# Patient Record
Sex: MALE | Race: WHITE | Employment: OTHER | URBAN - NONMETROPOLITAN AREA
[De-identification: names, ages, dates, MRNs, and addresses within clinical notes are randomized per-mention and may not be internally consistent; named-entity substitution may affect disease eponyms.]

---

## 2019-11-25 ENCOUNTER — HOSPITAL ENCOUNTER (INPATIENT)
Age: 64
LOS: 6 days | Discharge: HOME | DRG: 246 | End: 2019-12-01
Attending: EMERGENCY MEDICINE | Admitting: FAMILY MEDICINE
Payer: MEDICARE

## 2019-11-25 ENCOUNTER — APPOINTMENT (OUTPATIENT)
Dept: GENERAL RADIOLOGY | Age: 64
DRG: 246 | End: 2019-11-25
Payer: MEDICARE

## 2019-11-25 PROBLEM — I50.9 ACUTE DECOMPENSATED HEART FAILURE (HCC): Status: ACTIVE | Noted: 2019-11-25

## 2019-11-25 LAB
ALBUMIN SERPL-MCNC: 3.7 G/DL (ref 3.5–5.1)
ALP BLD-CCNC: 79 U/L (ref 38–126)
ALT SERPL-CCNC: 29 U/L (ref 11–66)
ANION GAP SERPL CALCULATED.3IONS-SCNC: 13 MEQ/L (ref 8–16)
APTT: 25.1 SECONDS (ref 22–38)
AST SERPL-CCNC: 30 U/L (ref 5–40)
BASOPHILS # BLD: 0.7 %
BASOPHILS ABSOLUTE: 0.1 THOU/MM3 (ref 0–0.1)
BILIRUB SERPL-MCNC: 0.5 MG/DL (ref 0.3–1.2)
BILIRUBIN DIRECT: < 0.2 MG/DL (ref 0–0.3)
BUN BLDV-MCNC: 16 MG/DL (ref 7–22)
CALCIUM SERPL-MCNC: 9.1 MG/DL (ref 8.5–10.5)
CHLORIDE BLD-SCNC: 95 MEQ/L (ref 98–111)
CO2: 26 MEQ/L (ref 23–33)
CREAT SERPL-MCNC: 0.7 MG/DL (ref 0.4–1.2)
EKG ATRIAL RATE: 95 BPM
EKG P AXIS: 58 DEGREES
EKG P-R INTERVAL: 134 MS
EKG Q-T INTERVAL: 384 MS
EKG QRS DURATION: 106 MS
EKG QTC CALCULATION (BAZETT): 482 MS
EKG R AXIS: 19 DEGREES
EKG T AXIS: 92 DEGREES
EKG VENTRICULAR RATE: 95 BPM
EOSINOPHIL # BLD: 0.9 %
EOSINOPHILS ABSOLUTE: 0.1 THOU/MM3 (ref 0–0.4)
ERYTHROCYTE [DISTWIDTH] IN BLOOD BY AUTOMATED COUNT: 12.3 % (ref 11.5–14.5)
ERYTHROCYTE [DISTWIDTH] IN BLOOD BY AUTOMATED COUNT: 46.4 FL (ref 35–45)
GFR SERPL CREATININE-BSD FRML MDRD: > 90 ML/MIN/1.73M2
GLUCOSE BLD-MCNC: 101 MG/DL (ref 70–108)
HCT VFR BLD CALC: 40.7 % (ref 42–52)
HEMOGLOBIN: 14.1 GM/DL (ref 14–18)
IMMATURE GRANS (ABS): 0.03 THOU/MM3 (ref 0–0.07)
IMMATURE GRANULOCYTES: 0.4 %
INR BLD: 1.1 (ref 0.85–1.13)
LYMPHOCYTES # BLD: 14.9 %
LYMPHOCYTES ABSOLUTE: 1.1 THOU/MM3 (ref 1–4.8)
MCH RBC QN AUTO: 35.6 PG (ref 26–33)
MCHC RBC AUTO-ENTMCNC: 34.6 GM/DL (ref 32.2–35.5)
MCV RBC AUTO: 102.8 FL (ref 80–94)
MONOCYTES # BLD: 9.2 %
MONOCYTES ABSOLUTE: 0.7 THOU/MM3 (ref 0.4–1.3)
NUCLEATED RED BLOOD CELLS: 0 /100 WBC
OSMOLALITY CALCULATION: 269.6 MOSMOL/KG (ref 275–300)
PLATELET # BLD: 262 THOU/MM3 (ref 130–400)
PMV BLD AUTO: 10.3 FL (ref 9.4–12.4)
POTASSIUM SERPL-SCNC: 4.5 MEQ/L (ref 3.5–5.2)
PRO-BNP: ABNORMAL PG/ML (ref 0–900)
PROCALCITONIN: 0.05 NG/ML (ref 0.01–0.09)
RBC # BLD: 3.96 MILL/MM3 (ref 4.7–6.1)
SEG NEUTROPHILS: 73.9 %
SEGMENTED NEUTROPHILS ABSOLUTE COUNT: 5.6 THOU/MM3 (ref 1.8–7.7)
SODIUM BLD-SCNC: 134 MEQ/L (ref 135–145)
TOTAL PROTEIN: 6.2 G/DL (ref 6.1–8)
TROPONIN T: < 0.01 NG/ML
WBC # BLD: 7.6 THOU/MM3 (ref 4.8–10.8)

## 2019-11-25 PROCEDURE — 85610 PROTHROMBIN TIME: CPT

## 2019-11-25 PROCEDURE — 85025 COMPLETE CBC W/AUTO DIFF WBC: CPT

## 2019-11-25 PROCEDURE — 80076 HEPATIC FUNCTION PANEL: CPT

## 2019-11-25 PROCEDURE — 99223 1ST HOSP IP/OBS HIGH 75: CPT | Performed by: FAMILY MEDICINE

## 2019-11-25 PROCEDURE — 84443 ASSAY THYROID STIM HORMONE: CPT

## 2019-11-25 PROCEDURE — 2500000003 HC RX 250 WO HCPCS: Performed by: EMERGENCY MEDICINE

## 2019-11-25 PROCEDURE — 84484 ASSAY OF TROPONIN QUANT: CPT

## 2019-11-25 PROCEDURE — 84145 PROCALCITONIN (PCT): CPT

## 2019-11-25 PROCEDURE — 85730 THROMBOPLASTIN TIME PARTIAL: CPT

## 2019-11-25 PROCEDURE — 93005 ELECTROCARDIOGRAM TRACING: CPT | Performed by: EMERGENCY MEDICINE

## 2019-11-25 PROCEDURE — 36415 COLL VENOUS BLD VENIPUNCTURE: CPT

## 2019-11-25 PROCEDURE — 71045 X-RAY EXAM CHEST 1 VIEW: CPT

## 2019-11-25 PROCEDURE — 99284 EMERGENCY DEPT VISIT MOD MDM: CPT

## 2019-11-25 PROCEDURE — 96374 THER/PROPH/DIAG INJ IV PUSH: CPT

## 2019-11-25 PROCEDURE — 1200000003 HC TELEMETRY R&B

## 2019-11-25 PROCEDURE — 80048 BASIC METABOLIC PNL TOTAL CA: CPT

## 2019-11-25 PROCEDURE — 83880 ASSAY OF NATRIURETIC PEPTIDE: CPT

## 2019-11-25 RX ORDER — ONDANSETRON 2 MG/ML
4 INJECTION INTRAMUSCULAR; INTRAVENOUS EVERY 6 HOURS PRN
Status: DISCONTINUED | OUTPATIENT
Start: 2019-11-25 | End: 2019-12-02 | Stop reason: HOSPADM

## 2019-11-25 RX ORDER — BUMETANIDE 0.25 MG/ML
1 INJECTION, SOLUTION INTRAMUSCULAR; INTRAVENOUS ONCE
Status: COMPLETED | OUTPATIENT
Start: 2019-11-25 | End: 2019-11-25

## 2019-11-25 RX ORDER — BUMETANIDE 0.25 MG/ML
1 INJECTION, SOLUTION INTRAMUSCULAR; INTRAVENOUS 2 TIMES DAILY
Status: DISCONTINUED | OUTPATIENT
Start: 2019-11-26 | End: 2019-11-26

## 2019-11-25 RX ORDER — ACETAMINOPHEN 500 MG
500 TABLET ORAL DAILY
Status: ON HOLD | COMMUNITY
End: 2019-12-01 | Stop reason: SDUPTHER

## 2019-11-25 RX ORDER — IBUPROFEN 200 MG
200 TABLET ORAL DAILY
Status: ON HOLD | COMMUNITY
End: 2019-11-28 | Stop reason: HOSPADM

## 2019-11-25 RX ORDER — SODIUM CHLORIDE 0.9 % (FLUSH) 0.9 %
10 SYRINGE (ML) INJECTION EVERY 12 HOURS SCHEDULED
Status: DISCONTINUED | OUTPATIENT
Start: 2019-11-26 | End: 2019-12-02 | Stop reason: HOSPADM

## 2019-11-25 RX ORDER — SODIUM CHLORIDE 0.9 % (FLUSH) 0.9 %
10 SYRINGE (ML) INJECTION PRN
Status: DISCONTINUED | OUTPATIENT
Start: 2019-11-25 | End: 2019-11-27 | Stop reason: SDUPTHER

## 2019-11-25 RX ADMIN — BUMETANIDE 1 MG: 0.25 INJECTION INTRAMUSCULAR; INTRAVENOUS at 21:41

## 2019-11-25 ASSESSMENT — PAIN DESCRIPTION - ONSET
ONSET: ON-GOING
ONSET: ON-GOING

## 2019-11-25 ASSESSMENT — PAIN SCALES - GENERAL
PAINLEVEL_OUTOF10: 0
PAINLEVEL_OUTOF10: 6
PAINLEVEL_OUTOF10: 6

## 2019-11-25 ASSESSMENT — ENCOUNTER SYMPTOMS
TROUBLE SWALLOWING: 0
ABDOMINAL DISTENTION: 0
PHOTOPHOBIA: 0
DIARRHEA: 0
CONSTIPATION: 0
BLOOD IN STOOL: 0
RHINORRHEA: 0
BACK PAIN: 0
EYE PAIN: 0
COUGH: 0
SINUS PRESSURE: 0
EYE ITCHING: 0
VOICE CHANGE: 0
EYE REDNESS: 0
CHOKING: 0
NAUSEA: 0
EYE DISCHARGE: 0
SORE THROAT: 0
WHEEZING: 0
ABDOMINAL PAIN: 0
CHEST TIGHTNESS: 0
SHORTNESS OF BREATH: 0
VOMITING: 0

## 2019-11-25 ASSESSMENT — PAIN DESCRIPTION - LOCATION
LOCATION: ANKLE;FOOT
LOCATION: ANKLE;FOOT

## 2019-11-25 ASSESSMENT — PAIN DESCRIPTION - PROGRESSION
CLINICAL_PROGRESSION: NOT CHANGED
CLINICAL_PROGRESSION: NOT CHANGED

## 2019-11-25 ASSESSMENT — PAIN DESCRIPTION - PAIN TYPE
TYPE: ACUTE PAIN
TYPE: ACUTE PAIN

## 2019-11-25 ASSESSMENT — PAIN DESCRIPTION - FREQUENCY
FREQUENCY: CONTINUOUS
FREQUENCY: CONTINUOUS

## 2019-11-25 ASSESSMENT — PAIN DESCRIPTION - ORIENTATION
ORIENTATION: LEFT;RIGHT
ORIENTATION: RIGHT;LEFT

## 2019-11-25 ASSESSMENT — PAIN DESCRIPTION - DESCRIPTORS
DESCRIPTORS: SHOOTING
DESCRIPTORS: SHOOTING

## 2019-11-25 NOTE — LETTER
Beneficiary Notification Letter     This [place] Provider is Participating in an Innovative Payment and [address] [place] [address] [place] from Henri Maldonado:   Cheryl is participating in a Medicare initiative called the [facility] for [address] [facility]. You are receiving this letter because your health care provider has identified you as a patient who is receiving care through this initiative. Health care providers participating in the [facility], including Cheryl, will work with Medicare to improve care for patients. Your Medicare rights have not been changed. You still have all the same Medicare rights and protections, including the right to choose which hospital, doctor, or other health care provider you see. However, because Cheryl chose to participate in the [address] [place] [address], all Medicare beneficiaries who meet the eligibility criteria of this initiative will receive care under the initiative. If you do not wish to receive care under the [employer] [facility] [address] [facility], you must choose a health care provider that does not participate in this initiative for your care. Regardless of which health care provider you see, Medicare will continue to cover all of your medically necessary services. Bundled Payments for Care Improvement Advanced aims to help improve your care     The [employer] [facility] [facility] is an innovative Medicare initiative that encourages your doctors, hospitals, and other health care providers to work more closely together so you get better care during and following certain hospital stays.  In this initiative, doctors and hospitals may work closely with certain health care providers and 1-800- MEDICARE (4-572.273.7572). TTY users should call 6-781.252.4523. · To find a different doctor, visit Medicare's Physician Compare website, HDTapes.co.nz, or call 1-800-MEDICARE (466 6330). TTY users should call 0-419.375.1535. · To find a different skilled nursing facility, visit [employer] website, https://www.[employer]/, or call 1-800-MEDICARE (1- 353.541.2590). TTY users should call 6-592.586.6848. · To find a different long term care hospital, visit [facility] [address] Compare website, bluebird bi[employer]Hookflash[employer]ant, or call 1-800- MEDICARE (321 8547). TTY users should call 8-488.671.6236. · To find a different inpatient rehabilitation facility, visit [facility] Compare website, www.medicare.gov/ inpatientrehabilitation facilitycompare, or call 1-800-MEDICARE (6-437.236.8834). TTY users should call 2- 365.299.8390. · To find a different home health agency, visit [address] website, www.medicare.gov/homehealthcompare, or call 1-800-MEDICARE (8-180- 430-3064). TTY users should call 8-205.241.4299.

## 2019-11-26 ENCOUNTER — APPOINTMENT (OUTPATIENT)
Dept: GENERAL RADIOLOGY | Age: 64
DRG: 246 | End: 2019-11-26
Payer: MEDICARE

## 2019-11-26 PROBLEM — I50.21 ACUTE SYSTOLIC (CONGESTIVE) HEART FAILURE (HCC): Status: ACTIVE | Noted: 2019-11-25

## 2019-11-26 PROBLEM — F10.10 ETOH ABUSE: Status: ACTIVE | Noted: 2019-11-26

## 2019-11-26 PROBLEM — I47.29 NONSUSTAINED VENTRICULAR TACHYCARDIA: Status: ACTIVE | Noted: 2019-11-26

## 2019-11-26 PROBLEM — I48.0 PAROXYSMAL ATRIAL FIBRILLATION (HCC): Status: ACTIVE | Noted: 2019-11-26

## 2019-11-26 PROBLEM — Z72.0 TOBACCO ABUSE: Status: ACTIVE | Noted: 2019-11-26

## 2019-11-26 PROBLEM — I10 ESSENTIAL HYPERTENSION: Status: ACTIVE | Noted: 2019-11-26

## 2019-11-26 LAB
ANION GAP SERPL CALCULATED.3IONS-SCNC: 11 MEQ/L (ref 8–16)
BASOPHILS # BLD: 0.6 %
BASOPHILS ABSOLUTE: 0.1 THOU/MM3 (ref 0–0.1)
BUN BLDV-MCNC: 16 MG/DL (ref 7–22)
CALCIUM SERPL-MCNC: 9 MG/DL (ref 8.5–10.5)
CHLORIDE BLD-SCNC: 93 MEQ/L (ref 98–111)
CHOLESTEROL, TOTAL: 135 MG/DL (ref 100–199)
CO2: 26 MEQ/L (ref 23–33)
CREAT SERPL-MCNC: 0.7 MG/DL (ref 0.4–1.2)
EOSINOPHIL # BLD: 1.3 %
EOSINOPHILS ABSOLUTE: 0.1 THOU/MM3 (ref 0–0.4)
ERYTHROCYTE [DISTWIDTH] IN BLOOD BY AUTOMATED COUNT: 12.1 % (ref 11.5–14.5)
ERYTHROCYTE [DISTWIDTH] IN BLOOD BY AUTOMATED COUNT: 45.9 FL (ref 35–45)
GFR SERPL CREATININE-BSD FRML MDRD: > 90 ML/MIN/1.73M2
GLUCOSE BLD-MCNC: 115 MG/DL (ref 70–108)
HCT VFR BLD CALC: 41.6 % (ref 42–52)
HDLC SERPL-MCNC: 26 MG/DL
HEMOGLOBIN: 14.6 GM/DL (ref 14–18)
IMMATURE GRANS (ABS): 0.03 THOU/MM3 (ref 0–0.07)
IMMATURE GRANULOCYTES: 0.3 %
LDL CHOLESTEROL CALCULATED: 89 MG/DL
LV EF: 20 %
LVEF MODALITY: NORMAL
LYMPHOCYTES # BLD: 15.9 %
LYMPHOCYTES ABSOLUTE: 1.5 THOU/MM3 (ref 1–4.8)
MAGNESIUM: 1.8 MG/DL (ref 1.6–2.4)
MAGNESIUM: 2 MG/DL (ref 1.6–2.4)
MCH RBC QN AUTO: 35.9 PG (ref 26–33)
MCHC RBC AUTO-ENTMCNC: 35.1 GM/DL (ref 32.2–35.5)
MCV RBC AUTO: 102.2 FL (ref 80–94)
MONOCYTES # BLD: 9.1 %
MONOCYTES ABSOLUTE: 0.9 THOU/MM3 (ref 0.4–1.3)
NUCLEATED RED BLOOD CELLS: 0 /100 WBC
PLATELET # BLD: 268 THOU/MM3 (ref 130–400)
PMV BLD AUTO: 10.5 FL (ref 9.4–12.4)
POTASSIUM SERPL-SCNC: 4.1 MEQ/L (ref 3.5–5.2)
POTASSIUM SERPL-SCNC: 4.3 MEQ/L (ref 3.5–5.2)
RBC # BLD: 4.07 MILL/MM3 (ref 4.7–6.1)
SEG NEUTROPHILS: 72.8 %
SEGMENTED NEUTROPHILS ABSOLUTE COUNT: 6.8 THOU/MM3 (ref 1.8–7.7)
SODIUM BLD-SCNC: 130 MEQ/L (ref 135–145)
TRIGL SERPL-MCNC: 101 MG/DL (ref 0–199)
TSH SERPL DL<=0.05 MIU/L-ACNC: 1.36 UIU/ML (ref 0.4–4.2)
WBC # BLD: 9.4 THOU/MM3 (ref 4.8–10.8)

## 2019-11-26 PROCEDURE — 80048 BASIC METABOLIC PNL TOTAL CA: CPT

## 2019-11-26 PROCEDURE — 83735 ASSAY OF MAGNESIUM: CPT

## 2019-11-26 PROCEDURE — 2580000003 HC RX 258: Performed by: INTERNAL MEDICINE

## 2019-11-26 PROCEDURE — 36415 COLL VENOUS BLD VENIPUNCTURE: CPT

## 2019-11-26 PROCEDURE — 94760 N-INVAS EAR/PLS OXIMETRY 1: CPT

## 2019-11-26 PROCEDURE — 2500000003 HC RX 250 WO HCPCS: Performed by: FAMILY MEDICINE

## 2019-11-26 PROCEDURE — 2500000003 HC RX 250 WO HCPCS: Performed by: INTERNAL MEDICINE

## 2019-11-26 PROCEDURE — 93306 TTE W/DOPPLER COMPLETE: CPT

## 2019-11-26 PROCEDURE — 84132 ASSAY OF SERUM POTASSIUM: CPT

## 2019-11-26 PROCEDURE — 6360000002 HC RX W HCPCS: Performed by: INTERNAL MEDICINE

## 2019-11-26 PROCEDURE — 6370000000 HC RX 637 (ALT 250 FOR IP): Performed by: INTERNAL MEDICINE

## 2019-11-26 PROCEDURE — 6370000000 HC RX 637 (ALT 250 FOR IP): Performed by: FAMILY MEDICINE

## 2019-11-26 PROCEDURE — 85025 COMPLETE CBC W/AUTO DIFF WBC: CPT

## 2019-11-26 PROCEDURE — 51798 US URINE CAPACITY MEASURE: CPT

## 2019-11-26 PROCEDURE — 99223 1ST HOSP IP/OBS HIGH 75: CPT | Performed by: INTERNAL MEDICINE

## 2019-11-26 PROCEDURE — 6360000002 HC RX W HCPCS: Performed by: FAMILY MEDICINE

## 2019-11-26 PROCEDURE — 2580000003 HC RX 258: Performed by: FAMILY MEDICINE

## 2019-11-26 PROCEDURE — 99233 SBSQ HOSP IP/OBS HIGH 50: CPT | Performed by: INTERNAL MEDICINE

## 2019-11-26 PROCEDURE — 71045 X-RAY EXAM CHEST 1 VIEW: CPT

## 2019-11-26 PROCEDURE — 1200000003 HC TELEMETRY R&B

## 2019-11-26 PROCEDURE — 80061 LIPID PANEL: CPT

## 2019-11-26 RX ORDER — SODIUM CHLORIDE 0.9 % (FLUSH) 0.9 %
10 SYRINGE (ML) INJECTION EVERY 12 HOURS SCHEDULED
Status: DISCONTINUED | OUTPATIENT
Start: 2019-11-26 | End: 2019-12-02 | Stop reason: HOSPADM

## 2019-11-26 RX ORDER — PHENOBARBITAL 32.4 MG/1
32.4 TABLET ORAL 2 TIMES DAILY
Status: DISCONTINUED | OUTPATIENT
Start: 2019-11-27 | End: 2019-11-28

## 2019-11-26 RX ORDER — NICOTINE 21 MG/24HR
1 PATCH, TRANSDERMAL 24 HOURS TRANSDERMAL DAILY
Status: DISCONTINUED | OUTPATIENT
Start: 2019-11-26 | End: 2019-12-02 | Stop reason: HOSPADM

## 2019-11-26 RX ORDER — SPIRONOLACTONE 25 MG/1
25 TABLET ORAL DAILY
Status: DISCONTINUED | OUTPATIENT
Start: 2019-11-26 | End: 2019-12-02 | Stop reason: HOSPADM

## 2019-11-26 RX ORDER — BUMETANIDE 1 MG/1
1 TABLET ORAL 2 TIMES DAILY
Status: DISCONTINUED | OUTPATIENT
Start: 2019-11-26 | End: 2019-11-26

## 2019-11-26 RX ORDER — FOLIC ACID 1 MG/1
1 TABLET ORAL DAILY
Status: DISCONTINUED | OUTPATIENT
Start: 2019-11-26 | End: 2019-12-02 | Stop reason: HOSPADM

## 2019-11-26 RX ORDER — BUMETANIDE 1 MG/1
0.5 TABLET ORAL 2 TIMES DAILY
Status: DISCONTINUED | OUTPATIENT
Start: 2019-11-27 | End: 2019-12-02 | Stop reason: HOSPADM

## 2019-11-26 RX ORDER — LISINOPRIL 2.5 MG/1
2.5 TABLET ORAL 2 TIMES DAILY
Status: DISCONTINUED | OUTPATIENT
Start: 2019-11-26 | End: 2019-11-26

## 2019-11-26 RX ORDER — THIAMINE MONONITRATE (VIT B1) 100 MG
100 TABLET ORAL DAILY
Status: DISCONTINUED | OUTPATIENT
Start: 2019-11-26 | End: 2019-12-02 | Stop reason: HOSPADM

## 2019-11-26 RX ORDER — LISINOPRIL 5 MG/1
5 TABLET ORAL ONCE
Status: COMPLETED | OUTPATIENT
Start: 2019-11-26 | End: 2019-11-26

## 2019-11-26 RX ORDER — SODIUM CHLORIDE 0.9 % (FLUSH) 0.9 %
10 SYRINGE (ML) INJECTION PRN
Status: DISCONTINUED | OUTPATIENT
Start: 2019-11-26 | End: 2019-12-02 | Stop reason: HOSPADM

## 2019-11-26 RX ORDER — PHENOBARBITAL 32.4 MG/1
64.8 TABLET ORAL 2 TIMES DAILY
Status: DISPENSED | OUTPATIENT
Start: 2019-11-26 | End: 2019-11-27

## 2019-11-26 RX ORDER — CARVEDILOL 6.25 MG/1
6.25 TABLET ORAL 2 TIMES DAILY WITH MEALS
Status: DISCONTINUED | OUTPATIENT
Start: 2019-11-26 | End: 2019-12-02 | Stop reason: HOSPADM

## 2019-11-26 RX ORDER — BUMETANIDE 0.25 MG/ML
1 INJECTION, SOLUTION INTRAMUSCULAR; INTRAVENOUS ONCE
Status: COMPLETED | OUTPATIENT
Start: 2019-11-26 | End: 2019-11-26

## 2019-11-26 RX ADMIN — PHENOBARBITAL SODIUM 227.5 MG: 65 INJECTION INTRAMUSCULAR; INTRAVENOUS at 21:58

## 2019-11-26 RX ADMIN — PHENOBARBITAL SODIUM 227.5 MG: 65 INJECTION INTRAMUSCULAR; INTRAVENOUS at 11:21

## 2019-11-26 RX ADMIN — MAGNESIUM SULFATE HEPTAHYDRATE 1 G: 500 INJECTION, SOLUTION INTRAMUSCULAR; INTRAVENOUS at 13:11

## 2019-11-26 RX ADMIN — BUMETANIDE 1 MG: 0.25 INJECTION INTRAMUSCULAR; INTRAVENOUS at 17:45

## 2019-11-26 RX ADMIN — SODIUM CHLORIDE, PRESERVATIVE FREE 10 ML: 5 INJECTION INTRAVENOUS at 08:53

## 2019-11-26 RX ADMIN — ENOXAPARIN SODIUM 60 MG: 60 INJECTION SUBCUTANEOUS at 17:45

## 2019-11-26 RX ADMIN — LISINOPRIL 5 MG: 5 TABLET ORAL at 13:11

## 2019-11-26 RX ADMIN — PHENOBARBITAL 64.8 MG: 32.4 TABLET ORAL at 23:52

## 2019-11-26 RX ADMIN — Medication 100 MG: at 08:52

## 2019-11-26 RX ADMIN — BUMETANIDE 1 MG: 0.25 INJECTION INTRAMUSCULAR; INTRAVENOUS at 08:52

## 2019-11-26 RX ADMIN — CARVEDILOL 6.25 MG: 6.25 TABLET, FILM COATED ORAL at 14:23

## 2019-11-26 RX ADMIN — SODIUM CHLORIDE, PRESERVATIVE FREE 10 ML: 5 INJECTION INTRAVENOUS at 22:00

## 2019-11-26 RX ADMIN — ENOXAPARIN SODIUM 40 MG: 40 INJECTION SUBCUTANEOUS at 08:52

## 2019-11-26 RX ADMIN — SPIRONOLACTONE 25 MG: 25 TABLET ORAL at 14:23

## 2019-11-26 RX ADMIN — PHENOBARBITAL SODIUM 227.5 MG: 65 INJECTION INTRAMUSCULAR; INTRAVENOUS at 16:10

## 2019-11-26 RX ADMIN — LISINOPRIL 2.5 MG: 2.5 TABLET ORAL at 10:08

## 2019-11-26 RX ADMIN — FOLIC ACID 1 MG: 1 TABLET ORAL at 08:52

## 2019-11-26 ASSESSMENT — PAIN SCALES - GENERAL
PAINLEVEL_OUTOF10: 0

## 2019-11-26 NOTE — FLOWSHEET NOTE
11/26/19 0843   Vital Signs   Temp 98.3 °F (36.8 °C)   Temp Source Oral   Pulse 98   Heart Rate Source Monitor   Resp 16   BP (!) 162/102  (Manual - Dr. Aura Galicia)   BP Location Left upper arm     Dr. Lorene Tim notified of manual /102, he has been consistently hypertensive since admission from the ED with no BP meds given or anything PRN. See orders placed.

## 2019-11-26 NOTE — ED TRIAGE NOTES
Pt to er. Pt c/o bilateral leg swelling from the calfs down. Pt states hurts to walk on them. Denies any SOB or CP. States been going on for a week.

## 2019-11-26 NOTE — PROGRESS NOTES
Utilize [facility] alcohol withdrawal scale (Based on [place] Modified Alcohol Withdrawal Scale). Tabulate score based on classifications including Tremor, Sweating, Hallucination, Orientation, and Agitation. Tremor: 0  Sweatin  Hallucinations: 0  Orientation: 0  Agitation: 0  Total Score: 0  Action perform as described below     Tremor:  No tremor is 0 points. Tremor on movement is 1 point. Tremor at rest is 2 points. Sweating: No Sweat 0 points. Moist is 1 point. Drenching sweats is 2 points. Hallucinations: No present 0 points. Dissuadable is 1 point. Not dissuadable is 2 points. Orientation: Oriented 0 points. Vague/detached 1 point. Disoriented/no contact 2 points. Agitation: Calm 0 points. Anxious 1 point. Panicky 2 points. Check scale every 2 hours. Discontinue scoring with 4 consecutive scorings of 0. Scale 0: No phenobarbital given. Re-assess every 30 minutes as needed. Scale 1-3: Phenobarbital 130 mg IV over 3 minutes. Re-assess every 30 minutes as needed. May administer every 30 minutes to a maximum dose of phenobarbital 1040 mg in 24 hours! Scale 4-8: Phenobarbital 260 mg IV over 5 minutes. Re-assess every 30 minutes as needed. May administer every 30 minutes to a maximum dose of phenobarbital 1040mg in 24 hours! Scale 9-10: Transfer to ICU (if not already in ICU). Administer 10mg/kg phenobarbital IV over 30 minutes. Maximum dose phenobarbital is 1040mg in 24 hours!

## 2019-11-26 NOTE — ED NOTES
Upon first contact, pt resting comfortably with no concerns voiced at this time. Side rails up x2 and call light in reach.        [facility]  11/25/19 2044

## 2019-11-26 NOTE — PRE SEDATION
[address] [address]  Sedation/Analgesia History & Physical    Pt Name: Keturah Sheridan  Account number: [de-identified]  MRN: [id number]  YOB: 1955  Provider Performing Procedure: Reyes Hilario MD  Referring Provider: Juan Shen MD   Primary Care Physician: No primary care provider on file. Date: 11/26/2019    PRE-PROCEDURE    Code Status: FULL CODE  Brief History/Pre-Procedure Diagnosis: +    Acute combined chf  CMP EF 20%  PAF  HTN  chadsvasc =3  atr fib   NSVT 6 beats at rate of 150 bpm    Consent: : I have discussed with the patient risks, benefits, and alternatives (and relevant risks, benefits, and side effects related to alternatives or not receiving care), and likelihood of the success. The patient and/or representative appear to understand and agree to proceed. The discussion encompasses risks, benefits, and side effects related to the alternatives and the risks related to not receiving the proposed care, treatment, and services. The indication, risks and benefits of the procedure and possible therapeutic consequences and alternatives were discussed with the patient. The patient was given the opportunity to ask questions and to have them answered to his/her satisfaction. Risks of the procedure include but are not limited to the following: Bleeding, hematoma including retroperitoneal hemmorhage, infection, pain and discomfort, injury to the aorta and other blood vessels, rhythm disturbance, low blood pressure, myocardial infarction, stroke, kidney damage/failure, myocardial perforation, allergic reactions to sedatives/contrast material, loss of pulse/vascular compromise requiring surgery, aneurysm/pseudoaneurysm formation, possible loss of a limb/hand/leg, needing blood transfusion, requiring emergent open heart surgery or emergent coronary intervention, the need for intubation/respiratory support, the requirement for defibrillation/cardioversion, and death.  Alternatives to and omission of the suggested procedure were discussed. The patient had no further questions and wished to proceed; the consent form was signed. MEDICAL HISTORY  []ASHD/ANGINA/MI/CHF   [x]Hypertension  []Diabetes  []Hyperlipidemia  []Smoking  []Family Hx of ASHD  []Additional information:       has a past medical history of Asthma, Pneumonia, and Ulcer. SURGICAL HISTORY   has a past surgical history that includes Tonsillectomy and adenoidectomy.   Additional information:       ALLERGIES   Allergies as of 11/25/2019    (No Known Allergies)     Additional information:       MEDICATIONS   Aspirin  [] 81 mg  [] 325 mg  [] None  Antiplatelet drug therapy use last 5 days  [x]No []Yes  Coumadin Use Last 5 Days [x]No []Yes  Other anticoagulant use last 5 days  []No [x]Yes    Current Facility-Administered Medications:     sodium chloride flush 0.9 % injection 10 mL, 10 mL, Intravenous, 2 times per day, Dyana Sharp MD    sodium chloride flush 0.9 % injection 10 mL, 10 mL, Intravenous, PRN, Dyana Sharp MD    vitamin B-1 (THIAMINE) tablet 100 mg, 100 mg, Oral, Daily, Dyana Sharp MD, 100 mg at 53/64/91 5722    folic acid (FOLVITE) tablet 1 mg, 1 mg, Oral, Daily, Dyana Sharp MD, 1 mg at 11/26/19 0852    lisinopril (PRINIVIL;ZESTRIL) tablet 2.5 mg, 2.5 mg, Oral, BID, Marylene Scotland, MD, 2.5 mg at 11/26/19 1008    nicotine (NICODERM CQ) 14 MG/24HR 1 patch, 1 patch, Transdermal, Daily, Dyana Sharp MD, 1 patch at 11/26/19 1008    PHENobarbital (LUMINAL) 227.5 mg in sodium chloride 0.9 % 100 mL IVPB, 4 mg/kg (Ideal), Intravenous, Q4H, Stopped at 11/26/19 1630 **FOLLOWED BY** PHENobarbital (LUMINAL) tablet 64.8 mg, 64.8 mg, Oral, BID **FOLLOWED BY** [START ON 11/27/2019] PHENobarbital (LUMINAL) tablet 32.4 mg, 32.4 mg, Oral, BID, Marylene Scotland, MD    enoxaparin (LOVENOX) injection 60 mg, 1 mg/kg, Subcutaneous, BID, Marylene Scotland, MD    potassium (CARDIAC) replacement protocol, III   Lungs: Clear to auscultation    Abdomen: BS present, without HSM, masses, or tenderness    Extremities: without C,C,E.  Pulses 2+ bilaterally  Mental Status: Alert & Oriented        PLANNED PROCEDURE   [x]Cath  [x]PCI                []Pacemaker/AICD  []ANICETO             []Cardioversion []Peripheral angiography/PTA  []Other:      SEDATION  Planned agent:[x]Midazolam []Meperidine []Sublimaze []Morphine  []Diazepam  []Other:     ASA Classification:  []1 []2 [x]3 []4 []5   Class 1: A normal healthy patient  Class 2: Pt with mild to moderate systemic disease  Class 3: Severe systemic disease or disturbance  Class 4: Severe systemic disorders that are already life threatening. Class 5: Moribund pt with little chances of survival, for more than 24 hours. Mallampati I Airway Classification:   []1 [x]2 []3 []4     [x]Pre-procedure diagnostic studies complete and results available. Comment:    [x]Previous sedation/anesthesia experiences assessed. Comment:    [x]The patient is an appropriate candidate to undergo the planned procedure sedation and anesthesia. (Refer to nursing sedation/analgesia documentation record)  [x]Formulation and discussion of sedation/procedure plan, risks, and expectations with patient and/or responsible adult completed. [x]Patient examined immediately prior to the procedure.  (Refer to nursing sedation/analgesia documentation record)    Assessment:    Acute combined chf  CMP EF 20%  PAF  HTN  chadsvasc =3  atr fib   NSVT 6 beats at rate of 150 bpm     Plan:  RHC, LHC, Possible PCI in AM   Hold Lovenox in AM  Start SHANE Kenney MD   Electronically signed 11/26/2019 at 4:59 PM

## 2019-11-26 NOTE — PROGRESS NOTES
Nutrition Education    Type and Reason for Visit: Initial, Consult, Patient Education(heart failure guidelines)    Nutrition Assessment:     Good appetite. Has never had CHF or edema issues in past per pt. Note alcohol abuse. · Verbally reviewed information with Patient: Low Sodium/CHF diet and fluid recommendations. · Written educational materials provided. · Contact name and number provided. · Refer to Patient Education activity for more details.     Electronically signed by Danton Boeck, RD, LD on 11/26/19 at 3:21 PM    Contact Number: [phone]

## 2019-11-26 NOTE — H&P
History & Physical       Patient: Selwyn Garner  YOB: 1955    MRN: [id number]     Acct: [de-identified]    PCP: No primary care provider on file. Date of Admission: 11/25/2019    Date of Service: Patient seen / examined on 11/25/19 and admitted to inpatient with expected LOS greater than two midnights due to medical therapy. ASSESSMENT / PLAN:    New CHF with acute exacerbation  In the setting of chronic alcohol abuse. + Bilateral LE edema. Pro-BNP 16.5K. Mild cardiomegaly, pulm edema and tiny bilateral pleural effusions noted on CXR (reviewed). Procal negative. S/p 1 mg IV bumex in the ED. Patient has never seen cardiology as OP. Admit to telemetry. Continue IV diuresis. Monitor strict I/Os, daily weights. 2D ECHO and repeat CXR ordered for the AM. Check TSH, lipid panel. Cardiology consulted for additional recommendations / patient needs established as OP. CM/SW consulted. ?New incomplete LBBB (compared to previous EKG from 2013)  Trop neg x 1. Currently asymptomatic of CP. Cardiology consulted for additional recommendations (IP vs OP workup). Hypertension  Acute vs chronic. If persistent, would consider initiation of ACE-I +/- BB. Encourage tobacco/alcohol cessation. Monitor BP. Macrocytosis without anemia  Suspect 2/2 chronic alcohol use. Check B12/folate (no previous). Monitor with daily CBC. Chronic alcohol abuse  Patient does not express desire to quit or cut back at this time. Denies hx withdrawal. Last drink earlier was this afternoon. Daily thiamine / folate ordered. Alcohol assessment ordered / load with phenobarb if/when indicated. Counseling provided. Chronic tobacco abuse  Cessation counseling > 5 minutes. Patient agreed to try nicotine patch / ordered. Asthma  Stable / without exacerbation. Aerosols prn. Would benefit from establishing with OP PCP for ongoing management.       Chief Complaint: bilateral leg swelling    History of Present Illness:  74 y/o

## 2019-11-26 NOTE — FLOWSHEET NOTE
11/26/19 1113   Vital Signs   Temp 98 °F (36.7 °C)   Temp Source Oral   Pulse 102   Heart Rate Source Monitor   Resp 16   BP (!) 168/114  (Dr. Nazia Kulkarni notified)   BP Location Left upper arm   BP Upper/Lower Upper   MAP (mmHg) 134     Notified Dr. Nazia Kulkarni of blood pressure reading of 168/114 after receiving Lisinopril this morning. Orders for additional 1 time dose of Lisinopril. While Dr. Nazia Kulkarni sitting here, patient had 6 beats of Vtach. Patient asymptomatic sitting at bedside eating lunch. Dr. Nazia Kulkarni also notified off and on A.fib on telemetry monitoring which is new for patient, was in Sinus Tach this morning.  Rapid nurse notified to add to watcher list.

## 2019-11-26 NOTE — PROGRESS NOTES
Assessment and Plan:        1. Progressive leg swelling; likely due to systolic chf; diurese. HIS EF IS 15%! 2. hbp-lisinopril; suspect he has had longstanding hbp based on his ekg. 3. Systolic chf- echo, BB later, consulted Cardio  4. signif etoh intake-phenobarb prophylaxis  5. Smoker- nicotine patch  6. Urinary slowing- check bladder scan  7. Paroxysmal afib- seen on tele- will need anticoag  8. Nonsustained VT- short run- not surprising; follow lytes  9. Will need cath, life vest        CC:  Leg swelling over several wks  HPI:  Pt with hx etoh, tobacco abuse, no medical care in some time, Pt presented with above    ROS (12 point review of systems completed. Pertinent positives noted.  Otherwise ROS is negative) : slowness of urinary stream  PMH:  Per HPI  SHX:  , lives alone  FHX: heart, cancer, dm, cva  Allergies: See above    Medications:       sodium chloride flush  10 mL Intravenous 2 times per day    thiamine  100 mg Oral Daily    folic acid  1 mg Oral Daily    lisinopril  2.5 mg Oral BID    nicotine  1 patch Transdermal Daily    PHENobarbital IVPB  4 mg/kg (Ideal) Intravenous Q4H    Followed by   [facility] PHENobarbital  64.8 mg Oral BID    Followed by   [facility] ON 11/27/2019] PHENobarbital  32.4 mg Oral BID    sodium chloride flush  10 mL Intravenous 2 times per day    enoxaparin  40 mg Subcutaneous Daily    bumetanide  1 mg Intravenous BID       Vital Signs:   BP (!) 168/104   Pulse 98   Temp 98.3 °F (36.8 °C) (Oral)   Resp 16   Ht 5' 3\" (1.6 m)   Wt 130 lb 1.6 oz (59 kg)   SpO2 92%   BMI 23.05 kg/m²      Intake/Output Summary (Last 24 hours) at 11/26/2019 1007  Last data filed at 11/26/2019 1004  Gross per 24 hour   Intake 360 ml   Output 1158 ml   Net -798 ml        Physical Examination: General appearance - chronically ill appearing  Mental status - alert, oriented to person, place, and time  Neck - supple, no significant adenopathy, no JVD, or carotid bruits  Chest - fine

## 2019-11-26 NOTE — ED PROVIDER NOTES
Negative for dizziness, tremors, seizures, syncope, facial asymmetry, weakness, light-headedness, numbness and headaches. Hematological: Negative for adenopathy. Does not bruise/bleed easily. Psychiatric/Behavioral: Negative for agitation, hallucinations and suicidal ideas. The patient is not nervous/anxious. PAST MEDICAL HISTORY    has a past medical history of Asthma, CHF (congestive heart failure) ([address] Pneumonia, and Ulcer. SURGICAL HISTORY      has a past surgical history that includes Tonsillectomy and adenoidectomy. CURRENT MEDICATIONS       Current Discharge Medication List      CONTINUE these medications which have NOT CHANGED    Details   Multiple Vitamins-Minerals (CENTRUM SILVER 50+MEN) TABS Take 1 tablet by mouth daily      ibuprofen (ADVIL;MOTRIN) 200 MG tablet Take 200 mg by mouth daily      acetaminophen (TYLENOL) 500 MG tablet Take 500 mg by mouth daily      Potassium 99 MG TABS Take 1 tablet by mouth daily             ALLERGIES     has No Known Allergies. FAMILY HISTORY     He indicated that the status of his mother is unknown. He indicated that the status of his father is unknown. He indicated that the status of his maternal grandmother is unknown. He indicated that the status of his maternal grandfather is unknown. He indicated that the status of his paternal grandmother is unknown. He indicated that the status of his paternal grandfather is unknown. He indicated that the status of his maternal aunt is unknown. He indicated that the status of his neg hx is unknown.   family history includes Cancer in his maternal aunt; Heart Attack in his father, maternal grandfather, maternal grandmother, mother, paternal grandfather, and paternal grandmother; Stroke in his paternal grandmother. SOCIAL HISTORY    reports that he has been smoking pipe. He has smoked for the past 50.00 years. He has never used smokeless tobacco. He reports current alcohol use.  He reports that he does not interstitial infiltrates consistent with an atypical pneumonia or pulmonary edema as detailed above. Tiny bilateral pleural effusions. Mild cardiomegaly. **This report has been created using voice recognition software. It may contain minor errors which are inherent in voice recognition technology. **      Final report electronically signed by Dr. Анна Tillman on 11/25/2019 9:10 PM          LABS:   Labs Reviewed   CBC WITH AUTO DIFFERENTIAL - Abnormal; Notable for the following components:       Result Value    RBC 3.96 (*)     Hematocrit 40.7 (*)     .8 (*)     MCH 35.6 (*)     RDW-SD 46.4 (*)     All other components within normal limits   BASIC METABOLIC PANEL - Abnormal; Notable for the following components:    Sodium 134 (*)     Chloride 95 (*)     All other components within normal limits   BRAIN NATRIURETIC PEPTIDE - Abnormal; Notable for the following components:    Pro-BNP 85486.0 (*)     All other components within normal limits   OSMOLALITY - Abnormal; Notable for the following components:    Osmolality Calc 269.6 (*)     All other components within normal limits   BASIC METABOLIC PANEL - Abnormal; Notable for the following components:    Sodium 130 (*)     Chloride 93 (*)     Glucose 115 (*)     All other components within normal limits   CBC WITH AUTO DIFFERENTIAL - Abnormal; Notable for the following components:    RBC 4.07 (*)     Hematocrit 41.6 (*)     .2 (*)     MCH 35.9 (*)     RDW-SD 45.9 (*)     All other components within normal limits   TROPONIN   ANION GAP   GLOMERULAR FILTRATION RATE, ESTIMATED   PROTIME-INR   APTT   PROCALCITONIN   HEPATIC FUNCTION PANEL   MAGNESIUM   LIPID PANEL   TSH WITHOUT REFLEX   ANION GAP   GLOMERULAR FILTRATION RATE, ESTIMATED       EMERGENCY DEPARTMENT COURSE:   Vitals:    Vitals:    11/25/19 2210 11/25/19 2309 11/26/19 0408 11/26/19 0457   BP: (!) 164/108 (!) 164/104 (!) 158/98    Pulse: 109 111 99    Resp: 20 20 18    Temp: 98.4 °F (36.9 AM     Lupillo Cabral,   11/26/19 546

## 2019-11-26 NOTE — PROGRESS NOTES
Pt admitted to  6K8 from ED. Complaints: Leg swelling. IV none infusing into the wrist right, condition patent and no redness at a rate of 0 mls/ hour with about 0 mls in the bag still. IV site free of s/s of infection or infiltration. Vital signs obtained. Assessment and data collection initiated. Two nurse skin assessment performed by Liza Carreon and [facility] RN. Oriented to room. Policies and procedures for 6K explained. All questions answered with no further questions at this time. Fall prevention and safety brochure discussed with patient. Bed alarm on. Call light in reach.

## 2019-11-26 NOTE — ED NOTES
Pt resting quietly in room no needs expressed. Side rails up x2 with call light in reach. Will continue to monitor.        [facility], [address] [place] [address]  11/25/19 2384

## 2019-11-26 NOTE — ED NOTES
ED to inpatient nurses report    Chief Complaint   Patient presents with    Leg Swelling     bilateral      Present to ED from home  LOC: alert and orientated to name, place, date  Vital signs   Vitals:    11/25/19 1909 11/25/19 1911 11/25/19 2042 11/25/19 2129   BP:  (!) 154/115 (!) 164/115 (!) 164/119   Pulse: 102  99 95   Resp: 18  15 17   Temp: 98 °F (36.7 °C)      TempSrc: Oral      SpO2: 95%  98% 97%   Weight: 120 lb (54.4 kg)      Height: 5' 3\" (1.6 m)         Oxygen Baseline RA    Current needs required RA   LDAs:    Mobility: Requires assistance * 1  Pending ED orders: none  Present condition: Pt resting on cot with respirations even and unlabored at rest. Pt does have dyspnea with exertion and 1+ edema to bilateral lower extremities which impairs his gait. Pt denies any pain. VS stable. Medicated with bumex.      Electronically signed by Therese Varghese RN on 11/25/2019 at 9:48 PM       Therese Varghese RN  11/25/19 3472

## 2019-11-26 NOTE — PLAN OF CARE
Problem: Falls - Risk of:  Goal: Will remain free from falls  Description  Will remain free from falls  Outcome: Met This Shift  Note:   No falls this shift. Impulsive at time, stand by assist. Bed alarm on, call light within reach. Problem: Gas Exchange - Impaired:  Goal: Levels of oxygenation will improve  Description  Levels of oxygenation will improve  Outcome: Met This Shift  Note:   Oxygenation >90% on room air. Problem: Pain:  Goal: Pain level will decrease  Description  Pain level will decrease  Outcome: Met This Shift  Note:   No complaints of pain this shift. Will continue to monitor. Problem: Skin Integrity - Impaired:  Goal: Absence of new skin breakdown  Description  Absence of new skin breakdown  Outcome: Met This Shift  Note:   No new skin breakdown this shift. Ambulates frequently. Problem: Cardiac Output - Decreased:  Goal: Hemodynamic stability will improve  Description  Hemodynamic stability will improve  Outcome: Ongoing  Note:    11/26/19 0843   Vital Signs   Temp 98.3 °F (36.8 °C)   Temp Source Oral   Pulse 98   Heart Rate Source Monitor   Resp 16   BP (!) 162/102  (Manual - Dr. Silvana Avendaño)   BP Location Left upper arm   MAP (mmHg) 129   Patient Position High fowlers     Remains Hypertensive but was not on any blood pressure medications. Notified physician and blood pressure medication started today. Will continue to monitor. Problem: Discharge Planning:  Goal: Participates in care planning  Description  Participates in care planning  Outcome: Ongoing  Note:   Planning discharge home once medically stable, hearth cath tomorrow and lifevest needed for discharge. Problem: Fluid Volume:  Goal: Ability to maintain a balanced intake and output will improve  Description  Ability to maintain a balanced intake and output will improve  Outcome: Ongoing  Note:   2+ pitting edema in lower extremities. Diuresing well with Bumex IV.     Care plan reviewed with patient and family. Patient and family verbalize understanding of the plan of care and contribute to goal setting.

## 2019-11-27 ENCOUNTER — APPOINTMENT (OUTPATIENT)
Dept: CARDIAC CATH/INVASIVE PROCEDURES | Age: 64
DRG: 246 | End: 2019-11-27
Payer: MEDICARE

## 2019-11-27 LAB
ACTIVATED CLOTTING TIME: 296 SECONDS (ref 1–150)
ACTIVATED CLOTTING TIME: 340 SECONDS (ref 1–150)
ALLEN TEST: POSITIVE
AMMONIA: 31 UMOL/L (ref 11–60)
ANION GAP SERPL CALCULATED.3IONS-SCNC: 17 MEQ/L (ref 8–16)
BASE EXCESS (CALCULATED): 2.9 MMOL/L (ref -2.5–2.5)
BUN BLDV-MCNC: 19 MG/DL (ref 7–22)
CALCIUM SERPL-MCNC: 9.2 MG/DL (ref 8.5–10.5)
CHLORIDE BLD-SCNC: 93 MEQ/L (ref 98–111)
CO2: 24 MEQ/L (ref 23–33)
COLLECTED BY:: ABNORMAL
CREAT SERPL-MCNC: 0.7 MG/DL (ref 0.4–1.2)
DEVICE: ABNORMAL
EKG ATRIAL RATE: 77 BPM
EKG P AXIS: 59 DEGREES
EKG P-R INTERVAL: 138 MS
EKG Q-T INTERVAL: 424 MS
EKG QRS DURATION: 112 MS
EKG QTC CALCULATION (BAZETT): 479 MS
EKG R AXIS: 44 DEGREES
EKG T AXIS: 130 DEGREES
EKG VENTRICULAR RATE: 77 BPM
GFR SERPL CREATININE-BSD FRML MDRD: > 90 ML/MIN/1.73M2
GLUCOSE BLD-MCNC: 119 MG/DL (ref 70–108)
HCO3: 27 MMOL/L (ref 23–28)
IFIO2: 21
MAGNESIUM: 1.9 MG/DL (ref 1.6–2.4)
O2 SATURATION: 88 %
PCO2: 39 MMHG (ref 35–45)
PH BLOOD GAS: 7.45 (ref 7.35–7.45)
PO2: 52 MMHG (ref 71–104)
POTASSIUM SERPL-SCNC: 4.2 MEQ/L (ref 3.5–5.2)
SODIUM BLD-SCNC: 134 MEQ/L (ref 135–145)
SOURCE, BLOOD GAS: ABNORMAL
VITAMIN B-12: 796 PG/ML (ref 211–911)

## 2019-11-27 PROCEDURE — 93458 L HRT ARTERY/VENTRICLE ANGIO: CPT

## 2019-11-27 PROCEDURE — C9600 PERC DRUG-EL COR STENT SING: HCPCS

## 2019-11-27 PROCEDURE — 2500000003 HC RX 250 WO HCPCS

## 2019-11-27 PROCEDURE — 2580000003 HC RX 258: Performed by: INTERNAL MEDICINE

## 2019-11-27 PROCEDURE — C1874 STENT, COATED/COV W/DEL SYS: HCPCS

## 2019-11-27 PROCEDURE — C1725 CATH, TRANSLUMIN NON-LASER: HCPCS

## 2019-11-27 PROCEDURE — B2111ZZ FLUOROSCOPY OF MULTIPLE CORONARY ARTERIES USING LOW OSMOLAR CONTRAST: ICD-10-PCS | Performed by: INTERNAL MEDICINE

## 2019-11-27 PROCEDURE — 6370000000 HC RX 637 (ALT 250 FOR IP)

## 2019-11-27 PROCEDURE — 2709999900 HC NON-CHARGEABLE SUPPLY

## 2019-11-27 PROCEDURE — 6370000000 HC RX 637 (ALT 250 FOR IP): Performed by: INTERNAL MEDICINE

## 2019-11-27 PROCEDURE — 80048 BASIC METABOLIC PNL TOTAL CA: CPT

## 2019-11-27 PROCEDURE — 82140 ASSAY OF AMMONIA: CPT

## 2019-11-27 PROCEDURE — 99233 SBSQ HOSP IP/OBS HIGH 50: CPT | Performed by: INTERNAL MEDICINE

## 2019-11-27 PROCEDURE — C1887 CATHETER, GUIDING: HCPCS

## 2019-11-27 PROCEDURE — 2700000000 HC OXYGEN THERAPY PER DAY

## 2019-11-27 PROCEDURE — 93458 L HRT ARTERY/VENTRICLE ANGIO: CPT | Performed by: INTERNAL MEDICINE

## 2019-11-27 PROCEDURE — 027035Z DILATION OF CORONARY ARTERY, ONE ARTERY WITH TWO DRUG-ELUTING INTRALUMINAL DEVICES, PERCUTANEOUS APPROACH: ICD-10-PCS | Performed by: INTERNAL MEDICINE

## 2019-11-27 PROCEDURE — 93005 ELECTROCARDIOGRAM TRACING: CPT | Performed by: INTERNAL MEDICINE

## 2019-11-27 PROCEDURE — 99232 SBSQ HOSP IP/OBS MODERATE 35: CPT | Performed by: NURSE PRACTITIONER

## 2019-11-27 PROCEDURE — 2580000003 HC RX 258: Performed by: FAMILY MEDICINE

## 2019-11-27 PROCEDURE — 92928 PRQ TCAT PLMT NTRAC ST 1 LES: CPT | Performed by: INTERNAL MEDICINE

## 2019-11-27 PROCEDURE — C1894 INTRO/SHEATH, NON-LASER: HCPCS

## 2019-11-27 PROCEDURE — 36415 COLL VENOUS BLD VENIPUNCTURE: CPT

## 2019-11-27 PROCEDURE — 1200000003 HC TELEMETRY R&B

## 2019-11-27 PROCEDURE — 94640 AIRWAY INHALATION TREATMENT: CPT

## 2019-11-27 PROCEDURE — 6360000002 HC RX W HCPCS

## 2019-11-27 PROCEDURE — C1769 GUIDE WIRE: HCPCS

## 2019-11-27 PROCEDURE — 85347 COAGULATION TIME ACTIVATED: CPT

## 2019-11-27 PROCEDURE — 82803 BLOOD GASES ANY COMBINATION: CPT

## 2019-11-27 PROCEDURE — 82607 VITAMIN B-12: CPT

## 2019-11-27 PROCEDURE — 6360000004 HC RX CONTRAST MEDICATION: Performed by: INTERNAL MEDICINE

## 2019-11-27 PROCEDURE — 36600 WITHDRAWAL OF ARTERIAL BLOOD: CPT

## 2019-11-27 PROCEDURE — 6370000000 HC RX 637 (ALT 250 FOR IP): Performed by: FAMILY MEDICINE

## 2019-11-27 PROCEDURE — 94760 N-INVAS EAR/PLS OXIMETRY 1: CPT

## 2019-11-27 PROCEDURE — 4A023N7 MEASUREMENT OF CARDIAC SAMPLING AND PRESSURE, LEFT HEART, PERCUTANEOUS APPROACH: ICD-10-PCS | Performed by: INTERNAL MEDICINE

## 2019-11-27 PROCEDURE — 83735 ASSAY OF MAGNESIUM: CPT

## 2019-11-27 RX ORDER — SODIUM CHLORIDE 0.9 % (FLUSH) 0.9 %
10 SYRINGE (ML) INJECTION PRN
Status: DISCONTINUED | OUTPATIENT
Start: 2019-11-27 | End: 2019-12-02 | Stop reason: HOSPADM

## 2019-11-27 RX ORDER — SODIUM CHLORIDE 0.9 % (FLUSH) 0.9 %
10 SYRINGE (ML) INJECTION EVERY 12 HOURS SCHEDULED
Status: DISCONTINUED | OUTPATIENT
Start: 2019-11-27 | End: 2019-11-27 | Stop reason: SDUPTHER

## 2019-11-27 RX ORDER — ASPIRIN 81 MG/1
81 TABLET ORAL DAILY
Status: DISCONTINUED | OUTPATIENT
Start: 2019-11-28 | End: 2019-12-02 | Stop reason: HOSPADM

## 2019-11-27 RX ORDER — ALBUTEROL SULFATE 90 UG/1
2 AEROSOL, METERED RESPIRATORY (INHALATION) 4 TIMES DAILY
Status: DISCONTINUED | OUTPATIENT
Start: 2019-11-27 | End: 2019-12-02 | Stop reason: HOSPADM

## 2019-11-27 RX ORDER — SODIUM CHLORIDE 9 MG/ML
INJECTION, SOLUTION INTRAVENOUS CONTINUOUS
Status: DISCONTINUED | OUTPATIENT
Start: 2019-11-27 | End: 2019-11-28

## 2019-11-27 RX ORDER — ATORVASTATIN CALCIUM 40 MG/1
40 TABLET, FILM COATED ORAL NIGHTLY
Status: DISCONTINUED | OUTPATIENT
Start: 2019-11-27 | End: 2019-11-28

## 2019-11-27 RX ORDER — ACETAMINOPHEN 325 MG/1
650 TABLET ORAL EVERY 4 HOURS PRN
Status: DISCONTINUED | OUTPATIENT
Start: 2019-11-27 | End: 2019-12-02 | Stop reason: HOSPADM

## 2019-11-27 RX ORDER — CLOPIDOGREL BISULFATE 75 MG/1
75 TABLET ORAL DAILY
Status: DISCONTINUED | OUTPATIENT
Start: 2019-11-28 | End: 2019-12-02 | Stop reason: HOSPADM

## 2019-11-27 RX ADMIN — ATORVASTATIN CALCIUM 40 MG: 40 TABLET, FILM COATED ORAL at 20:07

## 2019-11-27 RX ADMIN — IOPAMIDOL 170 ML: 755 INJECTION, SOLUTION INTRAVENOUS at 17:21

## 2019-11-27 RX ADMIN — BUMETANIDE 0.5 MG: 1 TABLET ORAL at 20:06

## 2019-11-27 RX ADMIN — Medication 2 PUFF: at 14:13

## 2019-11-27 RX ADMIN — Medication 100 MG: at 08:53

## 2019-11-27 RX ADMIN — SODIUM CHLORIDE, PRESERVATIVE FREE 10 ML: 5 INJECTION INTRAVENOUS at 08:55

## 2019-11-27 RX ADMIN — CARVEDILOL 6.25 MG: 6.25 TABLET, FILM COATED ORAL at 08:53

## 2019-11-27 RX ADMIN — Medication 2 PUFF: at 18:27

## 2019-11-27 RX ADMIN — SODIUM CHLORIDE: 9 INJECTION, SOLUTION INTRAVENOUS at 17:52

## 2019-11-27 RX ADMIN — BUMETANIDE 0.5 MG: 1 TABLET ORAL at 08:53

## 2019-11-27 RX ADMIN — SODIUM CHLORIDE: 9 INJECTION, SOLUTION INTRAVENOUS at 09:24

## 2019-11-27 RX ADMIN — CARVEDILOL 6.25 MG: 6.25 TABLET, FILM COATED ORAL at 20:07

## 2019-11-27 RX ADMIN — Medication 2 PUFF: at 22:13

## 2019-11-27 RX ADMIN — PHENOBARBITAL 32.4 MG: 32.4 TABLET ORAL at 20:10

## 2019-11-27 RX ADMIN — SPIRONOLACTONE 25 MG: 25 TABLET ORAL at 08:53

## 2019-11-27 RX ADMIN — FOLIC ACID 1 MG: 1 TABLET ORAL at 08:53

## 2019-11-27 RX ADMIN — Medication 10 ML: at 08:54

## 2019-11-27 ASSESSMENT — PAIN SCALES - GENERAL
PAINLEVEL_OUTOF10: 0

## 2019-11-27 NOTE — BRIEF OP NOTE
[address] [address]  Sedation/Analgesia Post Sedation Record    Pt Name: Michael Peck  Account number: [de-identified]  MRN: [id number]  YOB: 1955  Procedure Performed By: Jinny Garza MD   Primary Care Physician: No primary care provider on file. Date: 11/27/2019    POST-PROCEDURE    Physicians/Assistants: Jinny Garza MD     Procedure Performed:Cath/ PCI      Sedation/Anesthesia: Versed/ Fentanyl and 2% xylocaine local anesthesia. Estimated Blood Loss: < 50 ml. Specimens Removed: None         Disposition of Specimen: N/A        Complications: No Immediate Complications. Post-procedure Diagnosis/Findings:         Severe cardiomyopathy (likely mixed etiology, ischemia and non-ischemic)  RCA   LCX/OM severe stenosis, s/p successful PCI/Stenting   Volume overload  Acute systolic CHF     Recommendations:  -Bedrest for 4 hours post procedure   -Monitor on Telemetry   -Optimize medical therapy for Coronary Artery Disease  -Aggressive risk factor modification   -Access site care  -Will need anticoagulation for atrial fibrillation. May resume Heparin gtt 4 hours after TR band is removed, switch to Eliquis in AM  -ASA 81 mg PO daily for one month (if patient stays on anticoagulation for AF)  -Plavix 75 mg po daily for 1 year   -High intensity statin therapy  -Repeat EKG on the floor   -Labs: CMP, CBC  -Outpatient follow up in office in 2-4 weeks  -Medical therapy trial for RCA . Will consider RCA  PCI in the future if medical therapy fails  -OMT for HFrEF  -IV Diuretics, I/O, Daily weight  -Lifevest is indicated     Findings and plan of care were discussed with the patient in details, questions were answered, agreeable to above mentioned plan.         Jinny Garza MD   Electronically signed 11/27/2019 at 5:17 PM  Interventional Cardiology

## 2019-11-27 NOTE — PLAN OF CARE
Problem: Cardiac Output - Decreased:  Goal: Hemodynamic stability will improve  Description  Hemodynamic stability will improve  11/26/2019 2229 by Maira Hudson RN  Outcome: Ongoing  Note:   Pt remains hemodynamically stable at this time, will continue to assess. Refer to flowsheet. Problem: Falls - Risk of:  Goal: Will remain free from falls  Description  Will remain free from falls  11/26/2019 2229 by Maira Hudson RN  Outcome: Ongoing  Note:   No falls so far this shift, call light and bedside table within reach. Bed in lowest position and alarm on, nonskid socks on bilaterally. Problem: Discharge Planning:  Goal: Participates in care planning  Description  Participates in care planning  11/26/2019 2229 by Maira Hudson RN  Outcome: Ongoing  Note:   Pt remains active in plan of care, continue to update as needed. Problem: Gas Exchange - Impaired:  Goal: Levels of oxygenation will improve  Description  Levels of oxygenation will improve  11/26/2019 2229 by Maira Hudson RN  Outcome: Ongoing  Note:   Pt remains on room air at this time, will continue to assess. Problem: Pain:  Goal: Pain level will decrease  Description  Pain level will decrease  11/26/2019 2229 by Maira Hudson RN  Outcome: Ongoing  Note:   Pt denies pain at this time, will continue to assess using 0-10 pain scale and facial expressions. Problem: Skin Integrity - Impaired:  Goal: Absence of new skin breakdown  Description  Absence of new skin breakdown  11/26/2019 2229 by Maira Hudson RN  Outcome: Ongoing  Note:   No new skin breakdown so far this shift, will continue to encourage pt to turn as tolerated and ambulate frequently.       Problem: Fluid Volume:  Goal: Ability to maintain a balanced intake and output will improve  Description  Ability to maintain a balanced intake and output will improve  11/26/2019 2229 by Maira Hudson RN  Outcome: Ongoing  Note:   Pt continues to have

## 2019-11-27 NOTE — PROGRESS NOTES
SR    Physical Exam:  General Appearance: alert and oriented to person, place and time, in no acute distress  Cardiovascular: normal rate, regular rhythm, normal S1 and S2, no murmurs, rubs, clicks, or gallops, distal pulses intact,   Pulmonary/Chest: clear to auscultation bilaterally- no wheezes, rales or rhonchi, normal air movement, no respiratory distress  Abdomen: soft, non-tender, non-distended, normal bowel sounds, no masses Extremities: no cyanosis, clubbing or edema, pulses present    Skin: warm and dry, no rash or erythema  Head: normocephalic and atraumatic  Musculoskeletal: normal range of motion, no joint swelling, deformity or tenderness  Neurological: alert, oriented, normal speech, no focal findings or movement disorder noted    Medications:    [START ON 2019] sacubitril-valsartan  1 tablet Oral BID    albuterol sulfate HFA  2 puff Inhalation 4x daily    sodium chloride flush  10 mL Intravenous 2 times per day    thiamine  100 mg Oral Daily    folic acid  1 mg Oral Daily    nicotine  1 patch Transdermal Daily    PHENobarbital  64.8 mg Oral BID    Followed by   Alesia Thomas PHENobarbital  32.4 mg Oral BID    enoxaparin  1 mg/kg Subcutaneous BID    potassium (CARDIAC) replacement protocol   Other RX Placeholder    magnesium replacement protocol   Other RX Placeholder    spironolactone  25 mg Oral Daily    carvedilol  6.25 mg Oral BID WC    bumetanide  0.5 mg Oral BID    sodium chloride flush  10 mL Intravenous 2 times per day      sodium chloride 65 mL/hr at 19 0924     sodium chloride flush, 10 mL, PRN  sodium chloride flush, 10 mL, PRN  magnesium hydroxide, 30 mL, Daily PRN  ondansetron, 4 mg, Q6H PRN  perflutren lipid microspheres, 1.5 mL, ONCE PRN        Diagnostics:  EK2019 20:47:42 [facility] ROUTINE RETRIEVAL  Normal sinus rhythm  Possible Left atrial enlargement  Incomplete left bundle branch block  Nonspecific ST and T wave abnormality  Prolonged QT interval or tu fusion, consider myocardial disease, electrolyte imbalance, or drug effects  Abnormal ECG  Confirmed by Yashira Mitchell ([id number]) on 11/25/2019 10:10:59 PM    Echo:    Electronically signed by Casimiro Hammond MD (Interpreting   physician) on 11/26/2019 at 12:25 PM   ----------------------------------------------------------------      Findings      Mitral Valve   The mitral valve structure was normal with normal leaflet separation. DOPPLER: The transmitral velocity was within the normal range with no   evidence for mitral stenosis. Mild mitral regurgitation is present. Aortic Valve   The aortic valve was trileaflet with normal thickness and cuspal   separation. DOPPLER: Transaortic velocity was within the normal range with   no evidence of aortic stenosis. Mild aortic regurgitation is noted. Tricuspid Valve   The tricuspid valve structure was normal with normal leaflet separation. DOPPLER: There was no evidence of tricuspid stenosis. Mild tricuspid regurgitation visualized. Right ventricular systolic pressure measures 40 mmhg. Pulmonic Valve   The pulmonic valve leaflets exhibited normal thickness, no calcification,   and normal cuspal separation. DOPPLER: The transpulmonic velocity was   within the normal range with no evidence for regurgitation. Left Atrium   The left atrium is Moderately dilated. Left Ventricle   Left Ventricular size is Moderately increased . Normal left ventricular wall thickness. There was severe global hypokinesis of the left ventricle. Systolic function was severely reduced. Ejection fraction is visually estimated at 20%. Right Atrium   Mildly enlarged right atrium size. Right Ventricle   The right ventricular size was normal with normal systolic function and   wall thickness. Pericardial Effusion   The pericardium was normal in appearance with no evidence of a pericardial   effusion.       Pleural Effusion   No evidence of

## 2019-11-27 NOTE — PROGRESS NOTES
Arrived to the unit with sitter. Right radial cath. No complications at this time. nasal cannula at 2. No complaints of pain. Alert and oriented to the unit. Elevated blood pressures .   Will continue to monitor

## 2019-11-27 NOTE — PLAN OF CARE
Problem: Cardiac Output - Decreased:  Goal: Hemodynamic stability will improve  Description  Hemodynamic stability will improve  Outcome: Ongoing  Note:   Vitals stable. Swelling improved in BLE. Problem: Falls - Risk of:  Goal: Will remain free from falls  Description  Will remain free from falls  Outcome: Ongoing  Note:   Falling star program in place. Human sitter in room with patient at all times for confusion. Problem: Gas Exchange - Impaired:  Goal: Levels of oxygenation will improve  Description  Levels of oxygenation will improve  Outcome: Ongoing  Note:   O2 at 2L applied for O2 sat of 88%     Problem: Pain:  Goal: Pain level will decrease  Description  Pain level will decrease  Outcome: Ongoing  Note:   Patient free from pain this shift. Pain rated on 0-10 pain rating scale. Will continue to reassess. Problem: Discharge Planning:  Goal: Participates in care planning  Description  Participates in care planning  Note:   Patient plans to be discharged to home when medically stable. Care plan reviewed with patient. Patient verbalize understanding of the plan of care and contribute to goal setting.

## 2019-11-28 ENCOUNTER — APPOINTMENT (OUTPATIENT)
Dept: GENERAL RADIOLOGY | Age: 64
DRG: 246 | End: 2019-11-28
Payer: MEDICARE

## 2019-11-28 LAB
ANION GAP SERPL CALCULATED.3IONS-SCNC: 16 MEQ/L (ref 8–16)
BUN BLDV-MCNC: 21 MG/DL (ref 7–22)
CALCIUM SERPL-MCNC: 8.8 MG/DL (ref 8.5–10.5)
CHLORIDE BLD-SCNC: 96 MEQ/L (ref 98–111)
CO2: 25 MEQ/L (ref 23–33)
CREAT SERPL-MCNC: 0.9 MG/DL (ref 0.4–1.2)
ERYTHROCYTE [DISTWIDTH] IN BLOOD BY AUTOMATED COUNT: 12.7 % (ref 11.5–14.5)
ERYTHROCYTE [DISTWIDTH] IN BLOOD BY AUTOMATED COUNT: 49.5 FL (ref 35–45)
GFR SERPL CREATININE-BSD FRML MDRD: 84 ML/MIN/1.73M2
GLUCOSE BLD-MCNC: 160 MG/DL (ref 70–108)
HCT VFR BLD CALC: 39.6 % (ref 42–52)
HEMOGLOBIN: 13.6 GM/DL (ref 14–18)
MAGNESIUM: 1.7 MG/DL (ref 1.6–2.4)
MCH RBC QN AUTO: 36.2 PG (ref 26–33)
MCHC RBC AUTO-ENTMCNC: 34.3 GM/DL (ref 32.2–35.5)
MCV RBC AUTO: 105.3 FL (ref 80–94)
PLATELET # BLD: 250 THOU/MM3 (ref 130–400)
PMV BLD AUTO: 11.2 FL (ref 9.4–12.4)
POTASSIUM REFLEX MAGNESIUM: 3.7 MEQ/L (ref 3.5–5.2)
POTASSIUM SERPL-SCNC: 3.7 MEQ/L (ref 3.5–5.2)
PRO-BNP: 8924 PG/ML (ref 0–900)
RBC # BLD: 3.76 MILL/MM3 (ref 4.7–6.1)
SODIUM BLD-SCNC: 137 MEQ/L (ref 135–145)
WBC # BLD: 8.8 THOU/MM3 (ref 4.8–10.8)

## 2019-11-28 PROCEDURE — 6370000000 HC RX 637 (ALT 250 FOR IP): Performed by: INTERNAL MEDICINE

## 2019-11-28 PROCEDURE — 80048 BASIC METABOLIC PNL TOTAL CA: CPT

## 2019-11-28 PROCEDURE — 83735 ASSAY OF MAGNESIUM: CPT

## 2019-11-28 PROCEDURE — 2709999900 HC NON-CHARGEABLE SUPPLY

## 2019-11-28 PROCEDURE — 94761 N-INVAS EAR/PLS OXIMETRY MLT: CPT

## 2019-11-28 PROCEDURE — 83880 ASSAY OF NATRIURETIC PEPTIDE: CPT

## 2019-11-28 PROCEDURE — 1200000003 HC TELEMETRY R&B

## 2019-11-28 PROCEDURE — 99233 SBSQ HOSP IP/OBS HIGH 50: CPT | Performed by: INTERNAL MEDICINE

## 2019-11-28 PROCEDURE — 99232 SBSQ HOSP IP/OBS MODERATE 35: CPT | Performed by: NURSE PRACTITIONER

## 2019-11-28 PROCEDURE — 6370000000 HC RX 637 (ALT 250 FOR IP): Performed by: NURSE PRACTITIONER

## 2019-11-28 PROCEDURE — 2700000000 HC OXYGEN THERAPY PER DAY

## 2019-11-28 PROCEDURE — 85027 COMPLETE CBC AUTOMATED: CPT

## 2019-11-28 PROCEDURE — 94640 AIRWAY INHALATION TREATMENT: CPT

## 2019-11-28 PROCEDURE — 6370000000 HC RX 637 (ALT 250 FOR IP): Performed by: FAMILY MEDICINE

## 2019-11-28 PROCEDURE — 2580000003 HC RX 258: Performed by: FAMILY MEDICINE

## 2019-11-28 PROCEDURE — 36415 COLL VENOUS BLD VENIPUNCTURE: CPT

## 2019-11-28 PROCEDURE — 71045 X-RAY EXAM CHEST 1 VIEW: CPT

## 2019-11-28 RX ORDER — BUMETANIDE 0.5 MG/1
0.5 TABLET ORAL 2 TIMES DAILY
Qty: 30 TABLET | Refills: 3 | Status: SHIPPED | OUTPATIENT
Start: 2019-11-28 | End: 2019-12-01

## 2019-11-28 RX ORDER — CLOPIDOGREL BISULFATE 75 MG/1
75 TABLET ORAL DAILY
Qty: 30 TABLET | Refills: 3 | Status: SHIPPED | OUTPATIENT
Start: 2019-11-28 | End: 2019-12-01

## 2019-11-28 RX ORDER — NITROGLYCERIN 0.4 MG/1
TABLET SUBLINGUAL
Qty: 25 TABLET | Refills: 1 | Status: SHIPPED | OUTPATIENT
Start: 2019-11-28 | End: 2019-12-01 | Stop reason: SDUPTHER

## 2019-11-28 RX ORDER — CARVEDILOL 6.25 MG/1
6.25 TABLET ORAL 2 TIMES DAILY WITH MEALS
Qty: 60 TABLET | Refills: 3 | Status: SHIPPED | OUTPATIENT
Start: 2019-11-28 | End: 2019-12-01

## 2019-11-28 RX ORDER — SPIRONOLACTONE 25 MG/1
25 TABLET ORAL DAILY
Qty: 30 TABLET | Refills: 3 | Status: SHIPPED | OUTPATIENT
Start: 2019-11-28 | End: 2019-12-01

## 2019-11-28 RX ORDER — ATORVASTATIN CALCIUM 80 MG/1
80 TABLET, FILM COATED ORAL NIGHTLY
Status: DISCONTINUED | OUTPATIENT
Start: 2019-11-28 | End: 2019-12-02 | Stop reason: HOSPADM

## 2019-11-28 RX ORDER — PHENOBARBITAL 32.4 MG/1
32.4 TABLET ORAL 2 TIMES DAILY
Status: COMPLETED | OUTPATIENT
Start: 2019-11-28 | End: 2019-11-28

## 2019-11-28 RX ORDER — ASPIRIN 81 MG/1
81 TABLET ORAL DAILY
Qty: 30 TABLET | Refills: 3 | Status: SHIPPED | OUTPATIENT
Start: 2019-11-28 | End: 2019-12-01

## 2019-11-28 RX ADMIN — SACUBITRIL AND VALSARTAN 1 TABLET: 24; 26 TABLET, FILM COATED ORAL at 06:53

## 2019-11-28 RX ADMIN — BUMETANIDE 0.5 MG: 1 TABLET ORAL at 20:04

## 2019-11-28 RX ADMIN — SODIUM CHLORIDE, PRESERVATIVE FREE 10 ML: 5 INJECTION INTRAVENOUS at 12:58

## 2019-11-28 RX ADMIN — ASPIRIN 81 MG: 81 TABLET ORAL at 12:54

## 2019-11-28 RX ADMIN — ATORVASTATIN CALCIUM 80 MG: 80 TABLET, FILM COATED ORAL at 20:03

## 2019-11-28 RX ADMIN — CARVEDILOL 6.25 MG: 6.25 TABLET, FILM COATED ORAL at 18:17

## 2019-11-28 RX ADMIN — SPIRONOLACTONE 25 MG: 25 TABLET ORAL at 12:55

## 2019-11-28 RX ADMIN — FOLIC ACID 1 MG: 1 TABLET ORAL at 12:53

## 2019-11-28 RX ADMIN — Medication 2 PUFF: at 20:25

## 2019-11-28 RX ADMIN — Medication 2 PUFF: at 07:35

## 2019-11-28 RX ADMIN — CARVEDILOL 6.25 MG: 6.25 TABLET, FILM COATED ORAL at 12:54

## 2019-11-28 RX ADMIN — Medication 100 MG: at 12:55

## 2019-11-28 RX ADMIN — PHENOBARBITAL 32.4 MG: 32.4 TABLET ORAL at 13:09

## 2019-11-28 RX ADMIN — Medication 2 PUFF: at 11:58

## 2019-11-28 RX ADMIN — CLOPIDOGREL BISULFATE 75 MG: 75 TABLET, FILM COATED ORAL at 12:54

## 2019-11-28 RX ADMIN — APIXABAN 5 MG: 5 TABLET, FILM COATED ORAL at 12:56

## 2019-11-28 RX ADMIN — SACUBITRIL AND VALSARTAN 1 TABLET: 24; 26 TABLET, FILM COATED ORAL at 20:03

## 2019-11-28 RX ADMIN — Medication 2 PUFF: at 15:50

## 2019-11-28 RX ADMIN — SODIUM CHLORIDE, PRESERVATIVE FREE 10 ML: 5 INJECTION INTRAVENOUS at 20:04

## 2019-11-28 RX ADMIN — APIXABAN 5 MG: 5 TABLET, FILM COATED ORAL at 20:03

## 2019-11-28 RX ADMIN — Medication 10 ML: at 12:56

## 2019-11-28 ASSESSMENT — PAIN SCALES - GENERAL
PAINLEVEL_OUTOF10: 0

## 2019-11-28 NOTE — PROCEDURES
angioplasty. After predilation, we  proceeded with stenting. Xience 2.5 mm x 12 mm drug-eluting stent was  placed. This was placed in the OM2 branch. In the distal circ into the  OM2 branch, I placed another 2.5 mm x 15 mm Xience drug-eluting stent in  an overlapping fashion. Post dilation with the same stent balloon at  high pressure in the overlap area at 22 atmospheres as well as in the  proximal edge of the stent was performed. Repeat angiography revealed  well expanded stents. There was no evidence for complications including  no dissection, perforation or distal embolization. Wires were removed  and angiogram revealed good results as mentioned above. Guide catheter  was removed. Vasc Band was applied and sheath was removed from right  radial artery. Minimal blood loss estimated at less than 50 mL. FINDINGS:  HEMODYNAMIC RESULTS:  LVEDP 29 mmHg. LEFT VENTRICULOGRAPHY:  Ejection fraction seems to be severely reduced  indicative of severe cardiomyopathy. EF 20%. CORONARY ANGIOGRAM:  1. RCA:  RCA has mild diffuse disease in the proximal segment. Mid RCA  has chronic total occlusion. _____ estimated at around 20 mL. RCA is a  dominant vessel. It receives left-to-right collaterals that fill the  LPL, LPDA and distal RCA. 2.  Left main coronary artery:  Left main coronary artery is patent  without significant stenosis. 3.  Left circumflex artery:  Proximal LCX has mild diffuse disease. OM1  is a large branching vessel that has no significant stenosis. Distal  left circumflex artery into the OM2 branch had significant bifurcation  lesion estimated at 70% to 80%. It becomes more severe in OM2 up to  90%. 4. LAD:  Left anterior descending artery has mild diffuse disease with  mild calcification in the proximal segment. Mid LAD seems to be patent  without significant stenosis. Distal LAD has a 30% to 50% lesion. MEDICATIONS:  See MAR. COMPLICATIONS:  None.     ACCESS:  Right radial

## 2019-11-28 NOTE — PROGRESS NOTES
Pt assited up to BR fairly steady gait. Voided. Ambulated pt in hallway approximently 750 ft. Sat out in lobby for approx. 15 minutes as per pts request. Pt up in chair. Chair alarm set. Warm blanket for comfort. Am medications taken. Pt is more alert and oriented x 4. Moist non productive cough noted.

## 2019-11-28 NOTE — PROGRESS NOTES
Assessment and Plan:        1. Acute on chronic chf- likely related to hbp, toxins ( etoh)  2. Cad- total RCA, severe OM2 and cx lesions, stented  3. Chronic etoh tobacco abuse- nicotine patch, phenobarb, Addiction services; abstinence doubtful  4. ADHERENCE TO MEDICAL REGIMEN AND ABILITY TO AFFORD MEDS IN DOUBT        CC:  Leg swelling, sob  HPI:  Pt with significant hbp, etoh and tobacco abuse, presented with systolic chf; cath as above. Lives alone. ROS (12 point review of systems completed. Pertinent positives noted.  Otherwise ROS is negative) :   PMH:  Per HPI  SHX:  See above    FHX: nc  Allergies: See above    Medications:       atorvastatin  80 mg Oral Nightly    apixaban  5 mg Oral BID    PHENobarbital  32.4 mg Oral BID    sacubitril-valsartan  1 tablet Oral BID    albuterol sulfate HFA  2 puff Inhalation 4x daily    aspirin  81 mg Oral Daily    clopidogrel  75 mg Oral Daily    sodium chloride flush  10 mL Intravenous 2 times per day    thiamine  100 mg Oral Daily    folic acid  1 mg Oral Daily    nicotine  1 patch Transdermal Daily    potassium (CARDIAC) replacement protocol   Other RX Placeholder    magnesium replacement protocol   Other RX Placeholder    spironolactone  25 mg Oral Daily    carvedilol  6.25 mg Oral BID WC    bumetanide  0.5 mg Oral BID    sodium chloride flush  10 mL Intravenous 2 times per day       Vital Signs:   BP (!) 111/98   Pulse 82   Temp 98.2 °F (36.8 °C) (Oral)   Resp 18   Ht 5' 3\" (1.6 m)   Wt 120 lb 8 oz (54.7 kg)   SpO2 96%   BMI 21.35 kg/m²      Intake/Output Summary (Last 24 hours) at 11/28/2019 0640  Last data filed at 11/28/2019 0445  Gross per 24 hour   Intake 421.11 ml   Output 475 ml   Net -53.89 ml        Physical Examination: General appearance - chronically ill appearing  Mental status - alert, oriented to person, place, and time  Neck - supple, no significant adenopathy, no JVD, or carotid bruits  Chest - few crackles  Heart - normal

## 2019-11-28 NOTE — PROGRESS NOTES
Objective:   BP (!) 111/98   Pulse 82   Temp 98.2 °F (36.8 °C) (Oral)   Resp 18   Ht 5' 3\" (1.6 m)   Wt 120 lb 8 oz (54.7 kg)   SpO2 96%   BMI 21.35 kg/m²        TELEMETRY: SR    Physical Exam:  General Appearance: alert and oriented to person, place and time, in no acute distress  Cardiovascular: normal rate, regular rhythm, normal S1 and S2, no murmurs, rubs, clicks, or gallops, distal pulses intact,   Pulmonary/Chest: clear to auscultation bilaterally- no wheezes, rales or rhonchi, normal air movement, no respiratory distress  Abdomen: soft, non-tender, non-distended, normal bowel sounds, no masses Extremities: no cyanosis, clubbing or edema, pulses present    Skin: warm and dry, no rash or erythema  Head: normocephalic and atraumatic  Musculoskeletal: normal range of motion, no joint swelling, deformity or tenderness  Neurological: alert, oriented, normal speech, no focal findings or movement disorder noted    Medications:    atorvastatin  80 mg Oral Nightly    apixaban  5 mg Oral BID    PHENobarbital  32.4 mg Oral BID    sacubitril-valsartan  1 tablet Oral BID    albuterol sulfate HFA  2 puff Inhalation 4x daily    aspirin  81 mg Oral Daily    clopidogrel  75 mg Oral Daily    sodium chloride flush  10 mL Intravenous 2 times per day    thiamine  100 mg Oral Daily    folic acid  1 mg Oral Daily    nicotine  1 patch Transdermal Daily    potassium (CARDIAC) replacement protocol   Other RX Placeholder    magnesium replacement protocol   Other RX Placeholder    spironolactone  25 mg Oral Daily    carvedilol  6.25 mg Oral BID     bumetanide  0.5 mg Oral BID    sodium chloride flush  10 mL Intravenous 2 times per day       sodium chloride flush, 10 mL, PRN  acetaminophen, 650 mg, Q4H PRN  magnesium hydroxide, 30 mL, Daily PRN  sodium chloride flush, 10 mL, PRN  ondansetron, 4 mg, Q6H PRN  perflutren lipid microspheres, 1.5 mL, ONCE PRN        Diagnostics:  EK2019 19:21:16 [facility]  Normal sinus rhythm  Possible Left atrial enlargement  Incomplete left bundle branch block  LVH with repolarization abnormality  Abnormal ECG  Confirmed by Joseph Saba ([id number]) on 11/27/2019 11:02:42 PM      25-NOV-2019 20:47:42 [facility] ROUTINE RETRIEVAL  Normal sinus rhythm  Possible Left atrial enlargement  Incomplete left bundle branch block  Nonspecific ST and T wave abnormality  Prolonged QT interval or tu fusion, consider myocardial disease, electrolyte imbalance, or drug effects  Abnormal ECG  Confirmed by Joseph Saba ([id number]) on 11/25/2019 10:10:59 PM    Echo:    Electronically signed by Emile Whaley MD (Interpreting   physician) on 11/26/2019 at 12:25 PM   ----------------------------------------------------------------      Findings      Mitral Valve   The mitral valve structure was normal with normal leaflet separation. DOPPLER: The transmitral velocity was within the normal range with no   evidence for mitral stenosis. Mild mitral regurgitation is present. Aortic Valve   The aortic valve was trileaflet with normal thickness and cuspal   separation. DOPPLER: Transaortic velocity was within the normal range with   no evidence of aortic stenosis. Mild aortic regurgitation is noted. Tricuspid Valve   The tricuspid valve structure was normal with normal leaflet separation. DOPPLER: There was no evidence of tricuspid stenosis. Mild tricuspid regurgitation visualized. Right ventricular systolic pressure measures 40 mmhg. Pulmonic Valve   The pulmonic valve leaflets exhibited normal thickness, no calcification,   and normal cuspal separation. DOPPLER: The transpulmonic velocity was   within the normal range with no evidence for regurgitation. Left Atrium   The left atrium is Moderately dilated. Left Ventricle   Left Ventricular size is Moderately increased . Normal left ventricular wall thickness.    There was severe global hypokinesis of the left ventricle. Systolic function was severely reduced. Ejection fraction is visually estimated at 20%. Right Atrium   Mildly enlarged right atrium size. Right Ventricle   The right ventricular size was normal with normal systolic function and   wall thickness. Pericardial Effusion   The pericardium was normal in appearance with no evidence of a pericardial   effusion. Pleural Effusion   No evidence of pleural effusion. Aorta / Great Vessels   Aortic aneurysm noted in the ascending aorta . Aortic aneurysm measures 4 cm .   -The Pulmonary artery is within normal limits. -IVC size is within normal limits with normal respiratory phasic changes. Left Heart Cath:   FINDINGS:  HEMODYNAMIC RESULTS:  LVEDP 29 mmHg.     LEFT VENTRICULOGRAPHY:  Ejection fraction seems to be severely reduced  indicative of severe cardiomyopathy. EF 20%.     CORONARY ANGIOGRAM:  1. RCA:  RCA has mild diffuse disease in the proximal segment. Mid RCA  has chronic total occlusion. _____ estimated at around 20 mL. RCA is a  dominant vessel. It receives left-to-right collaterals that fill the  LPL, LPDA and distal RCA. 2.  Left main coronary artery:  Left main coronary artery is patent  without significant stenosis. 3.  Left circumflex artery:  Proximal LCX has mild diffuse disease. OM1  is a large branching vessel that has no significant stenosis. Distal  left circumflex artery into the OM2 branch had significant bifurcation  lesion estimated at 70% to 80%. It becomes more severe in OM2 up to  90%. 4. LAD:  Left anterior descending artery has mild diffuse disease with  mild calcification in the proximal segment. Mid LAD seems to be patent  without significant stenosis. Distal LAD has a 30% to 50% lesion.     MEDICATIONS:  See MAR.     COMPLICATIONS:  None.     ACCESS:  Right radial artery access. Vasc Band was applied for  hemostasis.     CONCLUSIONS:  1. Severe cardiomyopathy, probably mixed etiology ischemic and  nonischemic. 2.  RCA,  involving mid RCA. Distal RCA fills via collaterals from  left to right. 3.  Severe distal left circumflex and OM2 lesions, status post  successful PCI with stenting.     PLAN:  1. Continue inpatient care. 2.  Optimize medical therapy for coronary disease. 3.  High intensity statin. 4.  Aspirin 81 mg p.o. daily for 1 month. The patient will be on  anticoagulation for new-onset atrial fibrillation, probably will be  started on Eliquis which can be started in the morning. Stop aspirin  after 1 month if the patient remains on anticoagulation. 5.  Plavix 75 mg p.o. daily for 1 year. 6.  Optimize medical therapy for heart failure. 7.  LifeVest is indicated before discharge. 8.  May consider RCA  PCI in the future if medical therapy fails. 9.  Monitor in telemetry.     Findings and plan of care were discussed with the patient in details.                       Jia Moyer MD     D: 11/27/2019      Lab Data:    Cardiac Enzymes:  No results for input(s): CKTOTAL, CKMB, CKMBINDEX, TROPONINI in the last 72 hours.     CBC:   Lab Results   Component Value Date    WBC 8.8 11/28/2019    RBC 3.76 11/28/2019    HGB 13.6 11/28/2019    HCT 39.6 11/28/2019     11/28/2019       CMP:    Lab Results   Component Value Date     11/28/2019    K 3.7 11/28/2019    CL 96 11/28/2019    CO2 25 11/28/2019    BUN 21 11/28/2019    CREATININE 0.9 11/28/2019    LABGLOM 84 11/28/2019    GLUCOSE 160 11/28/2019    CALCIUM 8.8 11/28/2019       Hepatic Function Panel:    Lab Results   Component Value Date    ALKPHOS 79 11/25/2019    ALT 29 11/25/2019    AST 30 11/25/2019    PROT 6.2 11/25/2019    BILITOT 0.5 11/25/2019    BILIDIR <0.2 11/25/2019    LABALBU 3.7 11/25/2019       Magnesium:    Lab Results   Component Value Date    MG 1.7 11/28/2019       PT/INR:    Lab Results   Component Value Date    INR 1.10 11/25/2019       HgBA1c:  No results found for: LABA1C    FLP:    Lab Results   Component Value Date    TRIG 101 11/26/2019    HDL 26 11/26/2019    LDLCALC 89 11/26/2019       TSH:    Lab Results   Component Value Date    TSH 1.360 11/25/2019         Assessment:    New acute systolic chf [facility] 3-- resolved     New dilated CDMP EF 20%     ? LV compliance / ? Medication compliance      S\p cardiac cath 11/27/2019:   RCA,  involving mid RCA. Distal RCA fills via collaterals from left to right. -- Severe distal left circumflex and OM2 lesions, status post  successful PCI with stenting. New PAFB -- SR    [id number] at least 2 needs OAC     Tobacco abuse   ETOH abuse    AAA (4 cm) - follow ups needs / BP control    ?compliance       Plan:  · Ok to DC from cardiac standpoint today after LV placed (if he can manage)  · CDMP meds - ?  Can he afford   · Compliance issues   · CHF referral   · We will see as OP           Electronically signed by DYAN Selby CNP on 11/28/2019 at 7:19 AM

## 2019-11-28 NOTE — PROGRESS NOTES
Pt is resting with eyes closed, not easily awaken. Pt stirs when call out his name. Will resume medications when awake.

## 2019-11-29 LAB
ANION GAP SERPL CALCULATED.3IONS-SCNC: 15 MEQ/L (ref 8–16)
BUN BLDV-MCNC: 17 MG/DL (ref 7–22)
CALCIUM SERPL-MCNC: 8.5 MG/DL (ref 8.5–10.5)
CHLORIDE BLD-SCNC: 91 MEQ/L (ref 98–111)
CO2: 22 MEQ/L (ref 23–33)
CREAT SERPL-MCNC: 0.7 MG/DL (ref 0.4–1.2)
GFR SERPL CREATININE-BSD FRML MDRD: > 90 ML/MIN/1.73M2
GLUCOSE BLD-MCNC: 190 MG/DL (ref 70–108)
MAGNESIUM: 1.6 MG/DL (ref 1.6–2.4)
POTASSIUM SERPL-SCNC: 3.5 MEQ/L (ref 3.5–5.2)
SODIUM BLD-SCNC: 128 MEQ/L (ref 135–145)

## 2019-11-29 PROCEDURE — 36415 COLL VENOUS BLD VENIPUNCTURE: CPT

## 2019-11-29 PROCEDURE — 6370000000 HC RX 637 (ALT 250 FOR IP): Performed by: FAMILY MEDICINE

## 2019-11-29 PROCEDURE — 6370000000 HC RX 637 (ALT 250 FOR IP): Performed by: INTERNAL MEDICINE

## 2019-11-29 PROCEDURE — 99232 SBSQ HOSP IP/OBS MODERATE 35: CPT | Performed by: INTERNAL MEDICINE

## 2019-11-29 PROCEDURE — 94640 AIRWAY INHALATION TREATMENT: CPT

## 2019-11-29 PROCEDURE — 6360000002 HC RX W HCPCS: Performed by: FAMILY MEDICINE

## 2019-11-29 PROCEDURE — 80048 BASIC METABOLIC PNL TOTAL CA: CPT

## 2019-11-29 PROCEDURE — 94760 N-INVAS EAR/PLS OXIMETRY 1: CPT

## 2019-11-29 PROCEDURE — 2580000003 HC RX 258: Performed by: FAMILY MEDICINE

## 2019-11-29 PROCEDURE — 6370000000 HC RX 637 (ALT 250 FOR IP): Performed by: NURSE PRACTITIONER

## 2019-11-29 PROCEDURE — 83735 ASSAY OF MAGNESIUM: CPT

## 2019-11-29 PROCEDURE — 1200000003 HC TELEMETRY R&B

## 2019-11-29 RX ADMIN — CARVEDILOL 6.25 MG: 6.25 TABLET, FILM COATED ORAL at 17:50

## 2019-11-29 RX ADMIN — APIXABAN 5 MG: 5 TABLET, FILM COATED ORAL at 20:15

## 2019-11-29 RX ADMIN — ACETAMINOPHEN 650 MG: 325 TABLET ORAL at 03:47

## 2019-11-29 RX ADMIN — CARVEDILOL 6.25 MG: 6.25 TABLET, FILM COATED ORAL at 08:45

## 2019-11-29 RX ADMIN — Medication 100 MG: at 08:45

## 2019-11-29 RX ADMIN — Medication 2 PUFF: at 20:17

## 2019-11-29 RX ADMIN — ASPIRIN 81 MG: 81 TABLET ORAL at 08:45

## 2019-11-29 RX ADMIN — Medication 2 PUFF: at 16:27

## 2019-11-29 RX ADMIN — FOLIC ACID 1 MG: 1 TABLET ORAL at 08:45

## 2019-11-29 RX ADMIN — SACUBITRIL AND VALSARTAN 1 TABLET: 24; 26 TABLET, FILM COATED ORAL at 20:15

## 2019-11-29 RX ADMIN — Medication 2 PUFF: at 11:57

## 2019-11-29 RX ADMIN — BUMETANIDE 0.5 MG: 1 TABLET ORAL at 08:45

## 2019-11-29 RX ADMIN — Medication 10 ML: at 20:20

## 2019-11-29 RX ADMIN — SACUBITRIL AND VALSARTAN 1 TABLET: 24; 26 TABLET, FILM COATED ORAL at 08:45

## 2019-11-29 RX ADMIN — BUMETANIDE 0.5 MG: 1 TABLET ORAL at 20:15

## 2019-11-29 RX ADMIN — ATORVASTATIN CALCIUM 80 MG: 80 TABLET, FILM COATED ORAL at 20:15

## 2019-11-29 RX ADMIN — CLOPIDOGREL BISULFATE 75 MG: 75 TABLET, FILM COATED ORAL at 08:45

## 2019-11-29 RX ADMIN — Medication 2 PUFF: at 08:11

## 2019-11-29 RX ADMIN — APIXABAN 5 MG: 5 TABLET, FILM COATED ORAL at 08:45

## 2019-11-29 RX ADMIN — ONDANSETRON 4 MG: 2 INJECTION INTRAMUSCULAR; INTRAVENOUS at 21:10

## 2019-11-29 RX ADMIN — Medication 10 ML: at 08:47

## 2019-11-29 RX ADMIN — SPIRONOLACTONE 25 MG: 25 TABLET ORAL at 08:45

## 2019-11-29 ASSESSMENT — PAIN DESCRIPTION - DESCRIPTORS: DESCRIPTORS: CRAMPING

## 2019-11-29 ASSESSMENT — PAIN - FUNCTIONAL ASSESSMENT: PAIN_FUNCTIONAL_ASSESSMENT: ACTIVITIES ARE NOT PREVENTED

## 2019-11-29 ASSESSMENT — PAIN DESCRIPTION - PROGRESSION
CLINICAL_PROGRESSION: GRADUALLY WORSENING

## 2019-11-29 ASSESSMENT — PAIN SCALES - GENERAL
PAINLEVEL_OUTOF10: 1
PAINLEVEL_OUTOF10: 0

## 2019-11-29 ASSESSMENT — PAIN DESCRIPTION - FREQUENCY: FREQUENCY: INTERMITTENT

## 2019-11-29 ASSESSMENT — PAIN DESCRIPTION - LOCATION: LOCATION: ABDOMEN

## 2019-11-29 ASSESSMENT — PAIN DESCRIPTION - ONSET: ONSET: ON-GOING

## 2019-11-29 ASSESSMENT — PAIN DESCRIPTION - ORIENTATION: ORIENTATION: MID;RIGHT;LEFT;LOWER

## 2019-11-29 ASSESSMENT — PAIN DESCRIPTION - PAIN TYPE: TYPE: ACUTE PAIN

## 2019-11-29 NOTE — PLAN OF CARE
Problem: Cardiac Output - Decreased:  Goal: Hemodynamic stability will improve  Description  Hemodynamic stability will improve  11/28/2019 2150 by Mayelin Cunha RN  Outcome: Ongoing  Note:   Vitals stable. NSR noted on telemetry monitor. Will continue to monitor. Problem: Falls - Risk of:  Goal: Will remain free from falls  Description  Will remain free from falls  11/28/2019 2150 by Mayelin Cunha RN  Outcome: Ongoing  Note:   Patient free from falls. Bed in low, locked position with 2/4 rails up and bed alarm on. Fall band intact. Call light, bedside table and personal belongings within reach. Problem: Discharge Planning:  Goal: Participates in care planning  Description  Participates in care planning  11/28/2019 2150 by Mayelin Cunha RN  Outcome: Ongoing  Note:   Social work on the case for discharge needs. Problem: Gas Exchange - Impaired:  Goal: Levels of oxygenation will improve  Description  Levels of oxygenation will improve  11/28/2019 2150 by Mayelin Cunha RN  Outcome: Ongoing  Note:   Patient O2 Sat >90 this shift. Oxygen placed as needed per order. Problem: Pain:  Goal: Pain level will decrease  Description  Pain level will decrease  11/28/2019 2150 by Mayelin Cunha RN  Outcome: Ongoing  Note:   Denies pain at this time. Will continue to monitor. Problem: Skin Integrity - Impaired:  Goal: Absence of new skin breakdown  Description  Absence of new skin breakdown  11/28/2019 2150 by Mayelin Cunha RN  Outcome: Ongoing  Note:   No skin breakdown noted this shift. Patient encouraged to change position frequently. Problem: Fluid Volume:  Goal: Ability to maintain a balanced intake and output will improve  Description  Ability to maintain a balanced intake and output will improve  11/28/2019 2150 by Mayelin Cunha RN  Outcome: Ongoing  Note:   I&Os, labs and weight monitored. Care plan reviewed with patient.   Patient verbalize

## 2019-11-29 NOTE — PLAN OF CARE
Patient plans to return to [facility] with no new services, see sw note dated 11/29.

## 2019-11-29 NOTE — PROGRESS NOTES
aspirin  81 mg Oral Daily    clopidogrel  75 mg Oral Daily    sodium chloride flush  10 mL Intravenous 2 times per day    thiamine  100 mg Oral Daily    folic acid  1 mg Oral Daily    nicotine  1 patch Transdermal Daily    potassium (CARDIAC) replacement protocol   Other RX Placeholder    magnesium replacement protocol   Other RX Placeholder    spironolactone  25 mg Oral Daily    carvedilol  6.25 mg Oral BID WC    bumetanide  0.5 mg Oral BID    sodium chloride flush  10 mL Intravenous 2 times per day     PRN Meds: sodium chloride flush, acetaminophen, magnesium hydroxide, sodium chloride flush, ondansetron, perflutren lipid microspheres      Intake/Output Summary (Last 24 hours) at 11/29/2019 0910  Last data filed at 11/29/2019 0338  Gross per 24 hour   Intake 1785 ml   Output 200 ml   Net 1585 ml       Diet:  DIET CARDIAC;     Exam:  BP (!) 142/82   Pulse 69   Temp 97.8 °F (36.6 °C) (Oral)   Resp 18   Ht 5' 3\" (1.6 m)   Wt 124 lb 6.4 oz (56.4 kg)   SpO2 95%   BMI 22.04 kg/m²     Physical Examination:   General appearance - alert, awake  and in no distress at rest,   HEENT: Normocephalic, Atraumatic, pupils reactive, mucous membranes moist  Chest - moving equally to respiration,sdecreased but symmetric air entry, very poor BS but fairly clear to auscultation  Heart - normal rate, regular rhythm, normal S1, S2, no murmurs,  Abdomen - soft, nontender, nondistended, no masses or organomegaly, BS+  Neurological - alert, oriented, normal speech, sensations intact and able to move all extremities   Extremities - peripheral pulses normal,2+ b/l pedal edema,  Capillary refill less than 3 sec  Skin - normal coloration and turgor, no rashes      Labs:   Recent Labs     11/28/19  0516   WBC 8.8   HGB 13.6*   HCT 39.6*        Recent Labs     11/27/19  0602 11/28/19  0516 11/29/19  0421   * 137 128*   K 4.2 3.7  3.7 3.5   CL 93* 96* 91*   CO2 24 25 22*   BUN 19 21 17   CREATININE 0.7 0.9 0.7 CALCIUM 9.2 8.8 8.5     No results for input(s): AST, ALT, BILIDIR, BILITOT, ALKPHOS in the last 72 hours. No results for input(s): INR in the last 72 hours. No results for input(s): Don Grippe in the last 72 hours. Microbiology:      Urinalysis:    No results found for: [place] Deirdre, BACTERIA, RBCUA, BLOODU, SPECGRAV, [address]    Radiology:  XR CHEST PORTABLE   Final Result   1. There is slightly worsened pulmonary vascular congestion. There are worsened patchy opacities within the right upper chest. The focal masslike opacity laterally within the right mid chest seen on the previous examination is less well visualized. There    also mild patchy opacities at both lung bases which most commonly represent infiltrate. Follow chest radiographs recommended to confirm complete resolution. **This report has been created using voice recognition software. It may contain minor errors which are inherent in voice recognition technology. **      Final report electronically signed by Dr. Maylin Alejandro on 11/28/2019 8:16 AM      XR CHEST PORTABLE   Final Result      Mildly worsened interstitial pulmonary edema versus atypical pneumonia. Worsening focal airspace opacity in the right upper lobe which may represent superimposed worsening bacterial pneumonia. Stable very small bilateral pleural effusions. **This report has been created using voice recognition software. It may contain minor errors which are inherent in voice recognition technology. **      Final report electronically signed by Dr. Stefani Foster on 11/26/2019 6:17 AM      XR CHEST PORTABLE   Final Result      Mild regional interstitial infiltrates consistent with an atypical pneumonia or pulmonary edema as detailed above. Tiny bilateral pleural effusions. Mild cardiomegaly. **This report has been created using voice recognition software. It may contain minor errors which are inherent in voice recognition technology. **

## 2019-11-29 NOTE — CONSULTS
Addiction consult was completed on 11/28/19 by Deng Richmond. Please reference her note.
mother. SOCIAL HISTORY:  He is a current smoker, smokes around 2-pack-a-day for  50 years and consume alcohol everyday. ALLERGIES:  No known drug allergy. HOME MEDICATIONS:  Prior to admission include multivitamin, ibuprofen,  Tylenol, and potassium. CURRENT MEDICATIONS:  Currently, he has been started already on  diuretics and has been started also on lisinopril. PHYSICAL EXAMINATION:  GENERAL:  Looks sick, in no form of distress. VITALS SIGNS:  Blood pressure 168/104, respiratory rate 16, temperature  98.3, pulse rate 98. HEENT:  Pink conjunctivae. Anicteric sclerae. Pupils are equal and  reactive bilaterally to light. NECK:  No lymphadenopathy. No goiter. No JVD. CHEST:  Bilateral basal fine crackles and decreased air entry. CARDIOVASCULAR:  Arteries are felt; carotid, femoral.  S1, S2 well  heard. There is S3 gallop rhythm. No murmur appreciated. ABDOMEN:  Soft, nontender, nondistended. Bowel sounds are positive. GENITALIA:  No CVA, flank or suprapubic tenderness. LOCOMOTOR:  No cyanosis, clubbing, or muscle or joint tenderness. Bilateral pedal pretibial edema of +1. SKIN:  No rashes, ulcers, or nodules. CNS:  Alert, awake, oriented to person and place. Cranial nerves  intact. Sensation is intact to touch and pain. Motor:  Normal muscle  strength and tone. Appropriate mood and affect. WORKUP:  EKG:  Sinus rhythm, possible left atrial enlargement,  nonspecific ST-T abnormality, evidence of left ventricular hypertrophy  and evidence of incomplete left bundle branch block has been noted. No  acute EKG abnormality noted. Chest x-ray done revealed infiltrate with interstitial edema and  bilateral small pleural effusion. Blood chemistry:  Sodium 130, potassium 4.3, BUN 16, creatinine 0.7. On  admission, sodium 134, so the patient must have been drinking a lot of  water. Troponin less than 0.01. Hematology:  White blood cells 9.4,  hemoglobin 14.6, and platelets 165.

## 2019-11-29 NOTE — PLAN OF CARE
level will decrease  Description  Pain level will decrease  11/29/2019 1056 by Lazaro Pleitez RN  Outcome: Ongoing  Note:   Patient absent of pain this shift. Problem: Skin Integrity - Impaired:  Goal: Absence of new skin breakdown  Description  Absence of new skin breakdown  11/29/2019 1056 by Lazaro Pleitez RN  Outcome: Ongoing  Note:   Patient absent of new skin breakdown this shift. Problem: Fluid Volume:  Goal: Ability to maintain a balanced intake and output will improve  Description  Ability to maintain a balanced intake and output will improve  11/29/2019 1056 by Lazaro Pleitez RN  Outcome: Ongoing  Note:   Patient has adequate intake and output. Problem: Risk for Impaired Skin Integrity  Goal: Tissue integrity - skin and mucous membranes  Description  Structural intactness and normal physiological function of skin and  mucous membranes. Outcome: Ongoing  Note:   Patient mucous membranes pink moist and intact. Problem: Pain:  Goal: Pain level will decrease  Description  Pain level will decrease  11/29/2019 1056 by Lazaro Pleitez RN  Outcome: Ongoing  Note:   Patient absent of pain this shift. 11/28/2019 2150 by Sam Barragan RN  Outcome: Ongoing  Note:   Denies pain at this time. Will continue to monitor. Care plan reviewed with patient. Patient verbalizes understanding of the plan of care and contributes to goal setting.

## 2019-11-29 NOTE — CARE COORDINATION
11/29/19, 9:32 AM    DISCHARGE ON GOING EVALUATION    Brooke Quijano day: 4  Location: [address] [place] [address] Reason for admit: Acute decompensated heart failure [facility]) [I50.9]   Procedure: 11-27-19 Cardiac Cath with 2 stents placed. 11-26-19 Echo with EF 20%  Treatment Plan of Care: This CM requested order to be placed for LV as need indicted in previous Cardiology note. Order placed per Kari Fatima. Required materials faxed to [employer] attention to [place]. Anticipate LV placement today pending approval. Pt would like a PCP within [facility] and walking distance to his home. He does not have a preference of whom. Appt set up with Mikki Nash CNP for Thursday Dec 5. PCP: No primary care provider on file. Readmission Risk Score: 12%  Patient Goals/Plan/Treatment Preferences: Pt to return home alone to [facility] with new Life Vest. New PCP appt scheduled for pt for Dec 5.
DISCHARGE/PLANNING EVALUATION  11/29/19, 9:16 AM    Reason for Referral: \"Resource assistance (Medicare)\"  Mental Status: Patient is alert and oriented  Decision Making: Patient makes own decisions  Family/Social/Home Environment: Spoke with patient, assessment completed. He lives at the [facility], rents a room. He is very independent, walks or takes the bus to appointments and store. Current Services including food security, transportation and housekeeping:  Patients needs are met  Current Equipment: none  Payment Source: Medicare  Concerns or Barriers to Discharge: Patient has Medicare Part A only, requested financial counselor to see for Medicaid screening. He is interested in moving, gave him low income/senior apartment list. Discussed medication assistance, will provide patient with coupon, [facility] [employer] [facility] and/or [facility] medication at discharge. CM is working on new PCP appointment. Post acute provider list with quality measures, geographic area and applicable managed care information provided. Questions regarding selection process answered: n/a    Teach Back Method used with patient  regarding care plan and discharge planning. Patient verbalize understanding of the plan of care and contribute to goal setting. Patient goals, treatment preferences and discharge plan:  Patient plans to return to [facility] with no new services.     Electronically signed by DANI Grande on 11/29/2019 at 9:16 AM
Heart cath today at 1000. Life Vest script faxed to [facility] notified, they will need heart cath report in order to process. Electronically signed by Jaswinder Galdamez RN on 11/27/2019 at 7:53 AM     Updated Virgen Mike at [facility] that cath is delayed until later today. Ethan Rea RN provided with [employer] and phone # and is agreeable to fax cath report to Virgen Mike when available. Virgen Mike also states he can be reached tomorrow, if needed.   Electronically signed by Jaswinder Galdamez RN on 11/27/2019 at 12:02 PM
 Evaluation: no    *Update:  Patient to have cath tomorrow. Will need Life Vest at discharge, not yet ordered.  Electronically signed by So Stevens RN on 11/26/2019 at 1:41 PM

## 2019-11-30 LAB
ANION GAP SERPL CALCULATED.3IONS-SCNC: 14 MEQ/L (ref 8–16)
BUN BLDV-MCNC: 11 MG/DL (ref 7–22)
CALCIUM SERPL-MCNC: 8.6 MG/DL (ref 8.5–10.5)
CHLORIDE BLD-SCNC: 88 MEQ/L (ref 98–111)
CO2: 24 MEQ/L (ref 23–33)
CREAT SERPL-MCNC: 0.7 MG/DL (ref 0.4–1.2)
GFR SERPL CREATININE-BSD FRML MDRD: > 90 ML/MIN/1.73M2
GLUCOSE BLD-MCNC: 159 MG/DL (ref 70–108)
MAGNESIUM: 1.6 MG/DL (ref 1.6–2.4)
POTASSIUM SERPL-SCNC: 4 MEQ/L (ref 3.5–5.2)
SODIUM BLD-SCNC: 124 MEQ/L (ref 135–145)
SODIUM BLD-SCNC: 126 MEQ/L (ref 135–145)
SODIUM BLD-SCNC: 128 MEQ/L (ref 135–145)

## 2019-11-30 PROCEDURE — 6370000000 HC RX 637 (ALT 250 FOR IP): Performed by: FAMILY MEDICINE

## 2019-11-30 PROCEDURE — 83735 ASSAY OF MAGNESIUM: CPT

## 2019-11-30 PROCEDURE — 80048 BASIC METABOLIC PNL TOTAL CA: CPT

## 2019-11-30 PROCEDURE — 94640 AIRWAY INHALATION TREATMENT: CPT

## 2019-11-30 PROCEDURE — 6370000000 HC RX 637 (ALT 250 FOR IP): Performed by: INTERNAL MEDICINE

## 2019-11-30 PROCEDURE — 84295 ASSAY OF SERUM SODIUM: CPT

## 2019-11-30 PROCEDURE — 1200000003 HC TELEMETRY R&B

## 2019-11-30 PROCEDURE — 6360000002 HC RX W HCPCS: Performed by: FAMILY MEDICINE

## 2019-11-30 PROCEDURE — 99232 SBSQ HOSP IP/OBS MODERATE 35: CPT | Performed by: INTERNAL MEDICINE

## 2019-11-30 PROCEDURE — 6370000000 HC RX 637 (ALT 250 FOR IP): Performed by: NURSE PRACTITIONER

## 2019-11-30 PROCEDURE — 94761 N-INVAS EAR/PLS OXIMETRY MLT: CPT

## 2019-11-30 PROCEDURE — 36415 COLL VENOUS BLD VENIPUNCTURE: CPT

## 2019-11-30 PROCEDURE — 2580000003 HC RX 258: Performed by: FAMILY MEDICINE

## 2019-11-30 RX ORDER — SODIUM CHLORIDE 1000 MG
1 TABLET, SOLUBLE MISCELLANEOUS
Status: DISPENSED | OUTPATIENT
Start: 2019-11-30 | End: 2019-12-01

## 2019-11-30 RX ORDER — SODIUM CHLORIDE 1000 MG
1 TABLET, SOLUBLE MISCELLANEOUS
Status: DISCONTINUED | OUTPATIENT
Start: 2019-11-30 | End: 2019-11-30

## 2019-11-30 RX ADMIN — APIXABAN 5 MG: 5 TABLET, FILM COATED ORAL at 11:25

## 2019-11-30 RX ADMIN — FOLIC ACID 1 MG: 1 TABLET ORAL at 11:24

## 2019-11-30 RX ADMIN — Medication 100 MG: at 11:23

## 2019-11-30 RX ADMIN — Medication 2 PUFF: at 11:41

## 2019-11-30 RX ADMIN — Medication 10 ML: at 20:50

## 2019-11-30 RX ADMIN — ONDANSETRON 4 MG: 2 INJECTION INTRAMUSCULAR; INTRAVENOUS at 15:51

## 2019-11-30 RX ADMIN — SODIUM CHLORIDE TAB 1 GM 1 G: 1 TAB at 18:26

## 2019-11-30 RX ADMIN — BUMETANIDE 0.5 MG: 1 TABLET ORAL at 20:40

## 2019-11-30 RX ADMIN — SPIRONOLACTONE 25 MG: 25 TABLET ORAL at 11:23

## 2019-11-30 RX ADMIN — Medication 10 ML: at 11:23

## 2019-11-30 RX ADMIN — MAGNESIUM GLUCONATE 500 MG ORAL TABLET 400 MG: 500 TABLET ORAL at 11:24

## 2019-11-30 RX ADMIN — BUMETANIDE 0.5 MG: 1 TABLET ORAL at 11:25

## 2019-11-30 RX ADMIN — CARVEDILOL 6.25 MG: 6.25 TABLET, FILM COATED ORAL at 11:24

## 2019-11-30 RX ADMIN — Medication 2 PUFF: at 21:04

## 2019-11-30 RX ADMIN — CARVEDILOL 6.25 MG: 6.25 TABLET, FILM COATED ORAL at 18:26

## 2019-11-30 RX ADMIN — SACUBITRIL AND VALSARTAN 1 TABLET: 24; 26 TABLET, FILM COATED ORAL at 20:39

## 2019-11-30 RX ADMIN — CLOPIDOGREL BISULFATE 75 MG: 75 TABLET, FILM COATED ORAL at 11:32

## 2019-11-30 RX ADMIN — ATORVASTATIN CALCIUM 80 MG: 80 TABLET, FILM COATED ORAL at 20:39

## 2019-11-30 RX ADMIN — APIXABAN 5 MG: 5 TABLET, FILM COATED ORAL at 20:58

## 2019-11-30 RX ADMIN — Medication 2 PUFF: at 15:43

## 2019-11-30 RX ADMIN — Medication 2 PUFF: at 07:39

## 2019-11-30 RX ADMIN — Medication 10 ML: at 15:51

## 2019-11-30 RX ADMIN — ASPIRIN 81 MG: 81 TABLET ORAL at 11:32

## 2019-11-30 RX ADMIN — SACUBITRIL AND VALSARTAN 1 TABLET: 24; 26 TABLET, FILM COATED ORAL at 11:23

## 2019-11-30 ASSESSMENT — PAIN SCALES - GENERAL
PAINLEVEL_OUTOF10: 0

## 2019-11-30 NOTE — PLAN OF CARE
Problem: Cardiac Output - Decreased:  Goal: Hemodynamic stability will improve  Description  Hemodynamic stability will improve  11/29/2019 2250 by Mayelin Cunha RN  Outcome: Ongoing  Note:   Vitals stable. NSR noted on telemetry monitor. Will continue to monitor. Problem: Falls - Risk of:  Goal: Will remain free from falls  Description  Will remain free from falls  11/29/2019 2250 by Mayelin Cunha RN  Outcome: Ongoing  Note:   Patient free from falls. Bed in low, locked position with 2/4 rails up and bed alarm on. Fall band intact. Call light, bedside table and personal belongings within reach. Problem: Discharge Planning:  Goal: Participates in care planning  Description  Participates in care planning  11/29/2019 2250 by Mayelin Cunha RN  Outcome: Ongoing  Note:   Patient plans to discharge home when medically stable. No barriers noted at this time. Problem: Gas Exchange - Impaired:  Goal: Levels of oxygenation will improve  Description  Levels of oxygenation will improve  11/29/2019 2250 by Mayelin Cunha RN  Outcome: Ongoing  Note:   Patient O2 Sat >90 this shift. Oxygen placed as needed per order. Problem: Pain:  Goal: Pain level will decrease  Description  Pain level will decrease  11/29/2019 2250 by Mayelin Cunha RN  Outcome: Ongoing  Note:   Denies pain at this time. Will continue to monitor. Problem: Skin Integrity - Impaired:  Goal: Absence of new skin breakdown  Description  Absence of new skin breakdown  11/29/2019 2250 by Mayelin Cunha RN  Outcome: Ongoing  Note:   No skin breakdown noted. Will continue to monitor. Problem: Fluid Volume:  Goal: Ability to maintain a balanced intake and output will improve  Description  Ability to maintain a balanced intake and output will improve  11/29/2019 2250 by Mayelin Cunha RN  Outcome: Ongoing  Note:   I&Os, weight and labs monitored. Pt refuses to use urinal for adequate urinary output. Problem: Risk for Impaired Skin Integrity  Goal: Tissue integrity - skin and mucous membranes  Description  Structural intactness and normal physiological function of skin and  mucous membranes. 11/29/2019 2250 by Brian Duarte RN  Outcome: Ongoing  Note:   Mucous membranes intact. Problem: Pain:  Goal: Pain level will decrease  Description  Pain level will decrease  11/29/2019 2250 by Brian Duarte RN  Outcome: Ongoing  Note:   Denies pain at this time. Will continue to monitor. Care plan reviewed with patient. Patient verbalize understanding of the plan of care and contribute to goal setting.

## 2019-11-30 NOTE — PROGRESS NOTES
BS+  Neurological - alert, oriented, normal speech, sensations intact and able to move all extremities   Extremities - peripheral pulses normal,2+ b/l pedal edema,  Capillary refill less than 3 sec  Skin - normal coloration and turgor, no rashes      Labs:   Recent Labs     11/28/19  0516   WBC 8.8   HGB 13.6*   HCT 39.6*        Recent Labs     11/28/19  0516 11/29/19  0421 11/30/19  0401    128* 126*   K 3.7  3.7 3.5 4.0   CL 96* 91* 88*   CO2 25 22* 24   BUN 21 17 11   CREATININE 0.9 0.7 0.7   CALCIUM 8.8 8.5 8.6     No results for input(s): AST, ALT, BILIDIR, BILITOT, ALKPHOS in the last 72 hours. No results for input(s): INR in the last 72 hours. No results for input(s): [place] in the last 72 hours. Microbiology:      Urinalysis:    No results found for: Zunilda Coho, BACTERIA, RBCUA, BLOODU, SPECGRAV, [address]    Radiology:  XR CHEST PORTABLE   Final Result   1. There is slightly worsened pulmonary vascular congestion. There are worsened patchy opacities within the right upper chest. The focal masslike opacity laterally within the right mid chest seen on the previous examination is less well visualized. There    also mild patchy opacities at both lung bases which most commonly represent infiltrate. Follow chest radiographs recommended to confirm complete resolution. **This report has been created using voice recognition software. It may contain minor errors which are inherent in voice recognition technology. **      Final report electronically signed by Dr. Emir Almaguer on 11/28/2019 8:16 AM      XR CHEST PORTABLE   Final Result      Mildly worsened interstitial pulmonary edema versus atypical pneumonia. Worsening focal airspace opacity in the right upper lobe which may represent superimposed worsening bacterial pneumonia. Stable very small bilateral pleural effusions. **This report has been created using voice recognition software.  It may contain minor errors which are inherent in voice recognition technology. **      Final report electronically signed by Dr. Anita Peña on 11/26/2019 6:17 AM      XR CHEST PORTABLE   Final Result      Mild regional interstitial infiltrates consistent with an atypical pneumonia or pulmonary edema as detailed above. Tiny bilateral pleural effusions. Mild cardiomegaly. **This report has been created using voice recognition software. It may contain minor errors which are inherent in voice recognition technology. **      Final report electronically signed by Dr. Antia Peña on 11/25/2019 9:10 PM          DVT prophylaxis: [] Lovenox                                 [] SCDs                                 [] SQ Heparin                                 [] Encourage ambulation           [x] Already on Anticoagulation     Code Status: Full Code    Tele:   [x] yes             [] no    Active Hospital Problems    Diagnosis Date Noted    Systolic congestive heart failure ([facility] [address] [I50.20]      Priority: High    Tobacco abuse [Z72.0] 11/26/2019    ETOH abuse [F10.10] 11/26/2019    Essential hypertension [I10] 11/26/2019    Paroxysmal atrial fibrillation ([address] [I48.0] 11/26/2019    Nonsustained ventricular tachycardia ([address] [I47.2] 10/70/2438    Acute systolic (congestive) heart failure ([address] [I50.21] 11/25/2019       Electronically signed by Ashanti Solomon MD on 11/30/2019 at 10:59 AM

## 2019-12-01 VITALS
HEIGHT: 63 IN | HEART RATE: 72 BPM | TEMPERATURE: 98.4 F | BODY MASS INDEX: 22.04 KG/M2 | DIASTOLIC BLOOD PRESSURE: 79 MMHG | SYSTOLIC BLOOD PRESSURE: 135 MMHG | RESPIRATION RATE: 16 BRPM | WEIGHT: 124.4 LBS | OXYGEN SATURATION: 96 %

## 2019-12-01 LAB
ANION GAP SERPL CALCULATED.3IONS-SCNC: 11 MEQ/L (ref 8–16)
BUN BLDV-MCNC: 11 MG/DL (ref 7–22)
CALCIUM SERPL-MCNC: 8.8 MG/DL (ref 8.5–10.5)
CHLORIDE BLD-SCNC: 92 MEQ/L (ref 98–111)
CO2: 29 MEQ/L (ref 23–33)
CREAT SERPL-MCNC: 0.8 MG/DL (ref 0.4–1.2)
GFR SERPL CREATININE-BSD FRML MDRD: > 90 ML/MIN/1.73M2
GLUCOSE BLD-MCNC: 133 MG/DL (ref 70–108)
POTASSIUM SERPL-SCNC: 4.2 MEQ/L (ref 3.5–5.2)
PRO-BNP: 4723 PG/ML (ref 0–900)
SODIUM BLD-SCNC: 132 MEQ/L (ref 135–145)
SODIUM BLD-SCNC: 134 MEQ/L (ref 135–145)

## 2019-12-01 PROCEDURE — 83880 ASSAY OF NATRIURETIC PEPTIDE: CPT

## 2019-12-01 PROCEDURE — 6370000000 HC RX 637 (ALT 250 FOR IP): Performed by: INTERNAL MEDICINE

## 2019-12-01 PROCEDURE — 80048 BASIC METABOLIC PNL TOTAL CA: CPT

## 2019-12-01 PROCEDURE — 99239 HOSP IP/OBS DSCHRG MGMT >30: CPT | Performed by: INTERNAL MEDICINE

## 2019-12-01 PROCEDURE — 94640 AIRWAY INHALATION TREATMENT: CPT

## 2019-12-01 PROCEDURE — 36415 COLL VENOUS BLD VENIPUNCTURE: CPT

## 2019-12-01 PROCEDURE — 1200000003 HC TELEMETRY R&B

## 2019-12-01 PROCEDURE — 6370000000 HC RX 637 (ALT 250 FOR IP): Performed by: FAMILY MEDICINE

## 2019-12-01 PROCEDURE — 94760 N-INVAS EAR/PLS OXIMETRY 1: CPT

## 2019-12-01 PROCEDURE — 84295 ASSAY OF SERUM SODIUM: CPT

## 2019-12-01 PROCEDURE — 6370000000 HC RX 637 (ALT 250 FOR IP): Performed by: NURSE PRACTITIONER

## 2019-12-01 RX ORDER — ACETAMINOPHEN 500 MG
500 TABLET ORAL EVERY 6 HOURS PRN
Qty: 120 TABLET | Refills: 3 | Status: SHIPPED | OUTPATIENT
Start: 2019-12-01 | End: 2019-12-12

## 2019-12-01 RX ORDER — LANOLIN ALCOHOL/MO/W.PET/CERES
100 CREAM (GRAM) TOPICAL DAILY
Qty: 30 TABLET | Refills: 3 | Status: SHIPPED | OUTPATIENT
Start: 2019-12-01 | End: 2020-01-10 | Stop reason: ALTCHOICE

## 2019-12-01 RX ORDER — NITROGLYCERIN 0.4 MG/1
TABLET SUBLINGUAL
Qty: 25 TABLET | Refills: 1 | Status: SHIPPED | OUTPATIENT
Start: 2019-12-01 | End: 2021-08-31 | Stop reason: SDUPTHER

## 2019-12-01 RX ORDER — CARVEDILOL 6.25 MG/1
6.25 TABLET ORAL 2 TIMES DAILY WITH MEALS
Qty: 60 TABLET | Refills: 3 | Status: SHIPPED | OUTPATIENT
Start: 2019-12-01 | End: 2020-01-10

## 2019-12-01 RX ORDER — BUMETANIDE 0.5 MG/1
0.5 TABLET ORAL 2 TIMES DAILY
Qty: 60 TABLET | Refills: 3 | Status: SHIPPED | OUTPATIENT
Start: 2019-12-01 | End: 2020-01-06 | Stop reason: SDUPTHER

## 2019-12-01 RX ORDER — FOLIC ACID 1 MG/1
1 TABLET ORAL DAILY
Qty: 30 TABLET | Refills: 3 | Status: SHIPPED | OUTPATIENT
Start: 2019-12-01 | End: 2020-01-10 | Stop reason: ALTCHOICE

## 2019-12-01 RX ORDER — ATORVASTATIN CALCIUM 80 MG/1
80 TABLET, FILM COATED ORAL NIGHTLY
Qty: 30 TABLET | Refills: 3 | Status: SHIPPED | OUTPATIENT
Start: 2019-12-01 | End: 2020-01-08 | Stop reason: SDUPTHER

## 2019-12-01 RX ORDER — CLOPIDOGREL BISULFATE 75 MG/1
75 TABLET ORAL DAILY
Qty: 30 TABLET | Refills: 3 | Status: SHIPPED | OUTPATIENT
Start: 2019-12-01 | End: 2020-01-10 | Stop reason: ALTCHOICE

## 2019-12-01 RX ORDER — SPIRONOLACTONE 25 MG/1
25 TABLET ORAL DAILY
Qty: 30 TABLET | Refills: 3 | Status: SHIPPED | OUTPATIENT
Start: 2019-12-01 | End: 2020-01-10 | Stop reason: ALTCHOICE

## 2019-12-01 RX ORDER — ASPIRIN 81 MG/1
81 TABLET ORAL DAILY
Qty: 30 TABLET | Refills: 0 | Status: SHIPPED | OUTPATIENT
Start: 2019-12-01 | End: 2019-12-12

## 2019-12-01 RX ADMIN — BUMETANIDE 0.5 MG: 1 TABLET ORAL at 12:13

## 2019-12-01 RX ADMIN — SACUBITRIL AND VALSARTAN 1 TABLET: 24; 26 TABLET, FILM COATED ORAL at 17:40

## 2019-12-01 RX ADMIN — Medication 2 PUFF: at 12:30

## 2019-12-01 RX ADMIN — CARVEDILOL 6.25 MG: 6.25 TABLET, FILM COATED ORAL at 12:12

## 2019-12-01 RX ADMIN — CLOPIDOGREL BISULFATE 75 MG: 75 TABLET, FILM COATED ORAL at 12:19

## 2019-12-01 RX ADMIN — Medication 2 PUFF: at 08:35

## 2019-12-01 RX ADMIN — Medication 100 MG: at 12:05

## 2019-12-01 RX ADMIN — SODIUM CHLORIDE TAB 1 GM 1 G: 1 TAB at 12:05

## 2019-12-01 RX ADMIN — SACUBITRIL AND VALSARTAN 1 TABLET: 24; 26 TABLET, FILM COATED ORAL at 12:12

## 2019-12-01 RX ADMIN — ATORVASTATIN CALCIUM 80 MG: 80 TABLET, FILM COATED ORAL at 17:40

## 2019-12-01 RX ADMIN — SPIRONOLACTONE 25 MG: 25 TABLET ORAL at 12:05

## 2019-12-01 RX ADMIN — ASPIRIN 81 MG: 81 TABLET ORAL at 12:19

## 2019-12-01 RX ADMIN — APIXABAN 5 MG: 5 TABLET, FILM COATED ORAL at 17:40

## 2019-12-01 RX ADMIN — CARVEDILOL 6.25 MG: 6.25 TABLET, FILM COATED ORAL at 17:38

## 2019-12-01 RX ADMIN — APIXABAN 5 MG: 5 TABLET, FILM COATED ORAL at 12:13

## 2019-12-01 RX ADMIN — MAGNESIUM GLUCONATE 500 MG ORAL TABLET 400 MG: 500 TABLET ORAL at 12:12

## 2019-12-01 RX ADMIN — ACETAMINOPHEN 650 MG: 325 TABLET ORAL at 03:54

## 2019-12-01 RX ADMIN — FOLIC ACID 1 MG: 1 TABLET ORAL at 12:12

## 2019-12-01 RX ADMIN — Medication 2 PUFF: at 17:41

## 2019-12-01 ASSESSMENT — PAIN SCALES - GENERAL
PAINLEVEL_OUTOF10: 0
PAINLEVEL_OUTOF10: 3
PAINLEVEL_OUTOF10: 0

## 2019-12-01 ASSESSMENT — PAIN DESCRIPTION - LOCATION: LOCATION: ABDOMEN

## 2019-12-01 ASSESSMENT — PAIN DESCRIPTION - PROGRESSION: CLINICAL_PROGRESSION: GRADUALLY WORSENING

## 2019-12-01 ASSESSMENT — PAIN DESCRIPTION - FREQUENCY: FREQUENCY: INTERMITTENT

## 2019-12-01 ASSESSMENT — PAIN - FUNCTIONAL ASSESSMENT: PAIN_FUNCTIONAL_ASSESSMENT: ACTIVITIES ARE NOT PREVENTED

## 2019-12-01 ASSESSMENT — PAIN DESCRIPTION - DESCRIPTORS: DESCRIPTORS: ACHING

## 2019-12-01 ASSESSMENT — PAIN DESCRIPTION - ORIENTATION: ORIENTATION: MID

## 2019-12-01 ASSESSMENT — PAIN DESCRIPTION - ONSET: ONSET: GRADUAL

## 2019-12-01 ASSESSMENT — PAIN DESCRIPTION - PAIN TYPE: TYPE: ACUTE PAIN

## 2019-12-01 NOTE — DISCHARGE SUMMARY
suggested pulmonary edema versus atypical pneumonia. Nonspecific ST-T wave changes. EKG showed incomplete left bundle branch block. Patient suspected to have acute CHF exacerbation and started on diuretics Bumex and cardiology was consulted. Patient also placed on phenobarbital prophylaxis because of alcohol abuse and did not have significant withdrawal symptoms. Patient noticed to have paroxysmal atrial fibrillation on telemetry monitor started on anticoagulation. Patient was also noticed to have short runs of nonsustained VT's .      2D echo showed EF of 20%, with severe global hypokinesis in the left ventricle and left atrium moderately dilated. Also noticed to have small aortic aneurysm in the ascending aorta around 4 cm. Subsequently cardiologist did a cardiac cath, and noticed to have RCA , left circumflex/OM2 severe stenosis-and had PCI and stenting done on 11/27/2019.     Cardiologist recommended aspirin for 1 month and Plavix for 1 year and long-term anticoagulation. Patient still continues to have edema in the legs and is on low-dose diuretics and does not have any symptoms of shortness of breath. As patient was being planned for discharge, sodium trended down to 126 and was placed on fluid restriction and counseled about need to avoid drinking soda pops which she drinks 2-3 every day along with ice and counseled about need for fluid restriction and monitoring closely. He was also supplemented with sodium 1 g, for 1 day and sodium improved. Patient is being discharged home and gave prescriptions for all his medications and counseled him at length about need for follow-up. Counseled about smoking cessation and alcohol abstinence. Patient has a LifeVest placed.        Exam:     Vitals:  Vitals:    11/30/19 1600 11/30/19 2015 11/30/19 2104 12/01/19 0345   BP: 111/74 111/72  122/84   Pulse: 76 73  84   Resp: 24 24 20 20   Temp: 98.4 °F (36.9 °C) 98.4 °F (36.9 °C)  97.9 °F (36.6 °C)   TempSrc: Oral Oral  Oral   SpO2: 97% 97% 96% 97%   Weight:       Height:         Weight: Weight: 124 lb 6.4 oz (56.4 kg)     24 hour intake/output:    Intake/Output Summary (Last 24 hours) at 12/1/2019 0756  Last data filed at 12/1/2019 0402  Gross per 24 hour   Intake 890 ml   Output --   Net 890 ml       Physical Examination:   General appearance - alert, awake  and in no distress at rest, LIFE VEST+  HEENT: Normocephalic, Atraumatic, pupils reactive, mucous membranes moist  Chest - moving equally to respiration,sdecreased but symmetric air entry, very poor BS but fairly clear to auscultation  Heart - normal rate, regular rhythm, normal S1, S2, no murmurs,  Abdomen - soft, nontender, nondistended, no masses or organomegaly, BS+  Neurological - alert, oriented, normal speech, sensations intact and able to move all extremities   Extremities - peripheral pulses normal,2+ b/l pedal edema,  Capillary refill less than 3 sec  Skin - normal coloration and turgor, no rashes         Labs: For convenience and continuity at follow-up the following most recent labs are provided:      CBC:    Lab Results   Component Value Date    WBC 8.8 11/28/2019    HGB 13.6 11/28/2019    HCT 39.6 11/28/2019     11/28/2019       Renal:    Lab Results   Component Value Date     12/01/2019    K 4.2 12/01/2019    K 3.7 11/28/2019    CL 92 12/01/2019    CO2 29 12/01/2019    BUN 11 12/01/2019    CREATININE 0.8 12/01/2019    CALCIUM 8.8 12/01/2019         Significant Diagnostic Studies    Radiology:   XR CHEST PORTABLE   Final Result   1. There is slightly worsened pulmonary vascular congestion. There are worsened patchy opacities within the right upper chest. The focal masslike opacity laterally within the right mid chest seen on the previous examination is less well visualized. There    also mild patchy opacities at both lung bases which most commonly represent infiltrate. Follow chest radiographs recommended to confirm complete resolution. **This report has been created using voice recognition software. It may contain minor errors which are inherent in voice recognition technology. **      Final report electronically signed by Dr. Eugenia Turner on 11/28/2019 8:16 AM      XR CHEST PORTABLE   Final Result      Mildly worsened interstitial pulmonary edema versus atypical pneumonia. Worsening focal airspace opacity in the right upper lobe which may represent superimposed worsening bacterial pneumonia. Stable very small bilateral pleural effusions. **This report has been created using voice recognition software. It may contain minor errors which are inherent in voice recognition technology. **      Final report electronically signed by Dr. Johanne Cramer on 11/26/2019 6:17 AM      XR CHEST PORTABLE   Final Result      Mild regional interstitial infiltrates consistent with an atypical pneumonia or pulmonary edema as detailed above. Tiny bilateral pleural effusions. Mild cardiomegaly. **This report has been created using voice recognition software. It may contain minor errors which are inherent in voice recognition technology. **      Final report electronically signed by Dr. Johanne Cramer on 11/25/2019 9:10 PM             Consults:     STR ED TO IP CONSULT  STR ED TO IP CONSULT  IP CONSULT TO DIETITIAN  IP CONSULT TO CARDIOLOGY  IP CONSULT TO CASE MANAGEMENT  IP CONSULT TO ADDICTION SERVICES  IP CONSULT TO DIETITIAN  IP CONSULT TO PHARMACY  IP CONSULT TO HEART FAILURE NURSE/COORDINATOR  IP CONSULT TO CARDIAC REHAB  IP CONSULT TO SOCIAL WORK  IP CONSULT TO DIETITIAN    Disposition: Home  Condition at Discharge: Stable    Code Status:  Full Code     Patient Instructions:    Discharge lab work: none  Activity: activity as tolerated  Diet: DIET CARDIAC; Daily Fluid Restriction: 1200 ml      Follow-up visits:   Timoteo Meckel, APRN - CNP  [id number]  [address]  [address]    On 12/5/2019  See Derik Villarreal on [address]

## 2019-12-01 NOTE — PLAN OF CARE
Problem: Cardiac Output - Decreased:  Goal: Hemodynamic stability will improve  Description  Hemodynamic stability will improve  Outcome: Met This Shift  Note:   BP 11/74 HR 76. Heart monitor on NSR. Problem: Falls - Risk of:  Goal: Will remain free from falls  Description  Will remain free from falls  Outcome: Met This Shift  Note:   No falls this shift. Using call light. Bed and chair alarms on and pt has a teley sitter. Goal: Absence of physical injury  Description  Absence of physical injury  Outcome: Met This Shift  Note:   No injury this shift. Problem: Discharge Planning:  Goal: Participates in care planning  Description  Participates in care planning  Outcome: Ongoing  Note:   Pt attempts to assist in decisions for POC and home. Pt does not want to g back to [facility] apartment. Problem: Gas Exchange - Impaired:  Goal: Levels of oxygenation will improve  Description  Levels of oxygenation will improve  Outcome: Met This Shift  Note:   Spo2 97 % on room air. Respirations 16/24. SOB with exertion. Problem: Gas Exchange - Impaired:  Goal: Levels of oxygenation will improve  Description  Levels of oxygenation will improve  Outcome: Met This Shift  Note:   Spo2 97 % on room air. Respirations 16/24. SOB with exertion. Problem: Pain:  Goal: Pain level will decrease  Description  Pain level will decrease  Outcome: Met This Shift  Note:   Hermelindo any pain. Problem: Skin Integrity - Impaired:  Goal: Absence of new skin breakdown  Description  Absence of new skin breakdown  Outcome: Met This Shift  Note:   No new skin issues as per skin assessment. Problem: Fluid Volume:  Goal: Ability to maintain a balanced intake and output will improve  Description  Ability to maintain a balanced intake and output will improve  Outcome: Ongoing  Note:   Decrease in appetite this shift. 50% of meals taken and consumes little water mainly soda. Voiding increases post diuretics.      Problem: Risk for Impaired

## 2019-12-01 NOTE — DISCHARGE INSTR - DIET
 Good nutrition is important when healing from an illness, injury, or surgery. Follow any nutrition recommendations given to you during your hospital stay.  If you were given an oral nutrition supplement while in the hospital, continue to take this supplement at home. You can take it with meals, in-between meals, and/or before bedtime. These supplements can be purchased at most local grocery stores, pharmacies, and chain super-stores.  If you have any questions about your diet or nutrition, call the hospital and ask for the dietitian.   Cardiac diet  Fluid restriction 1200 ml ( watch the amount of fluids you  Are drinking even jello, pudding, ice cream, soda)

## 2019-12-01 NOTE — PROGRESS NOTES
Problem: Cardiac Output - Decreased:  Goal: Hemodynamic stability will improve  Description  Hemodynamic stability will improve  11/29/2019 2250 by Chris Eddy RN  Outcome: Ongoing  Note:   Vitals stable. NSR noted on telemetry monitor. Will continue to monitor. Problem: Falls - Risk of:  Goal: Will remain free from falls  Description  Will remain free from falls  11/29/2019 2250 by Chris Eddy RN  Outcome: Ongoing  Note:   Patient free from falls. Bed in low, locked position with 2/4 rails up and bed alarm on. Fall band intact. Call light, bedside table and personal belongings within reach.         Problem: Discharge Planning:  Goal: Participates in care planning  Description  Participates in care planning  11/29/2019 2250 by Chris Eddy RN  Outcome: Ongoing  Note:   Patient plans to discharge home when medically stable. No barriers noted at this time.        Problem: Gas Exchange - Impaired:  Goal: Levels of oxygenation will improve  Description  Levels of oxygenation will improve  11/29/2019 2250 by Chris Eddy RN  Outcome: Ongoing  Note:   Patient O2 Sat >90 this shift. Oxygen placed as needed per order.        Problem: Pain:  Goal: Pain level will decrease  Description  Pain level will decrease  11/29/2019 2250 by Chris Eddy RN  Outcome: Ongoing  Note:   Denies pain at this time. Will continue to monitor. Problem: Skin Integrity - Impaired:  Goal: Absence of new skin breakdown  Description  Absence of new skin breakdown  11/29/2019 2250 by Chris Eddy RN  Outcome: Ongoing  Note:   No skin breakdown noted. Will continue to monitor. Problem: Fluid Volume:  Goal: Ability to maintain a balanced intake and output will improve  Description  Ability to maintain a balanced intake and output will improve  11/29/2019 2250 by Chris Eddy RN  Outcome: Ongoing  Note:   I&Os, weight and labs monitored. Pt refuses to use urinal for adequate urinary output. Problem: Risk for Impaired Skin Integrity  Goal: Tissue integrity - skin and mucous membranes  Description  Structural intactness and normal physiological function of skin and  mucous membranes. 11/29/2019 2250 by Pool To RN  Outcome: Ongoing  Note:   Mucous membranes intact. Problem: Pain:  Goal: Pain level will decrease  Description  Pain level will decrease  11/29/2019 2250 by Pool To RN  Outcome: Ongoing  Note:   Denies pain at this time. Will continue to monitor. Care plan reviewed with patient.   Patient verbalize understanding of the plan of care and contribute to goal setting.

## 2019-12-02 ENCOUNTER — CARE COORDINATION (OUTPATIENT)
Dept: CASE MANAGEMENT | Age: 64
End: 2019-12-02

## 2019-12-03 ENCOUNTER — CARE COORDINATION (OUTPATIENT)
Dept: CASE MANAGEMENT | Age: 64
End: 2019-12-03

## 2019-12-05 ENCOUNTER — OFFICE VISIT (OUTPATIENT)
Dept: INTERNAL MEDICINE CLINIC | Age: 64
End: 2019-12-05

## 2019-12-05 VITALS
SYSTOLIC BLOOD PRESSURE: 152 MMHG | OXYGEN SATURATION: 97 % | HEIGHT: 63 IN | DIASTOLIC BLOOD PRESSURE: 96 MMHG | BODY MASS INDEX: 23.92 KG/M2 | RESPIRATION RATE: 12 BRPM | HEART RATE: 84 BPM | WEIGHT: 135 LBS

## 2019-12-05 DIAGNOSIS — Z00.00 ENCOUNTER FOR MEDICAL EXAMINATION TO ESTABLISH CARE: Primary | ICD-10-CM

## 2019-12-05 DIAGNOSIS — Z72.0 TOBACCO ABUSE: ICD-10-CM

## 2019-12-05 DIAGNOSIS — F10.10 ALCOHOL ABUSE: ICD-10-CM

## 2019-12-05 DIAGNOSIS — I25.10 CORONARY ARTERY DISEASE INVOLVING NATIVE HEART WITHOUT ANGINA PECTORIS, UNSPECIFIED VESSEL OR LESION TYPE: ICD-10-CM

## 2019-12-05 DIAGNOSIS — I48.0 PAROXYSMAL A-FIB (HCC): ICD-10-CM

## 2019-12-05 DIAGNOSIS — I71.40 ABDOMINAL AORTIC ANEURYSM (AAA) WITHOUT RUPTURE: ICD-10-CM

## 2019-12-05 DIAGNOSIS — I50.9 CONGESTIVE HEART FAILURE, UNSPECIFIED HF CHRONICITY, UNSPECIFIED HEART FAILURE TYPE (HCC): ICD-10-CM

## 2019-12-05 PROCEDURE — 99204 OFFICE O/P NEW MOD 45 MIN: CPT | Performed by: NURSE PRACTITIONER

## 2019-12-05 SDOH — HEALTH STABILITY: MENTAL HEALTH: HOW MANY STANDARD DRINKS CONTAINING ALCOHOL DO YOU HAVE ON A TYPICAL DAY?: 3 OR 4

## 2019-12-05 SDOH — HEALTH STABILITY: MENTAL HEALTH: HOW OFTEN DO YOU HAVE A DRINK CONTAINING ALCOHOL?: 4 OR MORE TIMES A WEEK

## 2019-12-05 ASSESSMENT — PATIENT HEALTH QUESTIONNAIRE - PHQ9
2. FEELING DOWN, DEPRESSED OR HOPELESS: 0
SUM OF ALL RESPONSES TO PHQ QUESTIONS 1-9: 0
SUM OF ALL RESPONSES TO PHQ QUESTIONS 1-9: 0
1. LITTLE INTEREST OR PLEASURE IN DOING THINGS: 0
SUM OF ALL RESPONSES TO PHQ9 QUESTIONS 1 & 2: 0

## 2019-12-05 ASSESSMENT — ENCOUNTER SYMPTOMS
SHORTNESS OF BREATH: 1
DIARRHEA: 0
NAUSEA: 0
PHOTOPHOBIA: 0
CONSTIPATION: 0
VOMITING: 0
ABDOMINAL PAIN: 0
RHINORRHEA: 0
WHEEZING: 0
TROUBLE SWALLOWING: 0
SORE THROAT: 0
COUGH: 0
SINUS PRESSURE: 0
ABDOMINAL DISTENTION: 0
BLOOD IN STOOL: 0
SINUS PAIN: 0

## 2019-12-12 ENCOUNTER — OFFICE VISIT (OUTPATIENT)
Dept: CARDIOLOGY CLINIC | Age: 64
End: 2019-12-12

## 2019-12-12 VITALS
HEART RATE: 105 BPM | DIASTOLIC BLOOD PRESSURE: 68 MMHG | BODY MASS INDEX: 24.95 KG/M2 | HEIGHT: 62 IN | SYSTOLIC BLOOD PRESSURE: 140 MMHG | WEIGHT: 135.6 LBS | OXYGEN SATURATION: 91 %

## 2019-12-12 DIAGNOSIS — I50.42 CHF (CONGESTIVE HEART FAILURE), NYHA CLASS III, CHRONIC, COMBINED (HCC): Primary | ICD-10-CM

## 2019-12-12 LAB
ANION GAP SERPL CALCULATED.3IONS-SCNC: 15 MEQ/L (ref 8–16)
BUN BLDV-MCNC: 23 MG/DL (ref 7–22)
CALCIUM SERPL-MCNC: 9.2 MG/DL (ref 8.5–10.5)
CHLORIDE BLD-SCNC: 96 MEQ/L (ref 98–111)
CO2: 24 MEQ/L (ref 23–33)
CREAT SERPL-MCNC: 0.7 MG/DL (ref 0.4–1.2)
GFR SERPL CREATININE-BSD FRML MDRD: > 90 ML/MIN/1.73M2
GLUCOSE BLD-MCNC: 233 MG/DL (ref 70–108)
MAGNESIUM: 2 MG/DL (ref 1.6–2.4)
POTASSIUM SERPL-SCNC: 4.6 MEQ/L (ref 3.5–5.2)
PRO-BNP: ABNORMAL PG/ML (ref 0–900)
SODIUM BLD-SCNC: 135 MEQ/L (ref 135–145)

## 2019-12-12 PROCEDURE — 36415 COLL VENOUS BLD VENIPUNCTURE: CPT | Performed by: NURSE PRACTITIONER

## 2019-12-12 PROCEDURE — 99214 OFFICE O/P EST MOD 30 MIN: CPT | Performed by: NURSE PRACTITIONER

## 2019-12-12 PROCEDURE — 99406 BEHAV CHNG SMOKING 3-10 MIN: CPT | Performed by: NURSE PRACTITIONER

## 2019-12-12 ASSESSMENT — ENCOUNTER SYMPTOMS
SHORTNESS OF BREATH: 1
COUGH: 0
ABDOMINAL DISTENTION: 0

## 2019-12-23 ENCOUNTER — TELEPHONE (OUTPATIENT)
Dept: CARDIOLOGY CLINIC | Age: 64
End: 2019-12-23

## 2019-12-23 NOTE — TELEPHONE ENCOUNTER
----- Message from DYAN Quinn CNP sent at 12/13/2019 10:32 AM EST -----  Call and check on pt - fluid status - legs were very swollen, he was very confused on meds.   Remind to get labs after [place]

## 2019-12-31 LAB
ANION GAP SERPL CALCULATED.3IONS-SCNC: 7 MMOL/L (ref 4–12)
BUN BLDV-MCNC: 10 MG/DL (ref 5–27)
CALCIUM SERPL-MCNC: 9.1 MG/DL (ref 8.5–10.5)
CHLORIDE BLD-SCNC: 99 MMOL/L (ref 98–109)
CO2: 36 MMOL/L (ref 22–32)
CREAT SERPL-MCNC: 0.77 MG/DL (ref 0.6–1.3)
EGFR AFRICAN AMERICAN: >60 ML/MIN/1.73SQ.M
EGFR IF NONAFRICAN AMERICAN: >60 ML/MIN/1.73SQ.M
GLUCOSE: 59 MG/DL (ref 65–99)
POTASSIUM SERPL-SCNC: 4 MMOL/L (ref 3.5–5)
SODIUM BLD-SCNC: 142 MMOL/L (ref 134–146)

## 2020-01-02 NOTE — TELEPHONE ENCOUNTER
Labs look good. Inquire on symptoms = swelling, SOB, etc.  Is he taking all his meds? He was very confused on meds at last visit.

## 2020-01-02 NOTE — TELEPHONE ENCOUNTER
Spoke with pt. Pt denies swelling, cp and dizziness. Pt c/o slight sob. Pt states he is taking all his medications.

## 2020-01-06 ENCOUNTER — CARE COORDINATION (OUTPATIENT)
Dept: CARE COORDINATION | Age: 65
End: 2020-01-06

## 2020-01-06 ENCOUNTER — OFFICE VISIT (OUTPATIENT)
Dept: INTERNAL MEDICINE CLINIC | Age: 65
End: 2020-01-06
Payer: MEDICAID

## 2020-01-06 VITALS
DIASTOLIC BLOOD PRESSURE: 80 MMHG | BODY MASS INDEX: 24.95 KG/M2 | SYSTOLIC BLOOD PRESSURE: 130 MMHG | HEIGHT: 62 IN | OXYGEN SATURATION: 98 % | WEIGHT: 135.58 LBS | HEART RATE: 75 BPM | RESPIRATION RATE: 12 BRPM

## 2020-01-06 PROCEDURE — 99214 OFFICE O/P EST MOD 30 MIN: CPT | Performed by: NURSE PRACTITIONER

## 2020-01-06 RX ORDER — BUMETANIDE 0.5 MG/1
0.5 TABLET ORAL 2 TIMES DAILY
Qty: 60 TABLET | Refills: 3 | Status: SHIPPED | OUTPATIENT
Start: 2020-01-06 | End: 2020-02-03

## 2020-01-06 SDOH — SOCIAL STABILITY: SOCIAL NETWORK: IN A TYPICAL WEEK, HOW MANY TIMES DO YOU TALK ON THE PHONE WITH FAMILY, FRIENDS, OR NEIGHBORS?: NEVER

## 2020-01-06 SDOH — HEALTH STABILITY: PHYSICAL HEALTH: ON AVERAGE, HOW MANY MINUTES DO YOU ENGAGE IN EXERCISE AT THIS LEVEL?: 20 MIN

## 2020-01-06 SDOH — ECONOMIC STABILITY: FOOD INSECURITY: WITHIN THE PAST 12 MONTHS, YOU WORRIED THAT YOUR FOOD WOULD RUN OUT BEFORE YOU GOT MONEY TO BUY MORE.: NEVER TRUE

## 2020-01-06 SDOH — SOCIAL STABILITY: SOCIAL NETWORK
DO YOU BELONG TO ANY CLUBS OR ORGANIZATIONS SUCH AS CHURCH GROUPS UNIONS, FRATERNAL OR ATHLETIC GROUPS, OR SCHOOL GROUPS?: NO

## 2020-01-06 SDOH — ECONOMIC STABILITY: TRANSPORTATION INSECURITY
IN THE PAST 12 MONTHS, HAS THE LACK OF TRANSPORTATION KEPT YOU FROM MEDICAL APPOINTMENTS OR FROM GETTING MEDICATIONS?: YES

## 2020-01-06 SDOH — ECONOMIC STABILITY: INCOME INSECURITY: HOW HARD IS IT FOR YOU TO PAY FOR THE VERY BASICS LIKE FOOD, HOUSING, MEDICAL CARE, AND HEATING?: SOMEWHAT HARD

## 2020-01-06 SDOH — HEALTH STABILITY: MENTAL HEALTH
STRESS IS WHEN SOMEONE FEELS TENSE, NERVOUS, ANXIOUS, OR CAN'T SLEEP AT NIGHT BECAUSE THEIR MIND IS TROUBLED. HOW STRESSED ARE YOU?: TO SOME EXTENT

## 2020-01-06 SDOH — ECONOMIC STABILITY: TRANSPORTATION INSECURITY
IN THE PAST 12 MONTHS, HAS LACK OF TRANSPORTATION KEPT YOU FROM MEETINGS, WORK, OR FROM GETTING THINGS NEEDED FOR DAILY LIVING?: NO

## 2020-01-06 SDOH — SOCIAL STABILITY: SOCIAL NETWORK: HOW OFTEN DO YOU ATTEND CHURCH OR RELIGIOUS SERVICES?: NEVER

## 2020-01-06 SDOH — ECONOMIC STABILITY: FOOD INSECURITY: WITHIN THE PAST 12 MONTHS, THE FOOD YOU BOUGHT JUST DIDN'T LAST AND YOU DIDN'T HAVE MONEY TO GET MORE.: NEVER TRUE

## 2020-01-06 SDOH — SOCIAL STABILITY: SOCIAL NETWORK: HOW OFTEN DO YOU GET TOGETHER WITH FRIENDS OR RELATIVES?: NEVER

## 2020-01-06 SDOH — SOCIAL STABILITY: SOCIAL NETWORK: HOW OFTEN DO YOU ATTENT MEETINGS OF THE CLUB OR ORGANIZATION YOU BELONG TO?: NEVER

## 2020-01-06 SDOH — HEALTH STABILITY: PHYSICAL HEALTH: ON AVERAGE, HOW MANY DAYS PER WEEK DO YOU ENGAGE IN MODERATE TO STRENUOUS EXERCISE (LIKE A BRISK WALK)?: 2 DAYS

## 2020-01-06 ASSESSMENT — PATIENT HEALTH QUESTIONNAIRE - PHQ9
2. FEELING DOWN, DEPRESSED OR HOPELESS: 0
1. LITTLE INTEREST OR PLEASURE IN DOING THINGS: 0
SUM OF ALL RESPONSES TO PHQ QUESTIONS 1-9: 0
SUM OF ALL RESPONSES TO PHQ9 QUESTIONS 1 & 2: 0
SUM OF ALL RESPONSES TO PHQ QUESTIONS 1-9: 0

## 2020-01-06 NOTE — PROGRESS NOTES
pressure measures 40 mmhg.   Aortic aneurysm noted in the ascending aorta.   Aortic aneurysm measures 4 cm.        Edgar Ruano reports that he has cut back significantly on his drinking since last visit, but has not stopped completely. He is now drinking less than 1/4 bottle of gin a day. Has drank for 30+ years. Hyponatremia could be related to his alcohol consumption. Smoking 3 packs a day for 40+ years, declines smoking cessation at this time as well. Is currently living at the [facility], and states it is not a good place for him to be; however he has difficulty with housing as he is on disability. Still working at obtaining housing. Review of Systems   Constitutional: Negative for chills, fatigue and fever. HENT: Negative for congestion, rhinorrhea, sinus pressure, sinus pain, sore throat, tinnitus and trouble swallowing. Eyes: Negative for photophobia and visual disturbance. Respiratory: Positive for shortness of breath (on exertion). Negative for cough and wheezing. Cardiovascular: Negative for chest pain, palpitations and leg swelling. Gastrointestinal: Negative for abdominal distention, abdominal pain, blood in stool, constipation, diarrhea, nausea and vomiting. Endocrine: Negative for polydipsia, polyphagia and polyuria. Genitourinary: Negative for difficulty urinating, dysuria, frequency, hematuria and urgency. Musculoskeletal: Negative for arthralgias and myalgias. Skin: Negative for rash and wound. Neurological: Negative for dizziness, light-headedness and headaches. Psychiatric/Behavioral: Negative for dysphoric mood and sleep disturbance. The patient is not nervous/anxious. Prior to Visit Medications    Medication Sig Taking? Authorizing Provider   bumetanide (BUMEX) 0.5 MG tablet Take 1 tablet by mouth 2 times daily . hold if SBP less than 100 mm hg or HR less than 60 Yes Parvez Bruce, APRN - CNP   aspirin 81 MG tablet Take 81 mg by mouth daily Yes Historical Provider, MD Pulse: 75   Resp: 12   SpO2: 98%   Weight: 135 lb 9.3 oz (61.5 kg)   Height: 5' 2.01\" (1.575 m)     Estimated body mass index is 24.79 kg/m² as calculated from the following:    Height as of this encounter: 5' 2.01\" (1.575 m). Weight as of this encounter: 135 lb 9.3 oz (61.5 kg). Physical Exam  Constitutional:       General: He is not in acute distress. Appearance: Normal appearance. He is normal weight. He is not ill-appearing. HENT:      Head: Normocephalic and atraumatic. Right Ear: Tympanic membrane, ear canal and external ear normal.      Left Ear: Tympanic membrane, ear canal and external ear normal.      Nose: Nose normal. No congestion or rhinorrhea. Mouth/Throat:      Mouth: Mucous membranes are moist.      Pharynx: Oropharynx is clear. No oropharyngeal exudate or posterior oropharyngeal erythema. Eyes:      Extraocular Movements: Extraocular movements intact. Conjunctiva/sclera: Conjunctivae normal.      Pupils: Pupils are equal, round, and reactive to light. Neck:      Musculoskeletal: Normal range of motion and neck supple. No muscular tenderness. Vascular: No carotid bruit. Cardiovascular:      Rate and Rhythm: Normal rate and regular rhythm. Pulses: Normal pulses. Heart sounds: No murmur. No friction rub. No gallop. Pulmonary:      Effort: Pulmonary effort is normal. No respiratory distress. Breath sounds: Normal breath sounds. No wheezing, rhonchi or rales. Abdominal:      General: Abdomen is flat. Bowel sounds are normal. There is no distension. Palpations: Abdomen is soft. Tenderness: There is no tenderness. Musculoskeletal: Normal range of motion. General: No tenderness. Right lower leg: No edema. Left lower leg: No edema. Lymphadenopathy:      Cervical: No cervical adenopathy. Skin:     General: Skin is warm and dry. Capillary Refill: Capillary refill takes less than 2 seconds.       Findings: No erythema or rash. Neurological:      General: No focal deficit present. Mental Status: He is alert and oriented to person, place, and time. Motor: No weakness. Coordination: Coordination normal.      Deep Tendon Reflexes: Reflexes normal.   Psychiatric:         Mood and Affect: Mood normal.         Behavior: Behavior normal.         Thought Content: Thought content normal.         Judgment: Judgment normal.         ASSESSMENT/PLAN:    1. Essential hypertension    - bumetanide (BUMEX) 0.5 MG tablet; Take 1 tablet by mouth 2 times daily . hold if SBP less than 100 mm hg or HR less than 60  Dispense: 60 tablet; Refill: 3  - Labs obtained on 1/2/20- renal function WNL. Sodium has normalized as well. 2. Systolic congestive heart failure, unspecified HF chronicity ([facility] [address] [facility]    - Continue to follow with CHF clinic as previously scheduled  - Follow with cardiology as scheduled  - Continue Entresto- samples provided per cardiology office. 3. Paroxysmal atrial fibrillation (HCC)    - Apical pulse is regular at this time  - Life Vest on- follow with cardiology concerning  - On Eliquis and clopidogrel- samples provided by office. Discussed bleeding precautions, risks with alcohol consumption, as well as fall precautions. Understanding voiced. 4. ETOH abuse    - Continue to decrease alcohol consumption, discussed potential complications as well as electrolyte imbalances associated. Understanding voiced. - Encouraged counseling, declined at this time    5. Tobacco abuse    - Declines cessation at this time    6. Ab[facility]    - Followed by cardiology  - Will need CT of the chest in 1 year approximately for routine measurement     7. Coronary artery disease involving native coronary artery of native heart without angina pectoris    - Followed by cardiology as well      Clem Young RN assisting to assure patient has medications needed.  [facility] to assist as well. Samples provided. Return in about 3 months (around 4/6/2020). Sooner if needed     An electronic signature was used to authenticate this note.     --DYAN Rodríguez - CNP on 1/7/2020 at 9:46 AM

## 2020-01-06 NOTE — CARE COORDINATION
Ambulatory Care Coordination Note  1/6/2020  CM Risk Score: 1  Charlson 10 Year Mortality Risk Score: 23%     ACC: Shelia Cook, RN    Summary Note: Akhil Mccann was referred to care coordination by the PCP for education and assistance with medication management and connecting with resources as well as education and assistance in managing his CHF. Met with Akhil Mccann today during PCP appt. Introduced self and role. Pt recently diagnosed with CHF. Was not taking any medications prior to his hospitalization. Pt struggles financially. Does not currently have any drug coverage. out pharmacy has provided pt with some meds through [id number] and some of his medications were [facility] at d/c. Pt here today needing Eliquis as not available through [facility]. Entresto not either. Clinical staff notified cardiology office and they are able to provide pt with one bottle of samples of Entresto. Pt provided with Eliquis samples by PCP office. Pt reports that he currently lives at [facility]. Hoping to move out to get his own apt soon. Not connected with any community resources at this time. SW referral made. Pt not utilizing [facility] or [facility] for any services. CHF: reports that he has been taking his meds as prescribed. Denies edema. Plan of CAre:  SW referral made. Pt reports that he has never applied for Medicaid. Get connected with [facility] for transportation assistance   [employer] mgmt for CHF tool sheet, welcome letter, and right time right place right care ed sheet  mailed to pt. Reviewed f/u appts. Contacted out pharmacy. Requesting [facility] medication list be faxed so PCP can review to see if any other alternative meds can be prescribed d/t not having any drug coverage.    Congestive Heart Failure Assessment    How many restaurant meals do you eat per week?:  0  Do you salt your food before tasting it?:  No         Symptoms:   CHF associated angina: Neg, CHF associated dyspnea on exertion: Pos, CHF associated fatigue: your patient's physical health needs, are there any symptoms or problems (risk indicators) you are unsure about that require further investigation?:  No identified areas of uncertainly or problems already being investigated   Are the patients physical health problems impacting on their mental well-being?:  Mild impact on mental well-being e.g. \"\"feeling fed-up\"\", \"\"reduced enjoyment\"\"   Are there any problems with your patients lifestyle behaviors (alcohol, drugs, diet, exercise) that are impacting on physical or mental well-being?:  No identified areas of concern   Do you have any other concerns about your patients mental well-being? How would you rate their severity and impact on the patient?:  No identified areas of concern   How would you rate their home environment in terms of safety and stability (including domestic violence, insecure housing, neighbor harassment)?:  Safe, stable, but with some inconsistency   How do daily activities impact on the patient's well-being? (include current or anticipated unemployment, work, caregiving, access to transportation or other):  No identified problems or perceived positive benefits   How would you rate their social network (family, work, friends)?:  Restricted participation with some degree of social isolation   How would you rate their financial resources (including ability to afford all required medical care)?:  [address] insecure, some resource challenges   How wells does the patient now understand their health and well-being (symptoms, signs or risk factors) and what they need to do to manage their health?:  Reasonable to good understanding and already engages in managing health or is willing to undertake better management   How well do you think your patient can engage in healthcare discussions?  (Barriers include language, deafness, aphasia, alcohol or drug problems, learning difficulties, concentration):  Clear and open communication, no identified barriers Do other services need to be involved to help this patient?:  Other care/services in place but not sufficient   Are current services involved with this patient well-coordinated? (Include coordination with other services you are now recommendation): All required care/services in place and well-coordinated   Suggested Interventions and Community Resources   Medication Assistance Program:  In Process   Medi Set or Pill Pack: In Process   Social Work:  In Process   [employer] Management Tools: In Process         Set up/Review an Education Plan, Set up/Review Goals              Prior to Admission medications    Medication Sig Start Date End Date Taking? Authorizing Provider   bumetanide (BUMEX) 0.5 MG tablet Take 1 tablet by mouth 2 times daily . hold if SBP less than 100 mm hg or HR less than 60 1/6/20   DYAN Alex - CNP   aspirin 81 MG tablet Take 81 mg by mouth daily    Historical Provider, MD   apixaban (ELIQUIS) 5 MG TABS tablet Take 1 tablet by mouth 2 times daily 12/1/19   Jose R Diggs MD   atorvastatin (LIPITOR) 80 MG tablet Take 1 tablet by mouth nightly  Patient taking differently: Take 40 mg by mouth nightly  12/1/19   Jose R Diggs MD   carvedilol (COREG) 6.25 MG tablet Take 1 tablet by mouth 2 times daily (with meals) . hold if SBP less than 100 mm hg or HR less than 60  Patient taking differently: Take 6.25 mg by mouth 2 times daily (with meals) . hold  HR less than 60 12/1/19   Hyacinth Cash MD   clopidogrel (PLAVIX) 75 MG tablet Take 1 tablet by mouth daily 12/1/19   Jose R Diggs MD   folic acid (FOLVITE) 1 MG tablet Take 1 tablet by mouth daily 12/1/19   Jose R Diggs MD   magnesium oxide (MAG-OX) 400 (241.3 Mg) MG TABS tablet Take 1 tablet by mouth daily 12/1/19   Jose R Diggs MD   nitroGLYCERIN (NITROSTAT) 0.4 MG SL tablet For chest pain/ angina ,up to max of 3 total doses.  If no relief after 1 dose, call 911. 12/1/19   Jose R Diggs MD   sacubitril-valsartan (ENTRESTO) 24-26 MG per tablet Take 1 tablet by mouth 2 times daily . hold if SBP less than 100 mm hg or HR less than 60 12/1/19   Hyacinth Brown MD   spironolactone (ALDACTONE) 25 MG tablet Take 1 tablet by mouth daily . hold if SBP less than 100 mm hg or HR less than 60 12/1/19   Hyacinth Brown MD   vitamin B-1 100 MG tablet Take 1 tablet by mouth daily 12/1/19   Hyacinth Brown MD   Multiple Vitamins-Minerals (CENTRUM SILVER 50+MEN) TABS Take 1 tablet by mouth daily    Historical Provider, MD       Future Appointments   Date Time Provider Hector Abby   1/10/2020 11:15 AM Kumar Morales MD [facility] Heart [facility] [place]   3/2/2020 10:15 AM STR ECHO RM1 STR[facility] ECHO [place] HOD   3/2/2020 12:00 PM Kristie Ward APRN - CNP [facility] CHF [facility] - [place]   4/6/2020  1:20 PM DYAN Trujillo - CNP [facility] Physic [facility]

## 2020-01-07 PROBLEM — I25.10 CORONARY ARTERY DISEASE INVOLVING NATIVE CORONARY ARTERY OF NATIVE HEART WITHOUT ANGINA PECTORIS: Status: ACTIVE | Noted: 2020-01-07

## 2020-01-07 PROBLEM — I71.40 ABDOMINAL AORTIC ANEURYSM (AAA) WITHOUT RUPTURE (HCC): Status: ACTIVE | Noted: 2020-01-07

## 2020-01-07 ASSESSMENT — ENCOUNTER SYMPTOMS
COUGH: 0
SHORTNESS OF BREATH: 1
SINUS PAIN: 0
ABDOMINAL PAIN: 0
ABDOMINAL DISTENTION: 0
SINUS PRESSURE: 0
PHOTOPHOBIA: 0
CONSTIPATION: 0
SORE THROAT: 0
WHEEZING: 0
TROUBLE SWALLOWING: 0
NAUSEA: 0
DIARRHEA: 0
RHINORRHEA: 0
BLOOD IN STOOL: 0
VOMITING: 0

## 2020-01-08 ENCOUNTER — CARE COORDINATION (OUTPATIENT)
Dept: CARE COORDINATION | Age: 65
End: 2020-01-08

## 2020-01-08 RX ORDER — ATORVASTATIN CALCIUM 80 MG/1
80 TABLET, FILM COATED ORAL NIGHTLY
Qty: 30 TABLET | Refills: 5 | Status: SHIPPED | OUTPATIENT
Start: 2020-01-08 | End: 2020-02-07 | Stop reason: SDUPTHER

## 2020-01-09 ENCOUNTER — TELEPHONE (OUTPATIENT)
Dept: INTERNAL MEDICINE CLINIC | Age: 65
End: 2020-01-09

## 2020-01-10 ENCOUNTER — OFFICE VISIT (OUTPATIENT)
Dept: CARDIOLOGY CLINIC | Age: 65
End: 2020-01-10
Payer: MEDICAID

## 2020-01-10 VITALS
BODY MASS INDEX: 21.51 KG/M2 | SYSTOLIC BLOOD PRESSURE: 131 MMHG | WEIGHT: 121.4 LBS | HEART RATE: 69 BPM | DIASTOLIC BLOOD PRESSURE: 75 MMHG | HEIGHT: 63 IN

## 2020-01-10 PROBLEM — I50.42 CHRONIC COMBINED SYSTOLIC AND DIASTOLIC CONGESTIVE HEART FAILURE (HCC): Status: ACTIVE | Noted: 2020-01-10

## 2020-01-10 PROBLEM — I25.5 ISCHEMIC CARDIOMYOPATHY: Status: ACTIVE | Noted: 2020-01-10

## 2020-01-10 PROBLEM — Z95.820 S/P ANGIOPLASTY WITH STENT: Status: ACTIVE | Noted: 2020-01-10

## 2020-01-10 PROBLEM — E78.5 DYSLIPIDEMIA: Status: ACTIVE | Noted: 2020-01-10

## 2020-01-10 PROCEDURE — 99215 OFFICE O/P EST HI 40 MIN: CPT | Performed by: INTERNAL MEDICINE

## 2020-01-10 RX ORDER — CLOPIDOGREL BISULFATE 75 MG/1
75 TABLET ORAL DAILY
Qty: 30 TABLET | Refills: 6 | Status: SHIPPED
Start: 2020-01-10 | End: 2020-03-03 | Stop reason: ALTCHOICE

## 2020-01-10 RX ORDER — CLOPIDOGREL BISULFATE 75 MG/1
75 TABLET ORAL DAILY
COMMUNITY
End: 2020-01-10 | Stop reason: SDUPTHER

## 2020-01-10 RX ORDER — CARVEDILOL 12.5 MG/1
12.5 TABLET ORAL 2 TIMES DAILY WITH MEALS
Qty: 60 TABLET | Refills: 5 | Status: ON HOLD
Start: 2020-01-10 | End: 2020-03-06 | Stop reason: DRUGHIGH

## 2020-01-10 NOTE — PROGRESS NOTES
of 3 total doses. If no relief after 1 dose, call 911. 25 tablet 1    sacubitril-valsartan (ENTRESTO) 24-26 MG per tablet Take 1 tablet by mouth 2 times daily . hold if SBP less than 100 mm hg or HR less than 60 60 tablet 3    Multiple Vitamins-Minerals (CENTRUM SILVER 50+MEN) TABS Take 1 tablet by mouth daily       No current facility-administered medications for this visit. Review of Systems -     General ROS: negative  Psychological ROS: negative  Hematological and Lymphatic ROS: No history of blood clots or bleeding disorder. Respiratory ROS: no cough,  or wheezing, the rest see HPI  Cardiovascular ROS: See HPI  Gastrointestinal ROS: negative  Genito-Urinary ROS: no dysuria, trouble voiding, or hematuria  Musculoskeletal ROS: negative  Neurological ROS: no TIA or stroke symptoms  Dermatological ROS: negative      Blood pressure 131/75, pulse 69, height 5' 3\" (1.6 m), weight 121 lb 6.4 oz (55.1 kg). Physical Examination:    General appearance - alert, well appearing, and in no distress  HEENT- Pink conjunctiva  , Non-icteri sclera,PERRLA  Mental status - alert, oriented to person, place, and time  Neck - supple, no significant adenopathy, no JVD, or carotid bruits  Chest - clear to auscultation, no wheezes, rales or rhonchi, symmetric air entry  Heart - normal rate, regular rhythm, normal S1, S2, no murmurs, rubs, clicks or gallops  Abdomen - soft, nontender, nondistended, no masses or organomegaly  SEBLE- no CVA or flank tenderness, no suprapubic tenderness  Neurological - alert, oriented, normal speech, no focal findings or movement disorder noted  Musculoskeletal/limbs - no joint tenderness, deformity or swelling   - peripheral pulses normal, no pedal edema, no clubbing or cyanosis  Skin - normal coloration and turgor, no rashes, no suspicious skin lesions noted  Psych- appropriate mood and affect    Lab  No results for input(s): CKTOTAL, CKMB, CKMBINDEX, TROPONINI in the last 72 hours.   CBC:

## 2020-01-13 ENCOUNTER — CARE COORDINATION (OUTPATIENT)
Dept: CARE COORDINATION | Age: 65
End: 2020-01-13

## 2020-01-14 ENCOUNTER — CARE COORDINATION (OUTPATIENT)
Dept: CARE COORDINATION | Age: 65
End: 2020-01-14

## 2020-01-14 NOTE — CARE COORDINATION
Attempted to reach patient for continued Care Coordination follow up and education. Patient was unavailable at the time of my call, and no voicemail has been set up.     Future Appointments   Date Time Provider Hector Abby   2/7/2020 12:15 PM Savage Chinchilla MD [facility] - [place]   3/2/2020 10:15 AM STR ECHO RM1 [facility] ECHO [facility] HOD   3/2/2020 12:00 PM DYAN Harrison - CNP [facility] CHF P - [place]   4/6/2020  1:20 PM DYAN Palacios - CNP [facility] Physic [address] [facility]

## 2020-01-15 ENCOUNTER — CARE COORDINATION (OUTPATIENT)
Dept: CARE COORDINATION | Age: 65
End: 2020-01-15

## 2020-01-16 ENCOUNTER — CARE COORDINATION (OUTPATIENT)
Dept: CARE COORDINATION | Age: 65
End: 2020-01-16

## 2020-01-16 NOTE — CARE COORDINATION
[facility]. Pt provided me with all the paperwork that OLINDA needs to get Medicaid approval. I will take to [address] as they need this before his 1/21 phone interview they will be conducting. Plan of care:  Assist pt with Medicaid jj.   Provide housing resources

## 2020-01-17 ENCOUNTER — CARE COORDINATION (OUTPATIENT)
Dept: CARE COORDINATION | Age: 65
End: 2020-01-17

## 2020-01-20 ENCOUNTER — CARE COORDINATION (OUTPATIENT)
Dept: CARE COORDINATION | Age: 65
End: 2020-01-20

## 2020-01-29 ENCOUNTER — CARE COORDINATION (OUTPATIENT)
Dept: CARE COORDINATION | Age: 65
End: 2020-01-29

## 2020-01-29 RX ORDER — SPIRONOLACTONE 100 MG/1
25 TABLET, FILM COATED ORAL DAILY
COMMUNITY
End: 2020-03-30 | Stop reason: DRUGHIGH

## 2020-01-29 NOTE — CARE COORDINATION
Ambulatory Care Coordination Note  1/29/2020  CM Risk Score: 1  Charlson 10 Year Mortality Risk Score: 47%     ACC: Lord Le, RN    Summary Note: Dillan Casillas is being followed by care coordination for education and assistance in managing his CHF and assistance in connecting with resources. Spoke with Dillan Casillas today for f/u. CHF: pt reports that wt has not fluctuated much although he is not weighing daily. Educated pt on importance of daily wt monitoring. Stressed the need to get on scales first thing each morning and record wt. Denies edema to ext's or increased SOB. Continues to f/u with CHF clinic and cardiology. Was taking bumex. Reports that he is currently out. Will discuss with PCP as pt unable to afford meds and currently getting medications through [employer] program.  Pt also shared that he is out of plavix. Has 5 days left on Eliquis and 9 days left on Entresto. Will send message to PCP re: medications. Reviewed zone mgmt. Reviewed low sodium diet. Pt reports that he received a piece of mail from [employer], but he hasn't opened it yet. Advised to open it and if any material needs completed to complete in timely manner. Advised to call Shahana Lopez if any questions or needing assistance in completion. Plan of care:  Monitor wts daily. Monitor sodium in diet. Message sent to PCP re: med needs: bumex, plavix, eliquis, entresto  Continue working with Izabela Loya for assistance with resources  Continue working with [employer] for [employer]. Continue utilizing [employer] program for med assistance d/t no coverage. Be sure to call when getting low on meds.     Congestive Heart Failure Assessment    How many restaurant meals do you eat per week?:  0  Do you salt your food before tasting it?:  No         Symptoms:   CHF associated angina: Neg, CHF associated dyspnea on exertion: Pos, CHF associated fatigue: Neg, CHF associated leg swelling: Neg, CHF associated orthostatic hypotension: Neg, CHF associated PND: Neg, getting through [facility]   Yes Historical Provider, MD   carvedilol (COREG) 12.5 MG tablet Take 1 tablet by mouth 2 times daily (with meals)  Patient taking differently: Take 12.5 mg by mouth 2 times daily (with meals) Indications: getting through [address]  1/10/20  Yes Sharolyn Holter, MD   atorvastatin (LIPITOR) 80 MG tablet Take 1 tablet by mouth nightly  Patient taking differently: Take 80 mg by mouth nightly Indications: 40mg tablets. taking 2 nightly-getting through [facility]  1/8/20  Yes DYAN Astudillo CNP   apixaban (ELIQUIS) 5 MG TABS tablet Take 1 tablet by mouth 2 times daily 12/1/19  Yes Lexie Moura MD   sacubitril-valsartan (ENTRESTO) 24-26 MG per tablet Take 1 tablet by mouth 2 times daily . hold if SBP less than 100 mm hg or HR less than 60 12/1/19  Yes Lexie Moura MD   Multiple Vitamins-Minerals (CENTRUM SILVER 50+MEN) TABS Take 1 tablet by mouth daily   Yes Historical Provider, MD   Potassium 99 MG TABS Take by mouth    Historical Provider, MD   clopidogrel (PLAVIX) 75 MG tablet Take 1 tablet by mouth daily  Patient not taking: Reported on 1/29/2020 1/10/20   Sharolyn Holter, MD   bumetanide (BUMEX) 0.5 MG tablet Take 1 tablet by mouth 2 times daily . hold if SBP less than 100 mm hg or HR less than 60  Patient not taking: Reported on 1/29/2020 1/6/20   DYAN Astudillo CNP   magnesium oxide (MAG-OX) 400 (241.3 Mg) MG TABS tablet Take 1 tablet by mouth daily  Patient not taking: Reported on 1/29/2020 12/1/19   Hyacinth Hernandez MD   nitroGLYCERIN (NITROSTAT) 0.4 MG SL tablet For chest pain/ angina ,up to max of 3 total doses.  If no relief after 1 dose, call 911. 12/1/19   Lexie Moura MD       Future Appointments   Date Time Provider Hector Mora   2/7/2020 12:15 PM Sharolyn Holter, MD SRPX Heart [facility]   3/2/2020 10:15 AM STR ECHO RM1 STRZ ECHO [facility] HOD   3/2/2020 12:00 PM Enrique Franco, APRN - CNP SRPX CHF [facility] - [place]   4/6/2020  1:20 PM Cecily Harris, APRN - CNP SRPX Physic [facility]

## 2020-01-29 NOTE — TELEPHONE ENCOUNTER
Spoke with Gal Cordova today. Reviewed medications. Pt is still waiting to hear if approved for Medicaid. Continuing to use [facility] for medications at this time. Pt has been out of bumetanide x 2 wks. Out of plavix  Has 5 days left of Eliquis  Has 9 days left on Entresto  Pt reports that he is unable to afford his meds so if he cannot get them through [address] he will not be able to get them. Please advise.

## 2020-01-30 ENCOUNTER — CARE COORDINATION (OUTPATIENT)
Dept: CARE COORDINATION | Age: 65
End: 2020-01-30

## 2020-01-30 NOTE — CARE COORDINATION
Called pt to f/u with Medicaid application and to see if he has heard anything. Was unable to leave a msg. I will try again later.

## 2020-01-31 NOTE — TELEPHONE ENCOUNTER
Attempt to call pt and let him know we have samples of eliquis and entresto for him unable to leave a message do to voicemial not being set-up will try again at a later time.

## 2020-01-31 NOTE — TELEPHONE ENCOUNTER
Cardiology:  Please see ACM note and PCP note re: patient's bumex. Patient does not have medication coverage and is currently receiving medications thru [facility] does not carry Bumex but does have 20 mg, 40 gm, and 80 mg Lasix available if appropriate. Please advise. Thank you!

## 2020-02-03 ENCOUNTER — CARE COORDINATION (OUTPATIENT)
Dept: CARE COORDINATION | Age: 65
End: 2020-02-03

## 2020-02-03 RX ORDER — FUROSEMIDE 20 MG/1
20 TABLET ORAL 2 TIMES DAILY
Qty: 60 TABLET | Refills: 11 | Status: SHIPPED | OUTPATIENT
Start: 2020-02-03 | End: 2020-02-04 | Stop reason: SDUPTHER

## 2020-02-03 RX ORDER — FUROSEMIDE 20 MG/1
20 TABLET ORAL 2 TIMES DAILY
COMMUNITY
End: 2020-02-03 | Stop reason: SDUPTHER

## 2020-02-03 NOTE — CARE COORDINATION
Inquired re:pt Medicaid at [facility]. Pt needs to fax copy of his Medicare card. I will assist pt with this.

## 2020-02-03 NOTE — TELEPHONE ENCOUNTER
May change to lasix 20 bid instead of bumex 0.5 mg po bid( based on office record)  If dose of bumex  Pat taking is different let me know

## 2020-02-03 NOTE — TELEPHONE ENCOUNTER
Spoke with pt. Verified with him that dose of Bumex was indeed 0.5mg twice daily.  Script for  Lasix 20mg twice daily will need sent to outpt pharmacy so pt can obtain medication through [employer] program.

## 2020-02-03 NOTE — TELEPHONE ENCOUNTER
Med refill encounter routed back to [facility] at cardiology office notifying them that pt was contacted and Bumex dose was verified with pt and is correct so Lasix 20mg script can be sent to [facility] for pt to get med through [facility] program.    Pt informed that Amber Ochoa's office has samples for Eliquis and Entresto. Pt will  today. Verified dose of Bumetanide. Pt was indeed taking 0.5mg BID as med rec stated.

## 2020-02-04 RX ORDER — FUROSEMIDE 20 MG/1
20 TABLET ORAL 2 TIMES DAILY
Qty: 60 TABLET | Refills: 11 | Status: ON HOLD | OUTPATIENT
Start: 2020-02-04 | End: 2020-03-06

## 2020-02-07 ENCOUNTER — OFFICE VISIT (OUTPATIENT)
Dept: CARDIOLOGY CLINIC | Age: 65
End: 2020-02-07
Payer: MEDICARE

## 2020-02-07 VITALS
HEIGHT: 63 IN | SYSTOLIC BLOOD PRESSURE: 137 MMHG | HEART RATE: 65 BPM | WEIGHT: 126.6 LBS | DIASTOLIC BLOOD PRESSURE: 85 MMHG | BODY MASS INDEX: 22.43 KG/M2

## 2020-02-07 PROCEDURE — 99214 OFFICE O/P EST MOD 30 MIN: CPT | Performed by: INTERNAL MEDICINE

## 2020-02-07 RX ORDER — ATORVASTATIN CALCIUM 80 MG/1
80 TABLET, FILM COATED ORAL NIGHTLY
Qty: 30 TABLET | Refills: 5 | Status: SHIPPED | OUTPATIENT
Start: 2020-02-07 | End: 2020-09-01 | Stop reason: SDUPTHER

## 2020-02-07 NOTE — PROGRESS NOTES
Social Needs    Financial resource strain: Somewhat hard    Food insecurity:     Worry: Never true     Inability: Never true    Transportation needs:     Medical: Yes     Non-medical: No   Tobacco Use    Smoking status: Current Every Day Smoker     Packs/day: 3.00     Years: 50.00     Pack years: 150.00     Types: Pipe    Smokeless tobacco: Never Used   Substance and Sexual Activity    Alcohol use: Yes     Frequency: 4 or more times a week     Drinks per session: 3 or 4     Binge frequency: Never     Comment: Gin daily    Drug use: No    Sexual activity: Not on file   Lifestyle    Physical activity:     Days per week: 2 days     Minutes per session: 20 min    Stress:  To some extent   Relationships    Social connections:     Talks on phone: Never     Gets together: Never     Attends [employer] service: Never     Active member of club or organization: No     Attends meetings of clubs or organizations: Never     Relationship status: Not on file    Intimate partner violence:     Fear of current or ex partner: Not on file     Emotionally abused: Not on file     Physically abused: Not on file     Forced sexual activity: Not on file   Other Topics Concern    Not on file   Social History Narrative    Not on file       Current Outpatient Medications   Medication Sig Dispense Refill    atorvastatin (LIPITOR) 80 MG tablet Take 1 tablet by mouth nightly 30 tablet 5    furosemide (LASIX) 20 MG tablet Take 1 tablet by mouth 2 times daily 60 tablet 11    spironolactone (ALDACTONE) 100 MG tablet Take 25 mg by mouth daily Indications: getting through [facility]      Potassium 99 MG TABS Take by mouth      carvedilol (COREG) 12.5 MG tablet Take 1 tablet by mouth 2 times daily (with meals) (Patient taking differently: Take 12.5 mg by mouth 2 times daily (with meals) Indications: [facility] ) 60 tablet 5    clopidogrel (PLAVIX) 75 MG tablet Take 1 tablet by mouth daily 30 tablet 6    apixaban (ELIQUIS) 5 MG TABS tablet Take 1 tablet by mouth 2 times daily 60 tablet 3    magnesium oxide (MAG-OX) 400 (241.3 Mg) MG TABS tablet Take 1 tablet by mouth daily 30 tablet 2    nitroGLYCERIN (NITROSTAT) 0.4 MG SL tablet For chest pain/ angina ,up to max of 3 total doses. If no relief after 1 dose, call 911. 25 tablet 1    sacubitril-valsartan (ENTRESTO) 24-26 MG per tablet Take 1 tablet by mouth 2 times daily . hold if SBP less than 100 mm hg or HR less than 60 60 tablet 3    Multiple Vitamins-Minerals (CENTRUM SILVER 50+MEN) TABS Take 1 tablet by mouth daily       No current facility-administered medications for this visit. Review of Systems -     General ROS: negative  Psychological ROS: negative  Hematological and Lymphatic ROS: No history of blood clots or bleeding disorder. Respiratory ROS: no cough,  or wheezing, the rest see HPI  Cardiovascular ROS: See HPI  Gastrointestinal ROS: negative  Genito-Urinary ROS: no dysuria, trouble voiding, or hematuria  Musculoskeletal ROS: negative  Neurological ROS: no TIA or stroke symptoms  Dermatological ROS: negative      Blood pressure 137/85, pulse 65, height 5' 2.5\" (1.588 m), weight 126 lb 9.6 oz (57.4 kg).         Physical Examination:    General appearance - alert, well appearing, and in no distress  HEENT- Pink conjunctiva  , Non-icteri sclera,PERRLA  Mental status - alert, oriented to person, place, and time  Neck - supple, no significant adenopathy, no JVD, or carotid bruits  Chest - clear to auscultation, no wheezes, rales or rhonchi, symmetric air entry  Heart - normal rate, regular rhythm, normal S1, S2, no murmurs, rubs, clicks or gallops  Abdomen - soft, nontender, nondistended, no masses or organomegaly  SEBLE- no CVA or flank tenderness, no suprapubic tenderness  Neurological - alert, oriented, normal speech, no focal findings or movement disorder noted  Musculoskeletal/limbs - no joint tenderness, deformity or swelling   - peripheral pulses patent  without significant stenosis. 3.  Left circumflex artery:  Proximal LCX has mild diffuse disease. OM1  is a large branching vessel that has no significant stenosis. Distal  left circumflex artery into the OM2 branch had significant bifurcation  lesion estimated at 70% to 80%. It becomes more severe in OM2 up to  90%. 4. LAD:  Left anterior descending artery has mild diffuse disease with  mild calcification in the proximal segment. Mid LAD seems to be patent  without significant stenosis. Distal LAD has a 30% to 50% lesion.     MEDICATIONS:  See MAR.     COMPLICATIONS:  None.     ACCESS:  Right radial artery access. Vasc Band was applied for  hemostasis.     CONCLUSIONS:  1. Severe cardiomyopathy, probably mixed etiology ischemic and  nonischemic. 2.  RCA,  involving mid RCA. Distal RCA fills via collaterals from  left to right. 3.  Severe distal left circumflex and OM2 lesions, status post  successful PCI with stenting.     PLAN:  1. Continue inpatient care. 2.  Optimize medical therapy for coronary disease. 3.  High intensity statin. 4.  Aspirin 81 mg p.o. daily for 1 month. The patient will be on  anticoagulation for new-onset atrial fibrillation, probably will be  started on Eliquis which can be started in the morning. Stop aspirin  after 1 month if the patient remains on anticoagulation. 5.  Plavix 75 mg p.o. daily for 1 year. 6.  Optimize medical therapy for heart failure. 7.  LifeVest is indicated before discharge. 8.  May consider RCA  PCI in the future if medical therapy fails. 9.  Monitor in telemetry.     Findings and plan of care were discussed with the patient in details.  Orion Hall MD     D: 11/27/2019 17:34:32         Conclusions      Summary   Left Ventricular size is Moderately increased . Normal left ventricular wall thickness. There was severe global hypokinesis of the left ventricle. Systolic function was severely reduced.    Ejection fraction is visually estimated at 20%. The left atrium is Moderately dilated. Mildly enlarged right atrium size. Mild tricuspid regurgitation visualized. Right ventricular systolic pressure measures 40 mmhg. Aortic aneurysm noted in the ascending aorta . Aortic aneurysm measures 4 cm . Signature      ----------------------------------------------------------------   Electronically signed by Heena Mark MD (Interpreting   physician) on 11/26/2019 at 12:25 PM      Assessment   Diagnosis Orders   1. Chronic combined systolic and diastolic congestive heart failure ([address]     2. Ischemic cardiomyopathy     3. Coronary artery disease involving native coronary artery of native heart without angina pectoris     4. Essential hypertension     5. Dyslipidemia     6. S/P angioplasty with stent- LCX/OM nov 2019     7. Tobacco abuse         Recent admission DX Nov 2019  ASSESSMENT:  1. Acute combined systolic-diastolic congestive heart failure  exacerbation, new onset. 2.  Cardiomyopathy, newly diagnosed, ejection fraction 20% on this  admission. 3.  Paroxysmal atrial fibrillation, AFib is not captured on the 12-lead  EKG; however, it has been there in the telemetry in the night for couple  of hours and rate between 90 to 103.  4.  Episode of nonsustained VT composed of 6 beats, rate 150 beats per  minute. 5.  Hypertension, poorly controlled, needs a better control. 6.  Tobacco abuse. 7.  Alcohol abuse. 8.  Family history of coronary artery disease. 9.  Hyponatremia, sodium dropped from 134 yesterday to 130 today, so the  patient needs fluid restriction and avoid thiazide diuretics. 10.  The patient has multiple issues. CHADS-VASc of 3 with  hypertension, congestive heart failure with cardiomyopathy and age 75. Plan   meds and labs reviewed      Continue the current treatment and with constant vigilance to changes in symptoms and also any potential side effects.   Return for care or seek medical attention

## 2020-02-10 ENCOUNTER — CARE COORDINATION (OUTPATIENT)
Dept: CARE COORDINATION | Age: 65
End: 2020-02-10

## 2020-02-17 ENCOUNTER — TELEPHONE (OUTPATIENT)
Dept: CARDIOLOGY CLINIC | Age: 65
End: 2020-02-17

## 2020-02-17 NOTE — TELEPHONE ENCOUNTER
It looks like Constantino Taylor is working with Abbey Rebolledo our  on my team. I will ask her tomorrow if she knows if he was approved for Medicaid. It looks like she was helping him apply the last week or two.

## 2020-02-19 ENCOUNTER — CARE COORDINATION (OUTPATIENT)
Dept: CARE COORDINATION | Age: 65
End: 2020-02-19

## 2020-02-19 NOTE — CARE COORDINATION
Attempted to reach Kaumakani Diver today for f/u. No answer. Unable to leave message d/t mailbox not set up.

## 2020-02-25 ENCOUNTER — CARE COORDINATION (OUTPATIENT)
Dept: CARE COORDINATION | Age: 65
End: 2020-02-25

## 2020-02-25 NOTE — CARE COORDINATION
Attempted to reach patient for continued Care Coordination follow up and education. Patient was unavailable at the time of my call, and vm has not been set up.     Future Appointments   Date Time Provider Hector Mora   2/27/2020 11:00 AM STR ECHO RM1 STRZ ECHO [facility] HOD   3/2/2020 12:00 PM DYAN Bowden - CNP SRPX CHF P - [facility] [place] [facility]   4/6/2020  1:20 PM DYAN Fatima CNP SRPX Physic [facility]   4/6/2020  3:00 PM Katheryn Goldberg, MD [address] [place] Heart P - [place] [facility] [place]

## 2020-02-26 NOTE — CARE COORDINATION
Second attempt to reach patient for continued Care Coordination follow up and education. Patient was unavailable at the time of my call. Will refer back to [facility] at this time.

## 2020-02-27 ENCOUNTER — HOSPITAL ENCOUNTER (OUTPATIENT)
Dept: NON INVASIVE DIAGNOSTICS | Age: 65
Discharge: HOME | End: 2020-02-27

## 2020-02-27 PROCEDURE — 93307 TTE W/O DOPPLER COMPLETE: CPT

## 2020-02-28 ENCOUNTER — TELEPHONE (OUTPATIENT)
Dept: CARDIOLOGY CLINIC | Age: 65
End: 2020-02-28

## 2020-02-28 NOTE — TELEPHONE ENCOUNTER
Dr. Tristin MILLER--please see ECHO results below. Summary   limited echo   Ejection fraction is visually estimated at 30%. There was moderate global hypokinesis of the left ventricle. Left Ventricular size is Mildly increased . Mildly increased left ventricle wall thickness. No evidence of any pericardial effusion.

## 2020-03-02 ENCOUNTER — OFFICE VISIT (OUTPATIENT)
Dept: CARDIOLOGY CLINIC | Age: 65
End: 2020-03-02
Payer: MEDICARE

## 2020-03-02 ENCOUNTER — TELEPHONE (OUTPATIENT)
Dept: CARDIOLOGY CLINIC | Age: 65
End: 2020-03-02

## 2020-03-02 VITALS
DIASTOLIC BLOOD PRESSURE: 84 MMHG | SYSTOLIC BLOOD PRESSURE: 132 MMHG | HEIGHT: 62 IN | WEIGHT: 126.8 LBS | HEART RATE: 70 BPM | OXYGEN SATURATION: 96 % | BODY MASS INDEX: 23.34 KG/M2

## 2020-03-02 LAB
ANION GAP SERPL CALCULATED.3IONS-SCNC: 11 MEQ/L (ref 8–16)
BUN BLDV-MCNC: 8 MG/DL (ref 7–22)
CALCIUM SERPL-MCNC: 8.9 MG/DL (ref 8.5–10.5)
CHLORIDE BLD-SCNC: 99 MEQ/L (ref 98–111)
CO2: 29 MEQ/L (ref 23–33)
CREAT SERPL-MCNC: 0.8 MG/DL (ref 0.4–1.2)
GFR SERPL CREATININE-BSD FRML MDRD: > 90 ML/MIN/1.73M2
GLUCOSE BLD-MCNC: 122 MG/DL (ref 70–108)
POTASSIUM SERPL-SCNC: 3.9 MEQ/L (ref 3.5–5.2)
SODIUM BLD-SCNC: 139 MEQ/L (ref 135–145)

## 2020-03-02 PROCEDURE — 36415 COLL VENOUS BLD VENIPUNCTURE: CPT | Performed by: NURSE PRACTITIONER

## 2020-03-02 PROCEDURE — 99213 OFFICE O/P EST LOW 20 MIN: CPT | Performed by: NURSE PRACTITIONER

## 2020-03-02 ASSESSMENT — ENCOUNTER SYMPTOMS
ABDOMINAL DISTENTION: 0
SHORTNESS OF BREATH: 0
COUGH: 1

## 2020-03-02 NOTE — PROGRESS NOTES
Venipuncture obtained from rightAC. Patient tolerated procedure without complications or complaints.

## 2020-03-02 NOTE — PATIENT INSTRUCTIONS
You may receive a survey regarding the care you received during your visit. Your input is valuable to us. We encourage you to complete and return your survey. We hope you will choose us in the future for your healthcare needs.     Continue:  · Continue current medications  · Daily weights and record  · Fluid restriction of 2 Liters per day  · Limit sodium in diet to around 1297-5398 mg/day  · Monitor BP  · Activity as tolerated     Call the Heart Failure Clinic for any of the following symptoms: [phone]   Weight gain of 2-3 pounds in 1 day or 5 pounds in 1 week   Increased shortness of breath   Shortness of breath while laying down   Cough   Chest pain   Swelling in feet, ankles or legs   Tenderness or bloating in the abdomen   Fatigue    Decreased appetite or nausea    Confusion

## 2020-03-02 NOTE — PROGRESS NOTES
Heart Failure Clinic       Visit Date: 3/2/2020  Cardiologist:  Dr. Cinda Worthington  Primary Care Physician: Dr. Laura Barreto primary care provider on file. Sandra Osuna is a 75 y.o. male who presents today for:  Chief Complaint   Patient presents with    Congestive Heart Failure       HPI:   Sandra Osuna is a 75 y.o. male who presents to the office for a follow up patient visit in the heart failure clinic. Accompanied by no one  Lives at [facility] on [address]  HX: Combined (EF 20%), PAF (Eliquis), HTN, Asthma, hx ETOH (1/3L Gin/day x years), Tobacco abuse (50+ years)   Smoking 2-3pk/day  Continues 1/4L gin daily    Hospitalization r/t Heart Failure:  11/25-12/1 = CHF exac - went in w/ SOB, increased swelling.  + CXR. BNP 05937 - down to 4723 on discharge. IV Lasix. Discharge wt 124#  LHC - nonischemic/ischemic EF 20%. Was not on any meds prior to admission    OV [place] 1/10 - increased Coreg  ECHO 2/27 - EF 30% - increased from 20%. Referred to Rajan Davis for eval.     Concerns today:   Down 9# in 3/month. Feeling good. Not feel fluid overloaded - swelling gone, no bloating \"had tighten belt\". Still smoking and drinking. Continues w/ Lifevest  - no shocks  Mild leg pain w/ walking  Activity: Walks 7-8 blocks to get here - no break. Can do stairs. Diet: [employer] frozen meals, or [place], [employer].       Patient has:  Chest Pain: None  SOB: normal for him  Orthopnea/PND: No  FLORI: no  Edema: MUCH improved  Fatigue: Improved   Abdominal bloating: No  Cough: Smokers cough  Appetite: Good  Any extra diuretic use: No  Weight gain: down 9#  Home weight: 110-115#  Home blood pressure:  Not checking    Past Medical History:   Diagnosis Date    Asthma     Pneumonia     Ulcer      Past Surgical History:   Procedure Laterality Date    TONSILLECTOMY AND ADENOIDECTOMY       Family History   Problem Relation Age of Onset    Heart Attack Mother     Heart Attack Father     Cancer Maternal Aunt     diastolic CHF, NYHA class 2 (HCC)  Basic Metabolic Panel     Continue:  GDMT:   ACE/ARB/ARNI - Entresto 24/26 BID? ??not sure if on   BB - Coreg 12.5 BID   Diuretic - Lasix 20 BID? ?? Not sure if on   Hydralazine/Isos. - None   Aldosterone- Aldactone 25/day  Continue Current medications:  Eliquis  Stable - looks good. Appears Euvolemic  Confused on meds - no list w/ him = Will bring med bottles tomorrow at [facility] appt. BMP today (routine)  Repeat ECHO shows EF 30% (up from 20%)  Appt made w/ [facility] for tomorrow at 11am  Reinforced smoking and alcohol cessation greater than 5 min spent educating. F/U w/ [place] in April   F/U clinic in June    · Daily weights  · Fluid restriction of 2 Liters per day  · Limit sodium in diet to around 5068-7080 mg/day  · Monitor BP  · Activity as tolerated     Patient was instructed to call the [employer] for any changes in symptoms as noted in AVS.      Return in about 2 months (around 5/2/2020). or sooner if needed     Patient given educational materials - see patient instructions. We discussed the importance of weighing oneself and recording daily. We also discussed the importance of a low sodium diet, higher sodium foods to avoid and better low sodium food options. Patient verbalizes understanding of plan of care using teach back method, and is agreeable to the treatment plan.        Electronically signed by DYAN Quinn CNP on 3/2/2020 at 12:36 PM

## 2020-03-03 ENCOUNTER — OFFICE VISIT (OUTPATIENT)
Dept: CARDIOLOGY CLINIC | Age: 65
End: 2020-03-03

## 2020-03-03 ENCOUNTER — TELEPHONE (OUTPATIENT)
Dept: CARDIOLOGY CLINIC | Age: 65
End: 2020-03-03

## 2020-03-03 ENCOUNTER — CARE COORDINATION (OUTPATIENT)
Dept: CARE COORDINATION | Age: 65
End: 2020-03-03

## 2020-03-03 VITALS
WEIGHT: 125 LBS | HEART RATE: 74 BPM | DIASTOLIC BLOOD PRESSURE: 86 MMHG | SYSTOLIC BLOOD PRESSURE: 144 MMHG | HEIGHT: 62 IN | BODY MASS INDEX: 23 KG/M2

## 2020-03-03 LAB — MAGNESIUM: 1.8 MG/DL (ref 1.6–2.4)

## 2020-03-03 PROCEDURE — G8420 CALC BMI NORM PARAMETERS: HCPCS | Performed by: INTERNAL MEDICINE

## 2020-03-03 PROCEDURE — G8482 FLU IMMUNIZE ORDER/ADMIN: HCPCS | Performed by: INTERNAL MEDICINE

## 2020-03-03 PROCEDURE — G8427 DOCREV CUR MEDS BY ELIG CLIN: HCPCS | Performed by: INTERNAL MEDICINE

## 2020-03-03 PROCEDURE — 99205 OFFICE O/P NEW HI 60 MIN: CPT | Performed by: INTERNAL MEDICINE

## 2020-03-03 PROCEDURE — 93000 ELECTROCARDIOGRAM COMPLETE: CPT | Performed by: INTERNAL MEDICINE

## 2020-03-03 PROCEDURE — 4040F PNEUMOC VAC/ADMIN/RCVD: CPT | Performed by: INTERNAL MEDICINE

## 2020-03-03 PROCEDURE — 3017F COLORECTAL CA SCREEN DOC REV: CPT | Performed by: INTERNAL MEDICINE

## 2020-03-03 PROCEDURE — 1123F ACP DISCUSS/DSCN MKR DOCD: CPT | Performed by: INTERNAL MEDICINE

## 2020-03-03 PROCEDURE — 4004F PT TOBACCO SCREEN RCVD TLK: CPT | Performed by: INTERNAL MEDICINE

## 2020-03-03 ASSESSMENT — ENCOUNTER SYMPTOMS
DIARRHEA: 0
ABDOMINAL PAIN: 0
RIGHT EYE: 0
SHORTNESS OF BREATH: 0
SORE THROAT: 0
NAUSEA: 0
WHEEZING: 0
BACK PAIN: 0
COUGH: 0
VOMITING: 0
BLURRED VISION: 0
LEFT EYE: 0
CONSTIPATION: 0
DOUBLE VISION: 0

## 2020-03-03 NOTE — TELEPHONE ENCOUNTER
Pt here w/ med bottles, he is not taking Lasix, Plavix or Magnesium. Pamelayevgeniy Sahni called and left message w/ Ursula Granger, Patient assistance to get back on Plavix. Euvolemic will change Lasix to PRN. Will add Mg to BMP from yesterday. Thinks he's been out of meds \"awhile\". Need to restart Plavix ASAP - spoke w/ pharmacy, $12/month. Pt states unable to afford. He was PCI in November 2019 w/  in RCA. Will change to Brilinta, samples given. Pt given Ursula Granger # will call. Spoke w/ Brenda Abreu - having ICD placed Friday, ok to continue Brilinta.   Only Hold Eliquis

## 2020-03-03 NOTE — PROGRESS NOTES
New patient here for ICD evaluation. Denies cp, palpitations, dizziness and lightheaded and WANDY. C/o sob \"off and on\".

## 2020-03-03 NOTE — PROGRESS NOTES
[facility] [place] [id number] [facility] [address] [facility] [facility]  Dept: [phone]  Dept Fax: [phone]      CARDIAC ELECTROPHYSIOLOGY  [facility]    3/3/2020      REFERRING PROVIDER:  Char Alarcon PA-C    CHIEF COMPLAINT:  I don't want to wear the Life Vest anymore. Chief Complaint   Patient presents with    New Patient     ICD evaluation       HPI:  HPI    03/03/2020: The patient is a 76 y/o male that was admitted to the hospital on 11/25/2019 for swelling and shortness of breath. The patient was diagnosed with acute LV systolic dysfunction. He had an ECHO that showed an EF of 20%. The patient underwent cardiac catheterization and was treated with PCI of the RCA. The patient has been taking his heart failure medications. Despite revascularization and medical therapy, the patient's repeat ECHO showed an improvement of his LVEF to only 30%. The patient has not had any recurrent hospitalizations for heart failure. He is wearing his Life Vest and wants to get rid of it. I am seeing the patient to discuss the indications and option of having implanting an ICD for the primary prevention of sudden cardiac death. PMH:  History obtained from chart review and the patient. Past Medical History:   Diagnosis Date    Asthma     Pneumonia     Ulcer          Ischemic Heart Disease.     PSH:  Past Surgical History:   Procedure Laterality Date    TONSILLECTOMY AND ADENOIDECTOMY         FAMILY HISTORY:  Family History   Problem Relation Age of Onset    Heart Attack Mother     Heart Attack Father     Cancer Maternal Aunt     Heart Attack Maternal Grandmother     Heart Attack Maternal Grandfather     Heart Attack Paternal Grandmother     Stroke Paternal Grandmother     Heart Attack Paternal Grandfather     Breast Cancer Neg Hx     Diabetes Neg Hx     Kidney Disease Neg Hx        SOCIAL HISTORY:  Social History     Socioeconomic History    Marital status: Single     Spouse name: None    Number of children: None    Years of education: None    Highest education level: None   Occupational History    None   Social Needs    Financial resource strain: Somewhat hard    Food insecurity:     Worry: Never true     Inability: Never true    Transportation needs:     Medical: Yes     Non-medical: No   Tobacco Use    Smoking status: Current Every Day Smoker     Packs/day: 3.00     Years: 50.00     Pack years: 150.00     Types: Pipe    Smokeless tobacco: Never Used   Substance and Sexual Activity    Alcohol use: Yes     Frequency: 4 or more times a week     Drinks per session: 3 or 4     Binge frequency: Never     Comment: Gin daily    Drug use: No    Sexual activity: None   Lifestyle    Physical activity:     Days per week: 2 days     Minutes per session: 20 min    Stress:  To some extent   Relationships    Social connections:     Talks on phone: Never     Gets together: Never     Attends Alevism service: Never     Active member of club or organization: No     Attends meetings of clubs or organizations: Never     Relationship status: None    Intimate partner violence:     Fear of current or ex partner: None     Emotionally abused: None     Physically abused: None     Forced sexual activity: None   Other Topics Concern    None   Social History Narrative    None       MEDICATIONS:  Current Outpatient Medications   Medication Sig Dispense Refill    atorvastatin (LIPITOR) 80 MG tablet Take 1 tablet by mouth nightly 30 tablet 5    furosemide (LASIX) 20 MG tablet Take 1 tablet by mouth 2 times daily 60 tablet 11    spironolactone (ALDACTONE) 100 MG tablet Take 25 mg by mouth daily Indications: getting through [facility]      Potassium 99 MG TABS Take by mouth      carvedilol (COREG) 12.5 MG tablet Take 1 tablet by mouth 2 times daily (with meals) (Patient taking differently: Take 12.5 mg by mouth 2 times daily (with meals) Indications: getting through [facility] ) 60 tablet 5    clopidogrel (PLAVIX) 75 MG tablet Take 1 tablet by mouth daily 30 tablet 6    apixaban (ELIQUIS) 5 MG TABS tablet Take 1 tablet by mouth 2 times daily 60 tablet 3    magnesium oxide (MAG-OX) 400 (241.3 Mg) MG TABS tablet Take 1 tablet by mouth daily 30 tablet 2    nitroGLYCERIN (NITROSTAT) 0.4 MG SL tablet For chest pain/ angina ,up to max of 3 total doses. If no relief after 1 dose, call 911. 25 tablet 1    sacubitril-valsartan (ENTRESTO) 24-26 MG per tablet Take 1 tablet by mouth 2 times daily . hold if SBP less than 100 mm hg or HR less than 60 60 tablet 3    Multiple Vitamins-Minerals (CENTRUM SILVER 50+MEN) TABS Take 1 tablet by mouth daily       No current facility-administered medications for this visit. REVIEW OFSYSTEMS:    Review of Systems   Constitution: Negative for fever, weight gain and weight loss. HENT: Negative for hearing loss and sore throat. Eyes: Negative for blurred vision, double vision, vision loss in left eye and vision loss in right eye. Cardiovascular: Positive for dyspnea on exertion. Negative for chest pain, irregular heartbeat, near-syncope, palpitations and syncope. Respiratory: Negative for cough, shortness of breath and wheezing. Endocrine: Negative for cold intolerance, heat intolerance and polyuria. Hematologic/Lymphatic: Negative for bleeding problem. Does not bruise/bleed easily. Skin: Negative for dry skin, itching and rash. Musculoskeletal: Negative for arthritis, back pain, joint pain and myalgias. Gastrointestinal: Negative for abdominal pain, constipation, diarrhea, nausea and vomiting. Genitourinary: Negative for dysuria, frequency, hematuria and urgency. Neurological: Negative for dizziness, headaches, light-headedness, numbness, paresthesias, seizures and vertigo. Psychiatric/Behavioral: Negative for depression and memory loss. The patient is not nervous/anxious.         PHYSICAL EXAM:  BP (!) 176/106   Pulse 74   Ht 5' 2\" (1.575 m) Wt 125 lb (56.7 kg)   BMI 22.86 kg/m²     Physical Exam  Vitals signs and nursing note reviewed. Constitutional:       General: He is not in acute distress. Appearance: Normal appearance. He is not diaphoretic. HENT:      Head: Normocephalic and atraumatic. No contusion. Right Ear: Hearing and external ear normal. No decreased hearing noted. Left Ear: Hearing and external ear normal. No decreased hearing noted. Nose: Nose normal.      Mouth/Throat:      Mouth: Mucous membranes are not dry. Pharynx: No oropharyngeal exudate. Eyes:      General:         Right eye: No discharge. Left eye: No discharge. Conjunctiva/sclera: Conjunctivae normal.      Pupils: Pupils are equal, round, and reactive to light. Neck:      Musculoskeletal: Neck supple. No edema. Thyroid: No thyroid mass. Vascular: No JVD. Trachea: Trachea normal.   Cardiovascular:      Rate and Rhythm: Normal rate and regular rhythm. No extrasystoles are present. Pulses: Normal pulses. Heart sounds: Normal heart sounds. Pulmonary:      Effort: Pulmonary effort is normal.      Breath sounds: Normal breath sounds. Chest:      Chest wall: No tenderness. Breasts: Breasts are symmetrical.     Abdominal:      General: Bowel sounds are normal.      Palpations: Abdomen is soft. There is no mass. Tenderness: There is no abdominal tenderness. Hernia: No hernia is present. Musculoskeletal: Normal range of motion. Right shoulder: Normal. He exhibits normal range of motion and no swelling. Left shoulder: Normal. He exhibits normal range of motion and no swelling. Right hip: Normal. He exhibits normal range of motion and no swelling. Left hip: Normal. He exhibits normal range of motion and no swelling. Right knee: Normal. He exhibits normal range of motion and no swelling. Left knee: Normal. He exhibits normal range of motion and no swelling.       Right

## 2020-03-05 ENCOUNTER — HOSPITAL ENCOUNTER (OUTPATIENT)
Age: 65
Discharge: HOME | End: 2020-03-05
Payer: MEDICARE

## 2020-03-05 ENCOUNTER — PREP FOR PROCEDURE (OUTPATIENT)
Dept: CARDIOLOGY | Age: 65
End: 2020-03-05

## 2020-03-05 ENCOUNTER — CARE COORDINATION (OUTPATIENT)
Dept: CARE COORDINATION | Age: 65
End: 2020-03-05

## 2020-03-05 LAB
ANION GAP SERPL CALCULATED.3IONS-SCNC: 12 MEQ/L (ref 8–16)
BUN BLDV-MCNC: 10 MG/DL (ref 7–22)
CALCIUM SERPL-MCNC: 9.5 MG/DL (ref 8.5–10.5)
CHLORIDE BLD-SCNC: 101 MEQ/L (ref 98–111)
CO2: 26 MEQ/L (ref 23–33)
CREAT SERPL-MCNC: 0.7 MG/DL (ref 0.4–1.2)
GFR SERPL CREATININE-BSD FRML MDRD: > 90 ML/MIN/1.73M2
GLUCOSE BLD-MCNC: 150 MG/DL (ref 70–108)
MAGNESIUM: 1.7 MG/DL (ref 1.6–2.4)
POTASSIUM SERPL-SCNC: 4.2 MEQ/L (ref 3.5–5.2)
SODIUM BLD-SCNC: 139 MEQ/L (ref 135–145)

## 2020-03-05 PROCEDURE — 80048 BASIC METABOLIC PNL TOTAL CA: CPT

## 2020-03-05 PROCEDURE — 36415 COLL VENOUS BLD VENIPUNCTURE: CPT

## 2020-03-05 PROCEDURE — 83735 ASSAY OF MAGNESIUM: CPT

## 2020-03-05 RX ORDER — SODIUM CHLORIDE 0.9 % (FLUSH) 0.9 %
10 SYRINGE (ML) INJECTION EVERY 12 HOURS SCHEDULED
Status: CANCELLED | OUTPATIENT
Start: 2020-03-05

## 2020-03-05 RX ORDER — SODIUM CHLORIDE 9 MG/ML
INJECTION, SOLUTION INTRAVENOUS CONTINUOUS
Status: CANCELLED | OUTPATIENT
Start: 2020-03-05

## 2020-03-05 RX ORDER — SODIUM CHLORIDE 0.9 % (FLUSH) 0.9 %
10 SYRINGE (ML) INJECTION PRN
Status: CANCELLED | OUTPATIENT
Start: 2020-03-05

## 2020-03-05 RX ORDER — CHLORHEXIDINE GLUCONATE 4 G/100ML
SOLUTION TOPICAL ONCE
Status: CANCELLED | OUTPATIENT
Start: 2020-03-05 | End: 2020-03-05

## 2020-03-06 ENCOUNTER — APPOINTMENT (OUTPATIENT)
Dept: CARDIAC CATH/INVASIVE PROCEDURES | Age: 65
End: 2020-03-06
Attending: INTERNAL MEDICINE
Payer: MEDICARE

## 2020-03-06 ENCOUNTER — APPOINTMENT (OUTPATIENT)
Dept: GENERAL RADIOLOGY | Age: 65
End: 2020-03-06
Attending: INTERNAL MEDICINE
Payer: MEDICARE

## 2020-03-06 ENCOUNTER — ANESTHESIA EVENT (OUTPATIENT)
Dept: CARDIAC CATH/INVASIVE PROCEDURES | Age: 65
End: 2020-03-06
Payer: MEDICARE

## 2020-03-06 ENCOUNTER — ANESTHESIA (OUTPATIENT)
Dept: CARDIAC CATH/INVASIVE PROCEDURES | Age: 65
End: 2020-03-06
Payer: MEDICARE

## 2020-03-06 VITALS — OXYGEN SATURATION: 95 % | TEMPERATURE: 98.2 F | DIASTOLIC BLOOD PRESSURE: 97 MMHG | SYSTOLIC BLOOD PRESSURE: 182 MMHG

## 2020-03-06 PROBLEM — I42.9 CARDIOMYOPATHY (HCC): Status: ACTIVE | Noted: 2020-03-06

## 2020-03-06 PROBLEM — I25.5 CARDIOMYOPATHY, ISCHEMIC: Status: ACTIVE | Noted: 2020-03-06

## 2020-03-06 PROCEDURE — 71045 X-RAY EXAM CHEST 1 VIEW: CPT

## 2020-03-06 PROCEDURE — 2580000003 HC RX 258: Performed by: NURSE ANESTHETIST, CERTIFIED REGISTERED

## 2020-03-06 PROCEDURE — 2500000003 HC RX 250 WO HCPCS: Performed by: NURSE ANESTHETIST, CERTIFIED REGISTERED

## 2020-03-06 PROCEDURE — 6360000002 HC RX W HCPCS: Performed by: NURSE ANESTHETIST, CERTIFIED REGISTERED

## 2020-03-06 PROCEDURE — 3700000000 HC ANESTHESIA ATTENDED CARE

## 2020-03-06 PROCEDURE — 3700000001 HC ADD 15 MINUTES (ANESTHESIA)

## 2020-03-06 PROCEDURE — 7100000000 HC PACU RECOVERY - FIRST 15 MIN

## 2020-03-06 PROCEDURE — 7100000001 HC PACU RECOVERY - ADDTL 15 MIN

## 2020-03-06 RX ORDER — ROCURONIUM BROMIDE 10 MG/ML
INJECTION, SOLUTION INTRAVENOUS PRN
Status: DISCONTINUED | OUTPATIENT
Start: 2020-03-06 | End: 2020-03-06 | Stop reason: SDUPTHER

## 2020-03-06 RX ORDER — EPINEPHRINE 1 MG/ML
INJECTION, SOLUTION, CONCENTRATE INTRAVENOUS PRN
Status: DISCONTINUED | OUTPATIENT
Start: 2020-03-06 | End: 2020-03-06 | Stop reason: SDUPTHER

## 2020-03-06 RX ORDER — GLYCOPYRROLATE 1 MG/5 ML
SYRINGE (ML) INTRAVENOUS PRN
Status: DISCONTINUED | OUTPATIENT
Start: 2020-03-06 | End: 2020-03-06 | Stop reason: SDUPTHER

## 2020-03-06 RX ORDER — SODIUM CHLORIDE 9 MG/ML
INJECTION, SOLUTION INTRAVENOUS CONTINUOUS PRN
Status: DISCONTINUED | OUTPATIENT
Start: 2020-03-06 | End: 2020-03-06 | Stop reason: SDUPTHER

## 2020-03-06 RX ORDER — ONDANSETRON 2 MG/ML
INJECTION INTRAMUSCULAR; INTRAVENOUS PRN
Status: DISCONTINUED | OUTPATIENT
Start: 2020-03-06 | End: 2020-03-06 | Stop reason: SDUPTHER

## 2020-03-06 RX ORDER — FENTANYL CITRATE 50 UG/ML
INJECTION, SOLUTION INTRAMUSCULAR; INTRAVENOUS PRN
Status: DISCONTINUED | OUTPATIENT
Start: 2020-03-06 | End: 2020-03-06 | Stop reason: SDUPTHER

## 2020-03-06 RX ORDER — NEOSTIGMINE METHYLSULFATE 5 MG/5 ML
SYRINGE (ML) INTRAVENOUS PRN
Status: DISCONTINUED | OUTPATIENT
Start: 2020-03-06 | End: 2020-03-06 | Stop reason: SDUPTHER

## 2020-03-06 RX ORDER — LIDOCAINE HCL/PF 100 MG/5ML
SYRINGE (ML) INJECTION PRN
Status: DISCONTINUED | OUTPATIENT
Start: 2020-03-06 | End: 2020-03-06 | Stop reason: SDUPTHER

## 2020-03-06 RX ORDER — PROPOFOL 10 MG/ML
INJECTION, EMULSION INTRAVENOUS PRN
Status: DISCONTINUED | OUTPATIENT
Start: 2020-03-06 | End: 2020-03-06 | Stop reason: SDUPTHER

## 2020-03-06 RX ADMIN — ROCURONIUM BROMIDE 10 MG: 10 INJECTION INTRAVENOUS at 08:59

## 2020-03-06 RX ADMIN — EPINEPHRINE 4 MCG: 1 INJECTION, SOLUTION, CONCENTRATE INTRAVENOUS at 08:59

## 2020-03-06 RX ADMIN — ROCURONIUM BROMIDE 30 MG: 10 INJECTION INTRAVENOUS at 08:40

## 2020-03-06 RX ADMIN — PHENYLEPHRINE HYDROCHLORIDE 200 MCG: 10 INJECTION INTRAVENOUS at 09:28

## 2020-03-06 RX ADMIN — ONDANSETRON 4 MG: 2 INJECTION INTRAMUSCULAR; INTRAVENOUS at 09:34

## 2020-03-06 RX ADMIN — PHENYLEPHRINE HYDROCHLORIDE 200 MCG: 10 INJECTION INTRAVENOUS at 08:57

## 2020-03-06 RX ADMIN — Medication 40 MG: at 08:36

## 2020-03-06 RX ADMIN — EPINEPHRINE 8 MCG: 1 INJECTION, SOLUTION, CONCENTRATE INTRAVENOUS at 09:02

## 2020-03-06 RX ADMIN — Medication 2.5 MG: at 09:52

## 2020-03-06 RX ADMIN — Medication 0.5 MG: at 09:52

## 2020-03-06 RX ADMIN — PROPOFOL 120 MG: 10 INJECTION, EMULSION INTRAVENOUS at 08:36

## 2020-03-06 RX ADMIN — FENTANYL CITRATE 150 MCG: 50 INJECTION INTRAMUSCULAR; INTRAVENOUS at 08:36

## 2020-03-06 RX ADMIN — SODIUM CHLORIDE: 9 INJECTION, SOLUTION INTRAVENOUS at 08:28

## 2020-03-06 ASSESSMENT — PULMONARY FUNCTION TESTS
PIF_VALUE: 0
PIF_VALUE: 1
PIF_VALUE: 13
PIF_VALUE: 13
PIF_VALUE: 14
PIF_VALUE: 13
PIF_VALUE: 13
PIF_VALUE: 12
PIF_VALUE: 13
PIF_VALUE: 13
PIF_VALUE: 0
PIF_VALUE: 13
PIF_VALUE: 13
PIF_VALUE: 1
PIF_VALUE: 14
PIF_VALUE: 2
PIF_VALUE: 13
PIF_VALUE: 37
PIF_VALUE: 13
PIF_VALUE: 14
PIF_VALUE: 13
PIF_VALUE: 17
PIF_VALUE: 2
PIF_VALUE: 13
PIF_VALUE: 13
PIF_VALUE: 1
PIF_VALUE: 13
PIF_VALUE: 20
PIF_VALUE: 1
PIF_VALUE: 13
PIF_VALUE: 0
PIF_VALUE: 12
PIF_VALUE: 13
PIF_VALUE: 13
PIF_VALUE: 1
PIF_VALUE: 0
PIF_VALUE: 14
PIF_VALUE: 12
PIF_VALUE: 16
PIF_VALUE: 14
PIF_VALUE: 13
PIF_VALUE: 15
PIF_VALUE: 22
PIF_VALUE: 13
PIF_VALUE: 13
PIF_VALUE: 1
PIF_VALUE: 13
PIF_VALUE: 13
PIF_VALUE: 1
PIF_VALUE: 13
PIF_VALUE: 34
PIF_VALUE: 12
PIF_VALUE: 14
PIF_VALUE: 13
PIF_VALUE: 13
PIF_VALUE: 1
PIF_VALUE: 12
PIF_VALUE: 13
PIF_VALUE: 31
PIF_VALUE: 3
PIF_VALUE: 13
PIF_VALUE: 28
PIF_VALUE: 13
PIF_VALUE: 33
PIF_VALUE: 0
PIF_VALUE: 2
PIF_VALUE: 0
PIF_VALUE: 13
PIF_VALUE: 13
PIF_VALUE: 2
PIF_VALUE: 13
PIF_VALUE: 13
PIF_VALUE: 14
PIF_VALUE: 2
PIF_VALUE: 1
PIF_VALUE: 13
PIF_VALUE: 13
PIF_VALUE: 4
PIF_VALUE: 13
PIF_VALUE: 14
PIF_VALUE: 15
PIF_VALUE: 1
PIF_VALUE: 13
PIF_VALUE: 1
PIF_VALUE: 13

## 2020-03-06 NOTE — TELEPHONE ENCOUNTER
I received a call from [place] on [address]  The pt is there for an ICD and did not bring his bottles  It is unclear if he has been taking Eliquis and she suspects the medicine he actually held was the Brilinta  She just wanted to make sure someone was aware as he is still confused on what he is to be taking   I told her I would pass along so you can follow up on Monday

## 2020-03-06 NOTE — ANESTHESIA PRE PROCEDURE
50 mL IVPB  2 g Intravenous On Call to [address] Sultana Almaguer PA-C        sodium chloride flush 0.9 % injection 10 mL  10 mL Intravenous PRN Joleen Jin PA-C        bacitracin 50,000 Units in sodium chloride 0.9 % 1,000 mL irrigation  50,000 Units Topical Once Ro Hermosillo MD           Allergies:  No Known Allergies    Problem List:    Patient Active Problem List   Diagnosis Code    Acute systolic (congestive) heart failure (HCC) I50.21    Tobacco abuse Z72.0    ETOH abuse F10.10    Essential hypertension I10    Paroxysmal atrial fibrillation (HCC) I48.0    Nonsustained ventricular tachycardia (HCC) E82.3    Systolic congestive heart failure ([facility] I50.20    Abdominal aortic aneurysm (AAA) without rupture ([facility] I71.4    Coronary artery disease involving native coronary artery of native heart without angina pectoris I25.10    Ischemic cardiomyopathy I25.5    S/P angioplasty with stent- LCX/OM nov 2019 Z95.820    Chronic combined systolic and diastolic congestive heart failure (HCC) I50.42    Dyslipidemia E78.5       Past Medical History:        Diagnosis Date    Asthma     Atrial fibrillation ([facility] 11/2019    CAD S/P percutaneous coronary angioplasty 11/27/2019    Cardiomy[email or web address] 11/2019    Pneumonia     Ulcer        Past Surgical History:        Procedure Laterality Date    CORONARY ANGIOPLASTY WITH STENT PLACEMENT  11/27/2019    Stent Circ and OM    EYE SURGERY Right     cataract    TONSILLECTOMY AND ADENOIDECTOMY         Social History:    Social History     Tobacco Use    Smoking status: Current Every Day Smoker     Packs/day: 3.00     Years: 50.00     Pack years: 150.00     Types: Cigarettes    Smokeless tobacco: Never Used   Substance Use Topics    Alcohol use: Yes     Frequency: 4 or more times a week     Drinks per session: 3 or 4     Binge frequency: Never     Comment: Gin daily - drink with lemon lime soda                                Ready to quit: Not Answered  Counseling given: Not Answered      Vital Signs (Current):   Vitals:    03/06/20 0637 03/06/20 0654   BP:  (!) 177/107   Pulse:  58   Resp:  22   Temp:  97.8 °F (36.6 °C)   TempSrc:  Oral   SpO2:  97%   Weight: 115 lb (52.2 kg)    Height: 5' 2\" (1.575 m)                                               BP Readings from Last 3 Encounters:   03/06/20 (!) 177/107   03/03/20 (!) 144/86   03/02/20 132/84       NPO Status:                                                                                 BMI:   Wt Readings from Last 3 Encounters:   03/06/20 115 lb (52.2 kg)   03/03/20 125 lb (56.7 kg)   03/02/20 126 lb 12.8 oz (57.5 kg)     Body mass index is 21.03 kg/m². CBC:   Lab Results   Component Value Date    WBC 6.7 03/06/2020    RBC 4.53 03/06/2020    HGB 15.2 03/06/2020    HCT 44.0 03/06/2020    MCV 97.1 03/06/2020    RDW 13.7 06/08/2013     03/06/2020       CMP:   Lab Results   Component Value Date     03/06/2020    K 3.7 03/06/2020     03/06/2020    CO2 23 03/06/2020    BUN 11 03/06/2020    CREATININE 0.8 03/06/2020    LABGLOM >90 03/06/2020    GLUCOSE 124 03/06/2020    GLUCOSE 59 12/31/2019    PROT 6.2 11/25/2019    CALCIUM 9.2 03/06/2020    BILITOT 0.5 11/25/2019    ALKPHOS 79 11/25/2019    AST 30 11/25/2019    ALT 29 11/25/2019       POC Tests: No results for input(s): POCGLU, POCNA, POCK, POCCL, POCBUN, POCHEMO, POCHCT in the last 72 hours.     Coags:   Lab Results   Component Value Date    INR 1.08 03/06/2020    APTT 32.9 03/06/2020       HCG (If Applicable): No results found for: PREGTESTUR, PREGSERUM, HCG, HCGQUANT     ABGs: No results found for: PHART, PO2ART, UKX6UPI, ECE0TZG, BEART, I3JFBZJU     Type & Screen (If Applicable):  Lab Results   Component Value Date    LABRH POS 03/06/2020       Anesthesia Evaluation    Airway: Mallampati: II       Mouth opening: > = 3 FB Dental:          Pulmonary:   (+) asthma:                            Cardiovascular:    (+) hypertension:,

## 2020-03-07 ENCOUNTER — APPOINTMENT (OUTPATIENT)
Dept: GENERAL RADIOLOGY | Age: 65
End: 2020-03-07
Attending: INTERNAL MEDICINE
Payer: MEDICARE

## 2020-03-07 PROCEDURE — 71046 X-RAY EXAM CHEST 2 VIEWS: CPT

## 2020-03-10 ENCOUNTER — CARE COORDINATION (OUTPATIENT)
Dept: CARE COORDINATION | Age: 65
End: 2020-03-10
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
## 2020-03-11 ENCOUNTER — CARE COORDINATION (OUTPATIENT)
Dept: CARE COORDINATION | Age: 65
End: 2020-03-11

## 2020-03-12 ENCOUNTER — NURSE ONLY (OUTPATIENT)
Dept: CARDIOLOGY CLINIC | Age: 65
End: 2020-03-12
Payer: MEDICARE

## 2020-03-12 ENCOUNTER — OFFICE VISIT (OUTPATIENT)
Dept: CARDIOLOGY CLINIC | Age: 65
End: 2020-03-12
Payer: MEDICARE

## 2020-03-12 VITALS
DIASTOLIC BLOOD PRESSURE: 88 MMHG | SYSTOLIC BLOOD PRESSURE: 142 MMHG | HEIGHT: 62 IN | BODY MASS INDEX: 22.08 KG/M2 | WEIGHT: 120 LBS

## 2020-03-12 PROBLEM — Z95.810 ICD (IMPLANTABLE CARDIOVERTER-DEFIBRILLATOR) IN PLACE: Status: ACTIVE | Noted: 2020-03-12

## 2020-03-12 PROCEDURE — 93282 PRGRMG EVAL IMPLANTABLE DFB: CPT | Performed by: INTERNAL MEDICINE

## 2020-03-12 PROCEDURE — 99211 OFF/OP EST MAY X REQ PHY/QHP: CPT | Performed by: NURSE PRACTITIONER

## 2020-03-12 RX ORDER — ACETAMINOPHEN 325 MG/1
650 TABLET ORAL EVERY 4 HOURS PRN
COMMUNITY
Start: 2020-03-06 | End: 2021-02-12

## 2020-03-12 RX ORDER — CARVEDILOL 12.5 MG/1
12.5 TABLET ORAL 2 TIMES DAILY
Qty: 90 TABLET | Refills: 0
Start: 2020-03-12 | End: 2020-08-24 | Stop reason: SDUPTHER

## 2020-03-12 NOTE — PROGRESS NOTES
medtronic single icd initial check post implant    11.3 years on device   RV imped 323  Shock 40  SVC 51  0% paced   R waves 16.9  Threshold 0.75 @ 0.4    Given new carelink schedule -has a monitor on order but has not arrived yet. Given number to medtronic      steri strips intact,  puffiness, yellow/purple  bruising, no drainage, tender to touch.     If, at any point, there is any increased redness, swelling, increased discomfort, fever, or the incision opens up, the patient is aware to go to the emergency room

## 2020-03-20 NOTE — TELEPHONE ENCOUNTER
Pt needs refills samples are ready for . Spoke with Aniya Casanova in pharmacy down stairs and she is going to get him signed up to get meds free. She will reach out to pt.

## 2020-03-25 ENCOUNTER — CARE COORDINATION (OUTPATIENT)
Dept: CARE COORDINATION | Age: 65
End: 2020-03-25

## 2020-03-25 NOTE — CARE COORDINATION
I spoke with Suyapa Samreen to see if he has gotten the information I had sent him yet for patient assistance. He said he didn't but while I was on the phone he looked for it. He found the envelope but has not even opened it yet. I explained to him that the sooner he opens and returns this information the sooner I can possibly get him assistance on his medications. He just said ok. I will follow up with him soon if I don't receive the paperwork back.         [facility]  [address] [facility]   Medication Assistance  RHETT RANDHAWA II.[employer]    D)[phone])[phone]

## 2020-03-26 ENCOUNTER — CARE COORDINATION (OUTPATIENT)
Dept: CARE COORDINATION | Age: 65
End: 2020-03-26

## 2020-03-27 ENCOUNTER — CARE COORDINATION (OUTPATIENT)
Dept: CARE COORDINATION | Age: 65
End: 2020-03-27

## 2020-03-27 NOTE — CARE COORDINATION
Pt reported he \"sent paperwork back in.\" He is doing ok. Pt went over medications to see if he has enough meds. He believes he does. Pt \"is good on food, I go to [employer] every week and get TV dinners. Educated pt on staying home as much as possible. Pt asked what the symptoms for virus are. Informed him that fever, cough, tiredness. Provided pt with the [employer] call center # and encouraged him to call if he gets these sx. Active listening provided.

## 2020-03-30 RX ORDER — SPIRONOLACTONE 25 MG/1
25 TABLET ORAL DAILY
COMMUNITY
End: 2020-03-30 | Stop reason: SDUPTHER

## 2020-03-31 ENCOUNTER — CARE COORDINATION (OUTPATIENT)
Dept: CARE COORDINATION | Age: 65
End: 2020-03-31

## 2020-03-31 RX ORDER — SPIRONOLACTONE 25 MG/1
25 TABLET ORAL DAILY
Qty: 30 TABLET | Refills: 4 | Status: SHIPPED | OUTPATIENT
Start: 2020-03-31 | End: 2020-06-03

## 2020-04-07 ENCOUNTER — CARE COORDINATION (OUTPATIENT)
Dept: CARE COORDINATION | Age: 65
End: 2020-04-07

## 2020-04-07 NOTE — CARE COORDINATION
Ambulatory Care Coordination Note  4/7/2020  CM Risk Score: 1  Charlson 10 Year Mortality Risk Score: 47%     ACC: Caty Rocha RN    Summary Note: Ciara Evans is being followed by care coordination for education and assistance in managing his CHF. Spoke with Ciara Evans today for f/u. Pt reports that he has signed paperwork and mailed back to [facility] for assistance. CHF: pt reports that breathing is at baseline. Pt is monitoring daily wts. Reports minimal fluctuation in wts. Pt very vague with information today. Unsure what forms he has recently completed. Working with med assistance and utilizing [employer] for some of his medications. Pt reports that he is not out of any of his medications at this time. Reinforced to pt that he will not be able to get all of his medications through [address]. Verbalizes understanding. Plan of care:  Pharmacy referral placed. Continue monitoring daily wts  Continue f/u with CHF clinic  Limit sodium in diet to 2gms daily  Call clinic if having 3 pound wt gain in 1 day or 5 pounds in 1 wk  Continue working with Swati Melendez med assistance. Pt reports that he recently mailed back application. Reached out to [facility]. She has not received it yet. Call if unable to obtain medications.    Continue utilizing [employer] for those meds that are available through that program.    Congestive Heart Failure Assessment    Do you understand a low sodium diet?:  Yes  Do you understand how to read food labels?:  Yes  How many restaurant meals do you eat per week?:  0  Do you salt your food before tasting it?:  No         Symptoms:   CHF associated angina: Neg, CHF associated dyspnea on exertion: Pos, CHF associated fatigue: Neg, CHF associated leg swelling: Neg, CHF associated orthostatic hypotension: Neg, CHF associated PND: Neg, CHF associated shortness of breath: Neg, CHF associated weakness: Neg      Symptom course:  stable  Patient-reported weight (lb):  114  Weight

## 2020-04-08 ENCOUNTER — TELEPHONE (OUTPATIENT)
Dept: PHARMACY | Facility: CLINIC | Age: 65
End: 2020-04-08

## 2020-04-08 ENCOUNTER — CARE COORDINATION (OUTPATIENT)
Dept: CARE COORDINATION | Age: 65
End: 2020-04-08

## 2020-04-13 ENCOUNTER — TELEPHONE (OUTPATIENT)
Dept: CARDIOLOGY CLINIC | Age: 65
End: 2020-04-13

## 2020-04-13 NOTE — TELEPHONE ENCOUNTER
Ismael Garza, APRN - CNP  Yaritza Graf, ESPINOZA; YOJANA Flood; Kori Bobo; Ofe Vázquez, ESPINOZA             [facility]!! Vijay May called, requesting samples of Eliquis. We do not currently have any in office. Did not know if Dr. Sharon Vizcaino office did by chance, and if it is even necessary for him to stay on it? He did have an AICD placed recently. I also know that [employer] was working with him. Just wanted to see what we all could do to help!

## 2020-04-14 ENCOUNTER — CARE COORDINATION (OUTPATIENT)
Dept: CARE COORDINATION | Age: 65
End: 2020-04-14

## 2020-04-15 ENCOUNTER — TELEPHONE (OUTPATIENT)
Dept: PHARMACY | Age: 65
End: 2020-04-15

## 2020-04-15 RX ORDER — WARFARIN SODIUM 5 MG/1
TABLET ORAL
Qty: 30 TABLET | Refills: 3 | Status: SHIPPED | OUTPATIENT
Start: 2020-04-15 | End: 2020-09-02

## 2020-04-17 ENCOUNTER — CARE COORDINATION (OUTPATIENT)
Dept: CARE COORDINATION | Age: 65
End: 2020-04-17

## 2020-04-17 NOTE — CARE COORDINATION
I called [facility] to check on Emery application for [facility]. He was approved until the end of the year. They have not been able to get a hold of him to set up shipment. I called Tram Liu to give him the number to call to set up shipment but he was waiting on the bus at [facility] and couldn't write it down. He will call me on Monday to get the number.     Quita Romero [address]   Medication Assistance  RHETT RANDHAWA II[employer]    T)[phone] (P)[phone]

## 2020-04-20 ENCOUNTER — HOSPITAL ENCOUNTER (OUTPATIENT)
Dept: PHARMACY | Age: 65
Setting detail: THERAPIES SERIES
Discharge: HOME | End: 2020-04-20
Payer: MEDICARE

## 2020-04-20 ENCOUNTER — CARE COORDINATION (OUTPATIENT)
Dept: CARE COORDINATION | Age: 65
End: 2020-04-20

## 2020-04-20 ENCOUNTER — NURSE ONLY (OUTPATIENT)
Dept: LAB | Age: 65
End: 2020-04-20

## 2020-04-20 LAB — INR BLD: 1.73 (ref 0.85–1.13)

## 2020-04-20 PROCEDURE — 99211 OFF/OP EST MAY X REQ PHY/QHP: CPT

## 2020-04-20 NOTE — CARE COORDINATION
Baljeet Zavala called to let me know he got his Brilinta in the mail today. I also gave him the number for [employer] to call to get his shipment. Patient states he doesn't have rx coverage. I was able to locate [facility] on [address]. I was given his ID number but no billing for the pharmacy. I will try to run a test claim through his insurance next time I am in the office.     Simona Fothergill [address]   Medication Assistance  RHETT RANDHAWA II.[employer]    X)[phone] (N)[phone]

## 2020-04-23 ENCOUNTER — HOSPITAL ENCOUNTER (OUTPATIENT)
Dept: PHARMACY | Age: 65
Setting detail: THERAPIES SERIES
Discharge: HOME | End: 2020-04-23
Payer: MEDICARE

## 2020-04-23 ENCOUNTER — NURSE ONLY (OUTPATIENT)
Dept: LAB | Age: 65
End: 2020-04-23

## 2020-04-23 LAB — INR BLD: 2.51 (ref 0.85–1.13)

## 2020-04-23 PROCEDURE — 99211 OFF/OP EST MAY X REQ PHY/QHP: CPT

## 2020-04-23 NOTE — PROGRESS NOTES
Medication Management [address]   [phone] (phone)  [phone] (fax)      Anticoagulation encounter completed by telephone. Patient had lab result completed at outside lab. Mr. Angela Garza is a 75 y.o.  male with history of Afib. Patient verifies current dosing regimen and tablet strength. No missed or extra doses. Patient denies s/s bleeding/bruising/swelling/SOB/chest pain  No blood in urine or stool. No dietary changes. No changes in medication/OTC agents/Herbals. No change in alcohol use or tobacco use. No change in activity level. Patient denies headaches/dizziness/lightheadedness/falls. No vomiting/diarrhea or acute illness. No Procedures scheduled in the future at this time. Assessment:   Lab Results   Component Value Date    INR 2.51 (H) 04/23/2020    INR 1.73 (H) 04/20/2020    INR 1.08 03/06/2020     INR therapeutic   Recent Labs     04/23/20  0827   INR 2.51*     Patient's INR therapeutic. Patient has been on warfarin for 7 days. Plan:  Coumadin 2 mg today and then Coumadin 3 mg daily. Recheck INR in 4 days. Patient reminded to call the Anticoagulation Clinic with any signs or symptoms of bleeding or with any medication changes. Patient given instructions utilizing the teach back method.     Paulette Tillman, PharmD, BCPS  4/23/2020 9:51 AM    CLINICAL PHARMACY CONSULT: MED RECONCILIATION/REVIEW ADDENDUM    For Pharmacy Admin Tracking Only    PHSO: No  Total # of Interventions Recommended: 1  - Increased Dose #: 1  - Maintenance Safety Lab Monitoring #: 1  Total Interventions Accepted: 1  Time Spent (min): 15

## 2020-04-27 ENCOUNTER — NURSE ONLY (OUTPATIENT)
Dept: LAB | Age: 65
End: 2020-04-27

## 2020-04-27 ENCOUNTER — HOSPITAL ENCOUNTER (OUTPATIENT)
Dept: PHARMACY | Age: 65
Setting detail: THERAPIES SERIES
Discharge: HOME | End: 2020-04-27
Payer: MEDICARE

## 2020-04-27 LAB — INR BLD: 3.13 (ref 0.85–1.13)

## 2020-04-27 PROCEDURE — 99211 OFF/OP EST MAY X REQ PHY/QHP: CPT

## 2020-04-29 ENCOUNTER — CARE COORDINATION (OUTPATIENT)
Dept: CARE COORDINATION | Age: 65
End: 2020-04-29

## 2020-04-29 NOTE — CARE COORDINATION
Attempted to reach [facility] today for f/u. No answer. Unable to leave message as mailbox has not been set up. Spoke with SW. She will attempt to outreach to pt within the next wk.

## 2020-04-30 ENCOUNTER — CARE COORDINATION (OUTPATIENT)
Dept: CARE COORDINATION | Age: 65
End: 2020-04-30

## 2020-05-01 ENCOUNTER — CARE COORDINATION (OUTPATIENT)
Dept: CARE COORDINATION | Age: 65
End: 2020-05-01

## 2020-05-04 ENCOUNTER — HOSPITAL ENCOUNTER (OUTPATIENT)
Dept: PHARMACY | Age: 65
Setting detail: THERAPIES SERIES
Discharge: HOME | End: 2020-05-04
Payer: MEDICARE

## 2020-05-04 ENCOUNTER — NURSE ONLY (OUTPATIENT)
Dept: LAB | Age: 65
End: 2020-05-04

## 2020-05-04 LAB — INR BLD: 1.77 (ref 0.85–1.13)

## 2020-05-13 ENCOUNTER — CARE COORDINATION (OUTPATIENT)
Dept: CARE COORDINATION | Age: 65
End: 2020-05-13

## 2020-05-13 NOTE — CARE COORDINATION
meals do you eat per week?:  0  Do you salt your food before tasting it?:  No         Symptoms:   CHF associated angina: Neg, CHF associated dyspnea on exertion: Pos, CHF associated fatigue: Neg, CHF associated leg swelling: Neg, CHF associated orthostatic hypotension: Neg, CHF associated PND: Neg, CHF associated shortness of breath: Neg, CHF associated weakness: Neg      Symptom course:  stable  Patient-reported weight (lb):  114  Weight trend:  stable  Salt intake watch compared to last visit:  stable               Care Coordination Interventions    Program Enrollment:  Complex Care  Referral from Primary Care Provider:  No  Suggested Interventions and Community Resources  Disease Specific Clinic:  Completed (Comment: CHF clinic)  Medication Assistance Program:  Completed  Medi Set or Pill Pack: In Process  Pharmacist:  In Process (Comment: referral placed.)  Social Work:  Completed (Comment: referral made to SW 1/6)  Zone Management Tools:  Completed (Comment: CHF zone mgmt)         Goals Addressed                 This Visit's Progress     COMPLETED: Community Resource Goal        I will complete application for assistance to Transportation Assistance and Medicaid for aaddidtional benefits. I will additional benefits. Work with SW to get connected with resources in community  Barriers: financial, lack of support, overwhelmed by complexity of regimen and stress  Plan for overcoming my barriers: education and support from PCP, ACM, SW.    Confidence: 8/10  Anticipated Goal Completion Date: 3/6/20  Not met. Has not completed his portion of application process. Barriers: lack of motivation, lack of support and education  Plan for overcoming barriers: education, ongoing SW, ACM, PCP support  Anticipated Goal Completion Date: 5/6/20       Conditions and Symptoms        I will schedule office visits, as directed by my provider. I will keep my appointment or reschedule if I have to cancel.   I will notify my Take by mouth    Historical Provider, MD   nitroGLYCERIN (NITROSTAT) 0.4 MG SL tablet For chest pain/ angina ,up to max of 3 total doses.  If no relief after 1 dose, call 911. 12/1/19   Hyacinth Alexis MD   Multiple Vitamins-Minerals (CENTRUM SILVER 50+MEN) TABS Take 1 tablet by mouth daily    Historical Provider, MD       Future Appointments   Date Time Provider Hector Floresisti   6/2/2020  2:00 PM Enedelia Benoit MD [facility] [address]   6/2/2020  3:00 PM DYAN Roldan - CNP [facility] [address]   6/16/2020  1:30 PM SCHEDULE, RENITA PACER NURSE [address] [address]ez [address]   6/16/2020  2:00 PM DYAN Schroeder - CNP [facility] [address]

## 2020-05-14 ENCOUNTER — NURSE ONLY (OUTPATIENT)
Dept: LAB | Age: 65
End: 2020-05-14

## 2020-05-14 ENCOUNTER — HOSPITAL ENCOUNTER (OUTPATIENT)
Dept: PHARMACY | Age: 65
Setting detail: THERAPIES SERIES
Discharge: HOME | End: 2020-05-14
Payer: MEDICARE

## 2020-05-14 LAB — INR BLD: 3.15 (ref 0.85–1.13)

## 2020-05-14 PROCEDURE — 99211 OFF/OP EST MAY X REQ PHY/QHP: CPT

## 2020-05-14 NOTE — PROGRESS NOTES
Medication Management [address]  [phone] (phone)  [phone] (fax)      Anticoagulation encounter completed via telephone. Patient had lab result completed at outside lab. Mr. Gill Santana is a 75 y.o.  male with history of Afib. Patient verifies current dosing regimen and tablet strength. No missed or extra doses. Patient denies s/s bleeding/bruising/swelling/SOB/chest pain. -Baseline SOB. No blood in urine or stool. No dietary changes. No changes in medication/OTC agents/Herbals. No change in alcohol use or tobacco use. -reports baseline gin daily. No change in activity level. Patient denies headaches/dizziness/lightheadedness/falls. No vomiting/diarrhea or acute illness. No Procedures scheduled in the future at this time. Assessment:   Lab Results   Component Value Date    INR 3.15 (H) 05/14/2020    INR 1.77 (H) 05/04/2020    INR 3.13 (H) 04/27/2020     INR supratherapeutic   Recent Labs     05/14/20  0927   INR 3.15*     Slightly supra therpeautic INR following dose increase at last visit. Still trying to establish maintenance dose. Plan:  Take 1mg tonight (5/14) then continue Coumadin 2mg MonFri and 3mg SuTuWThSa. Recheck INR in 1.5 weeks. Patient reminded to call the Anticoagulation Clinic with any signs or symptoms of bleeding or with any medication changes. Patient given instructions utilizing the teach back method. The following statement was review with patient regarding this virtual visit:  We want to confirm that, for purposes of billing, this is a virtual visit with your provider for which we will submit a claim for reimbursement with your insurance company. You may be responsible for any copays, coinsurance amounts or other amounts not covered by your insurance company. If you do not accept this, unfortunately we will not be able to schedule a virtual visit with the provider. Do you accept?   Yes    Verbal Consent

## 2020-05-26 ENCOUNTER — HOSPITAL ENCOUNTER (OUTPATIENT)
Dept: PHARMACY | Age: 65
Setting detail: THERAPIES SERIES
Discharge: HOME | End: 2020-05-26
Payer: MEDICARE

## 2020-05-26 ENCOUNTER — NURSE ONLY (OUTPATIENT)
Dept: LAB | Age: 65
End: 2020-05-26

## 2020-05-26 LAB — INR BLD: 1.75 (ref 0.85–1.13)

## 2020-05-26 PROCEDURE — 99211 OFF/OP EST MAY X REQ PHY/QHP: CPT

## 2020-06-02 ENCOUNTER — OFFICE VISIT (OUTPATIENT)
Dept: CARDIOLOGY CLINIC | Age: 65
End: 2020-06-02
Payer: MEDICARE

## 2020-06-02 ENCOUNTER — TELEPHONE (OUTPATIENT)
Dept: CARDIOLOGY CLINIC | Age: 65
End: 2020-06-02

## 2020-06-02 ENCOUNTER — OFFICE VISIT (OUTPATIENT)
Dept: INTERNAL MEDICINE CLINIC | Age: 65
End: 2020-06-02
Payer: MEDICARE

## 2020-06-02 VITALS
WEIGHT: 123.4 LBS | HEART RATE: 69 BPM | SYSTOLIC BLOOD PRESSURE: 131 MMHG | DIASTOLIC BLOOD PRESSURE: 87 MMHG | HEIGHT: 63 IN | BODY MASS INDEX: 21.86 KG/M2

## 2020-06-02 VITALS
SYSTOLIC BLOOD PRESSURE: 120 MMHG | BODY MASS INDEX: 22.82 KG/M2 | HEART RATE: 74 BPM | WEIGHT: 124 LBS | HEIGHT: 62 IN | TEMPERATURE: 98.1 F | DIASTOLIC BLOOD PRESSURE: 80 MMHG

## 2020-06-02 PROCEDURE — 99214 OFFICE O/P EST MOD 30 MIN: CPT | Performed by: NURSE PRACTITIONER

## 2020-06-02 PROCEDURE — 99215 OFFICE O/P EST HI 40 MIN: CPT | Performed by: INTERNAL MEDICINE

## 2020-06-02 NOTE — PROGRESS NOTES
Chief Complaint   Patient presents with    Follow-up    Congestive Heart Failure    Coronary Artery Disease    Cardiomyopathy       Originally Seen in hospital for new CHF and new CMP and New onset atr fib  Had cath and sent home on vest and oMT      2 month follow up. EKG done 3-6-2020. Denied cp,  palpitations, dizziness or edema    Sob on exertion stable    Record reviewed    Patient Seen, Chart, Consults notes, Labs, Radiology studies reviewed.     Has beed on asa/plavix and apixaban now  On apixaban and asa      Feel good today and post D/c    Patient Active Problem List   Diagnosis    Acute systolic (congestive) heart failure (HCC)    Tobacco abuse    ETOH abuse    Essential hypertension    Paroxysmal atrial fibrillation (HCC)    Nonsustained ventricular tachycardia (HCC)    Systolic congestive heart failure (HCC)    Abdominal aortic aneurysm (AAA) without rupture ([address]    Coronary artery disease involving native coronary artery of native heart without angina pectoris    Ischemic cardiomyopathy    S/P angioplasty with stent- LCX/OM nov 2019    Chronic combined systolic and diastolic congestive heart failure (HCC)    Dyslipidemia    Cardiomyopathy ([address]    Cardiomyopathy, ischemic    Medtronic single ICD        Past Surgical History:   Procedure Laterality Date    CORONARY ANGIOPLASTY WITH STENT PLACEMENT  11/27/2019    Stent Circ and OM    EYE SURGERY Right     cataract    TONSILLECTOMY AND ADENOIDECTOMY         No Known Allergies     Family History   Problem Relation Age of Onset    Heart Attack Mother     Heart Attack Father     Cancer Maternal Aunt     Heart Attack Maternal Grandmother     Heart Attack Maternal Grandfather     Heart Attack Paternal Grandmother     Stroke Paternal Grandmother     Heart Attack Paternal Grandfather     Breast Cancer Neg Hx     Diabetes Neg Hx     Kidney Disease Neg Hx         Social History     Socioeconomic History    Marital status: Single     Spouse name: Not on file    Number of children: Not on file    Years of education: Not on file    Highest education level: Not on file   Occupational History    Not on file   Social Needs    Financial resource strain: Somewhat hard    Food insecurity     Worry: Never true     Inability: Never true    Transportation needs     Medical: Yes     Non-medical: No   Tobacco Use    Smoking status: Current Every Day Smoker     Packs/day: 3.00     Years: 50.00     Pack years: 150.00     Types: Cigarettes    Smokeless tobacco: Never Used   Substance and Sexual Activity    Alcohol use: Yes     Frequency: 4 or more times a week     Drinks per session: 3 or 4     Binge frequency: Never     Comment: Gin daily - drink with lemon lime soda    Drug use: No    Sexual activity: Not on file   Lifestyle    Physical activity     Days per week: 2 days     Minutes per session: 20 min    Stress: To some extent   Relationships    Social connections     Talks on phone: Never     Gets together: Never     Attends Hoahaoism service: Never     Active member of club or organization: No     Attends meetings of clubs or organizations: Never     Relationship status: Not on file    Intimate partner violence     Fear of current or ex partner: Not on file     Emotionally abused: Not on file     Physically abused: Not on file     Forced sexual activity: Not on file   Other Topics Concern    Not on file   Social History Narrative    Not on file       Current Outpatient Medications   Medication Sig Dispense Refill    spironolactone (ALDACTONE) 25 MG tablet Take 0.5 tablets by mouth daily 30 tablet 1    ticagrelor (BRILINTA) 90 MG TABS tablet Take 1 tablet by mouth 2 times daily 60 tablet 4    warfarin (COUMADIN) 5 MG tablet Take as directed by [facility] 30 tablets = 30 days 30 tablet 3    sacubitril-valsartan (ENTRESTO) 24-26 MG per tablet Take 1 tablet by mouth 2 times daily . hold if SBP less than 100 mm hg or HR less than 60 60 tablet 2    acetaminophen (TYLENOL) 325 MG tablet Take 650 mg by mouth      carvedilol (COREG) 12.5 MG tablet Take 1 tablet by mouth 2 times daily 90 tablet 0    atorvastatin (LIPITOR) 80 MG tablet Take 1 tablet by mouth nightly 30 tablet 5    nitroGLYCERIN (NITROSTAT) 0.4 MG SL tablet For chest pain/ angina ,up to max of 3 total doses. If no relief after 1 dose, call 911. 25 tablet 1    Multiple Vitamins-Minerals (CENTRUM SILVER 50+MEN) TABS Take 1 tablet by mouth daily       No current facility-administered medications for this visit. Review of Systems -     General ROS: negative  Psychological ROS: negative  Hematological and Lymphatic ROS: No history of blood clots or bleeding disorder. Respiratory ROS: no cough,  or wheezing, the rest see HPI  Cardiovascular ROS: See HPI  Gastrointestinal ROS: negative  Genito-Urinary ROS: no dysuria, trouble voiding, or hematuria  Musculoskeletal ROS: negative  Neurological ROS: no TIA or stroke symptoms  Dermatological ROS: negative      Blood pressure 131/87, pulse 69, height 5' 2.5\" (1.588 m), weight 123 lb 6.4 oz (56 kg).         Physical Examination:    General appearance - alert, well appearing, and in no distress  HEENT- Pink conjunctiva  , Non-icteri sclera,PERRLA  Mental status - alert, oriented to person, place, and time  Neck - supple, no significant adenopathy, no JVD, or carotid bruits  Chest - clear to auscultation, no wheezes, rales or rhonchi, symmetric air entry  Heart - normal rate, regular rhythm, normal S1, S2, no murmurs, rubs, clicks or gallops  Abdomen - soft, nontender, nondistended, no masses or organomegaly  SEBLE- no CVA or flank tenderness, no suprapubic tenderness  Neurological - alert, oriented, normal speech, no focal findings or movement disorder noted  Musculoskeletal/limbs - no joint tenderness, deformity or swelling   - peripheral pulses normal, no pedal edema, no clubbing or cyanosis  Skin - normal and coordination of care    Addendum  Labs -BMP and MG and lFT and LP- reviewed  K went up to 4.9 from 3.7 over three month  Hence   Stop potassium 99mg tab  Decrease aldactone 12.5 po qd from 25 po qd  BMP prior to ,next visit      I spent 40 minutes involved in face-to-face discussion of medical issues, prognosis, record review  and plan with the patient today and more than 50% of the time was spent on counseling and coordination of care      RTC in 2 months with TERESO De Jesus [facility]

## 2020-06-02 NOTE — PROGRESS NOTES
2020     Luis Dumas (:  1955) is a 75 y.o. male, here for evaluation of the following medical concerns: CHF, CAD, paroxysmal atrial fibrillation, Vtach, HTN, AAA, and alcoholism     I last seen Arleen Romberg 5 months ago. He has not had any ER visits or hospitalizations since last visit. Denies any falls. He was admitted to the hospital - for problems listed above. Prior to that, he did not have any known medical history. Presents today unaccompanied. Reports that he is feeling good. Does have concerns about obtaining medications.      CHF/CAD/Atrial Fibrillation/HTN/AAA-  Cardiac catheterization on  requiring stent placement to the left circumflex and OM 2. He was started on Eliquis at that time. [facility] has been assisting with medication assistance. Is following with Dr. Judy Koehler routinely. Had an AICD placed on 3/6/2020. No firing.      Limited echocardiogram 2020    Ejection fraction is visually estimated at 30%. There was moderate global hypokinesis of the left ventricle. Left Ventricular size is Mildly increased . Mildly increased left ventricle wall thickness. No evidence of any pericardial effusion.       Arleen Romberg reports that he has cut back significantly on his drinking since last visit, but has not stopped completely. He is now drinking less than 1/4 bottle of gin a day. Has drank for 30+ years. Hyponatremia could be related to his alcohol consumption. Smoking 3 packs a day for 40+ years, declines smoking cessation at this time as well. Is currently living at the [facility], and states it is not a good place for him to be; however he has difficulty with housing as he is on disability. Still working at obtaining housing. Review of Systems   Constitutional: Negative for chills, fatigue and fever. HENT: Negative for congestion, rhinorrhea, sinus pressure, sinus pain, sore throat, tinnitus and trouble swallowing.     Eyes: Negative for photophobia and visual disturbance. Respiratory: Positive for shortness of breath (on exertion). Negative for cough and wheezing. Cardiovascular: Negative for chest pain, palpitations and leg swelling. Gastrointestinal: Negative for abdominal distention, abdominal pain, blood in stool, constipation, diarrhea, nausea and vomiting. Endocrine: Negative for polydipsia, polyphagia and polyuria. Genitourinary: Negative for difficulty urinating, dysuria, frequency, hematuria and urgency. Musculoskeletal: Negative for arthralgias and myalgias. Skin: Negative for rash and wound. Neurological: Negative for dizziness, light-headedness and headaches. Psychiatric/Behavioral: Negative for dysphoric mood and sleep disturbance. The patient is not nervous/anxious. Prior to Visit Medications    Medication Sig Taking? Authorizing Provider   ticagrelor (BRILINTA) 90 MG TABS tablet Take 1 tablet by mouth 2 times daily Yes Eunice Bell MD   warfarin (COUMADIN) 5 MG tablet Take as directed by [facility] 30 tablets = 30 days Yes Krystian Jarvis MD   sacubitril-valsartan (ENTRESTO) 24-26 MG per tablet Take 1 tablet by mouth 2 times daily . hold if SBP less than 100 mm hg or HR less than 60 Yes Manasa Davies PA-C   acetaminophen (TYLENOL) 325 MG tablet Take 650 mg by mouth Yes Historical Provider, MD   carvedilol (COREG) 12.5 MG tablet Take 1 tablet by mouth 2 times daily Yes DYAN Chaney - CNP   atorvastatin (LIPITOR) 80 MG tablet Take 1 tablet by mouth nightly Yes Eunice Bell MD   nitroGLYCERIN (NITROSTAT) 0.4 MG SL tablet For chest pain/ angina ,up to max of 3 total doses. If no relief after 1 dose, call 911.  Yes Gamaliel Appiah MD   Multiple Vitamins-Minerals (CENTRUM SILVER 50+MEN) TABS Take 1 tablet by mouth daily Yes Historical Provider, MD   spironolactone (ALDACTONE) 25 MG tablet Take 0.5 tablets by mouth daily  Eunice Bell MD        Social History     Tobacco Use    Smoking status:

## 2020-06-02 NOTE — TELEPHONE ENCOUNTER
SPOKE WITH PRAKASH AT THE PHARMACY AND SHE STATES THAT THE PATIENT HAS NOT PICKED UP [address] SINCE December. SHE ALSO STATES THEY HAD A SCRIPT FOR LASIX THAT WAS READY FOR  ON 2-5-2020. PATIENT NEVER PICKED IT UP.

## 2020-06-02 NOTE — PATIENT INSTRUCTIONS
[facility]   [phone]    [address] [facility].[place], [address]   [phone]    Donald Phelan  [phone]

## 2020-06-03 ENCOUNTER — NURSE ONLY (OUTPATIENT)
Dept: LAB | Age: 65
End: 2020-06-03

## 2020-06-03 ENCOUNTER — TELEPHONE (OUTPATIENT)
Dept: CARDIOLOGY CLINIC | Age: 65
End: 2020-06-03

## 2020-06-03 ENCOUNTER — HOSPITAL ENCOUNTER (OUTPATIENT)
Dept: PHARMACY | Age: 65
Setting detail: THERAPIES SERIES
Discharge: HOME | End: 2020-06-03
Payer: MEDICARE

## 2020-06-03 LAB
ALBUMIN SERPL-MCNC: 4.2 G/DL (ref 3.5–5.1)
ALP BLD-CCNC: 73 U/L (ref 38–126)
ALT SERPL-CCNC: 24 U/L (ref 11–66)
ANION GAP SERPL CALCULATED.3IONS-SCNC: 10 MEQ/L (ref 8–16)
AST SERPL-CCNC: 33 U/L (ref 5–40)
BILIRUB SERPL-MCNC: 1 MG/DL (ref 0.3–1.2)
BILIRUBIN DIRECT: < 0.2 MG/DL (ref 0–0.3)
BUN BLDV-MCNC: 11 MG/DL (ref 7–22)
CALCIUM SERPL-MCNC: 9.7 MG/DL (ref 8.5–10.5)
CHLORIDE BLD-SCNC: 104 MEQ/L (ref 98–111)
CHOLESTEROL, TOTAL: 116 MG/DL (ref 100–199)
CO2: 27 MEQ/L (ref 23–33)
CREAT SERPL-MCNC: 0.8 MG/DL (ref 0.4–1.2)
GFR SERPL CREATININE-BSD FRML MDRD: > 90 ML/MIN/1.73M2
GLUCOSE BLD-MCNC: 115 MG/DL (ref 70–108)
HDLC SERPL-MCNC: 46 MG/DL
INR BLD: 2.44 (ref 0.85–1.13)
LDL CHOLESTEROL CALCULATED: 49 MG/DL
MAGNESIUM: 1.9 MG/DL (ref 1.6–2.4)
POTASSIUM SERPL-SCNC: 4.9 MEQ/L (ref 3.5–5.2)
SODIUM BLD-SCNC: 141 MEQ/L (ref 135–145)
TOTAL PROTEIN: 6.7 G/DL (ref 6.1–8)
TRIGL SERPL-MCNC: 103 MG/DL (ref 0–199)

## 2020-06-03 PROCEDURE — 99211 OFF/OP EST MAY X REQ PHY/QHP: CPT

## 2020-06-03 RX ORDER — SPIRONOLACTONE 25 MG/1
12.5 TABLET ORAL DAILY
Qty: 30 TABLET | Refills: 1 | Status: SHIPPED | OUTPATIENT
Start: 2020-06-03 | End: 2020-09-01 | Stop reason: SDUPTHER

## 2020-06-03 NOTE — PROGRESS NOTES
Do you accept?   Yes    CLINICAL PHARMACY CONSULT: MED RECONCILIATION/REVIEW ADDENDUM    For Pharmacy Admin Tracking Only    PHSO: No  Total # of Interventions Recommended: 0  - Maintenance Safety Lab Monitoring #: 1  Total Interventions Accepted: 0  Time Spent (min): 7435 Viktoriya Maher, PharmD  [address]

## 2020-06-04 ENCOUNTER — CARE COORDINATION (OUTPATIENT)
Dept: CARE COORDINATION | Age: 65
End: 2020-06-04

## 2020-06-04 NOTE — CARE COORDINATION
cancel. I will notify my provider of any barriers to my plan of care. I will follow my Zone Management tool to seek urgent or emergent care. I will notify my provider of any symptoms that indicate a worsening of my condition. CHF    Barriers: lack of motivation, overwhelmed by complexity of regimen and lack of education  Plan for overcoming my barriers: educate on zone mgmt. F/u with CHF clinic. Education and support from [facility], PCP, specialists  Confidence: 6/10  Anticipated Goal Completion Date: 6/13/20              Prior to Admission medications    Medication Sig Start Date End Date Taking? Authorizing Provider   spironolactone (ALDACTONE) 25 MG tablet Take 0.5 tablets by mouth daily 6/3/20  Yes Zoraida Nichols MD   ticagrelor (BRILINTA) 90 MG TABS tablet Take 1 tablet by mouth 2 times daily 6/2/20  Yes Zoraida Nichols MD   warfarin (COUMADIN) 5 MG tablet Take as directed by [facility] 30 tablets = 30 days 4/15/20  Yes Adriano Webb MD   sacubitril-valsartan (ENTRESTO) 24-26 MG per tablet Take 1 tablet by mouth 2 times daily . hold if SBP less than 100 mm hg or HR less than 60 3/31/20  Yes Manasa Davies PA-C   acetaminophen (TYLENOL) 325 MG tablet Take 650 mg by mouth 3/6/20  Yes Historical Provider, MD   carvedilol (COREG) 12.5 MG tablet Take 1 tablet by mouth 2 times daily 3/12/20  Yes DYAN Marquis CNP   atorvastatin (LIPITOR) 80 MG tablet Take 1 tablet by mouth nightly 2/7/20  Yes Zoraida Nichols MD   Multiple Vitamins-Minerals (CENTRUM SILVER 50+MEN) TABS Take 1 tablet by mouth daily   Yes Historical Provider, MD   nitroGLYCERIN (NITROSTAT) 0.4 MG SL tablet For chest pain/ angina ,up to max of 3 total doses.  If no relief after 1 dose, call 911. 12/1/19   Jens Gtuierres MD       Future Appointments   Date Time Provider Hector Mora   6/16/2020  1:30 PM SCHEDULE, RENITA PACER NURSE [facility] [address]   6/16/2020  2:00 PM DYAN Marquis CNP CHF P -

## 2020-06-16 ENCOUNTER — OFFICE VISIT (OUTPATIENT)
Dept: CARDIOLOGY CLINIC | Age: 65
End: 2020-06-16
Payer: MEDICARE

## 2020-06-16 ENCOUNTER — NURSE ONLY (OUTPATIENT)
Dept: CARDIOLOGY CLINIC | Age: 65
End: 2020-06-16
Payer: MEDICARE

## 2020-06-16 VITALS
OXYGEN SATURATION: 99 % | DIASTOLIC BLOOD PRESSURE: 70 MMHG | HEART RATE: 94 BPM | BODY MASS INDEX: 22.86 KG/M2 | WEIGHT: 125 LBS | SYSTOLIC BLOOD PRESSURE: 110 MMHG

## 2020-06-16 PROCEDURE — 93282 PRGRMG EVAL IMPLANTABLE DFB: CPT | Performed by: INTERNAL MEDICINE

## 2020-06-16 PROCEDURE — 99213 OFFICE O/P EST LOW 20 MIN: CPT | Performed by: NURSE PRACTITIONER

## 2020-06-16 ASSESSMENT — ENCOUNTER SYMPTOMS
ABDOMINAL DISTENTION: 0
COUGH: 0
SHORTNESS OF BREATH: 1

## 2020-06-16 NOTE — PROGRESS NOTES
 Cancer Maternal Aunt     Heart Attack Maternal Grandmother     Heart Attack Maternal Grandfather     Heart Attack Paternal Grandmother     Stroke Paternal Grandmother     Heart Attack Paternal Grandfather     Breast Cancer Neg Hx     Diabetes Neg Hx     Kidney Disease Neg Hx      Social History     Tobacco Use    Smoking status: Current Every Day Smoker     Packs/day: 3.00     Years: 50.00     Pack years: 150.00     Types: Cigarettes    Smokeless tobacco: Never Used   Substance Use Topics    Alcohol use: Yes     Frequency: 4 or more times a week     Drinks per session: 3 or 4     Binge frequency: Never     Comment: Gin daily - drink with lemon lime soda     Current Outpatient Medications   Medication Sig Dispense Refill    spironolactone (ALDACTONE) 25 MG tablet Take 0.5 tablets by mouth daily 30 tablet 1    ticagrelor (BRILINTA) 90 MG TABS tablet Take 1 tablet by mouth 2 times daily 60 tablet 4    warfarin (COUMADIN) 5 MG tablet Take as directed by [facility] 30 tablets = 30 days 30 tablet 3    sacubitril-valsartan (ENTRESTO) 24-26 MG per tablet Take 1 tablet by mouth 2 times daily . hold if SBP less than 100 mm hg or HR less than 60 60 tablet 2    acetaminophen (TYLENOL) 325 MG tablet Take 650 mg by mouth      carvedilol (COREG) 12.5 MG tablet Take 1 tablet by mouth 2 times daily 90 tablet 0    atorvastatin (LIPITOR) 80 MG tablet Take 1 tablet by mouth nightly 30 tablet 5    nitroGLYCERIN (NITROSTAT) 0.4 MG SL tablet For chest pain/ angina ,up to max of 3 total doses. If no relief after 1 dose, call 911. 25 tablet 1    Multiple Vitamins-Minerals (CENTRUM SILVER 50+MEN) TABS Take 1 tablet by mouth daily       No current facility-administered medications for this visit. No Known Allergies    SUBJECTIVE:   Review of Systems   Constitutional: Negative for activity change, appetite change and fatigue. Respiratory: Positive for shortness of breath (Rarely).  Negative for cough. Cardiovascular: Negative for chest pain, palpitations and leg swelling. Gastrointestinal: Negative for abdominal distention. Neurological: Negative for weakness, light-headedness and headaches. Hematological: Negative for adenopathy. Psychiatric/Behavioral: Negative for sleep disturbance. OBJECTIVE:   Today's Vitals:  /70 (Site: Right Upper Arm)   Pulse 94   Wt 125 lb (56.7 kg)   SpO2 99%   BMI 22.86 kg/m²     Physical Exam  Vitals signs reviewed. Constitutional:       General: He is not in acute distress. Appearance: Normal appearance. He is well-developed. He is not diaphoretic. HENT:      Head: Normocephalic and atraumatic. Eyes:      Conjunctiva/sclera: Conjunctivae normal.   Neck:      Musculoskeletal: Normal range of motion and neck supple. Comments: No JVD  Cardiovascular:      Rate and Rhythm: Normal rate and regular rhythm. Heart sounds: Normal heart sounds. No murmur. Pulmonary:      Effort: Pulmonary effort is normal. No respiratory distress. Breath sounds: Normal breath sounds. No wheezing or rales. Abdominal:      General: Bowel sounds are normal. There is no distension. Palpations: Abdomen is soft. Tenderness: There is no abdominal tenderness. Musculoskeletal: Normal range of motion. Right lower leg: No edema. Left lower leg: No edema. Skin:     General: Skin is warm and dry. Capillary Refill: Capillary refill takes less than 2 seconds. Neurological:      Mental Status: He is alert and oriented to person, place, and time.       Coordination: Coordination normal.   Psychiatric:         Behavior: Behavior normal.         Wt Readings from Last 3 Encounters:   06/16/20 125 lb (56.7 kg)   06/02/20 124 lb (56.2 kg)   06/02/20 123 lb 6.4 oz (56 kg)     BP Readings from Last 3 Encounters:   06/16/20 110/70   06/02/20 120/80   06/02/20 131/87     Pulse Readings from Last 3 Encounters:   06/16/20 94   06/02/20 74 started in the morning.  Stop aspirin  after 1 month if the patient remains on anticoagulation. 5.  Plavix 75 mg p.o. daily for 1 year. 6.  Optimize medical therapy for heart failure. 7.  LifeVest is indicated before discharge. 8.  May consider RCA  PCI in the future if medical therapy fails. 9.  Monitor in telemetry.     Findings and plan of care were discussed with the patient in details.     Elieser Loza MD     D: 11/27/2019 17:34:32            Results reviewed:  BNP: No results found for: BNP  CBC:   Lab Results   Component Value Date    WBC 6.7 03/06/2020    RBC 4.53 03/06/2020    HGB 15.2 03/06/2020    HCT 44.0 03/06/2020     03/06/2020     CMP:    Lab Results   Component Value Date     06/03/2020    K 4.9 06/03/2020    K 3.7 03/06/2020     06/03/2020    CO2 27 06/03/2020    BUN 11 06/03/2020    CREATININE 0.8 06/03/2020    LABGLOM >90 06/03/2020    GLUCOSE 115 06/03/2020    GLUCOSE 59 12/31/2019    CALCIUM 9.7 06/03/2020     Hepatic Function Panel:    Lab Results   Component Value Date    ALKPHOS 73 06/03/2020    ALT 24 06/03/2020    AST 33 06/03/2020    PROT 6.7 06/03/2020    BILITOT 1.0 06/03/2020    BILIDIR <0.2 06/03/2020    LABALBU 4.2 06/03/2020     Magnesium:    Lab Results   Component Value Date    MG 1.9 06/03/2020     PT/INR:    Lab Results   Component Value Date    INR 2.44 06/03/2020     Lipids:    Lab Results   Component Value Date    TRIG 103 06/03/2020    HDL 46 06/03/2020    LDLCALC 49 06/03/2020       ASSESSMENT AND PLAN:   The patient's condition/symptoms are Stable: No clinical evidence of fluid overload today. Continue current medical regimen without changes at present time. Diagnosis Orders   1.  CHF (congestive heart failure), NYHA class II, chronic, systolic (HCC)       Continue:  GDMT:   ACE/ARB/ARNI - Entresto 24/26 BID   BB - Coreg 12.5 BID   Diuretic - None   Hydralazine/Isos. - None   Aldosterone- Aldactone 12.5/day (recently decreased by [facility] COLVIN+ 4.9)  Continue Current medications:  Brilinta, Warfarin  Stable - appears Euvolemic - looks good. Labs 6/3 stable. No med changes today  Continue daily wts  Recommend smoking/ETOH cessation = no interest in quitting  F/U w/ [facility] in Sept  F/U in clinic in Feb.     · Daily weights  · Fluid restriction of 2 Liters per day  · Limit sodium in diet to around 4252-9066 mg/day  · Monitor BP  · Activity as tolerated     Patient was instructed to call the [address] for any changes in symptoms as noted in AVS.      Return in about 6 months (around 12/16/2020). or sooner if needed     Patient given educational materials - see patient instructions. We discussed the importance of weighing oneself and recording daily. We also discussed the importance of a low sodium diet, higher sodium foods to avoid and better low sodium food options. Patient verbalizes understanding of plan of care using teach back method, and is agreeable to the treatment plan.        Electronically signed by DYAN Lawler CNP on 6/16/2020 at 3:45 PM

## 2020-06-16 NOTE — PATIENT INSTRUCTIONS
You may receive a survey regarding the care you received during your visit. Your input is valuable to us. We encourage you to complete and return your survey. We hope you will choose us in the future for your healthcare needs.     Continue:  · Continue current medications  · Daily weights and record  · Fluid restriction of 2 Liters per day  · Limit sodium in diet to around 3825-7111 mg/day  · Monitor BP  · Activity as tolerated     Call the [facility] for any of the following symptoms: [phone]   Weight gain of 2-3 pounds in 1 day or 5 pounds in 1 week   Increased shortness of breath   Shortness of breath while laying down   Cough   Chest pain   Swelling in feet, ankles or legs   Tenderness or bloating in the abdomen   Fatigue    Decreased appetite or nausea    Confusion

## 2020-06-17 ENCOUNTER — HOSPITAL ENCOUNTER (OUTPATIENT)
Dept: PHARMACY | Age: 65
Setting detail: THERAPIES SERIES
Discharge: HOME | End: 2020-06-17
Payer: MEDICARE

## 2020-06-17 ENCOUNTER — NURSE ONLY (OUTPATIENT)
Dept: LAB | Age: 65
End: 2020-06-17

## 2020-06-17 LAB — INR BLD: 1.96 (ref 0.85–1.13)

## 2020-06-17 PROCEDURE — 99211 OFF/OP EST MAY X REQ PHY/QHP: CPT

## 2020-06-20 ASSESSMENT — ENCOUNTER SYMPTOMS
ABDOMINAL PAIN: 0
PHOTOPHOBIA: 0
NAUSEA: 0
SINUS PRESSURE: 0
DIARRHEA: 0
SINUS PAIN: 0
COUGH: 0
SORE THROAT: 0
CONSTIPATION: 0
BLOOD IN STOOL: 0
TROUBLE SWALLOWING: 0
VOMITING: 0
SHORTNESS OF BREATH: 1
ABDOMINAL DISTENTION: 0
RHINORRHEA: 0
WHEEZING: 0

## 2020-07-08 ENCOUNTER — HOSPITAL ENCOUNTER (OUTPATIENT)
Dept: PHARMACY | Age: 65
Setting detail: THERAPIES SERIES
Discharge: HOME | End: 2020-07-08
Payer: MEDICARE

## 2020-07-08 ENCOUNTER — NURSE ONLY (OUTPATIENT)
Dept: LAB | Age: 65
End: 2020-07-08

## 2020-07-08 LAB — INR BLD: 1.64 (ref 0.85–1.13)

## 2020-07-08 PROCEDURE — 99211 OFF/OP EST MAY X REQ PHY/QHP: CPT

## 2020-07-08 NOTE — PROGRESS NOTES
by your insurance company. If you do not accept this, unfortunately we will not be able to schedule a virtual visit with the provider. Do you accept?   Yes    CLINICAL PHARMACY CONSULT: MED RECONCILIATION/REVIEW ADDENDUM    For Pharmacy Admin Tracking Only    PHSO: No  Total # of Interventions Recommended: 1  - Increased Dose #: 1  - Maintenance Safety Lab Monitoring #: 1  Total Interventions Accepted: 1  Time Spent (min): [address], PharmD

## 2020-07-22 ENCOUNTER — PROCEDURE VISIT (OUTPATIENT)
Dept: CARDIOLOGY CLINIC | Age: 65
End: 2020-07-22
Payer: MEDICARE

## 2020-07-22 ENCOUNTER — HOSPITAL ENCOUNTER (OUTPATIENT)
Dept: PHARMACY | Age: 65
Setting detail: THERAPIES SERIES
Discharge: HOME | End: 2020-07-22
Payer: MEDICARE

## 2020-07-22 ENCOUNTER — NURSE ONLY (OUTPATIENT)
Dept: LAB | Age: 65
End: 2020-07-22

## 2020-07-22 LAB — INR BLD: 1.49 (ref 0.85–1.13)

## 2020-07-22 PROCEDURE — G2066 INTER DEVC REMOTE 30D: HCPCS | Performed by: INTERNAL MEDICINE

## 2020-07-22 PROCEDURE — 99211 OFF/OP EST MAY X REQ PHY/QHP: CPT

## 2020-07-22 PROCEDURE — 93297 REM INTERROG DEV EVAL ICPMS: CPT | Performed by: INTERNAL MEDICINE

## 2020-07-29 ENCOUNTER — HOSPITAL ENCOUNTER (OUTPATIENT)
Dept: PHARMACY | Age: 65
Setting detail: THERAPIES SERIES
Discharge: HOME | End: 2020-07-29
Payer: MEDICARE

## 2020-07-29 VITALS — TEMPERATURE: 96.5 F

## 2020-07-29 LAB — POC INR: 2.1 (ref 0.8–1.2)

## 2020-07-29 PROCEDURE — 85610 PROTHROMBIN TIME: CPT

## 2020-07-29 PROCEDURE — 99211 OFF/OP EST MAY X REQ PHY/QHP: CPT

## 2020-07-29 PROCEDURE — 36416 COLLJ CAPILLARY BLOOD SPEC: CPT

## 2020-07-29 NOTE — PROGRESS NOTES
Medication Management [address]  [phone] (phone)  [phone] (fax)      Mr. Jo Lozano is a 75 y.o.  male with history of Afib who presents today for anticoagulation monitoring and adjustment. Patient verifies current dosing regimen and tablet strength.-writes it all down on his calender when we call  No missed or extra doses. Patient denies s/s bleeding/bruising/swelling/SOB/chest pain  No blood in urine or stool.-small drops of blood in the urine has stopped  No dietary changes. No changes in medication/OTC agents/Herbals. No change in alcohol use or tobacco use. No change in activity level. Patient denies headaches/dizziness/lightheadedness/falls. No vomiting/diarrhea or acute illness. -diarrhea this AM  No Procedures scheduled in the future at this time. Assessment:   Lab Results   Component Value Date    INR 2.10 (H) 07/29/2020    INR 1.49 (H) 07/22/2020    INR 1.64 (H) 07/08/2020     INR therapeutic   Recent Labs     07/29/20  1457   INR 2.10*     Patient presents for the first time in clinic. He benefits from clinic appointments and clear, written instructions. First therapeutic INR following dose increase and boost.      Plan:  Increase Coumadin 4 mg W, 3 mg MTThFSS. Recheck INR in 2 weeks. Patient reminded to call the Anticoagulation Clinic with signs or symptoms of bleeding or with any medication changes. Patient given instructions utilizing the teach back method. Discharged ambulatory in no apparent distress. After visit summary printed and reviewed with patient.       Medications reviewed and updated on home medication list Yes    Influenza vaccine:     [] given    [x] declined   [] received previously   [] plans to receive at a later time   [] refused    [] documented in [facility]: MED RECONCILIATION/REVIEW [address]: No  Total # of Interventions Recommended: 2  - Increased Dose #: 1  - Maintenance Safety Lab Monitoring #: 1  Total Interventions Accepted: 2  Time Spent (min): [address], PharmD, BCPS  7/29/2020  3:39 PM

## 2020-08-12 ENCOUNTER — HOSPITAL ENCOUNTER (OUTPATIENT)
Dept: PHARMACY | Age: 65
Setting detail: THERAPIES SERIES
Discharge: HOME | End: 2020-08-12
Payer: MEDICARE

## 2020-08-12 VITALS — TEMPERATURE: 98 F

## 2020-08-12 LAB — POC INR: 2.3 (ref 0.8–1.2)

## 2020-08-12 PROCEDURE — 85610 PROTHROMBIN TIME: CPT | Performed by: PHARMACIST

## 2020-08-12 PROCEDURE — 36416 COLLJ CAPILLARY BLOOD SPEC: CPT | Performed by: PHARMACIST

## 2020-08-12 PROCEDURE — 99211 OFF/OP EST MAY X REQ PHY/QHP: CPT | Performed by: PHARMACIST

## 2020-08-24 RX ORDER — CARVEDILOL 12.5 MG/1
12.5 TABLET ORAL 2 TIMES DAILY
Qty: 60 TABLET | Refills: 2 | Status: SHIPPED | OUTPATIENT
Start: 2020-08-24 | End: 2020-09-01 | Stop reason: SDUPTHER

## 2020-08-24 NOTE — TELEPHONE ENCOUNTER
Last visit- 6/2/2020  Next visit- 9/1/2020    Requested Prescriptions     Pending Prescriptions Disp Refills    carvedilol (COREG) 12.5 MG tablet 60 tablet 2     Sig: Take 1 tablet by mouth 2 times daily

## 2020-08-26 ENCOUNTER — PROCEDURE VISIT (OUTPATIENT)
Dept: CARDIOLOGY CLINIC | Age: 65
End: 2020-08-26
Payer: MEDICARE

## 2020-08-26 PROCEDURE — 93297 REM INTERROG DEV EVAL ICPMS: CPT | Performed by: INTERNAL MEDICINE

## 2020-08-26 PROCEDURE — G2066 INTER DEVC REMOTE 30D: HCPCS | Performed by: INTERNAL MEDICINE

## 2020-09-01 ENCOUNTER — OFFICE VISIT (OUTPATIENT)
Dept: CARDIOLOGY CLINIC | Age: 65
End: 2020-09-01
Payer: MEDICARE

## 2020-09-01 ENCOUNTER — OFFICE VISIT (OUTPATIENT)
Dept: INTERNAL MEDICINE CLINIC | Age: 65
End: 2020-09-01
Payer: MEDICARE

## 2020-09-01 VITALS
HEIGHT: 62 IN | DIASTOLIC BLOOD PRESSURE: 82 MMHG | WEIGHT: 124.4 LBS | HEART RATE: 58 BPM | BODY MASS INDEX: 22.89 KG/M2 | SYSTOLIC BLOOD PRESSURE: 158 MMHG

## 2020-09-01 VITALS
HEART RATE: 60 BPM | WEIGHT: 124.4 LBS | BODY MASS INDEX: 22.89 KG/M2 | DIASTOLIC BLOOD PRESSURE: 80 MMHG | TEMPERATURE: 97.8 F | HEIGHT: 62 IN | SYSTOLIC BLOOD PRESSURE: 122 MMHG

## 2020-09-01 PROBLEM — I25.5 CARDIOMYOPATHY, ISCHEMIC: Status: RESOLVED | Noted: 2020-03-06 | Resolved: 2020-09-01

## 2020-09-01 PROCEDURE — 99214 OFFICE O/P EST MOD 30 MIN: CPT | Performed by: INTERNAL MEDICINE

## 2020-09-01 PROCEDURE — 99214 OFFICE O/P EST MOD 30 MIN: CPT | Performed by: NURSE PRACTITIONER

## 2020-09-01 RX ORDER — ATORVASTATIN CALCIUM 40 MG/1
80 TABLET, FILM COATED ORAL DAILY
COMMUNITY
End: 2020-11-18 | Stop reason: SDUPTHER

## 2020-09-01 RX ORDER — CARVEDILOL 12.5 MG/1
12.5 TABLET ORAL 2 TIMES DAILY
Qty: 60 TABLET | Refills: 4 | Status: SHIPPED | OUTPATIENT
Start: 2020-09-01 | End: 2021-02-22 | Stop reason: SDUPTHER

## 2020-09-01 RX ORDER — SPIRONOLACTONE 25 MG/1
12.5 TABLET ORAL DAILY
Qty: 30 TABLET | Refills: 4 | Status: SHIPPED | OUTPATIENT
Start: 2020-09-01 | End: 2020-11-18 | Stop reason: SDUPTHER

## 2020-09-01 RX ORDER — ATORVASTATIN CALCIUM 80 MG/1
80 TABLET, FILM COATED ORAL NIGHTLY
Qty: 30 TABLET | Refills: 4 | Status: SHIPPED
Start: 2020-09-01 | End: 2020-09-01 | Stop reason: DRUGHIGH

## 2020-09-01 ASSESSMENT — ENCOUNTER SYMPTOMS
SINUS PAIN: 0
SINUS PRESSURE: 0
ABDOMINAL PAIN: 0
ABDOMINAL DISTENTION: 0
SHORTNESS OF BREATH: 1
DIARRHEA: 0
PHOTOPHOBIA: 0
WHEEZING: 0
SORE THROAT: 0
NAUSEA: 0
BLOOD IN STOOL: 0
TROUBLE SWALLOWING: 0
RHINORRHEA: 0
VOMITING: 0
CONSTIPATION: 0
COUGH: 0

## 2020-09-01 NOTE — PROGRESS NOTES
2020     Alex Horne (:  1955) is a 75 y.o. male, here for evaluation of the following medical concerns: CHF, CAD, paroxysmal atrial fibrillation, Vtach, HTN, AAA, and alcoholism     I last seen Devon Bhat 3 months ago. He has not had any ER visits or hospitalizations since last visit. Denies any falls.      CHF/CAD/Atrial Fibrillation/HTN-  Cardiac catheterization on  requiring stent placement to the left circumflex and OM 2. He was started on Eliquis at that time. [facility] has been assisting with medication assistance. Is following with Dr. John Johnson routinely. Had an AICD placed on 3/6/2020. No firing.      Limited echocardiogram 2020     Ejection fraction is visually estimated at 30%.   There was moderate global hypokinesis of the left ventricle.   Left Ventricular size is Mildly increased .   Mildly increased left ventricle wall thickness.   No evidence of any pericardial effusion.        Devon Bhat reports that he has cut back significantly on his drinking since last visit, but has not stopped completely. He is now drinking less than 1/4 bottle of gin a day. Has drank for 30+ years. Hyponatremia could be related to his alcohol consumption. Smoking 3 packs a day for 40+ years, declines smoking cessation at this time as well. Is currently living at the [facility], and states it is not a good place for him to be; however he has difficulty with housing as he is on disability. Still working at obtaining housing.     Review of Systems   Constitutional: Negative for chills, fatigue and fever. HENT: Negative for congestion, rhinorrhea, sinus pressure, sinus pain, sore throat, tinnitus and trouble swallowing. Eyes: Negative for photophobia and visual disturbance. Respiratory: Positive for shortness of breath (on exertion). Negative for cough and wheezing. Cardiovascular: Negative for chest pain, palpitations and leg swelling.    Gastrointestinal: Negative for abdominal distention, abdominal pain, blood in stool, constipation, diarrhea, nausea and vomiting. Endocrine: Negative for polydipsia, polyphagia and polyuria. Genitourinary: Negative for difficulty urinating, dysuria, frequency, hematuria and urgency. Musculoskeletal: Negative for arthralgias and myalgias. Skin: Negative for rash and wound. Neurological: Negative for dizziness, light-headedness and headaches. Psychiatric/Behavioral: Negative for dysphoric mood and sleep disturbance. The patient is not nervous/anxious. Prior to Visit Medications    Medication Sig Taking? Authorizing Provider   ticagrelor (BRILINTA) 90 MG TABS tablet Take 1 tablet by mouth 2 times daily Yes Omayra Tovar MD   spironolactone (ALDACTONE) 25 MG tablet Take 0.5 tablets by mouth daily Yes Omayra Tovar MD   carvedilol (COREG) 12.5 MG tablet Take 1 tablet by mouth 2 times daily Yes Omayra Tovar MD   atorvastatin (LIPITOR) 40 MG tablet Take 40 mg by mouth daily 2 tablets daily Yes Historical Provider, MD   sacubitril-valsartan (ENTRESTO) 24-26 MG per tablet Take 1 tablet by mouth 2 times daily . hold if SBP less than 100 mm hg or HR less than 60 Yes Manasa Davies PA-C   acetaminophen (TYLENOL) 325 MG tablet Take 650 mg by mouth every 4 hours as needed for Pain or Fever Don't take more than 3,000 mg each day. Yes Historical Provider, MD   nitroGLYCERIN (NITROSTAT) 0.4 MG SL tablet For chest pain/ angina ,up to max of 3 total doses. If no relief after 1 dose, call 911. Patient not taking: Reported on 9/2/2020 Yes Zonia Burkitt, MD   Multiple Vitamins-Minerals (CENTRUM SILVER 50+MEN) TABS Take 1 tablet by mouth daily Yes Historical Provider, MD   warfarin (COUMADIN) 2 MG tablet Take as directed by [facility] tabs = 30 day supply  Bunny De Los Santos MD   warfarin (COUMADIN) 2 MG tablet Take by mouth every evening Dosed by [facility].   Historical Provider, MD        Social History     Tobacco Use    Smoking status: Current Every Day Smoker     Packs/day: 3.00     Years: 50.00     Pack years: 150.00     Types: Cigarettes    Smokeless tobacco: Never Used   Substance Use Topics    Alcohol use: Yes     Frequency: 4 or more times a week     Drinks per session: 3 or 4     Binge frequency: Never     Comment: Gin daily - drink with lemon lime soda        Vitals:    09/01/20 1556   BP: 122/80   Site: Right Upper Arm   Cuff Size: Medium Adult   Pulse: 60   Temp: 97.8 °F (36.6 °C)   TempSrc: Oral   Weight: 124 lb 6.4 oz (56.4 kg)   Height: 5' 2\" (1.575 m)     Estimated body mass index is 22.75 kg/m² as calculated from the following:    Height as of this encounter: 5' 2\" (1.575 m). Weight as of this encounter: 124 lb 6.4 oz (56.4 kg). Physical Exam  Constitutional:       General: He is not in acute distress. Appearance: Normal appearance. He is normal weight. He is not ill-appearing. HENT:      Head: Normocephalic and atraumatic. Right Ear: Tympanic membrane, ear canal and external ear normal.      Left Ear: Tympanic membrane, ear canal and external ear normal.      Nose: Nose normal. No congestion or rhinorrhea. Mouth/Throat:      Mouth: Mucous membranes are moist.      Pharynx: Oropharynx is clear. No oropharyngeal exudate or posterior oropharyngeal erythema. Eyes:      Extraocular Movements: Extraocular movements intact. Conjunctiva/sclera: Conjunctivae normal.      Pupils: Pupils are equal, round, and reactive to light. Neck:      Musculoskeletal: Normal range of motion and neck supple. No muscular tenderness. Vascular: No carotid bruit. Cardiovascular:      Rate and Rhythm: Normal rate and regular rhythm. Pulses: Normal pulses. Heart sounds: No murmur. No friction rub. No gallop. Pulmonary:      Effort: Pulmonary effort is normal. No respiratory distress. Breath sounds: Normal breath sounds. No wheezing, rhonchi or rales. Abdominal:      General: Abdomen is flat. Bowel sounds are normal. There is no distension. Palpations: Abdomen is soft. Tenderness: There is no abdominal tenderness. Musculoskeletal: Normal range of motion. General: No tenderness. Right lower leg: No edema. Left lower leg: No edema. Lymphadenopathy:      Cervical: No cervical adenopathy. Skin:     General: Skin is warm and dry. Capillary Refill: Capillary refill takes less than 2 seconds. Findings: No erythema or rash. Neurological:      General: No focal deficit present. Mental Status: He is alert and oriented to person, place, and time. Motor: No weakness. Coordination: Coordination normal.      Deep Tendon Reflexes: Reflexes normal.   Psychiatric:         Mood and Affect: Mood normal.         Behavior: Behavior normal.         Thought Content: Thought content normal.         Judgment: Judgment normal.       ASSESSMENT/PLAN:    1. Systolic congestive heart failure, unspecified HF chronicity (HCC)    - Continue daily weights, report weight gain of 3 pounds in 1 day or 5 pounds in 1 week  - Continue current medication regimen  - Follow with Dr. Cynthia Centeno as previously scheduled    2. Essential hypertension    3. Paroxysmal atrial fibrillation (HCC)    - On Coumadin. Fall and bleeding precautions discussed. Understanding voiced. 4. Coronary artery disease involving native coronary artery of native heart without angina pectoris    5. ETOH abuse    - Encouraged alcohol cessation, patient declines      Return in about 3 months (around 12/1/2020). An electronic signature was used to authenticate this note.     --DYAN Marion - CNP on 9/12/2020 at 7:31 PM

## 2020-09-01 NOTE — PROGRESS NOTES
Chief Complaint   Patient presents with    3 Month Follow-Up    Cardiomyopathy    Congestive Heart Failure       Originally Seen in hospital for new CHF and new CMP and New onset atr fib  Had cath and sent home on vest and oMT        3 month follow up. EKG done 3-6-2020. Denied cp,  palpitations, dizziness or edema    Sob on exertion stable    Record reviewed    Patient Seen, Chart, Consults notes, Labs, Radiology studies reviewed.     Has beed on asa/plavix and apixaban now  On apixaban and asa      Feel good today and post D/c    Patient Active Problem List   Diagnosis    Acute systolic (congestive) heart failure (HCC)    Tobacco abuse    ETOH abuse    Essential hypertension    Paroxysmal atrial fibrillation (HCC)    Nonsustained ventricular tachycardia (HCC)    Systolic congestive heart failure (HCC)    Abdominal aortic aneurysm (AAA) without rupture ([address]    Coronary artery disease involving native coronary artery of native heart without angina pectoris    Ischemic cardiomyopathy    S/P angioplasty with stent- LCX/OM nov 2019    Chronic combined systolic and diastolic congestive heart failure (HCC)    Dyslipidemia    Cardiomyopathy ([address]    Medtronic single ICD        Past Surgical History:   Procedure Laterality Date    CORONARY ANGIOPLASTY WITH STENT PLACEMENT  11/27/2019    Stent Circ and OM    EYE SURGERY Right     cataract    TONSILLECTOMY AND ADENOIDECTOMY         No Known Allergies     Family History   Problem Relation Age of Onset    Heart Attack Mother     Heart Attack Father     Cancer Maternal Aunt     Heart Attack Maternal Grandmother     Heart Attack Maternal Grandfather     Heart Attack Paternal Grandmother     Stroke Paternal Grandmother     Heart Attack Paternal Grandfather     Breast Cancer Neg Hx     Diabetes Neg Hx     Kidney Disease Neg Hx         Social History     Socioeconomic History    Marital status: Single     Spouse name: Not on file    Number of children: Not on file    Years of education: Not on file    Highest education level: Not on file   Occupational History    Not on file   Social Needs    Financial resource strain: Somewhat hard    Food insecurity     Worry: Never true     Inability: Never true    Transportation needs     Medical: Yes     Non-medical: No   Tobacco Use    Smoking status: Current Every Day Smoker     Packs/day: 3.00     Years: 50.00     Pack years: 150.00     Types: Cigarettes    Smokeless tobacco: Never Used   Substance and Sexual Activity    Alcohol use: Yes     Frequency: 4 or more times a week     Drinks per session: 3 or 4     Binge frequency: Never     Comment: Gin daily - drink with lemon lime soda    Drug use: No    Sexual activity: Not on file   Lifestyle    Physical activity     Days per week: 2 days     Minutes per session: 20 min    Stress:  To some extent   Relationships    Social connections     Talks on phone: Never     Gets together: Never     Attends Denominational service: Never     Active member of club or organization: No     Attends meetings of clubs or organizations: Never     Relationship status: Not on file    Intimate partner violence     Fear of current or ex partner: Not on file     Emotionally abused: Not on file     Physically abused: Not on file     Forced sexual activity: Not on file   Other Topics Concern    Not on file   Social History Narrative    Not on file       Current Outpatient Medications   Medication Sig Dispense Refill    ticagrelor (BRILINTA) 90 MG TABS tablet Take 1 tablet by mouth 2 times daily 60 tablet 4    spironolactone (ALDACTONE) 25 MG tablet Take 0.5 tablets by mouth daily 30 tablet 4    carvedilol (COREG) 12.5 MG tablet Take 1 tablet by mouth 2 times daily 60 tablet 4    atorvastatin (LIPITOR) 80 MG tablet Take 1 tablet by mouth nightly 30 tablet 4    warfarin (COUMADIN) 5 MG tablet Take as directed by [facility] 30 tablets = 30 days 30 tablet 3  sacubitril-valsartan (ENTRESTO) 24-26 MG per tablet Take 1 tablet by mouth 2 times daily . hold if SBP less than 100 mm hg or HR less than 60 60 tablet 2    acetaminophen (TYLENOL) 325 MG tablet Take 650 mg by mouth every 4 hours as needed       nitroGLYCERIN (NITROSTAT) 0.4 MG SL tablet For chest pain/ angina ,up to max of 3 total doses. If no relief after 1 dose, call 911. 25 tablet 1    Multiple Vitamins-Minerals (CENTRUM SILVER 50+MEN) TABS Take 1 tablet by mouth daily       No current facility-administered medications for this visit. Review of Systems -     General ROS: negative  Psychological ROS: negative  Hematological and Lymphatic ROS: No history of blood clots or bleeding disorder. Respiratory ROS: no cough,  or wheezing, the rest see HPI  Cardiovascular ROS: See HPI  Gastrointestinal ROS: negative  Genito-Urinary ROS: no dysuria, trouble voiding, or hematuria  Musculoskeletal ROS: negative  Neurological ROS: no TIA or stroke symptoms  Dermatological ROS: negative      Blood pressure (!) 150/81, pulse 58, height 5' 2\" (1.575 m), weight 124 lb 6.4 oz (56.4 kg).         Physical Examination:    General appearance - alert, well appearing, and in no distress  HEENT- Pink conjunctiva  , Non-icteri sclera,PERRLA  Mental status - alert, oriented to person, place, and time  Neck - supple, no significant adenopathy, no JVD, or carotid bruits  Chest - clear to auscultation, no wheezes, rales or rhonchi, symmetric air entry  Heart - normal rate, regular rhythm, normal S1, S2, no murmurs, rubs, clicks or gallops  Abdomen - soft, nontender, nondistended, no masses or organomegaly  SEBLE- no CVA or flank tenderness, no suprapubic tenderness  Neurological - alert, oriented, normal speech, no focal findings or movement disorder noted  Musculoskeletal/limbs - no joint tenderness, deformity or swelling   - peripheral pulses normal, no pedal edema, no clubbing or cyanosis  Skin - normal coloration and turgor, no rashes, no suspicious skin lesions noted  Psych- appropriate mood and affect    Lab  No results for input(s): CKTOTAL, CKMB, CKMBINDEX, TROPONINI in the last 72 hours. CBC:   Lab Results   Component Value Date    WBC 6.7 03/06/2020    RBC 4.53 03/06/2020    HGB 15.2 03/06/2020    HCT 44.0 03/06/2020    MCV 97.1 03/06/2020    MCH 33.6 03/06/2020    MCHC 34.5 03/06/2020    RDW 13.7 06/08/2013     03/06/2020    MPV 11.4 03/06/2020     BMP:    Lab Results   Component Value Date     06/03/2020    K 4.9 06/03/2020    K 3.7 03/06/2020     06/03/2020    CO2 27 06/03/2020    BUN 11 06/03/2020    LABALBU 4.2 06/03/2020    CREATININE 0.8 06/03/2020    CALCIUM 9.7 06/03/2020    LABGLOM >90 06/03/2020    GLUCOSE 115 06/03/2020    GLUCOSE 59 12/31/2019     Hepatic Function Panel:    Lab Results   Component Value Date    ALKPHOS 73 06/03/2020    ALT 24 06/03/2020    AST 33 06/03/2020    PROT 6.7 06/03/2020    BILITOT 1.0 06/03/2020    BILIDIR <0.2 06/03/2020    LABALBU 4.2 06/03/2020     Magnesium:    Lab Results   Component Value Date    MG 1.9 06/03/2020     Warfarin PT/INR:  No components found for: PTPATWAR, PTINRWAR  HgBA1c:  No results found for: LABA1C  FLP:    Lab Results   Component Value Date    TRIG 103 06/03/2020    HDL 46 06/03/2020    LDLCALC 49 06/03/2020     TSH:    Lab Results   Component Value Date    TSH 1.360 11/25/2019       FINDINGS:  HEMODYNAMIC RESULTS:  LVEDP 29 mmHg.     LEFT VENTRICULOGRAPHY:  Ejection fraction seems to be severely reduced  indicative of severe cardiomyopathy. EF 20%.     CORONARY ANGIOGRAM:  1. RCA:  RCA has mild diffuse disease in the proximal segment. Mid RCA  has chronic total occlusion. _____ estimated at around 20 mL. RCA is a  dominant vessel. It receives left-to-right collaterals that fill the  LPL, LPDA and distal RCA. 2.  Left main coronary artery:  Left main coronary artery is patent  without significant stenosis.   3.  Left circumflex artery: Proximal LCX has mild diffuse disease. OM1  is a large branching vessel that has no significant stenosis. Distal  left circumflex artery into the OM2 branch had significant bifurcation  lesion estimated at 70% to 80%. It becomes more severe in OM2 up to  90%. 4. LAD:  Left anterior descending artery has mild diffuse disease with  mild calcification in the proximal segment. Mid LAD seems to be patent  without significant stenosis. Distal LAD has a 30% to 50% lesion.     MEDICATIONS:  See MAR.     COMPLICATIONS:  None.     ACCESS:  Right radial artery access. Vasc Band was applied for  hemostasis.     CONCLUSIONS:  1. Severe cardiomyopathy, probably mixed etiology ischemic and  nonischemic. 2.  RCA,  involving mid RCA. Distal RCA fills via collaterals from  left to right. 3.  Severe distal left circumflex and OM2 lesions, status post  successful PCI with stenting.     PLAN:  1. Continue inpatient care. 2.  Optimize medical therapy for coronary disease. 3.  High intensity statin. 4.  Aspirin 81 mg p.o. daily for 1 month. The patient will be on  anticoagulation for new-onset atrial fibrillation, probably will be  started on Eliquis which can be started in the morning. Stop aspirin  after 1 month if the patient remains on anticoagulation. 5.  Plavix 75 mg p.o. daily for 1 year. 6.  Optimize medical therapy for heart failure. 7.  LifeVest is indicated before discharge. 8.  May consider RCA  PCI in the future if medical therapy fails. 9.  Monitor in telemetry.     Findings and plan of care were discussed with the patient in details.  Madina Bone MD     D: 11/27/2019 17:34:32         Conclusions      Summary   Left Ventricular size is Moderately increased . Normal left ventricular wall thickness. There was severe global hypokinesis of the left ventricle. Systolic function was severely reduced. Ejection fraction is visually estimated at 20%.    The left atrium is Moderately dilated. Mildly enlarged right atrium size. Mild tricuspid regurgitation visualized. Right ventricular systolic pressure measures 40 mmhg. Aortic aneurysm noted in the ascending aorta . Aortic aneurysm measures 4 cm . Signature      ----------------------------------------------------------------   Electronically signed by Iam Powell MD (Interpreting   physician) on 11/26/2019 at 12:25 PM      Assessment   Diagnosis Orders   1. Coronary artery disease involving native coronary artery of native heart without angina pectoris     2. Ischemic cardiomyopathy     3. Chronic combined systolic and diastolic congestive heart failure (HCC)     4. Paroxysmal atrial fibrillation ([address]     5. Abdominal aortic aneurysm (AAA) without rupture ([address]     6. Essential hypertension     7. Dyslipidemia     8. Nonsustained ventricular tachycardia ([address]     9. Medtronic single ICD      10. S/P angioplasty with stent- LCX/OM nov 2019         Recent admission DX Nov 2019  ASSESSMENT:  1. Acute combined systolic-diastolic congestive heart failure  exacerbation, new onset. 2.  Cardiomyopathy, newly diagnosed, ejection fraction 20% on this  admission. 3.  Paroxysmal atrial fibrillation, AFib is not captured on the 12-lead  EKG; however, it has been there in the telemetry in the night for couple  of hours and rate between 90 to 103.  4.  Episode of nonsustained VT composed of 6 beats, rate 150 beats per  minute. 5.  Hypertension, poorly controlled, needs a better control. 6.  Tobacco abuse. 7.  Alcohol abuse. 8.  Family history of coronary artery disease. 9.  Hyponatremia, sodium dropped from 134 yesterday to 130 today, so the  patient needs fluid restriction and avoid thiazide diuretics. 10.  The patient has multiple issues. CHADS-VASc of 3 with  hypertension, congestive heart failure with cardiomyopathy and age 75.           Plan   The current meds and labs reviewed    Continue the current treatment and with

## 2020-09-02 ENCOUNTER — HOSPITAL ENCOUNTER (OUTPATIENT)
Age: 65
Discharge: HOME | End: 2020-09-02
Payer: MEDICARE

## 2020-09-02 ENCOUNTER — HOSPITAL ENCOUNTER (OUTPATIENT)
Dept: PHARMACY | Age: 65
Setting detail: THERAPIES SERIES
Discharge: HOME | End: 2020-09-02
Payer: MEDICARE

## 2020-09-02 VITALS — TEMPERATURE: 98 F

## 2020-09-02 PROBLEM — Z79.01 ANTICOAGULATED ON COUMADIN: Status: ACTIVE | Noted: 2020-09-02

## 2020-09-02 LAB
HCT VFR BLD CALC: 45.7 % (ref 42–52)
HEMOGLOBIN: 15.4 GM/DL (ref 14–18)
POC INR: 2 (ref 0.8–1.2)

## 2020-09-02 PROCEDURE — 36415 COLL VENOUS BLD VENIPUNCTURE: CPT

## 2020-09-02 PROCEDURE — 36416 COLLJ CAPILLARY BLOOD SPEC: CPT

## 2020-09-02 PROCEDURE — 99212 OFFICE O/P EST SF 10 MIN: CPT

## 2020-09-02 PROCEDURE — 85018 HEMOGLOBIN: CPT

## 2020-09-02 PROCEDURE — 85014 HEMATOCRIT: CPT

## 2020-09-02 PROCEDURE — 85610 PROTHROMBIN TIME: CPT

## 2020-09-02 RX ORDER — WARFARIN SODIUM 2 MG/1
TABLET ORAL
Qty: 50 TABLET | Refills: 5 | Status: SHIPPED | OUTPATIENT
Start: 2020-09-02 | End: 2021-01-29

## 2020-09-02 RX ORDER — WARFARIN SODIUM 2 MG/1
TABLET ORAL EVERY EVENING
COMMUNITY
End: 2020-09-02 | Stop reason: SDUPTHER

## 2020-09-02 RX ORDER — WARFARIN SODIUM 2 MG/1
TABLET ORAL EVERY EVENING
COMMUNITY
End: 2020-11-18

## 2020-09-02 NOTE — PROGRESS NOTES
Medication Management [address]   [phone] (phone)  [phone] (fax)      Mr. Jackie Valle is a 75 y.o.  male with history of atrial fib. , per Dr. Hank Hastings referral, who presents today for Warfarin monitoring and adjustment (3 week visit). Patient verifies current dosing regimen and tablet strength. No missed or extra doses. Patient denies  bleeding/bruising/swelling/SOB/chest pain. No blood in stool. States he saw a speck of blood in urine today. No dietary changes. No changes in medication/OTC agents/herbals. No change in alcohol use or tobacco use. No change in activity level. Patient denies headaches/dizziness/lightheadedness/falls. No vomiting or acute illness. Had 1 episode of diarrhea this morning. No procedures scheduled in the future at this time. Assessment:   Lab Results   Component Value Date    INR 2.00 (H) 09/02/2020    INR 2.30 (H) 08/12/2020    INR 2.10 (H) 07/29/2020     INR therapeutic - goal 2-3. Recent Labs     09/02/20  1434   INR 2.00*       Plan:  POCT INR ordered/performed/result reviewed. Continue PO Coumadin 4 mg W, 3 mg MTThFSS; today is W.  Recheck INR in 3 week(s). Patient reminded to call the Anticoagulation Clinic with any signs or symptoms of bleeding or with any medication changes. Patient given instructions utilizing the teach back method. Given routine H&H order. Discharged ambulatory in no apparent distress, wearing mask. Prescription renewed electronically by clinic pharmacist.      After visit summary printed and reviewed with patient.       Medications reviewed and updated on home medication list.    Influenza vaccine:     [] given    [] declined   [] received previously   [] plans to receive at a later time   [] refused    [] documented in EPIC

## 2020-09-03 ENCOUNTER — TELEPHONE (OUTPATIENT)
Dept: PHARMACY | Age: 65
End: 2020-09-03

## 2020-09-03 NOTE — TELEPHONE ENCOUNTER
Yesterday's routine H&H:  15.4/45.7 (15.2/44 last March). Notified patient results are normal.  States his pharmacist told him shouldn't be taking Brilinta and Coumadin; advised patient they thin blood differently and should take until doctor says to stop. Understanding/agreement voiced.

## 2020-09-23 ENCOUNTER — HOSPITAL ENCOUNTER (OUTPATIENT)
Dept: PHARMACY | Age: 65
Setting detail: THERAPIES SERIES
Discharge: HOME | End: 2020-09-23
Payer: MEDICARE

## 2020-09-23 VITALS — TEMPERATURE: 98.2 F

## 2020-09-23 LAB — POC INR: 1.4 (ref 0.8–1.2)

## 2020-09-23 PROCEDURE — 99211 OFF/OP EST MAY X REQ PHY/QHP: CPT

## 2020-09-23 PROCEDURE — 85610 PROTHROMBIN TIME: CPT

## 2020-09-23 PROCEDURE — 36416 COLLJ CAPILLARY BLOOD SPEC: CPT

## 2020-09-23 NOTE — PROGRESS NOTES
Medication Management [address] [place] [address]  [phone] (phone)  [phone] (fax)      Mr. Jackie Valle is a 75 y.o.  male with history of Afib who presents today for anticoagulation monitoring and adjustment. Patient verifies current dosing regimen and tablet strength. No missed or extra doses. Patient denies s/s bleeding/bruising/swelling/SOB/chest pain. No blood in stool. Noticed a very small amount of blood in urine a few days ago, patient states \"it was not even a drop\" and that it has resolved since. No dietary changes. No changes in medication/OTC agents/Herbals. No change in alcohol use or tobacco use. No change in activity level. Patient denies headaches/dizziness/lightheadedness/falls. No vomiting or acute illness. Patient reports having some diarrhea a couple weeks ago, but has since resolved. No Procedures scheduled in the future at this time. Assessment:   Lab Results   Component Value Date    INR 1.40 (H) 09/23/2020    INR 2.00 (H) 09/02/2020    INR 2.30 (H) 08/12/2020     INR subtherapeutic   Recent Labs     09/23/20  1420   INR 1.40*     Patient interview completed and discussed with pharmacist by Saint Skeeters, One Carlos Moreno PharmD Candidate    Previously, patient had three consecutive therapeutic INRs prior to today's result. Plan:  Coumadin 5 mg x 2 doses (9/23/20-9/24/20) then continue Coumadin 4 mg W and 3 mg MTuThFSaSu. Recheck INR in 2 week(s). Patient reminded to call the Anticoagulation Clinic with any signs or symptoms of bleeding or with any medication changes. Patient given instructions utilizing the teach back method. Discharged ambulatory in no apparent distress. After visit summary printed and reviewed with patient.       Medications reviewed and updated on home medication list Yes    Influenza vaccine:     [] given    [x] declined   [] received previously   [x] plans to receive at a later time   [] refused    []

## 2020-09-30 ENCOUNTER — PROCEDURE VISIT (OUTPATIENT)
Dept: CARDIOLOGY CLINIC | Age: 65
End: 2020-09-30
Payer: MEDICARE

## 2020-09-30 PROCEDURE — G2066 INTER DEVC REMOTE 30D: HCPCS | Performed by: INTERNAL MEDICINE

## 2020-09-30 PROCEDURE — 93297 REM INTERROG DEV EVAL ICPMS: CPT | Performed by: INTERNAL MEDICINE

## 2020-10-07 ENCOUNTER — HOSPITAL ENCOUNTER (OUTPATIENT)
Dept: PHARMACY | Age: 65
Setting detail: THERAPIES SERIES
Discharge: HOME | End: 2020-10-07
Payer: MEDICARE

## 2020-10-07 VITALS — TEMPERATURE: 97.7 F

## 2020-10-07 LAB — POC INR: 1.8 (ref 0.8–1.2)

## 2020-10-07 PROCEDURE — 99211 OFF/OP EST MAY X REQ PHY/QHP: CPT

## 2020-10-07 PROCEDURE — 36416 COLLJ CAPILLARY BLOOD SPEC: CPT

## 2020-10-07 PROCEDURE — 85610 PROTHROMBIN TIME: CPT

## 2020-10-07 NOTE — PROGRESS NOTES
Medication Management [address]   [phone] (phone)  [phone] (fax)      Mr. Antoine Rojo is a 75 y.o.  male with history of Afib who presents today for anticoagulation monitoring and adjustment. Patient verifies current dosing regimen and tablet strength. No missed or extra doses. Patient denies s/s bleeding/bruising/swelling/SOB/chest pain  No blood in urine or stool. Has been eating more [employer] potato chips with British onion dip  No changes in medication/OTC agents/Herbals. No change in alcohol use or tobacco use. No change in activity level. Patient denies headaches/dizziness/lightheadedness/falls. No vomiting/diarrhea or acute illness. No Procedures scheduled in the future at this time. Assessment:   Lab Results   Component Value Date    INR 1.80 (H) 10/07/2020    INR 1.40 (H) 09/23/2020    INR 2.00 (H) 09/02/2020     INR subtherapeutic   Recent Labs     10/07/20  1446   INR 1.80*     Patient interview completed and discussed with pharmacist by Tracey Dial, CharleeD Candidate    Plan:  Coumadin 5 mg today,  then increase Coumadin 4 mg MWF, 3 mg all other days (~ 9.1% increase). Recheck INR in 2 week(s). Patient reminded to call the Anticoagulation Clinic with signs or symptoms of bleeding or with any medication changes. Patient given instructions utilizing the teach back method. Discharged ambulatory in no apparent distress. After visit summary printed and reviewed with patient.       Medications reviewed and updated on home medication list Yes    Influenza vaccine:     [] given    [] declined   [x] received previously   [] plans to receive at a later time   [] refused    [] documented in [address]: MED [address]: Yes  Total # of Interventions Recommended: 1  - Increased Dose #: 1  - Maintenance Safety Lab Monitoring #: 1  Total Interventions Accepted: 1  Time Spent (min): 1975 [address], PharmD, BCPS  10/7/2020  3:12 PM

## 2020-10-21 ENCOUNTER — HOSPITAL ENCOUNTER (OUTPATIENT)
Dept: PHARMACY | Age: 65
Setting detail: THERAPIES SERIES
Discharge: HOME | End: 2020-10-21
Payer: MEDICARE

## 2020-10-21 VITALS — TEMPERATURE: 97.8 F

## 2020-10-21 LAB — POC INR: 3.1 (ref 0.8–1.2)

## 2020-10-21 PROCEDURE — 99211 OFF/OP EST MAY X REQ PHY/QHP: CPT

## 2020-10-21 PROCEDURE — 36416 COLLJ CAPILLARY BLOOD SPEC: CPT

## 2020-10-21 PROCEDURE — 85610 PROTHROMBIN TIME: CPT

## 2020-10-21 NOTE — PROGRESS NOTES
Medication Management [address]  [phone] (phone)  [phone] (fax)      Mr. Teresa Dimas is a 75 y.o.  male with history of Afib who presents today for anticoagulation monitoring and adjustment. Patient verifies current dosing regimen and tablet strength. No missed or extra doses. Patient denies s/s bleeding/bruising/swelling/chest pain. Patient reports baseline SOB that is unchanged. No blood in urine or stool. No dietary changes. No changes in medication/OTC agents/Herbals. No change in alcohol use or tobacco use. No change in activity level. Patient denies headaches/dizziness/lightheadedness/falls. No vomiting. Patient states he has had some diarrhea over the past few days, but is getting better. Patient reports he is getting over a cold. No Procedures scheduled in the future at this time. Assessment:   Lab Results   Component Value Date    INR 3.10 (H) 10/21/2020    INR 1.80 (H) 10/07/2020    INR 1.40 (H) 09/23/2020     INR supratherapeutic   Recent Labs     10/21/20  1428   INR 3.10*     Patient is slightly supratherapeutic following a dose adjustment. Plan:  Coumadin 2 mg x 1 dose today 10/21/20 then decrease Coumadin from 4 mg MWF and 3 mg TuThSaSu to Coumadin 4 mg MF and 3 mg TuWThSaSu (4.2% decrease). Recheck INR in 2 week(s). Patient reminded to call the Anticoagulation Clinic with signs or symptoms of bleeding or with any medication changes. Patient given instructions utilizing the teach back method. Discharged ambulatory in no apparent distress. After visit summary printed and reviewed with patient.       Medications reviewed and updated on home medication list Yes    Influenza vaccine:     [] given    [x] declined   [x] received previously   [] plans to receive at a later time   [] refused    [x] documented in [address]: MED RECONCILIATION/REVIEW [address] [address] Only    PHSO: Yes  Total # of Interventions Recommended: 1  - Decreased Dose #: 1  - Maintenance Safety Lab Monitoring #: 1  Total Interventions Accepted: 1  Time Spent (min): [address], PharmD, BCPS  10/21/2020  2:59 PM

## 2020-11-04 ENCOUNTER — HOSPITAL ENCOUNTER (OUTPATIENT)
Dept: PHARMACY | Age: 65
Setting detail: THERAPIES SERIES
Discharge: HOME | End: 2020-11-04
Payer: MEDICARE

## 2020-11-04 ENCOUNTER — PROCEDURE VISIT (OUTPATIENT)
Dept: CARDIOLOGY CLINIC | Age: 65
End: 2020-11-04
Payer: MEDICARE

## 2020-11-04 VITALS — TEMPERATURE: 97.6 F

## 2020-11-04 LAB — POC INR: 2.8 (ref 0.8–1.2)

## 2020-11-04 PROCEDURE — 85610 PROTHROMBIN TIME: CPT

## 2020-11-04 PROCEDURE — G2066 INTER DEVC REMOTE 30D: HCPCS | Performed by: INTERNAL MEDICINE

## 2020-11-04 PROCEDURE — 36416 COLLJ CAPILLARY BLOOD SPEC: CPT

## 2020-11-04 PROCEDURE — 93297 REM INTERROG DEV EVAL ICPMS: CPT | Performed by: INTERNAL MEDICINE

## 2020-11-04 PROCEDURE — 99211 OFF/OP EST MAY X REQ PHY/QHP: CPT

## 2020-11-04 NOTE — PROGRESS NOTES
Medication Management [address]  [phone] (phone)  [phone] (fax)      Mr. Valentin Bird is a 75 y.o.  male with history of Afib who presents today for anticoagulation monitoring and adjustment. Patient verifies current dosing regimen and tablet strength. No missed or extra doses. Patient denies s/s bleeding/bruising/swelling/SOB/chest pain  No blood in urine or stool. No dietary changes. No changes in medication/OTC agents/Herbals. No change in alcohol use or tobacco use. No change in activity level. Patient denies headaches/dizziness/lightheadedness/falls. No vomiting/diarrhea or acute illness. No Procedures scheduled in the future at this time. Assessment:   Lab Results   Component Value Date    INR 2.80 (H) 11/04/2020    INR 3.10 (H) 10/21/2020    INR 1.80 (H) 10/07/2020     INR therapeutic   Recent Labs     11/04/20  1441   INR 2.80*     Patient presents with first therapeutic INR following a recent dose adjustment. Plan:  Continue Coumadin 4 mg MF and 3 mg TuWThSaSu. Recheck INR in 2 week(s). Patient reminded to call the Anticoagulation Clinic with any signs or symptoms of bleeding or with any medication changes. Patient given instructions utilizing the teach back method. Discharged ambulatory in no apparent distress. After visit summary printed and reviewed with patient.       Medications reviewed and updated on home medication list Yes    Influenza vaccine:     [] given    [x] declined   [x] received previously   [] plans to receive at a later time   [] refused    [x] documented in [address]: [address] [place]: Yes  Total # of Interventions Recommended: 0  - Maintenance Safety Lab Monitoring #: 1  Total Interventions Accepted: 0  Time Spent (min): [address] [facility] April [place], PharmD, BCPS  11/4/2020  2:52 PM

## 2020-11-04 NOTE — PROGRESS NOTES
Mdt single icd  Battery 11.0yrs  V paced 0.3%  r wave 14. 8mV  Ventricle threshold 0.750V @ 0.40ms  Ventricle impedance 380 ohms  Shock 45 ohms  svc 60 ohms   optivol wnl

## 2020-11-18 ENCOUNTER — HOSPITAL ENCOUNTER (OUTPATIENT)
Dept: PHARMACY | Age: 65
Setting detail: THERAPIES SERIES
Discharge: HOME | End: 2020-11-18
Payer: MEDICARE

## 2020-11-18 VITALS — TEMPERATURE: 97.8 F

## 2020-11-18 LAB — POC INR: 1.7 (ref 0.8–1.2)

## 2020-11-18 PROCEDURE — 99211 OFF/OP EST MAY X REQ PHY/QHP: CPT

## 2020-11-18 PROCEDURE — 85610 PROTHROMBIN TIME: CPT

## 2020-11-18 PROCEDURE — 36416 COLLJ CAPILLARY BLOOD SPEC: CPT

## 2020-11-18 RX ORDER — SPIRONOLACTONE 25 MG/1
12.5 TABLET ORAL DAILY
Qty: 30 TABLET | Refills: 4 | Status: SHIPPED | OUTPATIENT
Start: 2020-11-18 | End: 2021-02-22 | Stop reason: SDUPTHER

## 2020-11-18 RX ORDER — ATORVASTATIN CALCIUM 40 MG/1
80 TABLET, FILM COATED ORAL DAILY
Qty: 30 TABLET | Refills: 2 | Status: SHIPPED | OUTPATIENT
Start: 2020-11-18 | End: 2021-01-04 | Stop reason: SDUPTHER

## 2020-11-24 ENCOUNTER — HOSPITAL ENCOUNTER (OUTPATIENT)
Age: 65
Discharge: HOME | End: 2020-11-24
Payer: MEDICARE

## 2020-11-24 LAB
ANION GAP SERPL CALCULATED.3IONS-SCNC: 10 MEQ/L (ref 8–16)
BUN BLDV-MCNC: 20 MG/DL (ref 7–22)
CALCIUM SERPL-MCNC: 10 MG/DL (ref 8.5–10.5)
CHLORIDE BLD-SCNC: 96 MEQ/L (ref 98–111)
CO2: 28 MEQ/L (ref 23–33)
CREAT SERPL-MCNC: 0.8 MG/DL (ref 0.4–1.2)
GFR SERPL CREATININE-BSD FRML MDRD: > 90 ML/MIN/1.73M2
GLUCOSE BLD-MCNC: 75 MG/DL (ref 70–108)
MAGNESIUM: 1.9 MG/DL (ref 1.6–2.4)
POTASSIUM SERPL-SCNC: 5 MEQ/L (ref 3.5–5.2)
SODIUM BLD-SCNC: 134 MEQ/L (ref 135–145)

## 2020-11-24 PROCEDURE — 83735 ASSAY OF MAGNESIUM: CPT

## 2020-11-24 PROCEDURE — 80048 BASIC METABOLIC PNL TOTAL CA: CPT

## 2020-11-24 PROCEDURE — 36415 COLL VENOUS BLD VENIPUNCTURE: CPT

## 2020-12-01 ENCOUNTER — CARE COORDINATION (OUTPATIENT)
Dept: CARE COORDINATION | Age: 65
End: 2020-12-01

## 2020-12-01 ENCOUNTER — OFFICE VISIT (OUTPATIENT)
Dept: INTERNAL MEDICINE CLINIC | Age: 65
End: 2020-12-01
Payer: MEDICARE

## 2020-12-01 ENCOUNTER — OFFICE VISIT (OUTPATIENT)
Dept: CARDIOLOGY CLINIC | Age: 65
End: 2020-12-01
Payer: MEDICARE

## 2020-12-01 VITALS
SYSTOLIC BLOOD PRESSURE: 120 MMHG | HEIGHT: 63 IN | BODY MASS INDEX: 22.36 KG/M2 | DIASTOLIC BLOOD PRESSURE: 60 MMHG | WEIGHT: 126.2 LBS | HEART RATE: 70 BPM

## 2020-12-01 VITALS
DIASTOLIC BLOOD PRESSURE: 84 MMHG | BODY MASS INDEX: 23.19 KG/M2 | SYSTOLIC BLOOD PRESSURE: 130 MMHG | WEIGHT: 126 LBS | HEIGHT: 62 IN | TEMPERATURE: 97.3 F

## 2020-12-01 PROCEDURE — 99214 OFFICE O/P EST MOD 30 MIN: CPT | Performed by: INTERNAL MEDICINE

## 2020-12-01 PROCEDURE — 99214 OFFICE O/P EST MOD 30 MIN: CPT | Performed by: NURSE PRACTITIONER

## 2020-12-01 NOTE — PROGRESS NOTES
2020     Sanna Nguyen (:  1955) is a 77 y.o. male, here for evaluation of the following medical concerns: CHF, CAD, paroxysmal atrial fibrillation, Vtach, HTN, alcoholism, and diarrhea    I last seen Lawrence 3 months ago. He has not had any ER visits or hospitalizations since last visit. Denies any falls.      CHF/CAD/Atrial Fibrillation/HTN-  Cardiac catheterization on  requiring stent placement to the left circumflex and OM 2. He was started on Eliquis at that time. [facility] has been assisting with medication assistance. Is following with Dr. Pricilla Castro routinely. Had an AICD placed on 3/6/2020. No firing.      Limited echocardiogram 2020     Ejection fraction is visually estimated at 30%.   There was moderate global hypokinesis of the left ventricle.   Left Ventricular size is Mildly increased .   Mildly increased left ventricle wall thickness.   No evidence of any pericardial effusion.        Silvia Vigil reports that he has cut back significantly on his drinking since last visit, but has not stopped completely. He is now drinking less than 1/4 bottle of gin a day. Has drank for 30+ years. Hyponatremia could be related to his alcohol consumption. Smoking 3 packs a day for 40+ years, declines smoking cessation at this time as well. Was recently able to find new housing at the [facility]. He is very happy to be there and out of the [facility]. It has also helped him to cut down on his smoking significantly, as he cannot smoke in his apartment. Lawrence complains of diarrhea 2-3 times per day for at least 2 weeks. No blood. Sometimes incontinent. Never had a colonoscopy. No family history of colon cancer. No recent ATB. No COVID exposure. Will send stool studies and also refer to GI for evaluation and screening. Will reach out to Yaritza to see if she is able to help arrange transportation, as this is a concern for patient.        Review of Systems   Constitutional: Negative for chills, fatigue and fever. HENT: Negative for congestion, rhinorrhea, sinus pressure, sinus pain, sore throat, tinnitus and trouble swallowing. Eyes: Negative for photophobia and visual disturbance. Respiratory: Positive for shortness of breath (on exertion). Negative for cough and wheezing. Cardiovascular: Negative for chest pain, palpitations and leg swelling. Gastrointestinal: Negative for abdominal distention, abdominal pain, blood in stool, constipation, diarrhea, nausea and vomiting. Endocrine: Negative for polydipsia, polyphagia and polyuria. Genitourinary: Negative for difficulty urinating, dysuria, frequency, hematuria and urgency. Musculoskeletal: Negative for arthralgias and myalgias. Skin: Negative for rash and wound. Neurological: Negative for dizziness, light-headedness and headaches. Psychiatric/Behavioral: Negative for dysphoric mood and sleep disturbance. The patient is not nervous/anxious. Prior to Visit Medications    Medication Sig Taking? Authorizing Provider   sacubitril-valsartan (ENTRESTO) 24-26 MG per tablet Take 1 tablet by mouth 2 times daily . hold if SBP less than 100 mm hg or HR less than 60 Yes DYAN Rodríguez CNP   atorvastatin (LIPITOR) 40 MG tablet Take 2 tablets by mouth daily Yes DYAN Rodríguez CNP   spironolactone (ALDACTONE) 25 MG tablet Take 0.5 tablets by mouth daily Yes DYAN Rodríguez CNP   warfarin (COUMADIN) 2 MG tablet Take as directed by [facility] tabs = 30 day supply Yes Fadumo Jones MD   ticagrelor (BRILINTA) 90 MG TABS tablet Take 1 tablet by mouth 2 times daily Yes Usman Mathis MD   carvedilol (COREG) 12.5 MG tablet Take 1 tablet by mouth 2 times daily Yes Usman Mathis MD   acetaminophen (TYLENOL) 325 MG tablet Take 650 mg by mouth every 4 hours as needed for Pain or Fever Don't take more than 3,000 mg each day.  Yes Historical Provider, MD   nitroGLYCERIN (NITROSTAT) 0.4 MG SL tablet For chest pain/ angina ,up to max of 3 total doses. If no relief after 1 dose, call 911. Yes Maylin Cadena MD   Multiple Vitamins-Minerals (CENTRUM SILVER 50+MEN) TABS Take 1 tablet by mouth daily Yes Historical Provider, MD        Social History     Tobacco Use    Smoking status: Current Every Day Smoker     Packs/day: 3.00     Years: 50.00     Pack years: 150.00     Types: Cigarettes    Smokeless tobacco: Never Used   Substance Use Topics    Alcohol use: Yes     Frequency: 4 or more times a week     Drinks per session: 3 or 4     Binge frequency: Never     Comment: Gin daily - drink with lemon lime soda        Vitals:    12/01/20 1403   BP: 130/84   Site: Right Upper Arm   Cuff Size: Medium Adult   Temp: 97.3 °F (36.3 °C)   TempSrc: Temporal   Weight: 126 lb (57.2 kg)   Height: 5' 2\" (1.575 m)     Estimated body mass index is 23.05 kg/m² as calculated from the following:    Height as of this encounter: 5' 2\" (1.575 m). Weight as of this encounter: 126 lb (57.2 kg). Physical Exam  Constitutional:       General: He is not in acute distress. Appearance: Normal appearance. He is normal weight. He is not ill-appearing. HENT:      Head: Normocephalic and atraumatic. Right Ear: Tympanic membrane, ear canal and external ear normal.      Left Ear: Tympanic membrane, ear canal and external ear normal.      Nose: Nose normal. No congestion or rhinorrhea. Mouth/Throat:      Mouth: Mucous membranes are moist.      Pharynx: Oropharynx is clear. No oropharyngeal exudate or posterior oropharyngeal erythema. Eyes:      Extraocular Movements: Extraocular movements intact. Conjunctiva/sclera: Conjunctivae normal.      Pupils: Pupils are equal, round, and reactive to light. Neck:      Musculoskeletal: Normal range of motion and neck supple. No muscular tenderness. Vascular: No carotid bruit. Cardiovascular:      Rate and Rhythm: Normal rate and regular rhythm. Pulses: Normal pulses. Heart sounds: No murmur. No friction rub. No gallop. Pulmonary:      Effort: Pulmonary effort is normal. No respiratory distress. Breath sounds: Normal breath sounds. No wheezing, rhonchi or rales. Abdominal:      General: Abdomen is flat. Bowel sounds are normal. There is no distension. Palpations: Abdomen is soft. Tenderness: There is no abdominal tenderness. Musculoskeletal: Normal range of motion. General: No tenderness. Right lower leg: No edema. Left lower leg: No edema. Lymphadenopathy:      Cervical: No cervical adenopathy. Skin:     General: Skin is warm and dry. Capillary Refill: Capillary refill takes less than 2 seconds. Findings: No erythema or rash. Neurological:      General: No focal deficit present. Mental Status: He is alert and oriented to person, place, and time. Motor: No weakness. Coordination: Coordination normal.      Deep Tendon Reflexes: Reflexes normal.   Psychiatric:         Mood and Affect: Mood normal.         Behavior: Behavior normal.         Thought Content: Thought content normal.         Judgment: Judgment normal.       ASSESSMENT/PLAN:    1. Diarrhea, unspecified type    - External Referral To Gastroenterology  - Clostridium Difficile Toxin/Antigen; Future  - H. Pylori Antigen, Stool; Future  - Rotavirus Antigen, Stool; Future  - CBC; Future  - Comprehensive Metabolic Panel; Future    2. Congestive heart failure, unspecified HF chronicity, unspecified heart failure type ([facility]    - Follow with Dr. Enrique Roach as previously scheduled  - Continue current medication regimen, samples of entresto given     3. Coronary artery disease involving native heart without angina pectoris, unspecified vessel or lesion type    4. Paroxysmal A-fib ([facility]    - Managed by Dr. Enrique Roach  - Fall precautions discussed- on coumadin    5. Essential hypertension    6. Alcoholism ([facility]    - Alcohol cessation encouraged.        Return in about 3 months (around 3/1/2021). An electronic signature was used to authenticate this note.     --Faheem Lee, DYAN - CNP on 12/16/2020 at 9:14 AM

## 2020-12-01 NOTE — PROGRESS NOTES
Chief Complaint   Patient presents with    3 Month Follow-Up    Coronary Artery Disease    Cardiomyopathy    Congestive Heart Failure       Originally Seen in hospital for new CHF and new CMP and New onset atr fib  Had cath and sent home on vest and oMT        3 month follow up. Hx of intermittent Diarrhea for the last 2 weeks  Being addressed by pcp  Advised hydration  EKG done 3-6-2020. Denied cp,  palpitations, dizziness or edema    Sob on exertion stable    Record reviewed    Patient Seen, Chart, Consults notes, Labs, Radiology studies reviewed.     Has beed on asa/plavix and apixaban now  On apixaban and asa      Feel good today and post D/c    Patient Active Problem List   Diagnosis    Acute systolic (congestive) heart failure (HCC)    Tobacco abuse    ETOH abuse    Essential hypertension    Paroxysmal atrial fibrillation (HCC)    Nonsustained ventricular tachycardia (HCC)    Systolic congestive heart failure (HCC)    Abdominal aortic aneurysm (AAA) without rupture ([address]    Coronary artery disease involving native coronary artery of native heart without angina pectoris    Ischemic cardiomyopathy    S/P angioplasty with stent- LCX/OM nov 2019    Chronic combined systolic and diastolic congestive heart failure (HCC)    Dyslipidemia    Cardiomyopathy ([address]    Medtronic single ICD     Anticoagulated on Coumadin       Past Surgical History:   Procedure Laterality Date    CORONARY ANGIOPLASTY WITH STENT PLACEMENT  11/27/2019    Stent Circ and OM    EYE SURGERY Right     cataract    TONSILLECTOMY AND ADENOIDECTOMY         No Known Allergies     Family History   Problem Relation Age of Onset    Heart Attack Mother     Heart Attack Father     Cancer Maternal Aunt     Heart Attack Maternal Grandmother     Heart Attack Maternal Grandfather     Heart Attack Paternal Grandmother     Stroke Paternal Grandmother     Heart Attack Paternal Grandfather     Breast Cancer Neg Hx     Diabetes Neg Hx     Kidney Disease Neg Hx         Social History     Socioeconomic History    Marital status: Single     Spouse name: Not on file    Number of children: Not on file    Years of education: Not on file    Highest education level: Not on file   Occupational History    Not on file   Social Needs    Financial resource strain: Somewhat hard    Food insecurity     Worry: Never true     Inability: Never true    Transportation needs     Medical: Yes     Non-medical: No   Tobacco Use    Smoking status: Current Every Day Smoker     Packs/day: 3.00     Years: 50.00     Pack years: 150.00     Types: Cigarettes    Smokeless tobacco: Never Used   Substance and Sexual Activity    Alcohol use: Yes     Frequency: 4 or more times a week     Drinks per session: 3 or 4     Binge frequency: Never     Comment: Gin daily - drink with lemon lime soda    Drug use: No    Sexual activity: Not on file   Lifestyle    Physical activity     Days per week: 2 days     Minutes per session: 20 min    Stress: To some extent   Relationships    Social connections     Talks on phone: Never     Gets together: Never     Attends Gnosticist service: Never     Active member of club or organization: No     Attends meetings of clubs or organizations: Never     Relationship status: Not on file    Intimate partner violence     Fear of current or ex partner: Not on file     Emotionally abused: Not on file     Physically abused: Not on file     Forced sexual activity: Not on file   Other Topics Concern    Not on file   Social History Narrative    Not on file       Current Outpatient Medications   Medication Sig Dispense Refill    sacubitril-valsartan (ENTRESTO) 24-26 MG per tablet Take 1 tablet by mouth 2 times daily . hold if SBP less than 100 mm hg or HR less than 60 60 tablet 2    atorvastatin (LIPITOR) 40 MG tablet Take 2 tablets by mouth daily 30 tablet 2    spironolactone (ALDACTONE) 25 MG tablet Take 0.5 tablets by mouth daily 30 tablet 4    warfarin (COUMADIN) 2 MG tablet Take as directed by [facility] tabs = 30 day supply 50 tablet 5    ticagrelor (BRILINTA) 90 MG TABS tablet Take 1 tablet by mouth 2 times daily 60 tablet 4    carvedilol (COREG) 12.5 MG tablet Take 1 tablet by mouth 2 times daily 60 tablet 4    acetaminophen (TYLENOL) 325 MG tablet Take 650 mg by mouth every 4 hours as needed for Pain or Fever Don't take more than 3,000 mg each day.  nitroGLYCERIN (NITROSTAT) 0.4 MG SL tablet For chest pain/ angina ,up to max of 3 total doses. If no relief after 1 dose, call 911. 25 tablet 1    Multiple Vitamins-Minerals (CENTRUM SILVER 50+MEN) TABS Take 1 tablet by mouth daily       No current facility-administered medications for this visit. Review of Systems -     General ROS: negative  Psychological ROS: negative  Hematological and Lymphatic ROS: No history of blood clots or bleeding disorder. Respiratory ROS: no cough,  or wheezing, the rest see HPI  Cardiovascular ROS: See HPI  Gastrointestinal ROS: negative  Genito-Urinary ROS: no dysuria, trouble voiding, or hematuria  Musculoskeletal ROS: negative  Neurological ROS: no TIA or stroke symptoms  Dermatological ROS: negative      Blood pressure 120/60, pulse 70, height 5' 3\" (1.6 m), weight 126 lb 3.2 oz (57.2 kg).         Physical Examination:    General appearance - alert, well appearing, and in no distress  HEENT- Pink conjunctiva  , Non-icteri sclera,PERRLA  Mental status - alert, oriented to person, place, and time  Neck - supple, no significant adenopathy, no JVD, or carotid bruits  Chest - clear to auscultation, no wheezes, rales or rhonchi, symmetric air entry  Heart - normal rate, regular rhythm, normal S1, S2, no murmurs, rubs, clicks or gallops  Abdomen - soft, nontender, nondistended, no masses or organomegaly  SEBLE- no CVA or flank tenderness, no suprapubic tenderness  Neurological - alert, oriented, normal speech, no focal findings or movement disorder noted  Musculoskeletal/limbs - no joint tenderness, deformity or swelling   - peripheral pulses normal, no pedal edema, no clubbing or cyanosis  Skin - normal coloration and turgor, no rashes, no suspicious skin lesions noted  Psych- appropriate mood and affect    Lab  No results for input(s): CKTOTAL, CKMB, CKMBINDEX, TROPONINI in the last 72 hours. CBC:   Lab Results   Component Value Date    WBC 6.7 03/06/2020    RBC 4.53 03/06/2020    HGB 15.4 09/02/2020    HCT 45.7 09/02/2020    MCV 97.1 03/06/2020    MCH 33.6 03/06/2020    MCHC 34.5 03/06/2020    RDW 13.7 06/08/2013     03/06/2020    MPV 11.4 03/06/2020     BMP:    Lab Results   Component Value Date     11/24/2020    K 5.0 11/24/2020    K 3.7 03/06/2020    CL 96 11/24/2020    CO2 28 11/24/2020    BUN 20 11/24/2020    LABALBU 4.2 06/03/2020    CREATININE 0.8 11/24/2020    CALCIUM 10.0 11/24/2020    LABGLOM >90 11/24/2020    GLUCOSE 75 11/24/2020    GLUCOSE 59 12/31/2019     Hepatic Function Panel:    Lab Results   Component Value Date    ALKPHOS 73 06/03/2020    ALT 24 06/03/2020    AST 33 06/03/2020    PROT 6.7 06/03/2020    BILITOT 1.0 06/03/2020    BILIDIR <0.2 06/03/2020    LABALBU 4.2 06/03/2020     Magnesium:    Lab Results   Component Value Date    MG 1.9 11/24/2020     Warfarin PT/INR:  No components found for: PTPATWAR, PTINRWAR  HgBA1c:  No results found for: LABA1C  FLP:    Lab Results   Component Value Date    TRIG 103 06/03/2020    HDL 46 06/03/2020    LDLCALC 49 06/03/2020     TSH:    Lab Results   Component Value Date    TSH 1.360 11/25/2019       FINDINGS:  HEMODYNAMIC RESULTS:  LVEDP 29 mmHg.     LEFT VENTRICULOGRAPHY:  Ejection fraction seems to be severely reduced  indicative of severe cardiomyopathy. EF 20%.     CORONARY ANGIOGRAM:  1. RCA:  RCA has mild diffuse disease in the proximal segment. Mid RCA  has chronic total occlusion. _____ estimated at around 20 mL. RCA is a  dominant vessel. It receives left-to-right collaterals that fill the  LPL, LPDA and distal RCA. 2.  Left main coronary artery:  Left main coronary artery is patent  without significant stenosis. 3.  Left circumflex artery:  Proximal LCX has mild diffuse disease. OM1  is a large branching vessel that has no significant stenosis. Distal  left circumflex artery into the OM2 branch had significant bifurcation  lesion estimated at 70% to 80%. It becomes more severe in OM2 up to  90%. 4. LAD:  Left anterior descending artery has mild diffuse disease with  mild calcification in the proximal segment. Mid LAD seems to be patent  without significant stenosis. Distal LAD has a 30% to 50% lesion.     MEDICATIONS:  See MAR.     COMPLICATIONS:  None.     ACCESS:  Right radial artery access. Vasc Band was applied for  hemostasis.     CONCLUSIONS:  1. Severe cardiomyopathy, probably mixed etiology ischemic and  nonischemic. 2.  RCA,  involving mid RCA. Distal RCA fills via collaterals from  left to right. 3.  Severe distal left circumflex and OM2 lesions, status post  successful PCI with stenting.     PLAN:  1. Continue inpatient care. 2.  Optimize medical therapy for coronary disease. 3.  High intensity statin. 4.  Aspirin 81 mg p.o. daily for 1 month. The patient will be on  anticoagulation for new-onset atrial fibrillation, probably will be  started on Eliquis which can be started in the morning. Stop aspirin  after 1 month if the patient remains on anticoagulation. 5.  Plavix 75 mg p.o. daily for 1 year. 6.  Optimize medical therapy for heart failure. 7.  LifeVest is indicated before discharge. 8.  May consider RCA  PCI in the future if medical therapy fails. 9.  Monitor in telemetry.     Findings and plan of care were discussed with the patient in details.  Jaquan Albert MD     D: 11/27/2019 17:34:32         Conclusions      Summary   Left Ventricular size is Moderately increased .    Normal left ventricular wall thickness. There was severe global hypokinesis of the left ventricle. Systolic function was severely reduced. Ejection fraction is visually estimated at 20%. The left atrium is Moderately dilated. Mildly enlarged right atrium size. Mild tricuspid regurgitation visualized. Right ventricular systolic pressure measures 40 mmhg. Aortic aneurysm noted in the ascending aorta . Aortic aneurysm measures 4 cm . Signature      ----------------------------------------------------------------   Electronically signed by Wing Kendall LOZANO (Interpreting   physician) on 11/26/2019 at 12:25 PM      Assessment   Diagnosis Orders   1. Chronic combined systolic and diastolic congestive heart failure ([address]     2. Coronary artery disease involving native coronary artery of native heart without angina pectoris     3. Ischemic cardiomyopathy     4. Paroxysmal atrial fibrillation (HCC)     5. Essential hypertension     6. S/P angioplasty with stent- LCX/OM nov 2019     7. Medtronic single ICD          Recent admission DX Nov 2019  ASSESSMENT:  1. Acute combined systolic-diastolic congestive heart failure  exacerbation, new onset. 2.  Cardiomyopathy, newly diagnosed, ejection fraction 20% on this  admission. 3.  Paroxysmal atrial fibrillation, AFib is not captured on the 12-lead  EKG; however, it has been there in the telemetry in the night for couple  of hours and rate between 90 to 103.  4.  Episode of nonsustained VT composed of 6 beats, rate 150 beats per  minute. 5.  Hypertension, poorly controlled, needs a better control. 6.  Tobacco abuse. 7.  Alcohol abuse. 8.  Family history of coronary artery disease. 9.  Hyponatremia, sodium dropped from 134 yesterday to 130 today, so the  patient needs fluid restriction and avoid thiazide diuretics. 10.  The patient has multiple issues. CHADS-VASc of 3 with  hypertension, congestive heart failure with cardiomyopathy and age 75.           Plan   The current meds and labs reviewed    Continue the current treatment and with constant vigilance to changes in symptoms and also any potential side effects. Return for care or seek medical attention immediately if symptoms got worse and/or develop new symptoms. Cardiomyopathy: improving, no CHF symptoms, no change in clinical condition. Will need periodic echocardiograms depending on symptoms. Cont entresto  Cont  coreg to 12.5 po bid       ICD interrogation  08/26/2020  Mdt single icd  Battery 11.1 yrs  V paced 0.2%  optivol wnl  No abnormality noted       Paroxysmal atrial fibrillation, AFib is not captured on the 12-lead  EKG; however, it has been there in the telemetry in the night for couple  of hours and rate between 90 to 103. Can no afford apixaban now on coumadin    Hx of intermittent Diarrhea for the last 2 weeks  Being addressed by pcp  Advised hydration    Congestive heart failure: no evidence of fluid overload today, no recent hospitalization for CHF  Cont entresto and aldactone 12.5 po qd    Dawit Lozoya has been off bumex 0.5 po bid or lasix since feb 2020 per information from his pharmacy and doing well without it. NO need to resume    Coronary artery disease, seems to be stable. Denies angina or change in breathing pattern  Cont apixaban  Cont  plavix 75 po qd     Hyperlipidemia: on statins, followed periodically. Patient need periodic lipid and liver profile.     I spent 25 minutes involved in face-to-face discussion of medical issues, prognosis, record review  and plan with the patient today and more than 50% of the time was spent on counseling and coordination of care    RTC in 4 months with TERESO Ramsey [facility]

## 2020-12-01 NOTE — CARE COORDINATION
Received message from So [place]ham regarding getting pt connected with transport to get him to his GI appt. Spoke with pt. He has never utilized [facility] in past but is agreeable in utilizing their services. Pt does not drive. Takes bus or walks. Referral being placed to Dr. Norma Lowe. Contacted GI office. Spoke with Daniel Rodgers. appt arranged for pt to be seen by Dr. Norma Lowe on 12/9 at 1030am.    [facility]. They have pt in their system but do not have his new home address. They request that pt call himself as they must talk to the pt and or family to update his information. Pt can provide them with the appt date and time once he contacts them. Spoke with Geo Davis briefly. Pt still at cardiology office. Reports that he has to leave there and go to lab following his appt. Pt informed that I will contact him tomorrow to provide him with information on GI appt and contacting [facility]    Contacted Geo Davis. Informed him that he needs to contact [facility] and provide them with updated information as well as appt date and time so that he can be transported. Provided pt with appt date, time and address to Dr. Morales Stage office. Provided him with number to [facility]. Pt will call today to get transportation set up to appt.

## 2020-12-02 ENCOUNTER — HOSPITAL ENCOUNTER (OUTPATIENT)
Dept: PHARMACY | Age: 65
Setting detail: THERAPIES SERIES
Discharge: HOME | End: 2020-12-02
Payer: MEDICARE

## 2020-12-02 ENCOUNTER — HOSPITAL ENCOUNTER (OUTPATIENT)
Age: 65
Discharge: HOME | End: 2020-12-02
Payer: MEDICARE

## 2020-12-02 VITALS — TEMPERATURE: 98.3 F

## 2020-12-02 LAB
ALBUMIN SERPL-MCNC: 4.5 G/DL (ref 3.5–5.1)
ALP BLD-CCNC: 74 U/L (ref 38–126)
ALT SERPL-CCNC: 46 U/L (ref 11–66)
ANION GAP SERPL CALCULATED.3IONS-SCNC: 8 MEQ/L (ref 8–16)
AST SERPL-CCNC: 54 U/L (ref 5–40)
BILIRUB SERPL-MCNC: 1.3 MG/DL (ref 0.3–1.2)
BUN BLDV-MCNC: 18 MG/DL (ref 7–22)
CALCIUM SERPL-MCNC: 10 MG/DL (ref 8.5–10.5)
CHLORIDE BLD-SCNC: 97 MEQ/L (ref 98–111)
CO2: 28 MEQ/L (ref 23–33)
CREAT SERPL-MCNC: 0.8 MG/DL (ref 0.4–1.2)
ERYTHROCYTE [DISTWIDTH] IN BLOOD BY AUTOMATED COUNT: 13.6 % (ref 11.5–14.5)
ERYTHROCYTE [DISTWIDTH] IN BLOOD BY AUTOMATED COUNT: 50.3 FL (ref 35–45)
GFR SERPL CREATININE-BSD FRML MDRD: > 90 ML/MIN/1.73M2
GLUCOSE BLD-MCNC: 92 MG/DL (ref 70–108)
HCT VFR BLD CALC: 43.1 % (ref 42–52)
HEMOGLOBIN: 14.9 GM/DL (ref 14–18)
MCH RBC QN AUTO: 34.7 PG (ref 26–33)
MCHC RBC AUTO-ENTMCNC: 34.6 GM/DL (ref 32.2–35.5)
MCV RBC AUTO: 100.2 FL (ref 80–94)
PLATELET # BLD: 235 THOU/MM3 (ref 130–400)
PMV BLD AUTO: 10.9 FL (ref 9.4–12.4)
POC INR: 1.9 (ref 0.8–1.2)
POTASSIUM SERPL-SCNC: 4.6 MEQ/L (ref 3.5–5.2)
RBC # BLD: 4.3 MILL/MM3 (ref 4.7–6.1)
SODIUM BLD-SCNC: 133 MEQ/L (ref 135–145)
TOTAL PROTEIN: 7.2 G/DL (ref 6.1–8)
WBC # BLD: 9.9 THOU/MM3 (ref 4.8–10.8)

## 2020-12-02 PROCEDURE — 36416 COLLJ CAPILLARY BLOOD SPEC: CPT

## 2020-12-02 PROCEDURE — 80053 COMPREHEN METABOLIC PANEL: CPT

## 2020-12-02 PROCEDURE — 99211 OFF/OP EST MAY X REQ PHY/QHP: CPT

## 2020-12-02 PROCEDURE — 85610 PROTHROMBIN TIME: CPT

## 2020-12-02 PROCEDURE — 85027 COMPLETE CBC AUTOMATED: CPT

## 2020-12-02 PROCEDURE — 36415 COLL VENOUS BLD VENIPUNCTURE: CPT

## 2020-12-02 NOTE — PROGRESS NOTES
[facility]ication [facility] [address]  [phone] (phone)  [phone] (fax)      Mr. Fred Rogers is a 77 y.o.  male with history of Afib who presents today for anticoagulation monitoring and adjustment. Patient verifies current dosing regimen and tablet strength. No missed or extra doses. Patient denies s/s bleeding/bruising/swelling/SOB/chest pain  No blood in urine or stool. No dietary changes. No changes in medication/OTC agents/Herbals. No change in alcohol use or tobacco use.-has started smoking less since about mid October   No change in activity level. Patient denies headaches/dizziness/lightheadedness/falls. No vomiting/diarrhea or acute illness.-diarrhea x 2 weeks. Physician is aware and he is supposed to see a GI doctor per patient   No Procedures scheduled in the future at this time. Assessment:   Lab Results   Component Value Date    INR 1.90 (H) 12/02/2020    INR 1.70 (H) 11/18/2020    INR 2.80 (H) 11/04/2020     INR subtherapeutic   Recent Labs     12/02/20  1436   INR 1.90*     Patient had several subtherapeutic levels on 22 mg weekly. At 24 mg weekly the INR was supratherapeutic. Patient is slightly subtherapeutic today with 23 mg weekly. Plan:  Coumadin 4 mg x 1 then continue Coumadin 4 mg MF, 3 mg TWThSS. Recheck INR in 3 week(s). Patient reminded to call the Anticoagulation Clinic with any signs or symptoms of bleeding or with any medication changes. Patient given instructions utilizing the teach back method. Discharged ambulatory in no apparent distress. After visit summary printed and reviewed with patient.       Medications reviewed and updated on home medication list Yes    Influenza vaccine:     [] given    [] declined   [] received previously   [] plans to receive at a later time   [] refused    [x] documented in [address]: MED RECONCILIATION/REVIEW [address] Only    PHSO: Yes  Total # of Interventions Recommended: 2  - Increased Dose #: 1  - Maintenance Safety Lab Monitoring #: 1  Total Interventions Accepted: 2  Time Spent (min): [address], PharmD, BCPS  12/2/2020  2:54 PM

## 2020-12-03 ENCOUNTER — HOSPITAL ENCOUNTER (OUTPATIENT)
Age: 65
Discharge: HOME | End: 2020-12-03
Payer: MEDICARE

## 2020-12-03 PROCEDURE — 87425 ROTAVIRUS AG IA: CPT

## 2020-12-03 PROCEDURE — 87338 HPYLORI STOOL AG IA: CPT

## 2020-12-03 PROCEDURE — 87449 NOS EACH ORGANISM AG IA: CPT

## 2020-12-04 LAB — C DIFF TOXIN/ANTIGEN: NEGATIVE

## 2020-12-05 LAB — ROTAVIRUS ANTIGEN: NEGATIVE

## 2020-12-06 LAB — HELICOBACTER PYLORI ANTIGEN, STOOL: NORMAL

## 2020-12-07 ENCOUNTER — TELEPHONE (OUTPATIENT)
Dept: INTERNAL MEDICINE CLINIC | Age: 65
End: 2020-12-07

## 2020-12-07 NOTE — TELEPHONE ENCOUNTER
----- Message from DYAN Ornelas CNP sent at 12/7/2020  8:29 AM EST -----  H pylori positive. Referred to GI at last visit. Can we check and see when his appointment is? Also, make sure GI has these results.

## 2020-12-07 NOTE — TELEPHONE ENCOUNTER
----- Message from DYAN Foy CNP sent at 12/7/2020  8:29 AM EST -----  H pylori positive. Referred to GI at last visit. Can we check and see when his appointment is? Also, make sure GI has these results.

## 2020-12-07 NOTE — TELEPHONE ENCOUNTER
Pt is scheduled to see  Dr. Sommer Padilla on 12/9 at 1030am. Will fax over information today.  Pt is aware of results

## 2020-12-09 ENCOUNTER — TELEPHONE (OUTPATIENT)
Dept: CARDIOLOGY CLINIC | Age: 65
End: 2020-12-09

## 2020-12-09 ENCOUNTER — PROCEDURE VISIT (OUTPATIENT)
Dept: CARDIOLOGY CLINIC | Age: 65
End: 2020-12-09
Payer: MEDICARE

## 2020-12-09 PROCEDURE — 93297 REM INTERROG DEV EVAL ICPMS: CPT | Performed by: INTERNAL MEDICINE

## 2020-12-09 PROCEDURE — G2066 INTER DEVC REMOTE 30D: HCPCS | Performed by: INTERNAL MEDICINE

## 2020-12-16 ENCOUNTER — HOSPITAL ENCOUNTER (OUTPATIENT)
Age: 65
Discharge: HOME | End: 2020-12-16
Payer: MEDICARE

## 2020-12-16 ASSESSMENT — ENCOUNTER SYMPTOMS
SINUS PAIN: 0
WHEEZING: 0
VOMITING: 0
CONSTIPATION: 0
BLOOD IN STOOL: 0
RHINORRHEA: 0
NAUSEA: 0
TROUBLE SWALLOWING: 0
SORE THROAT: 0
COUGH: 0
ABDOMINAL PAIN: 0
PHOTOPHOBIA: 0
SHORTNESS OF BREATH: 1
DIARRHEA: 0
ABDOMINAL DISTENTION: 0
SINUS PRESSURE: 0

## 2020-12-21 ENCOUNTER — TELEPHONE (OUTPATIENT)
Dept: PHARMACY | Age: 65
End: 2020-12-21

## 2020-12-21 NOTE — TELEPHONE ENCOUNTER
Noted patient is scheduled 12/24 for colonoscopy. On 12/9, cardiologist gave OK to be off Coumadin and Brilinta 5 days prior. Currently on clinic schedule for 12/23.   Message forwarded to clinic pharmacist.

## 2020-12-21 NOTE — TELEPHONE ENCOUNTER
Patient has been holding Coumadin since 12/19/20 for 5 day hold for upcoming colonoscopy. Patient originally scheduled for Coumadin Clinic visit on 12/23. Called patient and rescheduled appointment for 12/30 at 2:20 PM. Gave the following instructions for post procedure.     Date Warfarin Dose   12/25 6 mg   12/26 6 mg   12/27 3 mg   12/28 4 mg   12/29 3 mg     Shania Eng PharmD, BCPS  12/21/2020  1:08 PM

## 2020-12-23 ENCOUNTER — APPOINTMENT (OUTPATIENT)
Dept: PHARMACY | Age: 65
End: 2020-12-23
Payer: MEDICARE

## 2021-01-04 RX ORDER — ATORVASTATIN CALCIUM 40 MG/1
80 TABLET, FILM COATED ORAL DAILY
Qty: 60 TABLET | Refills: 2 | Status: SHIPPED | OUTPATIENT
Start: 2021-01-04 | End: 2021-04-09 | Stop reason: SDUPTHER

## 2021-01-04 NOTE — TELEPHONE ENCOUNTER
Last visit- 12/1/2020  Next visit- 3/1/2021    Requested Prescriptions     Pending Prescriptions Disp Refills    atorvastatin (LIPITOR) 40 MG tablet 60 tablet 2     Sig: Take 2 tablets by mouth daily

## 2021-01-13 ENCOUNTER — PROCEDURE VISIT (OUTPATIENT)
Dept: CARDIOLOGY CLINIC | Age: 66
End: 2021-01-13
Payer: MEDICARE

## 2021-01-13 DIAGNOSIS — Z95.810 ICD (IMPLANTABLE CARDIOVERTER-DEFIBRILLATOR) IN PLACE: Primary | ICD-10-CM

## 2021-01-13 PROCEDURE — 93295 DEV INTERROG REMOTE 1/2/MLT: CPT | Performed by: INTERNAL MEDICINE

## 2021-01-13 PROCEDURE — 93296 REM INTERROG EVL PM/IDS: CPT | Performed by: INTERNAL MEDICINE

## 2021-01-13 NOTE — PROGRESS NOTES
[facility] medtronic icd     RV imepd 380  Shock 49  SVC 69  Threshold 0.75 @ 0.4  R wave s15.9  0.3% paced   optivol WNL

## 2021-01-22 ENCOUNTER — TELEPHONE (OUTPATIENT)
Dept: PHARMACY | Age: 66
End: 2021-01-22

## 2021-01-22 NOTE — TELEPHONE ENCOUNTER
Patient no show/cancelled appointments on 12/23 and 12/30/20.   Letter mailed to patient to reschedule appointment by 1/29/21

## 2021-01-29 ENCOUNTER — TELEPHONE (OUTPATIENT)
Dept: PHARMACY | Age: 66
End: 2021-01-29

## 2021-01-29 NOTE — TELEPHONE ENCOUNTER
Pt cancelled apps 12/23 and no showed to appt 12/30 and has not responded to telephone calls or mailed letter to reschedule. Will discharge pt from [facility] as per protocol.

## 2021-01-29 NOTE — LETTER
[facility]  [address] [facility] [address]  Phone: [phone]  Fax: [phone]      January 29, 2021    Yuliya Ford  [address] [address]    Dear Yuliya Ford,    This letter is to inform you that you are being discharged from Med[facility] Management Clinic Services at [facility]. Your last appointment with us was on 12/2/20 and you have not responded to our calls or letters requesting you reschedule an appointment for follow-up. This notice is to confirm with you that we will no longer be responsible for monitoring and/or management of your anticoagulation therapy and we will not be able to provide any further refills for your warfarin (Coumadin). Please be advised that warfarin (Coumadin) therapy can place you at risk for potentially serious side effects related to bleeding. You should not be taking warfarin (Coumadin) without close supervision by a physician or appropriately trained health care professional. Be advised that this letter is only confirming your discharge from the Anticoagulation Service; please check with Dr. Braulio Guerra, to determine the appropriate provider for your INR management. If you should experience any problems within thirty days, we will be available to assist you until you become established with your new anticoagulation therapy management provider. If you have any questions or concerns, please don't hesitate to call.     Sincerely,    [facility] Anticoagulation [facility]

## 2021-01-29 NOTE — LETTER
[facility]  [id number] [place] [address]  Phone: [phone]  Fax: [address] [facility], [address] [facility]        2021      RE: Yuliya Ford,  1955    Dear Dr. Ramsey Flight: This letter is to inform you that your patient, Yuliya Ford, has been discharged from the Anticoagulation clinic at [facility] for the following reason: has not kept appt, unable to reach. Thank you for allowing us to work with your patients. We look forward to meeting your future needs. Please dont hesitate to contact the Anticoagulation Service office at [phone] if you have any questions or concerns.         Sincerely,        Diana Castorena, [facility]

## 2021-02-05 ENCOUNTER — HOSPITAL ENCOUNTER (EMERGENCY)
Age: 66
Discharge: HOME | End: 2021-02-05
Attending: EMERGENCY MEDICINE
Payer: MEDICARE

## 2021-02-05 ENCOUNTER — APPOINTMENT (OUTPATIENT)
Dept: GENERAL RADIOLOGY | Age: 66
End: 2021-02-05
Payer: MEDICARE

## 2021-02-05 VITALS
TEMPERATURE: 98.1 F | DIASTOLIC BLOOD PRESSURE: 91 MMHG | SYSTOLIC BLOOD PRESSURE: 149 MMHG | HEART RATE: 72 BPM | OXYGEN SATURATION: 99 % | RESPIRATION RATE: 20 BRPM

## 2021-02-05 DIAGNOSIS — Z79.01 CHRONIC ANTICOAGULATION: ICD-10-CM

## 2021-02-05 DIAGNOSIS — Z86.79 HISTORY OF ATRIAL FIBRILLATION: ICD-10-CM

## 2021-02-05 DIAGNOSIS — S22.42XA CLOSED FRACTURE OF MULTIPLE RIBS OF LEFT SIDE, INITIAL ENCOUNTER: Primary | ICD-10-CM

## 2021-02-05 LAB — INR BLD: 1.45 (ref 0.85–1.13)

## 2021-02-05 PROCEDURE — 85610 PROTHROMBIN TIME: CPT

## 2021-02-05 PROCEDURE — 36415 COLL VENOUS BLD VENIPUNCTURE: CPT

## 2021-02-05 PROCEDURE — 71101 X-RAY EXAM UNILAT RIBS/CHEST: CPT

## 2021-02-05 PROCEDURE — 6370000000 HC RX 637 (ALT 250 FOR IP): Performed by: EMERGENCY MEDICINE

## 2021-02-05 PROCEDURE — 99282 EMERGENCY DEPT VISIT SF MDM: CPT

## 2021-02-05 RX ORDER — HYDROCODONE BITARTRATE AND ACETAMINOPHEN 5; 325 MG/1; MG/1
1 TABLET ORAL ONCE
Status: COMPLETED | OUTPATIENT
Start: 2021-02-05 | End: 2021-02-05

## 2021-02-05 RX ORDER — HYDROCODONE BITARTRATE AND ACETAMINOPHEN 5; 325 MG/1; MG/1
1 TABLET ORAL EVERY 6 HOURS PRN
Qty: 20 TABLET | Refills: 0 | Status: SHIPPED | OUTPATIENT
Start: 2021-02-05 | End: 2021-02-10

## 2021-02-05 RX ORDER — WARFARIN SODIUM 2 MG/1
TABLET ORAL
Qty: 45 TABLET | Refills: 1 | Status: SHIPPED | OUTPATIENT
Start: 2021-02-05 | End: 2021-04-01 | Stop reason: SDUPTHER

## 2021-02-05 RX ADMIN — HYDROCODONE BITARTRATE AND ACETAMINOPHEN 1 TABLET: 5; 325 TABLET ORAL at 17:02

## 2021-02-05 ASSESSMENT — ENCOUNTER SYMPTOMS
COUGH: 0
BACK PAIN: 0
CHEST TIGHTNESS: 0
WHEEZING: 0
CONSTIPATION: 0
TROUBLE SWALLOWING: 0
NAUSEA: 0
SHORTNESS OF BREATH: 0
VOMITING: 0
DIARRHEA: 0
VOICE CHANGE: 0
SINUS PRESSURE: 0
ABDOMINAL PAIN: 0
SORE THROAT: 0
RHINORRHEA: 0

## 2021-02-05 ASSESSMENT — PAIN DESCRIPTION - PAIN TYPE: TYPE: ACUTE PAIN

## 2021-02-05 ASSESSMENT — PAIN DESCRIPTION - LOCATION: LOCATION: RIB CAGE

## 2021-02-05 ASSESSMENT — PAIN DESCRIPTION - FREQUENCY: FREQUENCY: CONTINUOUS

## 2021-02-05 ASSESSMENT — PAIN DESCRIPTION - DESCRIPTORS: DESCRIPTORS: ACHING

## 2021-02-05 NOTE — ED NOTES
Patient to ED for left sided rib pain. Patient states he slipped on ice on Tuesday. Patient denies hitting head.      Ayan No RN  02/05/21 5352

## 2021-02-05 NOTE — ED PROVIDER NOTES
[address] [facility] COMPLAINT    Chief Complaint   Patient presents with    Fall    Rib Injury       Nurses Notes reviewed and I agree except as noted in the HPI. HPI    Lino Milian is a 77 y.o. male who presents for evaluation of pain on the anterolateral rib secondary to fall 3 days ago. The patient states he was at outside and walking on the ice when the patient slipped and landed into the front and left side. Patient been hurting in the left anterolateral aspect of the chest patient with deep breathing and coughing. Denies any loss of consciousness denies any headache no neck pain back pain. Came in here because of persistent pain specially with deep breathing. Patient takes Coumadin however he ran out yesterday. He takes Coumadin for a coronary artery stent and had a pacemaker/atrial fibrillation in the past      REVIEW OF SYSTEMS    Review of Systems   Constitutional: Negative for appetite change, chills, diaphoresis, fatigue and fever. HENT: Negative for congestion, ear discharge, ear pain, postnasal drip, rhinorrhea, sinus pressure, sore throat, trouble swallowing and voice change. Respiratory: Negative for cough, chest tightness, shortness of breath and wheezing. Chest wall pain left anterolateral   Cardiovascular: Negative for chest pain, palpitations and leg swelling. Gastrointestinal: Negative for abdominal pain, constipation, diarrhea, nausea and vomiting. Musculoskeletal: Negative for arthralgias, back pain, joint swelling, myalgias, neck pain and neck stiffness. Skin: Negative for rash. Neurological: Negative for dizziness, syncope, weakness, light-headedness, numbness and headaches.        PAST MEDICAL HISTORY     has a past medical history of Asthma, Atrial fibrillation ([address] CAD S/P percutaneous coronary angioplasty, Cardiomyopathy ([address] Medtronic single ICD , Pneumonia, and Ulcer.    SURGICAL HISTORY   has a past surgical history that includes Tonsillectomy and adenoidectomy; Coronary angioplasty with stent (2019); and eye surgery (Right). CURRENT MEDICATIONS    Previous Medications    ACETAMINOPHEN (TYLENOL) 325 MG TABLET    Take 650 mg by mouth every 4 hours as needed for Pain or Fever Don't take more than 3,000 mg each day. ATORVASTATIN (LIPITOR) 40 MG TABLET    Take 2 tablets by mouth daily    CARVEDILOL (COREG) 12.5 MG TABLET    Take 1 tablet by mouth 2 times daily    MULTIPLE VITAMINS-MINERALS (CENTRUM SILVER 50+MEN) TABS    Take 1 tablet by mouth daily    NITROGLYCERIN (NITROSTAT) 0.4 MG SL TABLET    For chest pain/ angina ,up to max of 3 total doses. If no relief after 1 dose, call 911. SACUBITRIL-VALSARTAN (ENTRESTO) 24-26 MG PER TABLET    Take 1 tablet by mouth 2 times daily . hold if SBP less than 100 mm hg or HR less than 60    SPIRONOLACTONE (ALDACTONE) 25 MG TABLET    Take 0.5 tablets by mouth daily    TICAGRELOR (BRILINTA) 90 MG TABS TABLET    Take 1 tablet by mouth 2 times daily       ALLERGIES    has No Known Allergies. FAMILY HISTORY    He indicated that his mother is . He indicated that his father is . He indicated that his maternal grandmother is . He indicated that his maternal grandfather is . He indicated that the status of his paternal grandmother is unknown. He indicated that his paternal grandfather is . He indicated that his maternal aunt is . He indicated that the status of his neg hx is unknown.   family history includes Cancer in his maternal aunt; Heart Attack in his father, maternal grandfather, maternal grandmother, mother, paternal grandfather, and paternal grandmother; Stroke in his paternal grandmother. SOCIAL HISTORY     reports that he has been smoking cigarettes. He has a 150.00 pack-year smoking history. He has never used smokeless tobacco. He reports current alcohol use.  He reports that he does not use drugs. PHYSICAL EXAM      INITIAL VITALS: BP (!) 149/91   Pulse 72   Temp 98.1 °F (36.7 °C) (Oral)   Resp 20   SpO2 99% Estimated body mass index is 22.36 kg/m² as calculated from the following:    Height as of 12/1/20: 5' 3\" (1.6 m). Weight as of 12/1/20: 126 lb 3.2 oz (57.2 kg). Physical Exam  Vitals signs reviewed. Constitutional:       Appearance: He is well-developed. HENT:      Head: Normocephalic and atraumatic. Right Ear: External ear normal.      Left Ear: External ear normal.      Nose: Nose normal.   Eyes:      General: No scleral icterus. Conjunctiva/sclera: Conjunctivae normal.      Pupils: Pupils are equal, round, and reactive to light. Neck:      Musculoskeletal: Normal range of motion and neck supple. Thyroid: No thyromegaly. Vascular: No JVD. Cardiovascular:      Rate and Rhythm: Normal rate and regular rhythm. Heart sounds: No murmur. No friction rub. Pulmonary:      Effort: Pulmonary effort is normal.      Breath sounds: Normal breath sounds. No wheezing or rales. Comments: Pacemaker noted on the left upper chest  Chest:      Chest wall: Tenderness (Tenderness on the left anterolateral aspect at the level of 8/9 rib, without any crepitation, there is no hematoma or bruising) present. Abdominal:      General: Bowel sounds are normal.      Palpations: Abdomen is soft. There is no mass. Tenderness: There is no abdominal tenderness. Lymphadenopathy:      Cervical: No cervical adenopathy. Skin:     Findings: No rash. Neurological:      Mental Status: He is alert and oriented to person, place, and time. Psychiatric:         Behavior: Behavior is cooperative. MEDICAL DECISION MAKING    DIFFERENTIAL DIAGNOSIS:  Fall injury, rule out rib fracture, history of pacemaker atrial fibrillation on Coumadin    DIAGNOSTIC RESULTS      RADIOLOGY:  I have reviewed radiologic plain film image(s).   The plain films will be read or overread by the radiologist.All other non-plain film images(s) such as CT, Ultrasound and MRI have been read by the radiologist.  XR RIBS LEFT INCLUDE CHEST (MIN 3 VIEWS)   Final Result   Fractures of the lateral aspects of the left eighth and ninth ribs. No other acute abnormality. **This report has been created using voice recognition software. It may contain minor errors which are inherent in voice recognition technology. **      Final report electronically signed by Dr. Jalen Haley on 2/5/2021 4:10 PM          LABS:   Labs Reviewed   PROTIME-INR - Abnormal; Notable for the following components:       Result Value    INR 1.45 (*)     All other components within normal limits     All other unresulted laboratory test above are normal:    Vitals:    Vitals:    02/05/21 1530 02/05/21 1531   BP:  (!) 149/91   Pulse: 72    Resp: 20    Temp: 98.1 °F (36.7 °C)    TempSrc: Oral    SpO2: 99%        EMERGENCY DEPARTMENT COURSE:    Medications   HYDROcodone-acetaminophen (NORCO) 5-325 MG per tablet 1 tablet (has no administration in time range)       The pt was seen and evaluated by me. Within the department, I observed the pt's vitalsigns to be within acceptable range. Laboratory and Radiological studies were performed, results were reviewed with the patient. Within the department, the pt was treated with Norco for pain. I observed the pt's condition to hemodynamically stable during the duration of their stay. I explained my proposed course of treatment to the pt, and they were amenable to my decision. They were discharged home, and they will return to the ED if their symptoms become more severein nature, or otherwise change. CRITICAL CARE:   None. CONSULTS:  None    PROCEDURES:  None. FINAL IMPRESSION       1. Closed fracture of multiple ribs of left side 8 and 9 th ribs, initial encounter    2. Chronic anticoagulation    3.  History of atrial fibrillation DISPOSITION/PLAN  PATIENT REFERRED TO:  Daniela Mayers MD  [address] [place] [address]  [phone]    Schedule an appointment as soon as possible for a visit in 3 days      [address] [facility] [address] [address] EMERGENCY DEPT  [address]    As needed    DISCHARGE MEDICATIONS:  New Prescriptions    HYDROCODONE-ACETAMINOPHEN (NORCO) 5-325 MG PER TABLET    Take 1 tablet by mouth every 6 hours as needed for Pain for up to 5 days. Intended supply: 5 days. Take lowest dose possible to manage pain    WARFARIN (COUMADIN) 2 MG TABLET    4 mg Monday and Friday, 3 mg the rest of the week         (Please note that portions of this note were completed with a voice recognition program and electronically transcribed. Efforts were [facility] edit the dictations but occasionally words are mis-transcribed . The transcription may contain errors not detected in proofreading.   This transcription was electronically signed.)     02/05/21 4:57 PM      Molly Duffy MD      Emergency room physician           Molly Duffy MD  02/05/21 5737

## 2021-02-12 ENCOUNTER — OFFICE VISIT (OUTPATIENT)
Dept: INTERNAL MEDICINE CLINIC | Age: 66
End: 2021-02-12
Payer: MEDICARE

## 2021-02-12 VITALS
WEIGHT: 130 LBS | DIASTOLIC BLOOD PRESSURE: 84 MMHG | BODY MASS INDEX: 23.04 KG/M2 | SYSTOLIC BLOOD PRESSURE: 133 MMHG | TEMPERATURE: 97.9 F | HEIGHT: 63 IN | HEART RATE: 67 BPM

## 2021-02-12 DIAGNOSIS — Z91.81 AT HIGH RISK FOR FALLS: ICD-10-CM

## 2021-02-12 DIAGNOSIS — S22.42XD CLOSED FRACTURE OF MULTIPLE RIBS OF LEFT SIDE WITH ROUTINE HEALING, SUBSEQUENT ENCOUNTER: Primary | ICD-10-CM

## 2021-02-12 PROCEDURE — 1111F DSCHRG MED/CURRENT MED MERGE: CPT | Performed by: NURSE PRACTITIONER

## 2021-02-12 PROCEDURE — 99213 OFFICE O/P EST LOW 20 MIN: CPT | Performed by: NURSE PRACTITIONER

## 2021-02-12 RX ORDER — HYDROCODONE BITARTRATE AND ACETAMINOPHEN 5; 325 MG/1; MG/1
1 TABLET ORAL EVERY 6 HOURS PRN
COMMUNITY
End: 2021-03-01 | Stop reason: ALTCHOICE

## 2021-02-12 RX ORDER — HYDROCODONE BITARTRATE AND ACETAMINOPHEN 5; 325 MG/1; MG/1
1 TABLET ORAL EVERY 6 HOURS PRN
Qty: 28 TABLET | Refills: 0 | Status: SHIPPED | OUTPATIENT
Start: 2021-02-12 | End: 2021-02-19

## 2021-02-12 NOTE — PROGRESS NOTES
Post-Discharge Transitional Care Management Services or Hospital Follow Up      Manolo Nurse   YOB: 1955    Date of Office Visit:  2/12/2021  Date of Hospital Admission: 2/5/21  Date of Hospital Discharge: 2/5/21  Risk of hospital readmission (high >=14%. Medium >=10%) :No data recorded    Care management risk score Rising risk (score 2-5) and Complex Care (Scores >=6): 1     Non face to face  following discharge, date last encounter closed (first attempt may have been earlier): *No documented post hospital discharge outreach found in the last 14 days    Call initiated 2 business days of discharge: *No response recorded in the last 14 days    Patient Active Problem List   Diagnosis    Acute systolic (congestive) heart failure ([address]    Tobacco abuse    ETOH abuse    Essential hypertension    Paroxysmal atrial fibrillation ([address]    Nonsustained ventricular tachycardia (HCC)    Systolic congestive heart failure ([address]    Abdominal aortic aneurysm (AAA) without rupture ([address]    Coronary artery disease involving native coronary artery of native heart without angina pectoris    Ischemic cardiomyopathy    S/P angioplasty with stent- LCX/OM nov 2019    Chronic combined systolic and diastolic congestive heart failure (HCC)    Dyslipidemia    Cardiomyopathy ([address]    Medtronic single ICD     Anticoagulated on Coumadin       No Known Allergies    Medications listed as ordered at the time of discharge from [facility], [address] Medication Instructions VIOLA:    Printed on:03/02/21 6861   Medication Information                      atorvastatin (LIPITOR) 40 MG tablet  Take 2 tablets by mouth daily             carvedilol (COREG) 12.5 MG tablet  Take 1 tablet by mouth 2 times daily             HYDROcodone-acetaminophen (NORCO) 5-325 MG per tablet  Take 1 tablet by mouth every 6 hours as needed for Pain for up to 7 days.              Multiple Vitamins-Minerals (CENTRUM SILVER 50+MEN) TABS  Take 1 tablet by mouth daily             nitroGLYCERIN (NITROSTAT) 0.4 MG SL tablet  For chest pain/ angina ,up to max of 3 total doses. If no relief after 1 dose, call 911. sacubitril-valsartan (ENTRESTO) 24-26 MG per tablet  Take 1 tablet by mouth 2 times daily . hold if SBP less than 100 mm hg or HR less than 60             spironolactone (ALDACTONE) 25 MG tablet  Take 0.5 tablets by mouth daily             ticagrelor (BRILINTA) 90 MG TABS tablet  Take 1 tablet by mouth 2 times daily             warfarin (COUMADIN) 2 MG tablet  4 mg Monday and Friday, 3 mg the rest of the week                   Medications marked \"taking\" at this time  Outpatient Medications Marked as Taking for the 21 encounter (Office Visit) with DYAN Gar CNP   Medication Sig Dispense Refill    [] HYDROcodone-acetaminophen (NORCO) 5-325 MG per tablet Take 1 tablet by mouth every 6 hours as needed for Pain for up to 7 days. Intended supply: 3 days. Take lowest dose possible to manage pain 28 tablet 0    [DISCONTINUED] HYDROcodone-acetaminophen (NORCO) 5-325 MG per tablet Take 1 tablet by mouth every 6 hours as needed for Pain.  warfarin (COUMADIN) 2 MG tablet 4 mg Monday and Friday, 3 mg the rest of the week 45 tablet 1    atorvastatin (LIPITOR) 40 MG tablet Take 2 tablets by mouth daily 60 tablet 2    sacubitril-valsartan (ENTRESTO) 24-26 MG per tablet Take 1 tablet by mouth 2 times daily . hold if SBP less than 100 mm hg or HR less than 60 60 tablet 2    [DISCONTINUED] spironolactone (ALDACTONE) 25 MG tablet Take 0.5 tablets by mouth daily 30 tablet 4    [DISCONTINUED] ticagrelor (BRILINTA) 90 MG TABS tablet Take 1 tablet by mouth 2 times daily 60 tablet 4    [DISCONTINUED] carvedilol (COREG) 12.5 MG tablet Take 1 tablet by mouth 2 times daily 60 tablet 4    Multiple Vitamins-Minerals (CENTRUM SILVER 50+MEN) TABS Take 1 tablet by mouth daily          Medications patient taking as of now reconciled against medications ordered at time of hospital discharge: Yes    Chief Complaint   Patient presents with    Follow-Up from Hospital     broken ribs        History of Present illness - Follow up of Hospital diagnosis(es):     Queta Howard was seen in the ER 2/5 after he sustained a fall and fractured ribs. States that he was outside smoking a cigarette and slipped in the ice. He has 2 fractured ribs. Xray left ribs 2/5   Impression   Fractures of the lateral aspects of the left eighth and ninth ribs. No other acute abnormality. He is still having significant pain with inspiration and expiration. Encouraged cough and deep breathing. No other physical abnormalities noted on inspection. Discussed the process of rib healing. Discussed fall precautions, especially as he is on warfarin. Warfarin in followed by the coumadin clinic, ordered by cardiology. He declines hitting his head, CT of the head not ordered. No neuro deficits noted. Inpatient course: Discharge summary reviewed- see chart. Interval history/Current status:    A comprehensive review of systems was negative except for what was noted in the HPI. Vitals:    02/12/21 0925 02/12/21 0929   BP: (!) 155/86 133/84   Site: Right Lower Arm Left Lower Arm   Position: Sitting Sitting   Cuff Size: Medium Adult Medium Adult   Pulse: 63 67   Temp: 97.9 °F (36.6 °C)    Weight: 130 lb (59 kg)    Height: 5' 3\" (1.6 m)      Body mass index is 23.03 kg/m².    Wt Readings from Last 3 Encounters:   03/01/21 129 lb (58.5 kg)   02/22/21 126 lb (57.2 kg)   02/12/21 130 lb (59 kg)     BP Readings from Last 3 Encounters:   03/01/21 130/88   02/22/21 (!) 154/88   02/12/21 133/84        Physical Exam:  General Appearance: alert and oriented to person, place and time, well developed and well- nourished, in no acute distress  Skin: warm and dry, no rash or erythema  Head: normocephalic and atraumatic  Eyes: pupils equal, round, and reactive to light, extraocular eye movements intact, conjunctivae normal  ENT: tympanic membrane, external ear and ear canal normal bilaterally, nose without deformity, nasal mucosa and turbinates normal without polyps  Neck: supple and non-tender without mass, no thyromegaly or thyroid nodules, no cervical lymphadenopathy  Pulmonary/Chest: clear to auscultation bilaterally- no wheezes, rales or rhonchi, normal air movement, no respiratory distress  Cardiovascular: normal rate, regular rhythm, normal S1 and S2, no murmurs, rubs, clicks, or gallops, distal pulses intact, no carotid bruits  Abdomen: soft, non-tender, non-distended, normal bowel sounds, no masses or organomegaly  Extremities: no cyanosis, clubbing or edema  Musculoskeletal: normal range of motion, no joint swelling, deformity or tenderness  Neurologic: reflexes normal and symmetric, no cranial nerve deficit, gait, coordination and speech normal    Assessment/Plan:    1. Closed fracture of multiple ribs of left side with routine healing, subsequent encounter    - HYDROcodone-acetaminophen (NORCO) 5-325 MG per tablet; Take 1 tablet by mouth every 6 hours as needed for Pain for up to 7 days. Intended supply: 3 days. Take lowest dose possible to manage pain  Dispense: 28 tablet; Refill: 0  - Discussed cough and deep breathing, understanding voiced  - [place] reviewed, no discrepancies  - Do not recommend NSAID for pain; as patient is on Coumadin, drinks regularly, and is having GI issues    2.  At high risk for falls    - Discussed fall precautions, as well as bleeding precautions        Medical Decision Making: low complexity

## 2021-02-22 ENCOUNTER — OFFICE VISIT (OUTPATIENT)
Dept: CARDIOLOGY CLINIC | Age: 66
End: 2021-02-22
Payer: MEDICARE

## 2021-02-22 VITALS
DIASTOLIC BLOOD PRESSURE: 88 MMHG | OXYGEN SATURATION: 97 % | HEIGHT: 63 IN | SYSTOLIC BLOOD PRESSURE: 154 MMHG | WEIGHT: 126 LBS | BODY MASS INDEX: 22.32 KG/M2 | HEART RATE: 60 BPM

## 2021-02-22 DIAGNOSIS — I10 ESSENTIAL HYPERTENSION: ICD-10-CM

## 2021-02-22 DIAGNOSIS — I50.22 CHF (CONGESTIVE HEART FAILURE), NYHA CLASS II, CHRONIC, SYSTOLIC (HCC): Primary | ICD-10-CM

## 2021-02-22 DIAGNOSIS — I25.5 ISCHEMIC CARDIOMYOPATHY: ICD-10-CM

## 2021-02-22 DIAGNOSIS — I48.0 PAROXYSMAL ATRIAL FIBRILLATION (HCC): ICD-10-CM

## 2021-02-22 PROCEDURE — 99214 OFFICE O/P EST MOD 30 MIN: CPT | Performed by: NURSE PRACTITIONER

## 2021-02-22 RX ORDER — CARVEDILOL 12.5 MG/1
12.5 TABLET ORAL 2 TIMES DAILY
Qty: 60 TABLET | Refills: 5 | Status: SHIPPED | OUTPATIENT
Start: 2021-02-22 | End: 2021-08-26 | Stop reason: SDUPTHER

## 2021-02-22 RX ORDER — SPIRONOLACTONE 25 MG/1
12.5 TABLET ORAL DAILY
Qty: 15 TABLET | Refills: 5 | Status: SHIPPED | OUTPATIENT
Start: 2021-02-22 | End: 2021-08-31 | Stop reason: SDUPTHER

## 2021-02-22 ASSESSMENT — ENCOUNTER SYMPTOMS
COUGH: 0
ABDOMINAL DISTENTION: 0
SHORTNESS OF BREATH: 0

## 2021-02-22 NOTE — PROGRESS NOTES
Heart Failure Clinic       Visit Date: 2/22/2021  Cardiologist:  Dr. Genny Dolan  Primary Care Physician: Dr. Bella Maurice, APRN - CNP    Von Mahmood is a 77 y.o. male who presents today for:  Chief Complaint   Patient presents with    Congestive Heart Failure       HPI:   Von Mahmood is a 77 y.o. male who presents to the office for a follow up patient visit in the heart failure clinic. Accompanied by no one  Lives at [facility]     Type HF: Combined (EF 20%, improved 30%)  Device: Single ICD  HX:  PAF (Coumadin), HTN, Asthma, hx ETOH (1/3L Gin/day x years), Tobacco abuse (50+ years)   Smoking 2-3pk/day  Continues 1/4-1/3L gin daily     Hospitalization r/t Heart Failure:  11/25-12/1 = CHF exac - went in w/ SOB, increased swelling.  + CXR.  BNP 69486 - down to 4723 on discharge.  IV Lasix.  Discharge wt 124#  LHC - nonischemic/ischemic EF 20%.      Was not on any meds prior to admission    Concerns today: dealing w/ rib fractures - having a lot pain. Taking Norco - takes edge off. Having a lot pain right now. Hasn't taken any Norco today = looks like he's in pain  Activity: can get around ok - little slower d/t pain.     Patient has:  Chest Pain: no  SOB: slight worse w/ rib fx  Orthopnea/PND: no - sleeping more upright d/t ribs  FLORI: no  Edema: no  Fatigue: same  Abdominal bloating: no  Cough: smokers   Appetite: good  Any extra diuretic use: no  Weight gain: no  Home weight: stable  Home blood pressure: not checking    Past Medical History:   Diagnosis Date    Asthma     Atrial fibrillation ([address] 11/2019    CAD S/P percutaneous coronary angioplasty 11/27/2019    Cardiomyopathy ([facility] [address] 11/2019    Medtronic single ICD  3/12/2020    Pneumonia     Ulcer      Past Surgical History:   Procedure Laterality Date    CORONARY ANGIOPLASTY WITH STENT PLACEMENT  11/27/2019    Stent Circ and OM    EYE SURGERY Right     cataract    TONSILLECTOMY AND ADENOIDECTOMY       Family History   Problem Relation Age of Onset    Heart Attack Mother     Heart Attack Father     Cancer Maternal Aunt     Heart Attack Maternal Grandmother     Heart Attack Maternal Grandfather     Heart Attack Paternal Grandmother     Stroke Paternal Grandmother     Heart Attack Paternal Grandfather     Breast Cancer Neg Hx     Diabetes Neg Hx     Kidney Disease Neg Hx      Social History     Tobacco Use    Smoking status: Current Every Day Smoker     Packs/day: 3.00     Years: 50.00     Pack years: 150.00     Types: Cigarettes    Smokeless tobacco: Never Used   Substance Use Topics    Alcohol use: Yes     Frequency: 4 or more times a week     Drinks per session: 3 or 4     Binge frequency: Never     Comment: Gin daily - drink with lemon lime soda     Current Outpatient Medications   Medication Sig Dispense Refill    spironolactone (ALDACTONE) 25 MG tablet Take 0.5 tablets by mouth daily 15 tablet 5    ticagrelor (BRILINTA) 90 MG TABS tablet Take 1 tablet by mouth 2 times daily 60 tablet 5    carvedilol (COREG) 12.5 MG tablet Take 1 tablet by mouth 2 times daily 60 tablet 5    HYDROcodone-acetaminophen (NORCO) 5-325 MG per tablet Take 1 tablet by mouth every 6 hours as needed for Pain.  warfarin (COUMADIN) 2 MG tablet 4 mg Monday and Friday, 3 mg the rest of the week 45 tablet 1    atorvastatin (LIPITOR) 40 MG tablet Take 2 tablets by mouth daily 60 tablet 2    sacubitril-valsartan (ENTRESTO) 24-26 MG per tablet Take 1 tablet by mouth 2 times daily . hold if SBP less than 100 mm hg or HR less than 60 60 tablet 2    nitroGLYCERIN (NITROSTAT) 0.4 MG SL tablet For chest pain/ angina ,up to max of 3 total doses. If no relief after 1 dose, call 911. 25 tablet 1    Multiple Vitamins-Minerals (CENTRUM SILVER 50+MEN) TABS Take 1 tablet by mouth daily       No current facility-administered medications for this visit.       No Known Allergies    SUBJECTIVE:   Review of Systems   Constitutional: Negative for activity change, appetite change and fatigue. Respiratory: Negative for cough and shortness of breath. Cardiovascular: Positive for chest pain (L side pain d/t rib fx). Negative for palpitations and leg swelling. Gastrointestinal: Negative for abdominal distention. Neurological: Negative for weakness, light-headedness and headaches. Hematological: Negative for adenopathy. Psychiatric/Behavioral: Negative for sleep disturbance. OBJECTIVE:   Today's Vitals:  BP (!) 154/88   Pulse 60   Ht 5' 3\" (1.6 m)   Wt 126 lb (57.2 kg)   SpO2 97%   BMI 22.32 kg/m²     Physical Exam  Vitals signs reviewed. Constitutional:       General: He is not in acute distress. Appearance: Normal appearance. He is well-developed. He is not diaphoretic. HENT:      Head: Normocephalic and atraumatic. Eyes:      Conjunctiva/sclera: Conjunctivae normal.   Neck:      Musculoskeletal: Normal range of motion and neck supple. Comments: No JVD  Cardiovascular:      Rate and Rhythm: Normal rate and regular rhythm. Heart sounds: Normal heart sounds. No murmur. Pulmonary:      Effort: Pulmonary effort is normal. No respiratory distress. Breath sounds: Normal breath sounds. No wheezing or rales. Chest:      Chest wall: Tenderness (left side) present. Abdominal:      General: Bowel sounds are normal. There is no distension. Palpations: Abdomen is soft. Tenderness: There is no abdominal tenderness. Musculoskeletal: Normal range of motion. Right lower leg: No edema. Left lower leg: No edema. Skin:     General: Skin is warm and dry. Capillary Refill: Capillary refill takes less than 2 seconds. Neurological:      Mental Status: He is alert and oriented to person, place, and time.       Coordination: Coordination normal.   Psychiatric:         Behavior: Behavior normal.       Wt Readings from Last 3 Encounters:   02/22/21 126 lb (57.2 kg)   02/12/21 130 lb (59 kg)   12/01/20 126 lb 3.2 oz (57.2 kg)     BP Readings from Last 3 Encounters:   02/22/21 (!) 154/88   02/12/21 133/84   02/05/21 (!) 149/91     Pulse Readings from Last 3 Encounters:   02/22/21 60   02/12/21 67   02/05/21 72     Body mass index is 22.32 kg/m². ECHO:   Summary   limited echo   Ejection fraction is visually estimated at 30%.   There was moderate global hypokinesis of the left ventricle.   Left Ventricular size is Mildly increased .   Mildly increased left ventricle wall thickness.   No evidence of any pericardial effusion.      Signature      ----------------------------------------------------------------   Electronically signed by Miranda Guevara MD (Interpreting   physician) on 02/27/2020 at 05:15 PM   ----------------------------------------------------------------           Summary   Left Ventricular size is Moderately increased .   Normal left ventricular wall thickness.   There was severe global hypokinesis of the left ventricle.   Systolic function was severely reduced.   Ejection fraction is visually estimated at 20%.   The left atrium is Moderately dilated.   Mildly enlarged right atrium size.   Mild tricuspid regurgitation visualized.   Right ventricular systolic pressure measures 40 mmhg.   Aortic aneurysm noted in the ascending aorta .   Aortic aneurysm measures 4 cm .      Signature      ----------------------------------------------------------------   Electronically signed by Darryl Rodriguez MD (Interpreting   physician) on 11/26/2019 at 12:25 PM   ----------------------------------------------------------------        CATH/STRESS:   CONCLUSIONS:  1.  Severe cardiomyopathy, probably mixed etiology ischemic and  nonischemic. 2.  RCA,  involving mid RCA.  Distal RCA fills via collaterals from  left to right. 3.  Severe distal left circumflex and OM2 lesions, status post  successful PCI with stenting.     PLAN:  1.  Continue inpatient care. 2.  Optimize medical therapy for coronary disease.   3.  High intensity statin. 4.  Aspirin 81 mg p.o. daily for 1 month.  The patient will be on  anticoagulation for new-onset atrial fibrillation, probably will be  started on Eliquis which can be started in the morning.  Stop aspirin  after 1 month if the patient remains on anticoagulation. 5.  Plavix 75 mg p.o. daily for 1 year. 6.  Optimize medical therapy for heart failure. 7.  LifeVest is indicated before discharge. 8.  May consider RCA  PCI in the future if medical therapy fails. 9.  Monitor in telemetry.     Findings and plan of care were discussed with the patient in details.     Yady Amato MD     D: 11/27/2019 17:34:32          Results reviewed:  BNP: No results found for: BNP  CBC:   Lab Results   Component Value Date    WBC 9.9 12/02/2020    RBC 4.30 12/02/2020    HGB 14.9 12/02/2020    HCT 43.1 12/02/2020     12/02/2020     CMP:    Lab Results   Component Value Date     12/02/2020    K 4.6 12/02/2020    K 3.7 03/06/2020    CL 97 12/02/2020    CO2 28 12/02/2020    BUN 18 12/02/2020    CREATININE 0.8 12/02/2020    LABGLOM >90 12/02/2020    GLUCOSE 92 12/02/2020    GLUCOSE 59 12/31/2019    CALCIUM 10.0 12/02/2020     Hepatic Function Panel:    Lab Results   Component Value Date    ALKPHOS 74 12/02/2020    ALT 46 12/02/2020    AST 54 12/02/2020    PROT 7.2 12/02/2020    BILITOT 1.3 12/02/2020    BILIDIR <0.2 06/03/2020    LABALBU 4.5 12/02/2020     Magnesium:    Lab Results   Component Value Date    MG 1.9 11/24/2020     PT/INR:    Lab Results   Component Value Date    INR 1.45 02/05/2021     Lipids:    Lab Results   Component Value Date    TRIG 103 06/03/2020    HDL 46 06/03/2020    LDLCALC 49 06/03/2020       ASSESSMENT AND PLAN:   The patient's condition/symptoms are Stable: No clinical evidence of fluid overload today. Continue current medical regimen without changes at present time. Diagnosis Orders   1. CHF (congestive heart failure), NYHA class II, chronic, systolic ([facility] [address]     2.  Ischemic cardiomyopathy     3. Essential hypertension     4. Paroxysmal atrial fibrillation (HCC)       Continue:  GDMT:   ACE/ARB/ARNI - Entresto 24/26 BID   BB - Coreg 12.5 BID   Diuretic - None  AA - Aldactone 12.5/day  SGLT2 -  no  Vasodilator - no  Continue Current medications:  Brilinta, Coumadin  Stable - appears Euvolemic  No med changes today  Labs reviewed from Dec - stable  Repeat bloodwork next OV. Call PCP if pain not controlled w/ Norco  Continue diet/fluid adherence. Weigh daily  F/U in 4 months w/ Pacer check  F/U w/ [place] in March    Total visit time of 40 minutes has been spent with patient on education of symptoms, management, medication, and plan of care; as well as review of chart: labs, ECHO, radiology reports, etc.   I personally spent more then 50% of the appt time face to face with the patient. Daily weights  Fluid restriction of 2 Liters per day  Limit sodium in diet to around 8178-4612 mg/day  Monitor BP  Activity as tolerated     Patient was instructed to call the [facility] for any changes in symptoms as noted in AVS.      Return in about 4 months (around 6/22/2021). or sooner if needed     Patient given educational materials - see patient instructions. We discussed the importance of weighing oneself and recording daily. We also discussed the importance of a low sodium diet, higher sodium foods to avoid and better low sodium food options. Patient verbalizes understanding of plan of care using teach back method, and is agreeable to the treatment plan.        Electronically signed by DYAN Cline CNP on 2/22/2021 at 4:23 PM

## 2021-03-01 ENCOUNTER — OFFICE VISIT (OUTPATIENT)
Dept: INTERNAL MEDICINE CLINIC | Age: 66
End: 2021-03-01
Payer: MEDICARE

## 2021-03-01 VITALS
SYSTOLIC BLOOD PRESSURE: 130 MMHG | DIASTOLIC BLOOD PRESSURE: 88 MMHG | TEMPERATURE: 97.3 F | WEIGHT: 129 LBS | BODY MASS INDEX: 22.86 KG/M2 | HEIGHT: 63 IN | HEART RATE: 63 BPM

## 2021-03-01 DIAGNOSIS — R19.7 DIARRHEA, UNSPECIFIED TYPE: ICD-10-CM

## 2021-03-01 DIAGNOSIS — I10 ESSENTIAL HYPERTENSION: ICD-10-CM

## 2021-03-01 DIAGNOSIS — I50.20 SYSTOLIC CONGESTIVE HEART FAILURE, UNSPECIFIED HF CHRONICITY (HCC): ICD-10-CM

## 2021-03-01 DIAGNOSIS — S22.42XD CLOSED FRACTURE OF MULTIPLE RIBS OF LEFT SIDE WITH ROUTINE HEALING, SUBSEQUENT ENCOUNTER: Primary | ICD-10-CM

## 2021-03-01 DIAGNOSIS — R04.0 NOSEBLEED: ICD-10-CM

## 2021-03-01 PROCEDURE — 99214 OFFICE O/P EST MOD 30 MIN: CPT | Performed by: NURSE PRACTITIONER

## 2021-03-01 RX ORDER — HYDROCODONE BITARTRATE AND ACETAMINOPHEN 5; 325 MG/1; MG/1
1 TABLET ORAL EVERY 6 HOURS PRN
Qty: 28 TABLET | Refills: 0 | Status: SHIPPED | OUTPATIENT
Start: 2021-03-01 | End: 2021-03-08

## 2021-03-01 RX ORDER — HYDROCODONE BITARTRATE AND ACETAMINOPHEN 5; 325 MG/1; MG/1
1 TABLET ORAL EVERY 6 HOURS PRN
COMMUNITY
End: 2021-03-01 | Stop reason: SDUPTHER

## 2021-03-01 NOTE — PROGRESS NOTES
[facility]  Robe Mendoza  Dept: [phone]  Dept Fax: [phone]: [phone]     Visit Date:  3/1/2021    Patient:  Juju Santillan  YOB: 1955    HPI:     Chief Complaint   Patient presents with    3 Month Follow-Up    Congestive Heart Failure    Coronary Artery Disease    Hypertension       Jennifer Moreno presents today for 3 month follow up of CHF, CAD, HTN, diarrhea, nosebleeds, and rib fractures. I last seen him 2 weeks ago. No ER visits or hospitalizations since last visit. He denies any new falls since last visit, however continues to have significant left sided chest pain where ribs are fractured. He has cut down on smoking significantly, denies cessation. He continues to drink, denies alcohol cessation as well. He has had a deep, harsh cough; nonproductive. Lung sounds are clear, but this is evident pain with movement. Xray left 2/5/2021  Impression   Fractures of the lateral aspects of the left eighth and ninth ribs. No other acute abnormality.        He is following with Dr. Denise Albert for diarrhea. Had an EGD which showed mild gastritis, esophagitis, and Hpylori. Biopsies were taken and sent. He was treated with antibiotics, and diarrhea has improved. Is scheduled for colonoscopy soon. Jennifer Moreno also complains of frequent nosebleeds. He states that they are happening approximately every 2-3 days and while there is not profuse bleeding, it tends to continue to bleed slowly for hours. He is on coumadin per cardiology, managed by the coumadin clinic. INR 1.45 on 2/5. He also has a history of alcohol abuse and refuses cessation. Will send for INR and arrange visit with coumadin clinic for management.  Discussed bleeding precautions with patient, understanding voiced.      CHF/CAD/Atrial Fibrillation/HTN-  Cardiac catheterization on 11/27 requiring stent placement to the left circumflex and OM 2. [facility] has been assisting with medication assistance. Is following with Dr. Waleska Calzada routinely. Had an AICD placed on 3/6/2020. No firing.      Limited echocardiogram 2/27/2020     Ejection fraction is visually estimated at 30%.   There was moderate global hypokinesis of the left ventricle.   Left Ventricular size is Mildly increased .   Mildly increased left ventricle wall thickness.   No evidence of any pericardial effusion.       On the basis of positive falls risk screening, assessment and plan is as follows: home safety tips provided. Medications    Current Outpatient Medications:     spironolactone (ALDACTONE) 25 MG tablet, Take 0.5 tablets by mouth daily, Disp: 15 tablet, Rfl: 5    ticagrelor (BRILINTA) 90 MG TABS tablet, Take 1 tablet by mouth 2 times daily, Disp: 60 tablet, Rfl: 5    carvedilol (COREG) 12.5 MG tablet, Take 1 tablet by mouth 2 times daily, Disp: 60 tablet, Rfl: 5    warfarin (COUMADIN) 2 MG tablet, 4 mg Monday and Friday, 3 mg the rest of the week, Disp: 45 tablet, Rfl: 1    atorvastatin (LIPITOR) 40 MG tablet, Take 2 tablets by mouth daily, Disp: 60 tablet, Rfl: 2    sacubitril-valsartan (ENTRESTO) 24-26 MG per tablet, Take 1 tablet by mouth 2 times daily . hold if SBP less than 100 mm hg or HR less than 60, Disp: 60 tablet, Rfl: 2    nitroGLYCERIN (NITROSTAT) 0.4 MG SL tablet, For chest pain/ angina ,up to max of 3 total doses. If no relief after 1 dose, call 911., Disp: 25 tablet, Rfl: 1    Multiple Vitamins-Minerals (CENTRUM SILVER 50+MEN) TABS, Take 1 tablet by mouth daily, Disp: , Rfl:     The patient has No Known Allergies. Past Medical History  Toy Chariton  has a past medical history of Asthma, Atrial fibrillation ([address] CAD S/P percutaneous coronary angioplasty, Cardiomyopathy ([address] Medtronic single ICD , Pneumonia, and Ulcer.     Past Surgical History  The patient  has a past surgical history that includes Tonsillectomy and adenoidectomy; Coronary angioplasty with stent (11/27/2019); and eye surgery (Right). Family History  This patient's family history includes Cancer in his maternal aunt; Heart Attack in his father, maternal grandfather, maternal grandmother, mother, paternal grandfather, and paternal grandmother; Stroke in his paternal grandmother. Social History  Rene Weiss  reports that he has been smoking cigarettes. He has a 150.00 pack-year smoking history. He has never used smokeless tobacco. He reports current alcohol use. He reports that he does not use drugs. Health Maintenance:    Health Maintenance   Topic Date Due    Hepatitis C screen  Never done    DTaP/Tdap/Td vaccine (1 - Tdap) 11/20/1972    Shingles Vaccine (1 of 2) Never done    Colon cancer screen colonoscopy  Never done    Low dose CT lung screening  Never done    Annual Wellness Visit (AWV)  Never done    Pneumococcal 65+ years Vaccine (2 of 2 - PPSV23) 10/09/2020    Lipid screen  06/03/2021    Potassium monitoring  12/02/2021    Creatinine monitoring  12/02/2021    Flu vaccine  Completed    COVID-19 Vaccine  Completed    Hepatitis A vaccine  Aged Out    Hepatitis B vaccine  Aged Out    Hib vaccine  Aged Out    Meningococcal (ACWY) vaccine  Aged Out       Subjective:      Review of Systems   Constitutional: Negative for chills, fatigue and fever. HENT: Negative for congestion, rhinorrhea, sinus pressure, sinus pain, sore throat, tinnitus and trouble swallowing. Eyes: Negative for photophobia and visual disturbance. Respiratory: Positive for shortness of breath (on exertion). Negative for cough and wheezing. Cardiovascular: Positive for chest pain (rib fractures). Negative for palpitations and leg swelling. Gastrointestinal: Negative for abdominal distention, abdominal pain, blood in stool, constipation, diarrhea, nausea and vomiting. Endocrine: Negative for polydipsia, polyphagia and polyuria.    Genitourinary: Negative for difficulty urinating, dysuria, frequency, hematuria and urgency. Musculoskeletal: Negative for arthralgias and myalgias. Skin: Negative for rash and wound. Neurological: Negative for dizziness, light-headedness and headaches. Psychiatric/Behavioral: Negative for dysphoric mood and sleep disturbance. The patient is not nervous/anxious. Objective:     /88 (Site: Left Upper Arm, Cuff Size: Medium Adult)   Pulse 63   Temp 97.3 °F (36.3 °C) (Temporal)   Ht 5' 3\" (1.6 m)   Wt 129 lb (58.5 kg)   BMI 22.85 kg/m²     Physical Exam  Constitutional:       General: He is not in acute distress. Appearance: Normal appearance. He is normal weight. He is not ill-appearing. HENT:      Head: Normocephalic and atraumatic. Right Ear: Tympanic membrane, ear canal and external ear normal.      Left Ear: Tympanic membrane, ear canal and external ear normal.      Nose: Nose normal. No congestion or rhinorrhea. Mouth/Throat:      Mouth: Mucous membranes are moist.      Pharynx: Oropharynx is clear. No oropharyngeal exudate or posterior oropharyngeal erythema. Eyes:      Extraocular Movements: Extraocular movements intact. Conjunctiva/sclera: Conjunctivae normal.      Pupils: Pupils are equal, round, and reactive to light. Neck:      Musculoskeletal: Normal range of motion and neck supple. No muscular tenderness. Vascular: No carotid bruit. Cardiovascular:      Rate and Rhythm: Normal rate and regular rhythm. Pulses: Normal pulses. Heart sounds: No murmur. No friction rub. No gallop. Pulmonary:      Effort: Pulmonary effort is normal. No respiratory distress. Breath sounds: Normal breath sounds. No wheezing, rhonchi or rales. Abdominal:      General: Abdomen is flat. Bowel sounds are normal. There is no distension. Palpations: Abdomen is soft. Tenderness: There is no abdominal tenderness. Musculoskeletal: Normal range of motion.          General: No tenderness. Right lower leg: No edema. Left lower leg: No edema. Lymphadenopathy:      Cervical: No cervical adenopathy. Skin:     General: Skin is warm and dry. Capillary Refill: Capillary refill takes less than 2 seconds. Findings: No erythema or rash. Neurological:      General: No focal deficit present. Mental Status: He is alert and oriented to person, place, and time. Motor: No weakness. Coordination: Coordination normal.      Deep Tendon Reflexes: Reflexes normal.   Psychiatric:         Mood and Affect: Mood normal.         Behavior: Behavior normal.         Thought Content: Thought content normal.         Judgment: Judgment normal.         Labs Reviewed 3/1/2021:    Lab Results   Component Value Date    WBC 9.9 12/02/2020    HGB 14.9 12/02/2020    HCT 43.1 12/02/2020     12/02/2020    CHOL 116 06/03/2020    TRIG 103 06/03/2020    HDL 46 06/03/2020    ALT 46 12/02/2020    AST 54 (H) 12/02/2020     (L) 12/02/2020    K 4.6 12/02/2020    CL 97 (L) 12/02/2020    CREATININE 0.8 12/02/2020    BUN 18 12/02/2020    CO2 28 12/02/2020    TSH 1.360 11/25/2019    INR 1.45 (H) 02/05/2021       Assessment/Plan      1. Closed fracture of multiple ribs of left side with routine healing, subsequent encounter    - HYDROcodone-acetaminophen (NORCO) 5-325 MG per tablet; Take 1 tablet by mouth every 6 hours as needed for Pain for up to 7 days. Dispense: 28 tablet; Refill: 0    2. Systolic congestive heart failure, unspecified HF chronicity ([facility] [address]    - Follow with Dr. Genny Dolan as scheduled  - Continue current medication regimen    3. Essential hypertension    - Low sodium diet encouraged    4. Diarrhea, unspecified type    - Follow with GI as scheduled    5. Nosebleed    - INR now  - Arrange visit with coumadin clinic asap  - Bleeding precautions and fall precautions discussed. Understanding voiced. Return in about 3 months (around 6/1/2021).     Patient given educational materials - see patient instructions. Discussed use, benefit, and side effects of prescribed medications. All patient questions answered. Pt voiced understanding. Reviewed health maintenance.        Electronically signed DYAN Caceres CNP on 3/1/21 at 1:54 PM EST

## 2021-03-17 ASSESSMENT — ENCOUNTER SYMPTOMS
WHEEZING: 0
SHORTNESS OF BREATH: 1
ABDOMINAL DISTENTION: 0
SINUS PRESSURE: 0
COUGH: 0
TROUBLE SWALLOWING: 0
RHINORRHEA: 0
CONSTIPATION: 0
PHOTOPHOBIA: 0
DIARRHEA: 0
NAUSEA: 0
SORE THROAT: 0
ABDOMINAL PAIN: 0
VOMITING: 0
SINUS PAIN: 0
BLOOD IN STOOL: 0

## 2021-03-25 ENCOUNTER — PROCEDURE VISIT (OUTPATIENT)
Dept: CARDIOLOGY CLINIC | Age: 66
End: 2021-03-25
Payer: MEDICARE

## 2021-03-25 DIAGNOSIS — I50.20 SYSTOLIC CONGESTIVE HEART FAILURE, UNSPECIFIED HF CHRONICITY (HCC): Primary | ICD-10-CM

## 2021-03-25 DIAGNOSIS — Z95.810 ICD (IMPLANTABLE CARDIOVERTER-DEFIBRILLATOR) IN PLACE: ICD-10-CM

## 2021-03-25 PROCEDURE — 93297 REM INTERROG DEV EVAL ICPMS: CPT | Performed by: INTERNAL MEDICINE

## 2021-03-25 PROCEDURE — G2066 INTER DEVC REMOTE 30D: HCPCS | Performed by: INTERNAL MEDICINE

## 2021-03-30 ENCOUNTER — OFFICE VISIT (OUTPATIENT)
Dept: CARDIOLOGY CLINIC | Age: 66
End: 2021-03-30
Payer: MEDICARE

## 2021-03-30 VITALS
SYSTOLIC BLOOD PRESSURE: 129 MMHG | HEIGHT: 63 IN | WEIGHT: 124 LBS | DIASTOLIC BLOOD PRESSURE: 76 MMHG | HEART RATE: 59 BPM | BODY MASS INDEX: 21.97 KG/M2

## 2021-03-30 DIAGNOSIS — I48.0 PAROXYSMAL ATRIAL FIBRILLATION (HCC): ICD-10-CM

## 2021-03-30 DIAGNOSIS — I10 ESSENTIAL HYPERTENSION: ICD-10-CM

## 2021-03-30 DIAGNOSIS — I25.5 ISCHEMIC CARDIOMYOPATHY: ICD-10-CM

## 2021-03-30 DIAGNOSIS — E78.5 DYSLIPIDEMIA: ICD-10-CM

## 2021-03-30 DIAGNOSIS — I25.10 CORONARY ARTERY DISEASE INVOLVING NATIVE CORONARY ARTERY OF NATIVE HEART WITHOUT ANGINA PECTORIS: ICD-10-CM

## 2021-03-30 DIAGNOSIS — I47.29 NONSUSTAINED VENTRICULAR TACHYCARDIA: ICD-10-CM

## 2021-03-30 DIAGNOSIS — I50.42 CHRONIC COMBINED SYSTOLIC AND DIASTOLIC CONGESTIVE HEART FAILURE (HCC): Primary | ICD-10-CM

## 2021-03-30 PROCEDURE — 99214 OFFICE O/P EST MOD 30 MIN: CPT | Performed by: INTERNAL MEDICINE

## 2021-03-30 PROCEDURE — 93000 ELECTROCARDIOGRAM COMPLETE: CPT | Performed by: INTERNAL MEDICINE

## 2021-04-01 RX ORDER — WARFARIN SODIUM 2 MG/1
TABLET ORAL
Qty: 45 TABLET | Refills: 1 | Status: SHIPPED | OUTPATIENT
Start: 2021-04-01 | End: 2021-06-10 | Stop reason: ALTCHOICE

## 2021-04-09 DIAGNOSIS — I48.0 PAROXYSMAL A-FIB (HCC): ICD-10-CM

## 2021-04-09 DIAGNOSIS — I25.10 CORONARY ARTERY DISEASE INVOLVING NATIVE CORONARY ARTERY OF NATIVE HEART WITHOUT ANGINA PECTORIS: Primary | ICD-10-CM

## 2021-04-09 RX ORDER — ATORVASTATIN CALCIUM 40 MG/1
80 TABLET, FILM COATED ORAL DAILY
Qty: 60 TABLET | Refills: 2 | Status: SHIPPED | OUTPATIENT
Start: 2021-04-09 | End: 2021-07-06 | Stop reason: SDUPTHER

## 2021-04-28 ENCOUNTER — PROCEDURE VISIT (OUTPATIENT)
Dept: CARDIOLOGY CLINIC | Age: 66
End: 2021-04-28
Payer: MEDICARE

## 2021-04-28 DIAGNOSIS — Z95.810 ICD (IMPLANTABLE CARDIOVERTER-DEFIBRILLATOR) IN PLACE: Primary | ICD-10-CM

## 2021-04-28 PROCEDURE — 93295 DEV INTERROG REMOTE 1/2/MLT: CPT | Performed by: INTERNAL MEDICINE

## 2021-04-28 PROCEDURE — 93296 REM INTERROG EVL PM/IDS: CPT | Performed by: INTERNAL MEDICINE

## 2021-04-28 NOTE — PROGRESS NOTES
[facility] Medtronic Single lead ICD  Pt of [place]     Battery 10.7 years    Underlying rhythm VS    RV impedance 437  RV shock 46  SVC shock 59  RV wave 14.4  Ventricular threshold 0.750 @ 0.40  Ventricular amplitude 2.0 @ 0.40  Programmed VVI  V paced <0.1%     P Afib/warfarin  AFib burden 0%     Episodes: Non sustained VT vs Afib RVR     Optivol WNL

## 2021-05-12 ENCOUNTER — HOSPITAL ENCOUNTER (OUTPATIENT)
Age: 66
Discharge: HOME | End: 2021-05-12
Payer: MEDICARE

## 2021-05-12 LAB
INFLUENZA A: NOT DETECTED
INFLUENZA B: NOT DETECTED
SARS-COV-2 RNA, RT PCR: NOT DETECTED

## 2021-05-12 PROCEDURE — 87636 SARSCOV2 & INF A&B AMP PRB: CPT

## 2021-05-19 ENCOUNTER — HOSPITAL ENCOUNTER (OUTPATIENT)
Age: 66
Setting detail: OUTPATIENT SURGERY
Discharge: HOME | End: 2021-05-19
Attending: INTERNAL MEDICINE | Admitting: INTERNAL MEDICINE
Payer: MEDICARE

## 2021-05-19 VITALS
RESPIRATION RATE: 16 BRPM | OXYGEN SATURATION: 97 % | TEMPERATURE: 96.7 F | WEIGHT: 125.2 LBS | HEIGHT: 63 IN | BODY MASS INDEX: 22.18 KG/M2 | HEART RATE: 60 BPM | DIASTOLIC BLOOD PRESSURE: 78 MMHG | SYSTOLIC BLOOD PRESSURE: 162 MMHG

## 2021-05-19 PROCEDURE — 2580000003 HC RX 258: Performed by: INTERNAL MEDICINE

## 2021-05-19 PROCEDURE — 6370000000 HC RX 637 (ALT 250 FOR IP): Performed by: INTERNAL MEDICINE

## 2021-05-19 PROCEDURE — 3609010300 HC COLONOSCOPY W/BIOPSY SINGLE/MULTIPLE: Performed by: INTERNAL MEDICINE

## 2021-05-19 PROCEDURE — 88305 TISSUE EXAM BY PATHOLOGIST: CPT

## 2021-05-19 PROCEDURE — 7100000010 HC PHASE II RECOVERY - FIRST 15 MIN: Performed by: INTERNAL MEDICINE

## 2021-05-19 RX ORDER — SODIUM CHLORIDE 450 MG/100ML
INJECTION, SOLUTION INTRAVENOUS CONTINUOUS
Status: DISCONTINUED | OUTPATIENT
Start: 2021-05-19 | End: 2021-05-19 | Stop reason: HOSPADM

## 2021-05-19 RX ORDER — LIDOCAINE HYDROCHLORIDE 20 MG/ML
SOLUTION OROPHARYNGEAL PRN
Status: DISCONTINUED | OUTPATIENT
Start: 2021-05-19 | End: 2021-05-19 | Stop reason: ALTCHOICE

## 2021-05-19 RX ORDER — LIDOCAINE HYDROCHLORIDE 20 MG/ML
SOLUTION OROPHARYNGEAL
Status: DISCONTINUED
Start: 2021-05-19 | End: 2021-05-19 | Stop reason: HOSPADM

## 2021-05-19 RX ORDER — SODIUM CHLORIDE 9 MG/ML
25 INJECTION, SOLUTION INTRAVENOUS PRN
Status: DISCONTINUED | OUTPATIENT
Start: 2021-05-19 | End: 2021-05-19 | Stop reason: HOSPADM

## 2021-05-19 RX ORDER — SODIUM CHLORIDE 0.9 % (FLUSH) 0.9 %
5-40 SYRINGE (ML) INJECTION PRN
Status: DISCONTINUED | OUTPATIENT
Start: 2021-05-19 | End: 2021-05-19 | Stop reason: HOSPADM

## 2021-05-19 RX ORDER — SODIUM CHLORIDE 0.9 % (FLUSH) 0.9 %
5-40 SYRINGE (ML) INJECTION EVERY 12 HOURS SCHEDULED
Status: DISCONTINUED | OUTPATIENT
Start: 2021-05-19 | End: 2021-05-19 | Stop reason: HOSPADM

## 2021-05-19 RX ADMIN — SODIUM CHLORIDE: 4.5 INJECTION, SOLUTION INTRAVENOUS at 09:05

## 2021-05-19 ASSESSMENT — PAIN - FUNCTIONAL ASSESSMENT: PAIN_FUNCTIONAL_ASSESSMENT: 0-10

## 2021-05-19 ASSESSMENT — PAIN SCALES - GENERAL: PAINLEVEL_OUTOF10: 0

## 2021-05-19 NOTE — H&P
[address] [place] [address]                       PREOPERATIVE HISTORY AND PHYSICAL    PATIENT NAME: Brie Mendez                    :        1955  MED REC NO:   [id number]                           ROOM:  ACCOUNT NO:   [de-identified]                           ADMIT DATE: 2021  PROVIDER:     Ami Cuevas M.D. PROCEDURE:  Colonoscopy. INDICATION:  The patient is a 64-year-old pleasant male, complains of  diarrhea. Denied any blood in the stool or black stools. Denied any  dysphagia or painful swallowing. He is undergoing further evaluation. PAST MEDICAL HISTORY:  Asthma, chronic atrial fibrillation,  cardiomyopathy, Medtronic pacemaker, pneumonia, ulcer. PAST SURGICAL HISTORY:  Coronary angioplasty with stent placement, eye  surgery, pacemaker placement, tonsillectomy, adenoidectomy. MEDICATIONS:  Reviewed. PHYSICAL EXAMINATION:  VITAL SIGNS:  Temperature 97.9, pulse 60, respiratory rate 18, blood  pressure 168/90. HEART:  Regular. Pacemaker in the left anterior chest noted. LUNGS:  Equal to expansion on inspection. [address] air entry bilaterally. ABDOMEN:  Soft. Normoactive bowel sounds. IMPRESSION:  A 77year-old pleasant male who has diarrhea. He is  undergoing further evaluation. RECOMMENDATIONS:  Keep the patient nothing by mouth, proceed with  colonoscopy as planned. The risks including but not limited to  perforation, bleeding, infection, adverse reaction to medicine, very  slight chance of missing significant lesions. The patient expressed his  understanding. Further plans pending the above test results.         Juan De Leon M.D.    D: 2021 9:07:02       T: 2021 9:14:43     [email or web address]  Job#: [id number]     Doc#: [id number]    CC:

## 2021-05-19 NOTE — OP NOTE
[address] [id number]                                OPERATIVE REPORT    PATIENT NAME: Grace Smith                    :        1955  MED REC NO:   [id number]                           ROOM:  ACCOUNT NO:   [de-identified]                           ADMIT DATE: 2021  PROVIDER:     Melissa Larson M.D.    DATE OF PROCEDURE:  2021    PROCEDURE:  Colonoscopy. SURGEON: Melissa Larson MD.    INDICATION:  The patient is a 42-year-old pleasant male who has  diarrhea. Denied any nausea, vomiting. Denied any dysphagia. He is  undergoing further evaluation. Please see my brief history for details  and for physical examination. ASA CLASSIFICATION:  Class III. MEDICATIONS:  None. PHOTOGRAPHS:  Yes. BIOPSIES:  Yes. ESTIMATED BLOOD LOSS:  Less than 10 mL. CONSENT:  Procedure, indications and complications including but not  limited to perforation, bleeding, infection, adverse reaction to  medicine discussed with the patient. The patient expressed his  understanding and a written consent was obtained. DESCRIPTION OF PROCEDURE:  The patient was placed in the left lateral  decubitus position in the endo room #13. The patient was placed on  appropriate monitoring of vitals including blood pressure, heart rate  and pulse ox. After the rectal examination, the PCF-H190AL pediatric  colonoscope was inserted into the rectum and advanced up to the cecum  without any difficulty and terminal ileum was intubated. On careful  inspection, the preparation was good. On withdrawal of the scope, the  terminal ileum looks normal as shown in picture #A4. Appendiceal  orifice and cecum looks normal as shown in picture #A2 and A3. Ascending colon looks normal as shown in picture #A6 and A7. Transverse  colon looks normal as shown in picture #A8 and A9. Descending colon  looks normal as shown in picture #A10 and A11. In the sigmoid colon,  the patient had small polyps removed by cold biopsy. On retroflex in  the rectum, hypertrophied anal papillae with internal hemorrhoids noted  as shown in picture #A14. Air was withdrawn as the scope was removed. The patient tolerated the procedure well without any immediate  complications. Vitals including blood pressure, heart rate and pulse ox  were stable during the procedure and after the procedure. The patient  transferred to the recovery room in stable condition. IMPRESSION:  Colonoscopy to distal ileum. Two small polyps in the  distal sigmoid colon removed by cold biopsy. Internal hemorrhoids. RECOMMENDATIONS:  High-fiber diet, fiber supplements. Restart his  anticoagulation today. Followup colonoscopy in 5 years. Advised him to  call my office if any further GI problems. Thank you . Mile Haines for letting me to do procedure on the patient. Do not hesitate to call me if you have any questions. My  recommendations are above.         Dianna Gottlieb M.D.    D: 05/19/2021 9:42:51       T: 05/19/2021 9:49:26     [id number]  Job#: [id number]     Doc#: [id number]    CC:  Ashlie Alexandra M.D.

## 2021-05-19 NOTE — PROGRESS NOTES
Pt in phase 2. Pt denies pain. Dr. Ronnie Berry spoke with pt. Discharge instructions given and signed. No sedation used in procedure and pt tolerating well in phase 2. Pt passed gas.

## 2021-05-19 NOTE — PROGRESS NOTES
Colonoscopy complete, photos taken,, bx taken, 1  Specimen sent to  lab, pt tolerated procedure well. No sedation used    Scope Number 901 used.

## 2021-05-19 NOTE — PROGRESS NOTES
Luis Brown admitted to [facility] for colonoscopy with Dr. Kiara Liriano. Consent forms signed and verified.

## 2021-05-19 NOTE — BRIEF OP NOTE
Brief Postoperative Note      Patient: Celia Mendez  YOB: 1955  MRN: [id number]    Date of Procedure: 2021    Pre-Op Diagnosis: DIARRHEA    Post-Op Diagnosis: small sigmoid polyps biopsy done. Procedure(s):  COLONOSCOPY WITH BIOPSY    Surgeon(s):  Bang Alexander MD    Assistant:  * No surgical staff found *    Anesthesia: None    Estimated Blood Loss (mL): Minimal    Complications: Other: small sigmoid polyps biopsy done. Specimens:   ID Type Source Tests Collected by Time Destination   A : bx sigmoid colon polyp Tissue Sigmoid Colon SURGICAL PATHOLOGY Bang Alexander MD 2021 [id number]        Implants:  * No implants in log *      Drains: * No LDAs found *    Findings: small sigmoid polyps biopsy done.     Electronically signed by Bang Alexander MD on 2021 at 9:42 AM

## 2021-06-01 ENCOUNTER — OFFICE VISIT (OUTPATIENT)
Dept: INTERNAL MEDICINE CLINIC | Age: 66
End: 2021-06-01
Payer: MEDICARE

## 2021-06-01 VITALS
DIASTOLIC BLOOD PRESSURE: 82 MMHG | BODY MASS INDEX: 21.79 KG/M2 | HEART RATE: 66 BPM | TEMPERATURE: 98 F | HEIGHT: 63 IN | SYSTOLIC BLOOD PRESSURE: 142 MMHG | WEIGHT: 123 LBS

## 2021-06-01 DIAGNOSIS — I50.20 SYSTOLIC CONGESTIVE HEART FAILURE, UNSPECIFIED HF CHRONICITY (HCC): Primary | ICD-10-CM

## 2021-06-01 DIAGNOSIS — I10 ESSENTIAL HYPERTENSION: ICD-10-CM

## 2021-06-01 DIAGNOSIS — I48.0 PAROXYSMAL ATRIAL FIBRILLATION (HCC): ICD-10-CM

## 2021-06-01 PROCEDURE — 99214 OFFICE O/P EST MOD 30 MIN: CPT | Performed by: NURSE PRACTITIONER

## 2021-06-01 NOTE — PROGRESS NOTES
[facility]  [facility]  Dept: [phone]  Dept Fax: [phone]: [phone]     Visit Date:  6/1/2021    Patient:  Juany Wright  YOB: 1955    HPI:     Chief Complaint   Patient presents with    3 Month Follow-Up    Congestive Heart Failure    Hypertension     Bo Ramos presents today for 3 month follow up of CHF, CAD, HTN.     I last seen him 3 months ago. No ER visits or hospitalizations since last visit.     He denies any new falls since last visit. He has cut down on smoking significantly, denies cessation. He continues to drink, denies alcohol cessation as well.      CHF/CAD/Atrial Fibrillation/HTN-  Cardiac catheterization on 11/27 requiring stent placement to the left circumflex and OM 2. [facility] has been assisting with medication assistance. Is following with Dr. Shahid Lawrence routinely. Had an AICD placed on 3/6/2020. No firing. Has been on warfarin, but cannot get to the coumadin clinic often enough for INR checks and dosing, so they have discharged him. Called and spoke with pharmacy, Eliquis copay is $9. He is agreeable to this. Will change.  BP today in office 142/82.      Limited echocardiogram 2/27/2020     Ejection fraction is visually estimated at 30%.   There was moderate global hypokinesis of the left ventricle.   Left Ventricular size is Mildly increased .   Mildly increased left ventricle wall thickness.   No evidence of any pericardial effusion.      [id number] Score for Atrial Fibrillation Stroke Risk   Risk   Factors  Component Value   C CHF Yes 1   H HTN Yes 1   A2 Age >= 75 No,  (76 y.o.) 0   D DM No 0   S2 Prior Stroke/TIA No 0   V Vascular Disease No 0   A Age 74-69 Yes,  (76 y.o.) 1   Sc Sex male 0    [id number]  Score  3   Score last updated 6/10/21 45:87 PM EDT    Click here for a link to the UpToDate guideline \"Atrial Fibrillation: Anticoagulation therapy to prevent embolization    Disclaimer: Risk Score calculation is dependent on accuracy of patient problem list and past encounter diagnosis. He is following with Dr. Romario Henley for diarrhea. Had an EGD which showed mild gastritis, esophagitis, and Hpylori. Biopsies were taken and sent. He was treated with antibiotics, and diarrhea has improved. Medications    Current Outpatient Medications:     apixaban (ELIQUIS) 5 MG TABS tablet, Take 1 tablet by mouth 2 times daily, Disp: 60 tablet, Rfl: 5    atorvastatin (LIPITOR) 40 MG tablet, Take 2 tablets by mouth daily, Disp: 60 tablet, Rfl: 2    spironolactone (ALDACTONE) 25 MG tablet, Take 0.5 tablets by mouth daily, Disp: 15 tablet, Rfl: 5    ticagrelor (BRILINTA) 90 MG TABS tablet, Take 1 tablet by mouth 2 times daily, Disp: 60 tablet, Rfl: 5    carvedilol (COREG) 12.5 MG tablet, Take 1 tablet by mouth 2 times daily, Disp: 60 tablet, Rfl: 5    sacubitril-valsartan (ENTRESTO) 24-26 MG per tablet, Take 1 tablet by mouth 2 times daily . hold if SBP less than 100 mm hg or HR less than 60, Disp: 60 tablet, Rfl: 2    nitroGLYCERIN (NITROSTAT) 0.4 MG SL tablet, For chest pain/ angina ,up to max of 3 total doses. If no relief after 1 dose, call 911., Disp: 25 tablet, Rfl: 1    Multiple Vitamins-Minerals (CENTRUM SILVER 50+MEN) TABS, Take 1 tablet by mouth daily, Disp: , Rfl:     The patient has No Known Allergies. Past Medical History  Murray  has a past medical history of Asthma, Atrial fibrillation ([facility] [address] CAD S/P percutaneous coronary angioplasty, Cardiomyopathy ([facility] [address] [employer] single ICD , Pneumonia, and Ulcer. Past Surgical History  The patient  has a past surgical history that includes Tonsillectomy and adenoidectomy; Coronary angioplasty with stent (11/27/2019); eye surgery (Right); pacemaker placement; and Colonoscopy (Left, 5/19/2021).     Family History  This patient's family history includes Cancer in his maternal aunt; Heart Attack in his father, maternal grandfather, maternal grandmother, mother, paternal grandfather, and paternal grandmother; Stroke in his paternal grandmother. Social History  Nydia Rmaos  reports that he has been smoking cigarettes. He has a 50.00 pack-year smoking history. He has never used smokeless tobacco. He reports current alcohol use. He reports that he does not use drugs. Health Maintenance:    Health Maintenance   Topic Date Due    Hepatitis C screen  Never done    DTaP/Tdap/Td vaccine (1 - Tdap) 11/20/1972    Shingles Vaccine (1 of 2) Never done    Low dose CT lung screening  Never done    Annual Wellness Visit (AWV)  Never done    Pneumococcal 65+ years Vaccine (2 of 2 - PPSV23) 10/09/2020    Lipid screen  06/03/2021    Potassium monitoring  12/02/2021    Creatinine monitoring  12/02/2021    Colon cancer screen colonoscopy  05/19/2031    Flu vaccine  Completed    COVID-19 Vaccine  Completed    Hepatitis A vaccine  Aged Out    Hepatitis B vaccine  Aged Out    Hib vaccine  Aged Out    Meningococcal (ACWY) vaccine  Aged Out       Subjective:      Review of Systems   Constitutional: Negative for chills, fatigue and fever. HENT: Negative for congestion, rhinorrhea, sinus pressure, sinus pain, sore throat, tinnitus and trouble swallowing. Eyes: Negative for photophobia and visual disturbance. Respiratory: Positive for shortness of breath (on exertion). Negative for cough and wheezing. Cardiovascular: Positive for chest pain (rib fractures). Negative for palpitations and leg swelling. Gastrointestinal: Negative for abdominal distention, abdominal pain, blood in stool, constipation, diarrhea, nausea and vomiting. Endocrine: Negative for polydipsia, polyphagia and polyuria. Genitourinary: Negative for difficulty urinating, dysuria, frequency, hematuria and urgency. Musculoskeletal: Negative for arthralgias and myalgias. Skin: Negative for rash and wound. Neurological: Negative for dizziness, light-headedness and headaches. Psychiatric/Behavioral: Negative for dysphoric mood and sleep disturbance. The patient is not nervous/anxious. Objective:     BP (!) 142/82 (Site: Right Upper Arm, Cuff Size: Medium Adult)   Pulse 66   Temp 98 °F (36.7 °C) (Temporal)   Ht 5' 3\" (1.6 m)   Wt 123 lb (55.8 kg)   BMI 21.79 kg/m²     Physical Exam  Constitutional:       General: He is not in acute distress. Appearance: Normal appearance. He is normal weight. He is not ill-appearing. HENT:      Head: Normocephalic and atraumatic. Right Ear: Tympanic membrane, ear canal and external ear normal.      Left Ear: Tympanic membrane, ear canal and external ear normal.      Nose: Nose normal. No congestion or rhinorrhea. Mouth/Throat:      Mouth: Mucous membranes are moist.      Pharynx: Oropharynx is clear. No oropharyngeal exudate or posterior oropharyngeal erythema. Eyes:      Extraocular Movements: Extraocular movements intact. Conjunctiva/sclera: Conjunctivae normal.      Pupils: Pupils are equal, round, and reactive to light. Neck:      Vascular: No carotid bruit. Cardiovascular:      Rate and Rhythm: Normal rate and regular rhythm. Pulses: Normal pulses. Heart sounds: No murmur heard. No friction rub. No gallop. Pulmonary:      Effort: Pulmonary effort is normal. No respiratory distress. Breath sounds: Normal breath sounds. No wheezing, rhonchi or rales. Abdominal:      General: Abdomen is flat. Bowel sounds are normal. There is no distension. Palpations: Abdomen is soft. Tenderness: There is no abdominal tenderness. Musculoskeletal:         General: No tenderness. Normal range of motion. Cervical back: Normal range of motion and neck supple. No muscular tenderness. Right lower leg: No edema. Left lower leg: No edema. Lymphadenopathy:      Cervical: No cervical adenopathy.    Skin:     General: Skin is warm and dry. Capillary Refill: Capillary refill takes less than 2 seconds. Findings: No erythema or rash. Neurological:      General: No focal deficit present. Mental Status: He is alert and oriented to person, place, and time. Motor: No weakness. Coordination: Coordination normal.      Deep Tendon Reflexes: Reflexes normal.   Psychiatric:         Mood and Affect: Mood normal.         Behavior: Behavior normal.         Thought Content: Thought content normal.         Judgment: Judgment normal.         Labs Reviewed 6/1/2021:    Lab Results   Component Value Date    WBC 9.9 12/02/2020    HGB 14.9 12/02/2020    HCT 43.1 12/02/2020     12/02/2020    CHOL 116 06/03/2020    TRIG 103 06/03/2020    HDL 46 06/03/2020    ALT 46 12/02/2020    AST 54 (H) 12/02/2020     (L) 12/02/2020    K 4.6 12/02/2020    CL 97 (L) 12/02/2020    CREATININE 0.8 12/02/2020    BUN 18 12/02/2020    CO2 28 12/02/2020    TSH 1.360 11/25/2019    INR 1.45 (H) 02/05/2021       Assessment/Plan      1. Systolic congestive heart failure, unspecified HF chronicity (HCC)    - Medications reviewed, continue current regimen  - Follow with Dr. Emy Fuller as scheduled  - Limit added salt to diet  - Daily weights, report 2-3 pounds weight gain in 1 day or 3-5 pounds in 1 week    2. Essential hypertension    3. Paroxysmal atrial fibrillation (HCC)    - apixaban (ELIQUIS) 5 MG TABS tablet; Take 1 tablet by mouth 2 times daily  Dispense: 60 tablet; Refill: 5  - Fall precautions discussed, understanding voiced   - Bleeding precautions discussed, understanding voiced  - Stop warfarin  - Samples of Eliquis provided to patient      Return in about 3 months (around 9/1/2021). Patient given educational materials - see patient instructions. Discussed use, benefit, and side effects of prescribed medications. All patient questions answered. Pt voiced understanding. Reviewed health maintenance.        Electronically signed Serenity Ferris, APRN - CNP on 6/1/21 at 2:30 PM EDT

## 2021-06-10 ASSESSMENT — ENCOUNTER SYMPTOMS
ABDOMINAL DISTENTION: 0
VOMITING: 0
PHOTOPHOBIA: 0
DIARRHEA: 0
SORE THROAT: 0
SINUS PRESSURE: 0
TROUBLE SWALLOWING: 0
SINUS PAIN: 0
COUGH: 0
BLOOD IN STOOL: 0
WHEEZING: 0
CONSTIPATION: 0
SHORTNESS OF BREATH: 1
RHINORRHEA: 0
ABDOMINAL PAIN: 0
NAUSEA: 0

## 2021-06-15 ENCOUNTER — NURSE ONLY (OUTPATIENT)
Dept: CARDIOLOGY CLINIC | Age: 66
End: 2021-06-15
Payer: MEDICARE

## 2021-06-15 ENCOUNTER — OFFICE VISIT (OUTPATIENT)
Dept: CARDIOLOGY CLINIC | Age: 66
End: 2021-06-15
Payer: MEDICARE

## 2021-06-15 VITALS
DIASTOLIC BLOOD PRESSURE: 80 MMHG | BODY MASS INDEX: 22.04 KG/M2 | HEIGHT: 63 IN | WEIGHT: 124.4 LBS | OXYGEN SATURATION: 99 % | HEART RATE: 52 BPM | SYSTOLIC BLOOD PRESSURE: 138 MMHG

## 2021-06-15 DIAGNOSIS — F10.11 HISTORY OF ETOH ABUSE: ICD-10-CM

## 2021-06-15 DIAGNOSIS — I48.0 PAROXYSMAL ATRIAL FIBRILLATION (HCC): ICD-10-CM

## 2021-06-15 DIAGNOSIS — F17.210 CIGARETTE NICOTINE DEPENDENCE WITHOUT COMPLICATION: ICD-10-CM

## 2021-06-15 DIAGNOSIS — I50.22 CHF (CONGESTIVE HEART FAILURE), NYHA CLASS II, CHRONIC, SYSTOLIC (HCC): Primary | ICD-10-CM

## 2021-06-15 DIAGNOSIS — Z95.810 ICD (IMPLANTABLE CARDIOVERTER-DEFIBRILLATOR) IN PLACE: Primary | ICD-10-CM

## 2021-06-15 DIAGNOSIS — I10 ESSENTIAL HYPERTENSION: ICD-10-CM

## 2021-06-15 DIAGNOSIS — I25.5 ISCHEMIC CARDIOMYOPATHY: ICD-10-CM

## 2021-06-15 PROCEDURE — 99214 OFFICE O/P EST MOD 30 MIN: CPT | Performed by: NURSE PRACTITIONER

## 2021-06-15 PROCEDURE — 93282 PRGRMG EVAL IMPLANTABLE DFB: CPT | Performed by: INTERNAL MEDICINE

## 2021-06-15 RX ORDER — SACUBITRIL AND VALSARTAN 49; 51 MG/1; MG/1
1 TABLET, FILM COATED ORAL 2 TIMES DAILY
Qty: 60 TABLET | Refills: 5 | Status: SHIPPED | OUTPATIENT
Start: 2021-06-15 | End: 2022-02-14 | Stop reason: SDUPTHER

## 2021-06-15 ASSESSMENT — ENCOUNTER SYMPTOMS
COUGH: 0
SHORTNESS OF BREATH: 0
ABDOMINAL DISTENTION: 0

## 2021-06-15 NOTE — PROGRESS NOTES
Heart Failure Clinic       Visit Date: 6/15/2021  Cardiologist:  Dr. Emy Fuller  Primary Care Physician: Dr. Bree Hernandez, APRN - CNP    Tony Beltrán is a 77 y.o. male who presents today for:  Chief Complaint   Patient presents with    Congestive Heart Failure       HPI:   Tony Beltrán is a 77 y.o. male who presents to the office for a follow up patient visit in the heart failure clinic. Accompanied by no one  Lives at [facility]    Type HF: Combined (EF 20%, improved 30%)  Cause: ICM/NICM  Device: Single ICD  HX:  PAF (Eliquis), HTN, Asthma, hx ETOH (1/3L Gin/day x years), Tobacco abuse (50+ years)      Hospitalization r/t Heart Failure:  11/25-12/1 = CHF exac - went in w/ SOB, increased swelling.  + CXR.  BNP 48958 - down to 4723 on discharge.  IV Lasix.  Discharge wt 124#  LHC - nonischemic/ischemic EF 20%.      Was not on any meds prior to admission    Concerns today: Feeling good - no concerns voiced. Denies SOB, swelling, orthopnea, fatigue. Walks quite bit, not feel limited in activity.    Still Drinking about 1/4 liquor/day  Still smoking about pk/day    Patient has:  Chest Pain: no  SOB: no  Orthopnea/PND: no  FLORI: no  Edema: no  Fatigue: no  Abdominal bloating: no  Cough: no  Appetite: good  Home weight: stable  Home blood pressure: not check    Past Medical History:   Diagnosis Date    Asthma     Atrial fibrillation ([facility] [address] 11/2019    CAD S/P percutaneous coronary angioplasty 11/27/2019    Cardiomyopathy ([facility] [address] 11/2019    Medtronic single ICD  3/12/2020    Pneumonia     Ulcer      Past Surgical History:   Procedure Laterality Date    COLONOSCOPY Left 5/19/2021    COLONOSCOPY WITH BIOPSY performed by Elva Wallace MD at [address] [facility]  11/27/2019    Stent Circ and OM    EYE SURGERY Right     cataract    PACEMAKER PLACEMENT      TONSILLECTOMY AND ADENOIDECTOMY       Family History   Problem Relation Age of Onset    Heart Attack Mother     Heart Attack Father     Cancer Maternal Aunt     Heart Attack Maternal Grandmother     Heart Attack Maternal Grandfather     Heart Attack Paternal Grandmother     Stroke Paternal Grandmother     Heart Attack Paternal Grandfather     Breast Cancer Neg Hx     Diabetes Neg Hx     Kidney Disease Neg Hx      Social History     Tobacco Use    Smoking status: Current Every Day Smoker     Packs/day: 1.00     Years: 50.00     Pack years: 50.00     Types: Cigarettes    Smokeless tobacco: Never Used   Substance Use Topics    Alcohol use: Yes     Comment: Gin daily - drink with lemon lime soda: 1/3 of L      Current Outpatient Medications   Medication Sig Dispense Refill    sacubitril-valsartan (ENTRESTO) 49-51 MG per tablet Take 1 tablet by mouth 2 times daily 60 tablet 5    apixaban (ELIQUIS) 5 MG TABS tablet Take 1 tablet by mouth 2 times daily 60 tablet 5    atorvastatin (LIPITOR) 40 MG tablet Take 2 tablets by mouth daily 60 tablet 2    spironolactone (ALDACTONE) 25 MG tablet Take 0.5 tablets by mouth daily 15 tablet 5    ticagrelor (BRILINTA) 90 MG TABS tablet Take 1 tablet by mouth 2 times daily 60 tablet 5    carvedilol (COREG) 12.5 MG tablet Take 1 tablet by mouth 2 times daily 60 tablet 5    nitroGLYCERIN (NITROSTAT) 0.4 MG SL tablet For chest pain/ angina ,up to max of 3 total doses. If no relief after 1 dose, call 911. 25 tablet 1    Multiple Vitamins-Minerals (CENTRUM SILVER 50+MEN) TABS Take 1 tablet by mouth daily       No current facility-administered medications for this visit. No Known Allergies    SUBJECTIVE:   Review of Systems   Constitutional: Negative for activity change, appetite change and fatigue. Respiratory: Negative for cough and shortness of breath. Cardiovascular: Negative for chest pain, palpitations and leg swelling. Gastrointestinal: Negative for abdominal distention. Neurological: Negative for weakness, light-headedness and headaches. Hematological: Negative for adenopathy. Psychiatric/Behavioral: Negative for sleep disturbance. OBJECTIVE:   Today's Vitals:  /80   Pulse 52   Ht 5' 2.5\" (1.588 m)   Wt 124 lb 6.4 oz (56.4 kg)   SpO2 99%   BMI 22.39 kg/m²     Physical Exam  Vitals reviewed. Constitutional:       General: He is not in acute distress. Appearance: Normal appearance. He is well-developed. He is not diaphoretic. HENT:      Head: Normocephalic and atraumatic. Eyes:      Conjunctiva/sclera: Conjunctivae normal.   Neck:      Comments: No JVD  Cardiovascular:      Rate and Rhythm: Normal rate and regular rhythm. Heart sounds: Normal heart sounds. No murmur heard. Pulmonary:      Effort: Pulmonary effort is normal. No respiratory distress. Breath sounds: Normal breath sounds. No wheezing or rales. Abdominal:      General: Bowel sounds are normal. There is no distension. Palpations: Abdomen is soft. Tenderness: There is no abdominal tenderness. Musculoskeletal:         General: Normal range of motion. Cervical back: Normal range of motion and neck supple. Right lower leg: No edema. Left lower leg: No edema. Skin:     General: Skin is warm and dry. Capillary Refill: Capillary refill takes less than 2 seconds. Neurological:      Mental Status: He is alert and oriented to person, place, and time. Coordination: Coordination normal.   Psychiatric:         Behavior: Behavior normal.         Wt Readings from Last 3 Encounters:   06/15/21 124 lb 6.4 oz (56.4 kg)   06/01/21 123 lb (55.8 kg)   05/19/21 125 lb 3.2 oz (56.8 kg)     BP Readings from Last 3 Encounters:   06/15/21 138/80   06/01/21 (!) 142/82   05/19/21 (!) 162/78     Pulse Readings from Last 3 Encounters:   06/15/21 52   06/01/21 66   05/19/21 60     Body mass index is 22.39 kg/m². ECHO:    Conclusions      Summary   limited echo   Ejection fraction is visually estimated at 30%.    There was moderate global hypokinesis of the left ventricle. Left Ventricular size is Mildly increased . Mildly increased left ventricle wall thickness. No evidence of any pericardial effusion. Signature      ----------------------------------------------------------------   Electronically signed by Olimpia Sosa MD (Interpreting   physician) on 02/27/2020 at 05:15 PM   ----------------------------------------------------------------    Summary   Left Ventricular size is Moderately increased .   Normal left ventricular wall thickness.   There was severe global hypokinesis of the left ventricle.   Systolic function was severely reduced.   Ejection fraction is visually estimated at 20%.  Gaurav Oas left atrium is Moderately dilated.   Mildly enlarged right atrium size.   Mild tricuspid regurgitation visualized.   Right ventricular systolic pressure measures 40 mmhg.   Aortic aneurysm noted in the ascending aorta .   Aortic aneurysm measures 4 cm .      Signature      ----------------------------------------------------------------   Electronically signed by Rufus Silva MD (Interpreting   physician) on 11/26/2019 at 12:25 PM   ----------------------------------------------------------------        CATH/STRESS:   CONCLUSIONS:  1.  Severe cardiomyopathy, probably mixed etiology ischemic and  nonischemic. 2.  RCA,  involving mid RCA.  Distal RCA fills via collaterals from  left to right. 3.  Severe distal left circumflex and OM2 lesions, status post  successful PCI with stenting.     PLAN:  1.  Continue inpatient care. 2.  Optimize medical therapy for coronary disease. 3.  High intensity statin. 4.  Aspirin 81 mg p.o. daily for 1 month.  The patient will be on  anticoagulation for new-onset atrial fibrillation, probably will be  started on Eliquis which can be started in the morning.  Stop aspirin  after 1 month if the patient remains on anticoagulation. 5.  Plavix 75 mg p.o. daily for 1 year.   6.  Optimize medical therapy for heart failure. 7.  LifeVest is indicated before discharge. 8.  May consider RCA  PCI in the future if medical therapy fails. 9.  Monitor in telemetry.     Findings and plan of care were discussed with the patient in details.     Deanne Kamara MD     D: 11/27/2019 17:34:32            Results reviewed:  BNP: No results found for: BNP  CBC:   Lab Results   Component Value Date    WBC 9.9 12/02/2020    RBC 4.30 12/02/2020    HGB 14.9 12/02/2020    HCT 43.1 12/02/2020     12/02/2020     CMP:    Lab Results   Component Value Date     12/02/2020    K 4.6 12/02/2020    K 3.7 03/06/2020    CL 97 12/02/2020    CO2 28 12/02/2020    BUN 18 12/02/2020    CREATININE 0.8 12/02/2020    LABGLOM >90 12/02/2020    GLUCOSE 92 12/02/2020    GLUCOSE 59 12/31/2019    CALCIUM 10.0 12/02/2020     Hepatic Function Panel:    Lab Results   Component Value Date    ALKPHOS 74 12/02/2020    ALT 46 12/02/2020    AST 54 12/02/2020    PROT 7.2 12/02/2020    BILITOT 1.3 12/02/2020    BILIDIR <0.2 06/03/2020    LABALBU 4.5 12/02/2020     Magnesium:    Lab Results   Component Value Date    MG 1.9 11/24/2020     PT/INR:    Lab Results   Component Value Date    INR 1.45 02/05/2021     Lipids:    Lab Results   Component Value Date    TRIG 103 06/03/2020    HDL 46 06/03/2020    LDLCALC 49 06/03/2020       ASSESSMENT AND PLAN:   The patient's condition/symptoms are Stable: No clinical evidence of fluid overload today. Continue current medical regimen without changes at present time. Diagnosis Orders   1. CHF (congestive heart failure), NYHA class II, chronic, systolic ([address]     2. Ischemic cardiomyopathy     3. Essential hypertension     4. Paroxysmal atrial fibrillation (HCC)     5. History of ETOH abuse     6.  Cigarette nicotine dependence without complication       Continue:  GDMT:   ACE/ARB/ARNI - Entresto 24/26 BID - increase to 49/51 BID   BB - Coreg 12.5 BID   Diuretic - none  AA - Aldactone 12.5/day  SGLT2 - none  Vasodilator - none  Continue Current medications:  Eliquis, Brilinta  Stable. Appears Euvolemic  Finish Entresto samples - change to 49/51 BID when done w/ samples  RX sent to [address]. Discussed smoking/ETOH cessation - discussed for 3 min - pt has not interest in quitting at this time. Has labs next month for Dr Burt Melgoza  F/U w/ Burt Melgoza in Aug  F/U in clinic in 1 year or sooner if needed. Tolerating above noted HF meds, no ill side effects noted. Will continue to monitor kidney function and electrolytes. Will optimize as tolerated. Pt is compliant w/ medications. Total visit time of 30 minutes has been spent with patient on education of symptoms, management, medication, and plan of care; as well as review of chart: labs, ECHO, radiology reports, etc.   I personally spent more then 50% of the appt time face to face with the patient. Daily weights  Fluid restriction of 2 Liters per day  Limit sodium in diet to around 4988-7219 mg/day  Monitor BP  Activity as tolerated     Patient was instructed to call the [facility] [employer] [facility] for any changes in symptoms as noted in AVS.      No follow-ups on file. or sooner if needed     Patient given educational materials - see patient instructions. We discussed the importance of weighing oneself and recording daily. We also discussed the importance of a low sodium diet, higher sodium foods to avoid and better low sodium food options. Patient verbalizes understanding of plan of care using teach back method, and is agreeable to the treatment plan.        Electronically signed by DYAN Carrera CNP on 6/15/2021 at 3:51 PM

## 2021-06-15 NOTE — PROGRESS NOTES
In Office Medtronic Single lead ICD -- [employer] every 3m  Pt of [place]    Battery 10.6 years    RV lead noise turned on per standing order per Dr Shahbaz Lester    Presenting rhythm VS    RV impedance 437    RV shock 50  SVC shock 68    RV wave 16.1    V threshold 0.75 @ 0.40  V amplitude 2.0 @ 0.40    Programmed mode VVI 40    V paced <0.1%     AFib burden 0%  Hx afib/eliquis    Episodes   6/5/21 7 beats VT    Optivol WNL

## 2021-07-06 DIAGNOSIS — I25.10 CORONARY ARTERY DISEASE INVOLVING NATIVE CORONARY ARTERY OF NATIVE HEART WITHOUT ANGINA PECTORIS: ICD-10-CM

## 2021-07-06 RX ORDER — ATORVASTATIN CALCIUM 40 MG/1
80 TABLET, FILM COATED ORAL DAILY
Qty: 60 TABLET | Refills: 3 | Status: SHIPPED | OUTPATIENT
Start: 2021-07-06 | End: 2021-09-01 | Stop reason: SDUPTHER

## 2021-07-06 NOTE — TELEPHONE ENCOUNTER
Requested Prescriptions     Pending Prescriptions Disp Refills    atorvastatin (LIPITOR) 40 MG tablet 60 tablet 3     Sig: Take 2 tablets by mouth daily

## 2021-07-21 ENCOUNTER — PROCEDURE VISIT (OUTPATIENT)
Dept: CARDIOLOGY CLINIC | Age: 66
End: 2021-07-21
Payer: MEDICARE

## 2021-07-21 DIAGNOSIS — I50.20 SYSTOLIC CONGESTIVE HEART FAILURE, UNSPECIFIED HF CHRONICITY (HCC): Primary | ICD-10-CM

## 2021-07-21 DIAGNOSIS — Z95.810 ICD (IMPLANTABLE CARDIOVERTER-DEFIBRILLATOR) IN PLACE: ICD-10-CM

## 2021-07-21 PROCEDURE — 93297 REM INTERROG DEV EVAL ICPMS: CPT | Performed by: INTERNAL MEDICINE

## 2021-07-21 PROCEDURE — G2066 INTER DEVC REMOTE 30D: HCPCS | Performed by: INTERNAL MEDICINE

## 2021-08-25 ENCOUNTER — PROCEDURE VISIT (OUTPATIENT)
Dept: CARDIOLOGY CLINIC | Age: 66
End: 2021-08-25
Payer: MEDICARE

## 2021-08-25 ENCOUNTER — HOSPITAL ENCOUNTER (OUTPATIENT)
Age: 66
Discharge: HOME | End: 2021-08-25
Payer: MEDICARE

## 2021-08-25 DIAGNOSIS — I47.29 NONSUSTAINED VENTRICULAR TACHYCARDIA: ICD-10-CM

## 2021-08-25 DIAGNOSIS — I10 ESSENTIAL HYPERTENSION: ICD-10-CM

## 2021-08-25 DIAGNOSIS — Z95.810 ICD (IMPLANTABLE CARDIOVERTER-DEFIBRILLATOR) IN PLACE: Primary | ICD-10-CM

## 2021-08-25 DIAGNOSIS — I25.5 ISCHEMIC CARDIOMYOPATHY: ICD-10-CM

## 2021-08-25 DIAGNOSIS — I48.0 PAROXYSMAL ATRIAL FIBRILLATION (HCC): ICD-10-CM

## 2021-08-25 DIAGNOSIS — I25.10 CORONARY ARTERY DISEASE INVOLVING NATIVE CORONARY ARTERY OF NATIVE HEART WITHOUT ANGINA PECTORIS: ICD-10-CM

## 2021-08-25 DIAGNOSIS — I50.42 CHRONIC COMBINED SYSTOLIC AND DIASTOLIC CONGESTIVE HEART FAILURE (HCC): ICD-10-CM

## 2021-08-25 DIAGNOSIS — E78.5 DYSLIPIDEMIA: ICD-10-CM

## 2021-08-25 LAB
ALBUMIN SERPL-MCNC: 4.6 G/DL (ref 3.5–5.1)
ALP BLD-CCNC: 79 U/L (ref 38–126)
ALT SERPL-CCNC: 77 U/L (ref 11–66)
ANION GAP SERPL CALCULATED.3IONS-SCNC: 15 MEQ/L (ref 8–16)
AST SERPL-CCNC: 93 U/L (ref 5–40)
BILIRUB SERPL-MCNC: 1.2 MG/DL (ref 0.3–1.2)
BILIRUBIN DIRECT: 0.4 MG/DL (ref 0–0.3)
BUN BLDV-MCNC: 9 MG/DL (ref 7–22)
CALCIUM SERPL-MCNC: 9.4 MG/DL (ref 8.5–10.5)
CHLORIDE BLD-SCNC: 97 MEQ/L (ref 98–111)
CHOLESTEROL, TOTAL: 116 MG/DL (ref 100–199)
CO2: 23 MEQ/L (ref 23–33)
CREAT SERPL-MCNC: 0.7 MG/DL (ref 0.4–1.2)
GFR SERPL CREATININE-BSD FRML MDRD: > 90 ML/MIN/1.73M2
GLUCOSE BLD-MCNC: 73 MG/DL (ref 70–108)
HDLC SERPL-MCNC: 71 MG/DL
LDL CHOLESTEROL CALCULATED: 33 MG/DL
MAGNESIUM: 1.9 MG/DL (ref 1.6–2.4)
POTASSIUM SERPL-SCNC: 5.1 MEQ/L (ref 3.5–5.2)
SODIUM BLD-SCNC: 135 MEQ/L (ref 135–145)
TOTAL PROTEIN: 7 G/DL (ref 6.1–8)
TRIGL SERPL-MCNC: 58 MG/DL (ref 0–199)

## 2021-08-25 PROCEDURE — 80053 COMPREHEN METABOLIC PANEL: CPT

## 2021-08-25 PROCEDURE — 83735 ASSAY OF MAGNESIUM: CPT

## 2021-08-25 PROCEDURE — 93296 REM INTERROG EVL PM/IDS: CPT | Performed by: INTERNAL MEDICINE

## 2021-08-25 PROCEDURE — 82248 BILIRUBIN DIRECT: CPT

## 2021-08-25 PROCEDURE — 80061 LIPID PANEL: CPT

## 2021-08-25 PROCEDURE — 93295 DEV INTERROG REMOTE 1/2/MLT: CPT | Performed by: INTERNAL MEDICINE

## 2021-08-25 PROCEDURE — 36415 COLL VENOUS BLD VENIPUNCTURE: CPT

## 2021-08-25 NOTE — PROGRESS NOTES
DR Michael Blas PT   MEDTRONIC SINGLE ICD REMOTE   BATTERY 10.5 YRS REMAINING  VENT IMPEDENCE 437  RV 44  SVC 57  RV WAVES 13  VENT THRESHOLD 0.625 @ 0.4  VENT AMPLITUDE 2 @ 0.4  VVI 40  V PACED 0.1%  8/24/21 6 BTS NS VT   OPTIVOL WNL BUT RISING

## 2021-08-26 RX ORDER — CARVEDILOL 12.5 MG/1
12.5 TABLET ORAL 2 TIMES DAILY
Qty: 60 TABLET | Refills: 5 | Status: SHIPPED | OUTPATIENT
Start: 2021-08-26 | End: 2022-03-03 | Stop reason: SDUPTHER

## 2021-08-26 NOTE — TELEPHONE ENCOUNTER
----- Message from Gwendolyn Severs sent at 8/26/2021 12:04 PM EDT -----  Subject: Refill Request    QUESTIONS  Name of Medication? carvedilol (COREG) 12.5 MG tablet  Patient-reported dosage and instructions? 12.5 MG   How many days do you have left? 3  Preferred Pharmacy? [address] phone number (if available)? [phone]  Additional Information for Provider? Patient states that he needs his   medication refilled soon   ---------------------------------------------------------------------------  --------------  CALL BACK INFO  What is the best way for the office to contact you?  OK to leave message on   voicemail  Preferred Call Back Phone Number? [phone]

## 2021-08-31 ENCOUNTER — OFFICE VISIT (OUTPATIENT)
Dept: CARDIOLOGY CLINIC | Age: 66
End: 2021-08-31
Payer: MEDICARE

## 2021-08-31 VITALS
HEIGHT: 63 IN | WEIGHT: 125 LBS | SYSTOLIC BLOOD PRESSURE: 133 MMHG | BODY MASS INDEX: 22.15 KG/M2 | DIASTOLIC BLOOD PRESSURE: 89 MMHG | HEART RATE: 69 BPM

## 2021-08-31 DIAGNOSIS — I10 ESSENTIAL HYPERTENSION: ICD-10-CM

## 2021-08-31 DIAGNOSIS — I25.5 ISCHEMIC CARDIOMYOPATHY: ICD-10-CM

## 2021-08-31 DIAGNOSIS — I48.0 PAROXYSMAL ATRIAL FIBRILLATION (HCC): ICD-10-CM

## 2021-08-31 DIAGNOSIS — Z95.820 S/P ANGIOPLASTY WITH STENT: ICD-10-CM

## 2021-08-31 DIAGNOSIS — I50.42 CHRONIC COMBINED SYSTOLIC AND DIASTOLIC CONGESTIVE HEART FAILURE (HCC): ICD-10-CM

## 2021-08-31 DIAGNOSIS — I25.10 CORONARY ARTERY DISEASE INVOLVING NATIVE CORONARY ARTERY OF NATIVE HEART WITHOUT ANGINA PECTORIS: Primary | ICD-10-CM

## 2021-08-31 PROCEDURE — 99214 OFFICE O/P EST MOD 30 MIN: CPT | Performed by: INTERNAL MEDICINE

## 2021-08-31 RX ORDER — NITROGLYCERIN 0.4 MG/1
TABLET SUBLINGUAL
Qty: 25 TABLET | Refills: 1 | Status: SHIPPED | OUTPATIENT
Start: 2021-08-31

## 2021-08-31 RX ORDER — SPIRONOLACTONE 25 MG/1
12.5 TABLET ORAL EVERY OTHER DAY
Qty: 15 TABLET | Refills: 5 | Status: SHIPPED | OUTPATIENT
Start: 2021-08-31 | End: 2022-03-28

## 2021-08-31 NOTE — PROGRESS NOTES
Chief Complaint   Patient presents with    Follow-up       Originally Seen in hospital for new CHF and new CMP and New onset atr fib  Had cath and sent home on vest and oMT         6 month F/u    Denied cp,  palpitations, dizziness or edema    Sob on exertion stable    Record reviewed    Patient Seen, Chart, Consults notes, Labs, Radiology studies reviewed.     Has beed on asa/plavix and apixaban now  On apixaban and asa      Feel good today and post D/c    Patient Active Problem List   Diagnosis    Acute systolic (congestive) heart failure (HCC)    Tobacco abuse    ETOH abuse    Essential hypertension    Paroxysmal atrial fibrillation (HCC)    Nonsustained ventricular tachycardia (HCC)    Systolic congestive heart failure (HCC)    Abdominal aortic aneurysm (AAA) without rupture ([facility] [address]    Coronary artery disease involving native coronary artery of native heart without angina pectoris    Ischemic cardiomyopathy    S/P angioplasty with stent- LCX/OM nov 2019    Chronic combined systolic and diastolic congestive heart failure (HCC)    Dyslipidemia    Cardiomyopathy ([address]    Medtronic single ICD     Anticoagulated on Coumadin       Past Surgical History:   Procedure Laterality Date    COLONOSCOPY Left 5/19/2021    COLONOSCOPY WITH BIOPSY performed by Nohemy Dumont MD at [facility]    CORONARY ANGIOPLASTY WITH STENT PLACEMENT  11/27/2019    Stent Circ and OM    EYE SURGERY Right     cataract    PACEMAKER PLACEMENT      TONSILLECTOMY AND ADENOIDECTOMY         No Known Allergies     Family History   Problem Relation Age of Onset    Heart Attack Mother     Heart Attack Father     Cancer Maternal Aunt     Heart Attack Maternal Grandmother     Heart Attack Maternal Grandfather     Heart Attack Paternal Grandmother     Stroke Paternal Grandmother     Heart Attack Paternal Grandfather     Breast Cancer Neg Hx     Diabetes Neg Hx     Kidney Disease Neg Hx         Social History Socioeconomic History    Marital status:      Spouse name: Not on file    Number of children: Not on file    Years of education: Not on file    Highest education level: Not on file   Occupational History    Not on file   Tobacco Use    Smoking status: Current Every Day Smoker     Packs/day: 1.00     Years: 50.00     Pack years: 50.00     Types: Cigarettes    Smokeless tobacco: Never Used   Vaping Use    Vaping Use: Never used   Substance and Sexual Activity    Alcohol use: Yes     Comment: Gin daily - drink with lemon lime soda: 1/3 of L     Drug use: No    Sexual activity: Not on file   Other Topics Concern    Not on file   Social History Narrative    Not on file     Social Determinants of Health     Financial Resource Strain:     Difficulty of Paying Living Expenses:    Food Insecurity:     Worried About Running Out of Food in the Last Year:     Ran Out of Food in the Last Year:    Transportation Needs:     Lack of Transportation (Medical):  Lack of Transportation (Non-Medical):    Physical Activity:     Days of Exercise per Week:     Minutes of Exercise per Session:    Stress:     Feeling of Stress :    Social Connections:     Frequency of Communication with Friends and Family:     Frequency of Social Gatherings with Friends and Family:     Attends [employer] Services:     Active Member of Clubs or Organizations:     Attends Club or Organization Meetings:     Marital Status:    Intimate Partner Violence:     Fear of Current or Ex-Partner:     Emotionally Abused:     Physically Abused:     Sexually Abused:        Current Outpatient Medications   Medication Sig Dispense Refill    spironolactone (ALDACTONE) 25 MG tablet Take 0.5 tablets by mouth every other day 15 tablet 5    ticagrelor (BRILINTA) 90 MG TABS tablet Take 1 tablet by mouth 2 times daily 60 tablet 5    nitroGLYCERIN (NITROSTAT) 0.4 MG SL tablet For chest pain/ angina ,up to max of 3 total doses.  If no relief after 1 dose, call 911. 25 tablet 1    carvedilol (COREG) 12.5 MG tablet Take 1 tablet by mouth 2 times daily 60 tablet 5    atorvastatin (LIPITOR) 40 MG tablet Take 2 tablets by mouth daily 60 tablet 3    sacubitril-valsartan (ENTRESTO) 49-51 MG per tablet Take 1 tablet by mouth 2 times daily 60 tablet 5    apixaban (ELIQUIS) 5 MG TABS tablet Take 1 tablet by mouth 2 times daily 60 tablet 5    Multiple Vitamins-Minerals (CENTRUM SILVER 50+MEN) TABS Take 1 tablet by mouth daily       No current facility-administered medications for this visit. Review of Systems -     General ROS: negative  Psychological ROS: negative  Hematological and Lymphatic ROS: No history of blood clots or bleeding disorder. Respiratory ROS: no cough,  or wheezing, the rest see HPI  Cardiovascular ROS: See HPI  Gastrointestinal ROS: negative  Genito-Urinary ROS: no dysuria, trouble voiding, or hematuria  Musculoskeletal ROS: negative  Neurological ROS: no TIA or stroke symptoms  Dermatological ROS: negative      Blood pressure 133/89, pulse 69, height 5' 3\" (1.6 m), weight 125 lb (56.7 kg).         Physical Examination:    General appearance - alert, well appearing, and in no distress  HEENT- Pink conjunctiva  , Non-icteri sclera,PERRLA  Mental status - alert, oriented to person, place, and time  Neck - supple, no significant adenopathy, no JVD, or carotid bruits  Chest - clear to auscultation, no wheezes, rales or rhonchi, symmetric air entry  Heart - normal rate, regular rhythm, normal S1, S2, no murmurs, rubs, clicks or gallops  Abdomen - soft, nontender, nondistended, no masses or organomegaly  SEBLE- no CVA or flank tenderness, no suprapubic tenderness  Neurological - alert, oriented, normal speech, no focal findings or movement disorder noted  Musculoskeletal/limbs - no joint tenderness, deformity or swelling   - peripheral pulses normal, no pedal edema, no clubbing or cyanosis  Skin - normal coloration and turgor, no rashes, no suspicious skin lesions noted  Psych- appropriate mood and affect    Lab  No results for input(s): CKTOTAL, CKMB, CKMBINDEX, TROPONINI in the last 72 hours. CBC:   Lab Results   Component Value Date    WBC 9.9 12/02/2020    RBC 4.30 12/02/2020    HGB 14.9 12/02/2020    HCT 43.1 12/02/2020    .2 12/02/2020    MCH 34.7 12/02/2020    MCHC 34.6 12/02/2020    RDW 13.7 06/08/2013     12/02/2020    MPV 10.9 12/02/2020     BMP:    Lab Results   Component Value Date     08/25/2021    K 5.1 08/25/2021    K 3.7 03/06/2020    CL 97 08/25/2021    CO2 23 08/25/2021    BUN 9 08/25/2021    LABALBU 4.6 08/25/2021    CREATININE 0.7 08/25/2021    CALCIUM 9.4 08/25/2021    LABGLOM >90 08/25/2021    GLUCOSE 73 08/25/2021    GLUCOSE 59 12/31/2019     Hepatic Function Panel:    Lab Results   Component Value Date    ALKPHOS 79 08/25/2021    ALT 77 08/25/2021    AST 93 08/25/2021    PROT 7.0 08/25/2021    BILITOT 1.2 08/25/2021    BILIDIR 0.4 08/25/2021    LABALBU 4.6 08/25/2021     Magnesium:    Lab Results   Component Value Date    MG 1.9 08/25/2021     Warfarin PT/INR:  No components found for: PTPATWAR, PTINRWAR  HgBA1c:  No results found for: LABA1C  FLP:    Lab Results   Component Value Date    TRIG 58 08/25/2021    HDL 71 08/25/2021    LDLCALC 33 08/25/2021     TSH:    Lab Results   Component Value Date    TSH 1.360 11/25/2019       FINDINGS:  HEMODYNAMIC RESULTS:  LVEDP 29 mmHg.     LEFT VENTRICULOGRAPHY:  Ejection fraction seems to be severely reduced  indicative of severe cardiomyopathy. EF 20%.     CORONARY ANGIOGRAM:  1. RCA:  RCA has mild diffuse disease in the proximal segment. Mid RCA  has chronic total occlusion. _____ estimated at around 20 mL. RCA is a  dominant vessel. It receives left-to-right collaterals that fill the  LPL, LPDA and distal RCA. 2.  Left main coronary artery:  Left main coronary artery is patent  without significant stenosis.   3.  Left circumflex artery:  Proximal LCX has mild diffuse disease. OM1  is a large branching vessel that has no significant stenosis. Distal  left circumflex artery into the OM2 branch had significant bifurcation  lesion estimated at 70% to 80%. It becomes more severe in OM2 up to  90%. 4. LAD:  Left anterior descending artery has mild diffuse disease with  mild calcification in the proximal segment. Mid LAD seems to be patent  without significant stenosis. Distal LAD has a 30% to 50% lesion.     MEDICATIONS:  See MAR.     COMPLICATIONS:  None.     ACCESS:  Right radial artery access. Vasc Band was applied for  hemostasis.     CONCLUSIONS:  1. Severe cardiomyopathy, probably mixed etiology ischemic and  nonischemic. 2.  RCA,  involving mid RCA. Distal RCA fills via collaterals from  left to right. 3.  Severe distal left circumflex and OM2 lesions, status post  successful PCI with stenting.     PLAN:  1. Continue inpatient care. 2.  Optimize medical therapy for coronary disease. 3.  High intensity statin. 4.  Aspirin 81 mg p.o. daily for 1 month. The patient will be on  anticoagulation for new-onset atrial fibrillation, probably will be  started on Eliquis which can be started in the morning. Stop aspirin  after 1 month if the patient remains on anticoagulation. 5.  Plavix 75 mg p.o. daily for 1 year. 6.  Optimize medical therapy for heart failure. 7.  LifeVest is indicated before discharge. 8.  May consider RCA  PCI in the future if medical therapy fails. 9.  Monitor in telemetry.     Findings and plan of care were discussed with the patient in details.  Imani Estevez MD     D: 11/27/2019 17:34:32         Conclusions      Summary   Left Ventricular size is Moderately increased . Normal left ventricular wall thickness. There was severe global hypokinesis of the left ventricle. Systolic function was severely reduced. Ejection fraction is visually estimated at 20%. The left atrium is Moderately dilated.    Mildly reviewed    Continue the current treatment and with constant vigilance to changes in symptoms and also any potential side effects. Return for care or seek medical attention immediately if symptoms got worse and/or develop new symptoms. Cardiomyopathy: improving, no CHF symptoms, no change in clinical condition. Will need periodic echocardiograms depending on symptoms. Cont entresto  Cont  coreg to 12.5 po bid       ICD interrogation  08/2021  Mdt single icd  Okay   optivol wnl  No abnormality noted       Paroxysmal atrial fibrillation, AFib is not captured on the 12-lead  EKG; however, it has been there in the telemetry in the night for couple  of hours and rate between 90 to 103. Cont  apixaban     Hx of intermittent Diarrhea for the last 2 weeks  Being addressed by pcp  Advised hydration    Congestive heart failure: no evidence of fluid overload today, no recent hospitalization for CHF  Cont entresto and  k 5.1  Decrease  aldactone 12.5 po qqod    Pat has been off bumex 0.5 po bid or lasix since feb 2020 per information from his pharmacy and doing well without it. NO need to resume    Coronary artery disease, seems to be stable. Denies angina or change in breathing pattern  Cont apixaban  Cont  plavix 75 po qd     Hyperlipidemia: on statins, followed periodically. Patient need periodic lipid and liver profile. Discussed use, benefit, and side effects of prescribed medications. All patient questions answered. Pt voiced understanding. Instructed to continue current medications, diet and exercise. Continue risk factor modification and medical management. Patient agreed with treatment plan. Follow up as directed.     RTC in 5 months with TERESO Ambrose, [facility]

## 2021-09-01 ENCOUNTER — OFFICE VISIT (OUTPATIENT)
Dept: INTERNAL MEDICINE CLINIC | Age: 66
End: 2021-09-01
Payer: MEDICARE

## 2021-09-01 VITALS
BODY MASS INDEX: 22.15 KG/M2 | HEIGHT: 63 IN | TEMPERATURE: 98.3 F | WEIGHT: 125 LBS | HEART RATE: 64 BPM | DIASTOLIC BLOOD PRESSURE: 80 MMHG | SYSTOLIC BLOOD PRESSURE: 128 MMHG

## 2021-09-01 DIAGNOSIS — F17.200 SMOKER: Primary | ICD-10-CM

## 2021-09-01 DIAGNOSIS — I25.10 CORONARY ARTERY DISEASE INVOLVING NATIVE CORONARY ARTERY OF NATIVE HEART WITHOUT ANGINA PECTORIS: ICD-10-CM

## 2021-09-01 DIAGNOSIS — Z87.891 PERSONAL HISTORY OF TOBACCO USE: ICD-10-CM

## 2021-09-01 PROCEDURE — 99214 OFFICE O/P EST MOD 30 MIN: CPT | Performed by: NURSE PRACTITIONER

## 2021-09-01 RX ORDER — ATORVASTATIN CALCIUM 40 MG/1
80 TABLET, FILM COATED ORAL DAILY
Qty: 60 TABLET | Refills: 3 | Status: SHIPPED | OUTPATIENT
Start: 2021-09-01 | End: 2022-03-15 | Stop reason: SDUPTHER

## 2021-09-01 RX ORDER — ALBUTEROL SULFATE 90 UG/1
2 AEROSOL, METERED RESPIRATORY (INHALATION) 4 TIMES DAILY PRN
Qty: 54 G | Refills: 1 | Status: SHIPPED | OUTPATIENT
Start: 2021-09-01

## 2021-09-01 NOTE — PROGRESS NOTES
[facility]  Robe Mendoza  Dept: [phone]  Dept Fax: [phone]: [phone]     Visit Date:  9/1/2021    Patient:  Teresa Dimas  YOB: 1955    HPI:     Chief Complaint   Patient presents with    3 Month Follow-Up    Congestive Heart Failure    Hypertension     Uriah Ra presents today for medical evaluation of CHF, CAD, HTN. I last seen him 3 months ago. No ER visits or hospitalizations since last visit. He denies any new falls since last visit. He has cut down on smoking significantly, denies cessation. He continues to drink, denies alcohol cessation as well. CHF/CAD/Atrial Fibrillation/HTN-  Cardiac catheterization on 11/27 requiring stent placement to the left circumflex and OM 2. [facility] has been assisting with medication assistance. Is following with Dr. Giuseppe Hernandez routinely. Had an AICD placed on 3/6/2020. No firing. Is on Eliquis 5 mg BID and entresto Denies any abnormal bleeding, hematchezia, or melana. BP today in office 128/8    Limited echocardiogram 2/27/2020    Ejection fraction is visually estimated at 30%. There was moderate global hypokinesis of the left ventricle. Left Ventricular size is Mildly increased . Mildly increased left ventricle wall thickness.   No evidence of any pericardial effusion.     [id number] Score for Atrial Fibrillation Stroke Risk   Risk   Factors  Component Value   C CHF Yes 1   H HTN Yes 1   A2 Age >= 76 No,  (76 y.o.) 0   D DM No 0   S2 Prior Stroke/TIA No 0   V Vascular Disease No 0   A Age 74-69 Yes,  (76 y.o.) 1   Sc Sex male 0    [id number]  Score  3   Score last updated 9/1/21 2:50 PM EDT    Click here for a link to the UpToDate guideline \"Atrial Fibrillation: Anticoagulation therapy to prevent embolization    Disclaimer: Risk Score calculation is dependent on accuracy of patient problem list and past encounter diagnosis.    Paulino Grey is following with Dr. Teresa Cuello for diarrhea. Had an EGD which showed mild gastritis, esophagitis, and Hpylori. Biopsies were taken and sent. He was treated with antibiotics, and diarrhea has improved. Medications    Current Outpatient Medications:     atorvastatin (LIPITOR) 40 MG tablet, Take 2 tablets by mouth daily, Disp: 60 tablet, Rfl: 3    albuterol sulfate HFA (VENTOLIN HFA) 108 (90 Base) MCG/ACT inhaler, Inhale 2 puffs into the lungs 4 times daily as needed for Wheezing, Disp: 54 g, Rfl: 1    spironolactone (ALDACTONE) 25 MG tablet, Take 0.5 tablets by mouth every other day, Disp: 15 tablet, Rfl: 5    nitroGLYCERIN (NITROSTAT) 0.4 MG SL tablet, For chest pain/ angina ,up to max of 3 total doses. If no relief after 1 dose, call 911., Disp: 25 tablet, Rfl: 1    carvedilol (COREG) 12.5 MG tablet, Take 1 tablet by mouth 2 times daily, Disp: 60 tablet, Rfl: 5    sacubitril-valsartan (ENTRESTO) 49-51 MG per tablet, Take 1 tablet by mouth 2 times daily, Disp: 60 tablet, Rfl: 5    apixaban (ELIQUIS) 5 MG TABS tablet, Take 1 tablet by mouth 2 times daily, Disp: 60 tablet, Rfl: 5    Multiple Vitamins-Minerals (CENTRUM SILVER 50+MEN) TABS, Take 1 tablet by mouth daily, Disp: , Rfl:     The patient has No Known Allergies. Past Medical History  Negro Smith  has a past medical history of Asthma, Atrial fibrillation ([facility] [address] CAD S/P percutaneous coronary angioplasty, Cardiomyopathy ([facility] [address] Medtronic single ICD , Pneumonia, and Ulcer. Past Surgical History  The patient  has a past surgical history that includes Tonsillectomy and adenoidectomy; Coronary angioplasty with stent (11/27/2019); eye surgery (Right); pacemaker placement; and Colonoscopy (Left, 5/19/2021). Family History  This patient's family history includes Cancer in his maternal aunt;  Heart Attack in his father, maternal grandfather, maternal grandmother, mother, paternal grandfather, and paternal grandmother; Stroke in his paternal grandmother. Social History  Raiza Umanzor  reports that he has been smoking cigarettes. He has a 50.00 pack-year smoking history. He has never used smokeless tobacco. He reports current alcohol use. He reports that he does not use drugs. Health Maintenance:    Health Maintenance   Topic Date Due    Hepatitis C screen  Never done    Shingles Vaccine (1 of 2) Never done    Low dose CT lung screening  Never done    DTaP/Tdap/Td vaccine (1 - Tdap) 04/16/2016    Annual Wellness Visit (AWV)  Never done    Pneumococcal 65+ years Vaccine (2 of 2 - PPSV23) 10/09/2020    Flu vaccine (1) 09/01/2021    Lipid screen  08/25/2022    Potassium monitoring  08/25/2022    Creatinine monitoring  08/25/2022    Colon cancer screen colonoscopy  05/19/2031    COVID-19 Vaccine  Completed    Hepatitis A vaccine  Aged Out    Hepatitis B vaccine  Aged Out    Hib vaccine  Aged Out    Meningococcal (ACWY) vaccine  Aged Out       Subjective:      Review of Systems   Constitutional: Negative for chills, fatigue and fever. HENT: Negative for congestion, rhinorrhea, sinus pressure, sinus pain, sore throat, tinnitus and trouble swallowing. Eyes: Negative for photophobia and visual disturbance. Respiratory: Positive for shortness of breath (on exertion, chronic). Negative for cough and wheezing. Cardiovascular: Negative for chest pain, palpitations and leg swelling. Gastrointestinal: Negative for abdominal distention, abdominal pain, blood in stool, constipation, diarrhea, nausea and vomiting. Endocrine: Negative for polydipsia, polyphagia and polyuria. Genitourinary: Negative for difficulty urinating, dysuria, frequency, hematuria and urgency. Musculoskeletal: Negative for arthralgias and myalgias. Skin: Negative for rash and wound. Neurological: Negative for dizziness, light-headedness and headaches. Psychiatric/Behavioral: Negative for dysphoric mood and sleep disturbance.  The patient is not nervous/anxious. Objective:     /80 (Site: Left Upper Arm, Position: Sitting, Cuff Size: Medium Adult)   Pulse 64   Temp 98.3 °F (36.8 °C) (Temporal)   Ht 5' 3\" (1.6 m)   Wt 125 lb (56.7 kg)   BMI 22.14 kg/m²     Physical Exam  Constitutional:       General: He is not in acute distress. Appearance: Normal appearance. He is normal weight. He is not ill-appearing. HENT:      Head: Normocephalic and atraumatic. Right Ear: Tympanic membrane, ear canal and external ear normal.      Left Ear: Tympanic membrane, ear canal and external ear normal.      Nose: Nose normal. No congestion or rhinorrhea. Mouth/Throat:      Mouth: Mucous membranes are moist.      Pharynx: Oropharynx is clear. No oropharyngeal exudate or posterior oropharyngeal erythema. Eyes:      Extraocular Movements: Extraocular movements intact. Conjunctiva/sclera: Conjunctivae normal.      Pupils: Pupils are equal, round, and reactive to light. Neck:      Vascular: No carotid bruit. Cardiovascular:      Rate and Rhythm: Normal rate and regular rhythm. Pulses: Normal pulses. Heart sounds: No murmur heard. No friction rub. No gallop. Pulmonary:      Effort: Pulmonary effort is normal. No respiratory distress. Breath sounds: Normal breath sounds. No wheezing, rhonchi or rales. Abdominal:      General: Abdomen is flat. Bowel sounds are normal. There is no distension. Palpations: Abdomen is soft. Tenderness: There is no abdominal tenderness. Musculoskeletal:         General: No tenderness. Normal range of motion. Cervical back: Normal range of motion and neck supple. No muscular tenderness. Right lower leg: No edema. Left lower leg: No edema. Lymphadenopathy:      Cervical: No cervical adenopathy. Skin:     General: Skin is warm and dry. Capillary Refill: Capillary refill takes less than 2 seconds. Findings: No erythema or rash.    Neurological: General: No focal deficit present. Mental Status: He is alert and oriented to person, place, and time. Motor: No weakness. Coordination: Coordination normal.      Deep Tendon Reflexes: Reflexes normal.   Psychiatric:         Mood and Affect: Mood normal.         Behavior: Behavior normal.         Thought Content: Thought content normal.         Judgment: Judgment normal.         Labs Reviewed 9/1/2021:    Lab Results   Component Value Date    WBC 9.9 12/02/2020    HGB 14.9 12/02/2020    HCT 43.1 12/02/2020     12/02/2020    CHOL 116 08/25/2021    TRIG 58 08/25/2021    HDL 71 08/25/2021    ALT 77 (H) 08/25/2021    AST 93 (H) 08/25/2021     08/25/2021    K 5.1 08/25/2021    CL 97 (L) 08/25/2021    CREATININE 0.7 08/25/2021    BUN 9 08/25/2021    CO2 23 08/25/2021    TSH 1.360 11/25/2019    INR 1.45 (H) 02/05/2021       Assessment/Plan      1. Coronary artery disease involving native coronary artery of native heart without angina pectoris    - atorvastatin (LIPITOR) 40 MG tablet; Take 2 tablets by mouth daily  Dispense: 60 tablet; Refill: 3  - Echocardiogram complete; Future    2. Smoker    - CT LUNG SCREEN [Initial/Annual]; Future    3. Personal history of tobacco use        Return in about 6 months (around 3/1/2022). Patient given educational materials - see patient instructions. Discussed use, benefit, and side effects of prescribed medications. All patient questions answered. Pt voiced understanding. Reviewed health maintenance.        Electronically signed DYAN Allen - CNP on 9/1/21 at 2:22 PM EDT          Low Dose CT (LDCT) Lung Screening criteria met   Age 50-69   Pack year smoking >30   Still smoking or less than 15 year since quit   No sign or symptoms of lung cancer   > 11 months since last LDCT     Risks and benefits of lung cancer screening with LDCT scans discussed:    Significance of positive screen - False-positive LDCT results often occur. 95% of all positive results do not lead to a diagnosis of cancer. Usually further imaging can resolve most false-positive results; however, some patients may require invasive procedures. Over diagnosis risk - 10% to 12% of screen-detected lung cancer cases are over diagnosed--that is, the cancer would not have been detected in the patient's lifetime without the screening. Need for follow up screens annually to continue lung cancer screening effectiveness     Risks associated with radiation from annual LDCT- Radiation exposure is about the same as for a mammogram, which is about 1/3 of the annual background radiation exposure from everyday life. Starting screening at age 54 is not likely to increase cancer risk from radiation exposure. Patients with comorbidities resulting in life expectancy of < 10 years, or that would preclude treatment of an abnormality identified on CT, should not be screened due to lack of benefit.     To obtain maximal benefit from this screening, smoking cessation and long-term abstinence from smoking is critical

## 2021-09-12 ASSESSMENT — ENCOUNTER SYMPTOMS
ABDOMINAL PAIN: 0
SINUS PRESSURE: 0
SHORTNESS OF BREATH: 1
RHINORRHEA: 0
WHEEZING: 0
SINUS PAIN: 0
SORE THROAT: 0
ABDOMINAL DISTENTION: 0
DIARRHEA: 0
BLOOD IN STOOL: 0
CONSTIPATION: 0
COUGH: 0
TROUBLE SWALLOWING: 0
PHOTOPHOBIA: 0
VOMITING: 0
NAUSEA: 0

## 2021-09-24 ENCOUNTER — HOSPITAL ENCOUNTER (OUTPATIENT)
Dept: NON INVASIVE DIAGNOSTICS | Age: 66
Discharge: HOME | End: 2021-09-24
Payer: MEDICARE

## 2021-09-24 ENCOUNTER — HOSPITAL ENCOUNTER (OUTPATIENT)
Dept: CT IMAGING | Age: 66
Discharge: HOME | End: 2021-09-24
Payer: MEDICARE

## 2021-09-24 DIAGNOSIS — F17.200 SMOKER: ICD-10-CM

## 2021-09-24 DIAGNOSIS — I25.10 CORONARY ARTERY DISEASE INVOLVING NATIVE CORONARY ARTERY OF NATIVE HEART WITHOUT ANGINA PECTORIS: ICD-10-CM

## 2021-09-24 LAB
LV EF: 50 %
LVEF MODALITY: NORMAL

## 2021-09-24 PROCEDURE — 93306 TTE W/DOPPLER COMPLETE: CPT

## 2021-09-24 PROCEDURE — 71271 CT THORAX LUNG CANCER SCR C-: CPT

## 2021-09-29 ENCOUNTER — PROCEDURE VISIT (OUTPATIENT)
Dept: CARDIOLOGY CLINIC | Age: 66
End: 2021-09-29

## 2021-09-29 ENCOUNTER — TELEPHONE (OUTPATIENT)
Dept: CARDIOLOGY CLINIC | Age: 66
End: 2021-09-29

## 2021-09-29 DIAGNOSIS — Z95.810 ICD (IMPLANTABLE CARDIOVERTER-DEFIBRILLATOR) IN PLACE: Primary | ICD-10-CM

## 2021-09-29 PROCEDURE — 93296 REM INTERROG EVL PM/IDS: CPT | Performed by: INTERNAL MEDICINE

## 2021-09-29 PROCEDURE — 93295 DEV INTERROG REMOTE 1/2/MLT: CPT | Performed by: INTERNAL MEDICINE

## 2021-09-29 NOTE — LETTER
[facility] [address] [place] [address]  Phone: [phone]  Fax: [phone]    DYAN Sweeney CNP        October 4, 2021    Kristy Henbenja  [address]      Dear Lindy Monroy:    We have been unable to reach you by phone. Please contact our office at [phone] at your earliest convenience. Thank you. If you have any questions or concerns, please don't hesitate to call.     Sincerely,        DYAN Sweeney CNP

## 2021-09-29 NOTE — PROGRESS NOTES
[facility] medtronic single icd     10.4 years on device   RV imped 380  shock43  SVC 56  R waves 11.8  Threshold 0.75 @ 0.4  0.2% pace d  Elevated optivol will send to CHF

## 2021-10-04 NOTE — TELEPHONE ENCOUNTER
Called to patient, phone off. [facility]. Unable to contact patient. Letter mailed.

## 2021-10-11 ENCOUNTER — TELEPHONE (OUTPATIENT)
Dept: INTERNAL MEDICINE CLINIC | Age: 66
End: 2021-10-11

## 2021-10-11 DIAGNOSIS — I50.9 CONGESTIVE HEART FAILURE, UNSPECIFIED HF CHRONICITY, UNSPECIFIED HEART FAILURE TYPE (HCC): ICD-10-CM

## 2021-10-11 DIAGNOSIS — F17.200 SMOKER: Primary | ICD-10-CM

## 2021-10-11 NOTE — TELEPHONE ENCOUNTER
Called patient to inform patient of the information below.    Spoke with pt and he voiced he understood recommendations

## 2021-11-03 ENCOUNTER — TELEPHONE (OUTPATIENT)
Dept: CARDIOLOGY CLINIC | Age: 66
End: 2021-11-03

## 2021-11-03 ENCOUNTER — PROCEDURE VISIT (OUTPATIENT)
Dept: CARDIOLOGY CLINIC | Age: 66
End: 2021-11-03
Payer: MEDICARE

## 2021-11-03 DIAGNOSIS — I50.42 CHRONIC COMBINED SYSTOLIC AND DIASTOLIC CONGESTIVE HEART FAILURE (HCC): Primary | ICD-10-CM

## 2021-11-03 PROCEDURE — 93297 REM INTERROG DEV EVAL ICPMS: CPT | Performed by: INTERNAL MEDICINE

## 2021-11-03 PROCEDURE — G2066 INTER DEVC REMOTE 30D: HCPCS | Performed by: INTERNAL MEDICINE

## 2021-11-03 NOTE — TELEPHONE ENCOUNTER
[facility] Medtronic Single ICD Optivol  Pt of Fransisco/Fior Prasad    Battery 10.4 years    Optivol elevated

## 2021-11-03 NOTE — PROGRESS NOTES
[facility] Medtronic Single ICD Optivol  Pt of Fransisco/Fiorparis Prasad    Battery 10.4 years    Optivol elevated- will send to [facility]F [facility]

## 2021-12-08 ENCOUNTER — PROCEDURE VISIT (OUTPATIENT)
Dept: CARDIOLOGY CLINIC | Age: 66
End: 2021-12-08
Payer: MEDICARE

## 2021-12-08 ENCOUNTER — TELEPHONE (OUTPATIENT)
Dept: CARDIOLOGY | Age: 66
End: 2021-12-08

## 2021-12-08 DIAGNOSIS — I50.20 SYSTOLIC CONGESTIVE HEART FAILURE, UNSPECIFIED HF CHRONICITY (HCC): Primary | ICD-10-CM

## 2021-12-08 PROCEDURE — G2066 INTER DEVC REMOTE 30D: HCPCS | Performed by: INTERNAL MEDICINE

## 2021-12-08 PROCEDURE — 93297 REM INTERROG DEV EVAL ICPMS: CPT | Performed by: INTERNAL MEDICINE

## 2021-12-08 NOTE — LETTER
[facility]  [facility]  [address]  [place] [address]  Phone: [phone]  Fax: [phone]    DYAN Haas CNP        December 15, 2021    [address]      Dear Bautista Silva:    We have been trying to contact you regarding you recent device download and any heart failure symptoms.  Please contact the office at [phone]      Sincerely,        DYAN Haas CNP

## 2021-12-15 DIAGNOSIS — I48.0 PAROXYSMAL ATRIAL FIBRILLATION (HCC): ICD-10-CM

## 2022-01-12 ENCOUNTER — PROCEDURE VISIT (OUTPATIENT)
Dept: CARDIOLOGY CLINIC | Age: 67
End: 2022-01-12
Payer: MEDICARE

## 2022-01-12 DIAGNOSIS — Z95.810 ICD (IMPLANTABLE CARDIOVERTER-DEFIBRILLATOR) IN PLACE: Primary | ICD-10-CM

## 2022-01-12 PROCEDURE — 93295 DEV INTERROG REMOTE 1/2/MLT: CPT | Performed by: INTERNAL MEDICINE

## 2022-01-12 PROCEDURE — 93296 REM INTERROG EVL PM/IDS: CPT | Performed by: INTERNAL MEDICINE

## 2022-01-12 NOTE — PROGRESS NOTES
[facility] medtronic single icd     10.2 years   RV imped 399  Shock 43  SVC 58  Threshold 0.625 @ 0.4  0.1% RVP  optivol WNL

## 2022-02-14 DIAGNOSIS — I48.0 PAROXYSMAL ATRIAL FIBRILLATION (HCC): Primary | ICD-10-CM

## 2022-02-14 RX ORDER — SACUBITRIL AND VALSARTAN 49; 51 MG/1; MG/1
1 TABLET, FILM COATED ORAL 2 TIMES DAILY
Qty: 60 TABLET | Refills: 5 | Status: SHIPPED | OUTPATIENT
Start: 2022-02-14

## 2022-02-16 ENCOUNTER — PROCEDURE VISIT (OUTPATIENT)
Dept: CARDIOLOGY CLINIC | Age: 67
End: 2022-02-16
Payer: MEDICARE

## 2022-02-16 DIAGNOSIS — I50.20 SYSTOLIC CONGESTIVE HEART FAILURE, UNSPECIFIED HF CHRONICITY (HCC): Primary | ICD-10-CM

## 2022-02-16 NOTE — PROGRESS NOTES
[facility]  Pt of [place]    Battery 10.2 years    Optivol WNL

## 2022-02-18 PROCEDURE — 93297 REM INTERROG DEV EVAL ICPMS: CPT | Performed by: INTERNAL MEDICINE

## 2022-02-18 PROCEDURE — G2066 INTER DEVC REMOTE 30D: HCPCS | Performed by: INTERNAL MEDICINE

## 2022-03-03 RX ORDER — CARVEDILOL 12.5 MG/1
12.5 TABLET ORAL 2 TIMES DAILY
Qty: 60 TABLET | Refills: 5 | Status: SHIPPED | OUTPATIENT
Start: 2022-03-03 | End: 2022-09-15

## 2022-03-15 DIAGNOSIS — I25.10 CORONARY ARTERY DISEASE INVOLVING NATIVE CORONARY ARTERY OF NATIVE HEART WITHOUT ANGINA PECTORIS: ICD-10-CM

## 2022-03-15 RX ORDER — ATORVASTATIN CALCIUM 40 MG/1
80 TABLET, FILM COATED ORAL DAILY
Qty: 60 TABLET | Refills: 3 | Status: SHIPPED | OUTPATIENT
Start: 2022-03-15 | End: 2022-08-15 | Stop reason: SDUPTHER

## 2022-03-23 ENCOUNTER — TELEPHONE (OUTPATIENT)
Dept: CARDIOLOGY CLINIC | Age: 67
End: 2022-03-23

## 2022-03-23 ENCOUNTER — PROCEDURE VISIT (OUTPATIENT)
Dept: CARDIOLOGY CLINIC | Age: 67
End: 2022-03-23
Payer: MEDICARE

## 2022-03-23 DIAGNOSIS — I50.20 SYSTOLIC CONGESTIVE HEART FAILURE, UNSPECIFIED HF CHRONICITY (HCC): Primary | ICD-10-CM

## 2022-03-23 PROCEDURE — G2066 INTER DEVC REMOTE 30D: HCPCS | Performed by: INTERNAL MEDICINE

## 2022-03-23 PROCEDURE — 93297 REM INTERROG DEV EVAL ICPMS: CPT | Performed by: INTERNAL MEDICINE

## 2022-03-23 NOTE — PROGRESS NOTES
[facility]  Pt of [place]/Fior Prasad     Battery 10.1 years    Hx PAF- taking eliquis  Episodes  afib -- ? PACs vs PVCs    Optivol elevated. Will send to CHF clinic to address.

## 2022-03-23 NOTE — TELEPHONE ENCOUNTER
[facility] Medtronic Single ICD Optivol  Pt of [place]/Fior Prasad     Battery 10.1 years      Optivol elevated.

## 2022-03-28 ENCOUNTER — OFFICE VISIT (OUTPATIENT)
Dept: CARDIOLOGY CLINIC | Age: 67
End: 2022-03-28
Payer: MEDICARE

## 2022-03-28 VITALS
BODY MASS INDEX: 20.98 KG/M2 | DIASTOLIC BLOOD PRESSURE: 84 MMHG | WEIGHT: 118.4 LBS | HEART RATE: 54 BPM | HEIGHT: 63 IN | SYSTOLIC BLOOD PRESSURE: 156 MMHG

## 2022-03-28 DIAGNOSIS — I10 ESSENTIAL HYPERTENSION: ICD-10-CM

## 2022-03-28 DIAGNOSIS — Z95.810 ICD (IMPLANTABLE CARDIOVERTER-DEFIBRILLATOR) IN PLACE: ICD-10-CM

## 2022-03-28 DIAGNOSIS — I25.5 ISCHEMIC CARDIOMYOPATHY: ICD-10-CM

## 2022-03-28 DIAGNOSIS — I71.40 ABDOMINAL AORTIC ANEURYSM (AAA) WITHOUT RUPTURE: ICD-10-CM

## 2022-03-28 DIAGNOSIS — I25.10 CORONARY ARTERY DISEASE INVOLVING NATIVE CORONARY ARTERY OF NATIVE HEART WITHOUT ANGINA PECTORIS: Primary | ICD-10-CM

## 2022-03-28 DIAGNOSIS — I48.0 PAROXYSMAL ATRIAL FIBRILLATION (HCC): ICD-10-CM

## 2022-03-28 DIAGNOSIS — I50.42 CHRONIC COMBINED SYSTOLIC AND DIASTOLIC CONGESTIVE HEART FAILURE (HCC): ICD-10-CM

## 2022-03-28 DIAGNOSIS — Z95.820 S/P ANGIOPLASTY WITH STENT: ICD-10-CM

## 2022-03-28 DIAGNOSIS — E78.5 DYSLIPIDEMIA: ICD-10-CM

## 2022-03-28 DIAGNOSIS — Z72.0 TOBACCO ABUSE: ICD-10-CM

## 2022-03-28 PROCEDURE — 4040F PNEUMOC VAC/ADMIN/RCVD: CPT | Performed by: INTERNAL MEDICINE

## 2022-03-28 PROCEDURE — 4004F PT TOBACCO SCREEN RCVD TLK: CPT | Performed by: INTERNAL MEDICINE

## 2022-03-28 PROCEDURE — 3017F COLORECTAL CA SCREEN DOC REV: CPT | Performed by: INTERNAL MEDICINE

## 2022-03-28 PROCEDURE — 1123F ACP DISCUSS/DSCN MKR DOCD: CPT | Performed by: INTERNAL MEDICINE

## 2022-03-28 PROCEDURE — G8420 CALC BMI NORM PARAMETERS: HCPCS | Performed by: INTERNAL MEDICINE

## 2022-03-28 PROCEDURE — G8427 DOCREV CUR MEDS BY ELIG CLIN: HCPCS | Performed by: INTERNAL MEDICINE

## 2022-03-28 PROCEDURE — 93000 ELECTROCARDIOGRAM COMPLETE: CPT | Performed by: INTERNAL MEDICINE

## 2022-03-28 PROCEDURE — 99214 OFFICE O/P EST MOD 30 MIN: CPT | Performed by: INTERNAL MEDICINE

## 2022-03-28 PROCEDURE — G8484 FLU IMMUNIZE NO ADMIN: HCPCS | Performed by: INTERNAL MEDICINE

## 2022-03-28 RX ORDER — AMLODIPINE BESYLATE 5 MG/1
5 TABLET ORAL DAILY
Qty: 30 TABLET | Refills: 8 | Status: SHIPPED | OUTPATIENT
Start: 2022-03-28

## 2022-03-28 NOTE — PROGRESS NOTES
Chief Complaint   Patient presents with    6 Month Follow-Up    Coronary Artery Disease    Congestive Heart Failure       Originally Seen in hospital for new CHF and new CMP and New onset atr fib  Had cath and sent home on vest and oMT        6 month fu    Pt states he stopped taking aldactone 12.5 po QOD a few months ago- doesn't remember when. States he ran out     EKG today    Denied cp,  palpitations, dizziness or edema    Sob on exertion stable    Record reviewed    Patient Seen, Chart, Consults notes, Labs, Radiology studies reviewed.     Has beed on asa/plavix and apixaban now  On apixaban and asa      Feel good today and post D/c    Patient Active Problem List   Diagnosis    Acute systolic (congestive) heart failure (HCC)    Tobacco abuse    ETOH abuse    Essential hypertension    Paroxysmal atrial fibrillation (HCC)    Nonsustained ventricular tachycardia (HCC)    Systolic congestive heart failure (HCC)    Abdominal aortic aneurysm (AAA) without rupture ([address]    Coronary artery disease involving native coronary artery of native heart without angina pectoris    Ischemic cardiomyopathy    S/P angioplasty with stent- LCX/OM nov 2019    Chronic combined systolic and diastolic congestive heart failure (HCC)    Dyslipidemia    Cardiomyopathy ([address]    Medtronic single ICD     Anticoagulated on Coumadin       Past Surgical History:   Procedure Laterality Date    COLONOSCOPY Left 5/19/2021    COLONOSCOPY WITH BIOPSY performed by Duy Sosa MD at [facility]   [facility] 24  11/27/2019    Stent Circ and OM    EYE SURGERY Right     cataract    PACEMAKER PLACEMENT      TONSILLECTOMY AND ADENOIDECTOMY         No Known Allergies     Family History   Problem Relation Age of Onset    Heart Attack Mother     Heart Attack Father     Cancer Maternal Aunt     Heart Attack Maternal Grandmother     Heart Attack Maternal Grandfather     Heart Attack Paternal Not on file   Housing Stability:     Unable to Pay for Housing in the Last Year: Not on file    Number of Places Lived in the Last Year: Not on file    Unstable Housing in the Last Year: Not on file       Current Outpatient Medications   Medication Sig Dispense Refill    amLODIPine (NORVASC) 5 MG tablet Take 1 tablet by mouth daily 30 tablet 8    atorvastatin (LIPITOR) 40 MG tablet Take 2 tablets by mouth daily 60 tablet 3    carvedilol (COREG) 12.5 MG tablet Take 1 tablet by mouth 2 times daily 60 tablet 5    sacubitril-valsartan (ENTRESTO) 49-51 MG per tablet Take 1 tablet by mouth 2 times daily 60 tablet 5    apixaban (ELIQUIS) 5 MG TABS tablet Take 1 tablet by mouth 2 times daily 60 tablet 5    albuterol sulfate HFA (VENTOLIN HFA) 108 (90 Base) MCG/ACT inhaler Inhale 2 puffs into the lungs 4 times daily as needed for Wheezing 54 g 1    nitroGLYCERIN (NITROSTAT) 0.4 MG SL tablet For chest pain/ angina ,up to max of 3 total doses. If no relief after 1 dose, call 911. 25 tablet 1    Multiple Vitamins-Minerals (CENTRUM SILVER 50+MEN) TABS Take 1 tablet by mouth daily       No current facility-administered medications for this visit. Review of Systems -     General ROS: negative  Psychological ROS: negative  Hematological and Lymphatic ROS: No history of blood clots or bleeding disorder. Respiratory ROS: no cough,  or wheezing, the rest see HPI  Cardiovascular ROS: See HPI  Gastrointestinal ROS: negative  Genito-Urinary ROS: no dysuria, trouble voiding, or hematuria  Musculoskeletal ROS: negative  Neurological ROS: no TIA or stroke symptoms  Dermatological ROS: negative      Blood pressure (!) 156/84, pulse 54, height 5' 2.5\" (1.588 m), weight 118 lb 6.4 oz (53.7 kg).         Physical Examination:    General appearance - alert, well appearing, and in no distress  HEENT- Pink conjunctiva  , Non-icteri sclera,PERRLA  Mental status - alert, oriented to person, place, and time  Neck - supple, no significant adenopathy, no JVD, or carotid bruits  Chest - clear to auscultation, no wheezes, rales or rhonchi, symmetric air entry  Heart - normal rate, regular rhythm, normal S1, S2, no murmurs, rubs, clicks or gallops  Abdomen - soft, nontender, nondistended, no masses or organomegaly  SEBLE- no CVA or flank tenderness, no suprapubic tenderness  Neurological - alert, oriented, normal speech, no focal findings or movement disorder noted  Musculoskeletal/limbs - no joint tenderness, deformity or swelling   - peripheral pulses normal, no pedal edema, no clubbing or cyanosis  Skin - normal coloration and turgor, no rashes, no suspicious skin lesions noted  Psych- appropriate mood and affect    Lab  No results for input(s): CKTOTAL, CKMB, CKMBINDEX, TROPONINI in the last 72 hours.   CBC:   Lab Results   Component Value Date    WBC 9.9 12/02/2020    RBC 4.30 12/02/2020    HGB 14.9 12/02/2020    HCT 43.1 12/02/2020    .2 12/02/2020    MCH 34.7 12/02/2020    MCHC 34.6 12/02/2020    RDW 13.7 06/08/2013     12/02/2020    MPV 10.9 12/02/2020     BMP:    Lab Results   Component Value Date     08/25/2021    K 5.1 08/25/2021    K 3.7 03/06/2020    CL 97 08/25/2021    CO2 23 08/25/2021    BUN 9 08/25/2021    LABALBU 4.6 08/25/2021    CREATININE 0.7 08/25/2021    CALCIUM 9.4 08/25/2021    LABGLOM >90 08/25/2021    GLUCOSE 73 08/25/2021    GLUCOSE 59 12/31/2019     Hepatic Function Panel:    Lab Results   Component Value Date    ALKPHOS 79 08/25/2021    ALT 77 08/25/2021    AST 93 08/25/2021    PROT 7.0 08/25/2021    BILITOT 1.2 08/25/2021    BILIDIR 0.4 08/25/2021    LABALBU 4.6 08/25/2021     Magnesium:    Lab Results   Component Value Date    MG 1.9 08/25/2021     Warfarin PT/INR:  No components found for: PTPATWAR, PTINRWAR  HgBA1c:  No results found for: LABA1C  FLP:    Lab Results   Component Value Date    TRIG 58 08/25/2021    HDL 71 08/25/2021    LDLCALC 33 08/25/2021     TSH:    Lab Results   Component Value Date    TSH 1.360 11/25/2019   cath    FINDINGS:  HEMODYNAMIC RESULTS:  LVEDP 29 mmHg.     LEFT VENTRICULOGRAPHY:  Ejection fraction seems to be severely reduced  indicative of severe cardiomyopathy. EF 20%.     CORONARY ANGIOGRAM:  1. RCA:  RCA has mild diffuse disease in the proximal segment. Mid RCA  has chronic total occlusion. _____ estimated at around 20 mL. RCA is a  dominant vessel. It receives left-to-right collaterals that fill the  LPL, LPDA and distal RCA. 2.  Left main coronary artery:  Left main coronary artery is patent  without significant stenosis. 3.  Left circumflex artery:  Proximal LCX has mild diffuse disease. OM1  is a large branching vessel that has no significant stenosis. Distal  left circumflex artery into the OM2 branch had significant bifurcation  lesion estimated at 70% to 80%. It becomes more severe in OM2 up to  90%. 4. LAD:  Left anterior descending artery has mild diffuse disease with  mild calcification in the proximal segment. Mid LAD seems to be patent  without significant stenosis. Distal LAD has a 30% to 50% lesion.     MEDICATIONS:  See MAR.     COMPLICATIONS:  None.     ACCESS:  Right radial artery access. Vasc Band was applied for  hemostasis.     CONCLUSIONS:  1. Severe cardiomyopathy, probably mixed etiology ischemic and  nonischemic. 2.  RCA,  involving mid RCA. Distal RCA fills via collaterals from  left to right. 3.  Severe distal left circumflex and OM2 lesions, status post  successful PCI with stenting.     PLAN:  1. Continue inpatient care. 2.  Optimize medical therapy for coronary disease. 3.  High intensity statin. 4.  Aspirin 81 mg p.o. daily for 1 month. The patient will be on  anticoagulation for new-onset atrial fibrillation, probably will be  started on Eliquis which can be started in the morning. Stop aspirin  after 1 month if the patient remains on anticoagulation. 5.  Plavix 75 mg p.o. daily for 1 year.   6.  Optimize medical therapy for heart failure. 7.  LifeVest is indicated before discharge. 8.  May consider RCA  PCI in the future if medical therapy fails. 9.  Monitor in telemetry.     Findings and plan of care were discussed with the patient in details.  Yasmeen Dailey MD     D: 11/27/2019 17:34:32         Conclusions      Summary   Left Ventricular size is Moderately increased . Normal left ventricular wall thickness. There was severe global hypokinesis of the left ventricle. Systolic function was severely reduced. Ejection fraction is visually estimated at 20%. The left atrium is Moderately dilated. Mildly enlarged right atrium size. Mild tricuspid regurgitation visualized. Right ventricular systolic pressure measures 40 mmhg. Aortic aneurysm noted in the ascending aorta . Aortic aneurysm measures 4 cm . Signature      ----------------------------------------------------------------   Electronically signed by Rachel Olmstead MD (Interpreting   physician) on 11/26/2019 at 12:25 PM    ekg 3/30/21  Sinus  Rhythm   Low voltage in limb leads.    -Poor R-wave progression -may be secondary to pulmonary disease   consider old anterior infarct.    -  Nonspecific T-abnormality. ABNORMAL   No acute abn    ekg 3/28/22    Sinus  Rhythm   -  Nonspecific T-abnormality. ABNORMAL         Assessment   Diagnosis Orders   1. Coronary artery disease involving native coronary artery of native heart without angina pectoris     2. Paroxysmal atrial fibrillation (HCC)  EKG 12 lead   3. Ischemic cardiomyopathy  EKG 12 lead   4. Essential hypertension     5. Dyslipidemia     6. Chronic combined systolic and diastolic congestive heart failure ([address]     7. Medtronic single ICD      8. S/P angioplasty with stent- LCX/OM nov 2019     9. Tobacco abuse     10. Abdominal aortic aneurysm (AAA) without rupture [facility])         Recent admission DX Nov 2019  ASSESSMENT:  1.   Acute combined systolic-diastolic congestive heart failure  exacerbation, new onset. 2.  Cardiomyopathy, newly diagnosed, ejection fraction 20% on this  admission. 3.  Paroxysmal atrial fibrillation, AFib is not captured on the 12-lead  EKG; however, it has been there in the telemetry in the night for couple  of hours and rate between 90 to 103.  4.  Episode of nonsustained VT composed of 6 beats, rate 150 beats per  minute. 5.  Hypertension, poorly controlled, needs a better control. 6.  Tobacco abuse. 7.  Alcohol abuse. 8.  Family history of coronary artery disease. 9.  Hyponatremia, sodium dropped from 134 yesterday to 130 today, so the  patient needs fluid restriction and avoid thiazide diuretics. 10.  The patient has multiple issues. CHADS-VASc of 3 with  hypertension, congestive heart failure with cardiomyopathy and age 75. Plan   The  current meds and labs reviewed    Continue the current treatment and with constant vigilance to changes in symptoms and also any potential side effects. Return for care or seek medical attention immediately if symptoms got worse and/or develop new symptoms. Cardiomyopathy: improving, no CHF symptoms, no change in clinical condition. Will need periodic echocardiograms depending on symptoms. Cont entresto  Cont  coreg to 12.5 po bid       ICD interrogation  03/2022  Mdt single icd  Okay   optivol wnl  No abnormality noted       Paroxysmal atrial fibrillation, AFib is not captured on the 12-lead  EKG; however, it has been there in the telemetry in the night for couple  of hours and rate between 90 to 103. Cont  apixaban     Congestive heart failure: no evidence of fluid overload today, no recent hospitalization for CHF  Cont entresto and  Off   aldactone 12.5 po qod doing well and k was 5.1  Asim Boucher has been off bumex 0.5 po bid or lasix since feb 2020 per information from his pharmacy and doing well without it. NO need to resume    Coronary artery disease, seems to be stable.  Denies angina or change in breathing pattern  Cont apixaban  Cont  plavix 75 po qd  Cont apixaban    Hypertension, on medical treatment. Seems to be under good control. Patient is compliant with medical treatment. Need better BP contriol  Start norvasc 5 mg po qd       Hyperlipidemia: on statins, followed periodically. Patient need periodic lipid and liver profile. Lab prior to next visit    Discussed use, benefit, and side effects of prescribed medications. All patient questions answered. Pt voiced understanding. Instructed to continue current medications, diet and exercise. Continue risk factor modification and medical management. Patient agreed with treatment plan. Follow up as directed.     RTC in 5 months with TERESO Saldivar, [facility]

## 2022-03-28 NOTE — TELEPHONE ENCOUNTER
Called to patient, no answer. UT. FYI: have been unable to contact patient.   Has an appt today with Dr Mariella Riley

## 2022-05-04 ENCOUNTER — PROCEDURE VISIT (OUTPATIENT)
Dept: CARDIOLOGY CLINIC | Age: 67
End: 2022-05-04
Payer: MEDICARE

## 2022-05-04 DIAGNOSIS — I50.42 CHRONIC COMBINED SYSTOLIC AND DIASTOLIC CONGESTIVE HEART FAILURE (HCC): Primary | ICD-10-CM

## 2022-05-04 PROCEDURE — 93297 REM INTERROG DEV EVAL ICPMS: CPT | Performed by: INTERNAL MEDICINE

## 2022-05-04 PROCEDURE — G2066 INTER DEVC REMOTE 30D: HCPCS | Performed by: INTERNAL MEDICINE

## 2022-05-04 NOTE — PROGRESS NOTES
DR Dariela Santiago PT   MEDTRONIC OPTIVOL REMOTE   BATTERY 10 YRS REMAINING    2 HIGH VENT RATE EPISODES/ LONGEST 2 SECONDS   OPTIVOL WAS ELEVATED BUT NOW BACK TO NORMAL LIMITS

## 2022-06-14 ENCOUNTER — NURSE ONLY (OUTPATIENT)
Dept: CARDIOLOGY CLINIC | Age: 67
End: 2022-06-14
Payer: MEDICARE

## 2022-06-14 ENCOUNTER — OFFICE VISIT (OUTPATIENT)
Dept: CARDIOLOGY CLINIC | Age: 67
End: 2022-06-14
Payer: MEDICARE

## 2022-06-14 VITALS
WEIGHT: 119.2 LBS | SYSTOLIC BLOOD PRESSURE: 122 MMHG | HEIGHT: 63 IN | HEART RATE: 60 BPM | OXYGEN SATURATION: 95 % | DIASTOLIC BLOOD PRESSURE: 72 MMHG | BODY MASS INDEX: 21.12 KG/M2

## 2022-06-14 DIAGNOSIS — Z95.810 ICD (IMPLANTABLE CARDIOVERTER-DEFIBRILLATOR) IN PLACE: Primary | ICD-10-CM

## 2022-06-14 DIAGNOSIS — I48.0 PAROXYSMAL ATRIAL FIBRILLATION (HCC): ICD-10-CM

## 2022-06-14 DIAGNOSIS — I25.5 ISCHEMIC CARDIOMYOPATHY: ICD-10-CM

## 2022-06-14 DIAGNOSIS — I25.10 CORONARY ARTERY DISEASE INVOLVING NATIVE HEART WITHOUT ANGINA PECTORIS, UNSPECIFIED VESSEL OR LESION TYPE: ICD-10-CM

## 2022-06-14 DIAGNOSIS — I10 ESSENTIAL HYPERTENSION: ICD-10-CM

## 2022-06-14 DIAGNOSIS — I50.22 CHF (CONGESTIVE HEART FAILURE), NYHA CLASS II, CHRONIC, SYSTOLIC (HCC): Primary | ICD-10-CM

## 2022-06-14 PROCEDURE — 4004F PT TOBACCO SCREEN RCVD TLK: CPT | Performed by: NURSE PRACTITIONER

## 2022-06-14 PROCEDURE — G8427 DOCREV CUR MEDS BY ELIG CLIN: HCPCS | Performed by: NURSE PRACTITIONER

## 2022-06-14 PROCEDURE — 3017F COLORECTAL CA SCREEN DOC REV: CPT | Performed by: NURSE PRACTITIONER

## 2022-06-14 PROCEDURE — 1123F ACP DISCUSS/DSCN MKR DOCD: CPT | Performed by: NURSE PRACTITIONER

## 2022-06-14 PROCEDURE — 93282 PRGRMG EVAL IMPLANTABLE DFB: CPT | Performed by: INTERNAL MEDICINE

## 2022-06-14 PROCEDURE — 99214 OFFICE O/P EST MOD 30 MIN: CPT | Performed by: NURSE PRACTITIONER

## 2022-06-14 PROCEDURE — 93290 INTERROG DEV EVAL ICPMS IP: CPT | Performed by: INTERNAL MEDICINE

## 2022-06-14 PROCEDURE — G8420 CALC BMI NORM PARAMETERS: HCPCS | Performed by: NURSE PRACTITIONER

## 2022-06-14 PROCEDURE — 99406 BEHAV CHNG SMOKING 3-10 MIN: CPT | Performed by: NURSE PRACTITIONER

## 2022-06-14 ASSESSMENT — ENCOUNTER SYMPTOMS
ABDOMINAL DISTENTION: 0
COUGH: 0
SHORTNESS OF BREATH: 0

## 2022-06-14 NOTE — PATIENT INSTRUCTIONS
You may receive a survey regarding the care you received during your visit. Your input is valuable to us. We encourage you to complete and return your survey. We hope you will choose us in the future for your healthcare needs.     Continue:  · Continue current medications  · Daily weights and record  · Fluid restriction of 2 Liters per day  · Limit sodium in diet to around 9601-2766 mg/day  · Monitor BP  · Activity as tolerated     Call the Heart Failure Clinic for any of the following symptoms: [phone]   Weight gain of 2-3 pounds in 1 day or 5 pounds in 1 week   Increased shortness of breath   Shortness of breath while laying down   Cough   Chest pain   Swelling in feet, ankles or legs   Tenderness or bloating in the abdomen   Fatigue    Decreased appetite or nausea    Confusion

## 2022-06-14 NOTE — PROGRESS NOTES
medtronic single ICD in office   [place] pt     10 years on device   RV imped 437  Shock 46  SVC 60  Threshold   0.1%  optivol WNL  R waves 12.1  Threshold 0.75 @ 0.4

## 2022-06-14 NOTE — PROGRESS NOTES
Heart Failure Clinic       Visit Date: 6/14/2022  Cardiologist:  Dr. Toyin Denise  Primary Care Physician: Dr. Jennifer Ramírez, APRN - CNP    Stephanie Ornelas is a 74 y.o. male who presents today for:  No chief complaint on file. HPI:   Stephanie Ornelas is a 74 y.o. male who presents to the office for a follow up patient visit in the heart failure clinic. Accompanied by no one  Lives at [facility]     Type HF: Combined (EF 20%, improved 30%)  Cause: ICM/NICM  Device: Single ICD  HX:  PAF (Eliquis), HTN, Asthma, hx ETOH (1/3L Gin/day x years), Tobacco abuse (50+ years)      Hospitalization r/t Heart Failure:  11/25-12/1 = CHF exac - went in w/ SOB, increased swelling.  + CXR.  BNP 13245 - down to 4723 on discharge.  IV Lasix.  Discharge wt 124#  LHC - nonischemic/ischemic EF 20%.      Was not on any meds prior to admission    Last OV 6/2021 - doing well. Still daily ETOH use and smoking  Today: slowed down little over last year. Not doing as much - feels just r/t getting older  Denies fluid overload or worsening SOB.    No fluid on exam.   Still drinking daily and smoking 1/2-1 pack/day    Patient has:  Chest Pain: no  SOB: no  Orthopnea/PND: no  FLORI: no  Edema: no  Fatigue: some  Abdominal bloating: no  Cough: no  Appetite: good  Home weight: stable  Home blood pressure: not check    Past Medical History:   Diagnosis Date    Asthma     Atrial fibrillation ([facility] [address] 11/2019    CAD S/P percutaneous coronary angioplasty 11/27/2019    Cardiomyopathy ([facility] [address] 11/2019    Medtronic single ICD  3/12/2020    Pneumonia     Ulcer      Past Surgical History:   Procedure Laterality Date    COLONOSCOPY Left 5/19/2021    COLONOSCOPY WITH BIOPSY performed by Sadi Davison MD at [address] [facility]  11/27/2019    Stent Circ and OM    EYE SURGERY Right     cataract    PACEMAKER PLACEMENT      TONSILLECTOMY AND ADENOIDECTOMY       Family History   Problem Relation Age of Onset  Heart Attack Mother     Heart Attack Father     Cancer Maternal Aunt     Heart Attack Maternal Grandmother     Heart Attack Maternal Grandfather     Heart Attack Paternal Grandmother     Stroke Paternal Grandmother     Heart Attack Paternal Grandfather     Breast Cancer Neg Hx     Diabetes Neg Hx     Kidney Disease Neg Hx      Social History     Tobacco Use    Smoking status: Current Every Day Smoker     Packs/day: 1.00     Years: 50.00     Pack years: 50.00     Types: Cigarettes    Smokeless tobacco: Never Used   Substance Use Topics    Alcohol use: Yes     Comment: Gin daily - drink with lemon lime soda: 1/3 of L      Current Outpatient Medications   Medication Sig Dispense Refill    apixaban (ELIQUIS) 5 MG TABS tablet Take 1 tablet by mouth 2 times daily 60 tablet 5    amLODIPine (NORVASC) 5 MG tablet Take 1 tablet by mouth daily 30 tablet 8    atorvastatin (LIPITOR) 40 MG tablet Take 2 tablets by mouth daily 60 tablet 3    carvedilol (COREG) 12.5 MG tablet Take 1 tablet by mouth 2 times daily 60 tablet 5    sacubitril-valsartan (ENTRESTO) 49-51 MG per tablet Take 1 tablet by mouth 2 times daily 60 tablet 5    albuterol sulfate HFA (VENTOLIN HFA) 108 (90 Base) MCG/ACT inhaler Inhale 2 puffs into the lungs 4 times daily as needed for Wheezing 54 g 1    nitroGLYCERIN (NITROSTAT) 0.4 MG SL tablet For chest pain/ angina ,up to max of 3 total doses. If no relief after 1 dose, call 911. 25 tablet 1    Multiple Vitamins-Minerals (CENTRUM SILVER 50+MEN) TABS Take 1 tablet by mouth daily       No current facility-administered medications for this visit. No Known Allergies    SUBJECTIVE:   Review of Systems   Constitutional: Positive for fatigue. Negative for activity change and appetite change. Respiratory: Negative for cough and shortness of breath. Cardiovascular: Negative for chest pain, palpitations and leg swelling. Gastrointestinal: Negative for abdominal distention. Neurological: Negative for weakness, light-headedness and headaches. Hematological: Negative for adenopathy. Psychiatric/Behavioral: Negative for sleep disturbance. OBJECTIVE:   Today's Vitals:  /72   Pulse 60   Ht 5' 3\" (1.6 m)   Wt 119 lb 3.2 oz (54.1 kg)   SpO2 95%   BMI 21.12 kg/m²     Physical Exam  Vitals reviewed. Constitutional:       General: He is not in acute distress. Appearance: Normal appearance. He is well-developed. He is not diaphoretic. HENT:      Head: Normocephalic and atraumatic. Eyes:      Conjunctiva/sclera: Conjunctivae normal.   Neck:      Comments: No JVD  Cardiovascular:      Rate and Rhythm: Normal rate and regular rhythm. Heart sounds: Normal heart sounds. No murmur heard. Pulmonary:      Effort: Pulmonary effort is normal. No respiratory distress. Breath sounds: Normal breath sounds. No wheezing or rales. Abdominal:      General: Bowel sounds are normal. There is no distension. Palpations: Abdomen is soft. Tenderness: There is no abdominal tenderness. Musculoskeletal:         General: Normal range of motion. Cervical back: Normal range of motion and neck supple. Right lower leg: No edema. Left lower leg: No edema. Skin:     General: Skin is warm and dry. Capillary Refill: Capillary refill takes less than 2 seconds. Neurological:      Mental Status: He is alert and oriented to person, place, and time. Coordination: Coordination normal.   Psychiatric:         Behavior: Behavior normal.           Wt Readings from Last 3 Encounters:   06/14/22 119 lb 3.2 oz (54.1 kg)   03/28/22 118 lb 6.4 oz (53.7 kg)   09/01/21 125 lb (56.7 kg)     BP Readings from Last 3 Encounters:   06/14/22 122/72   03/28/22 (!) 156/84   09/01/21 128/80     Pulse Readings from Last 3 Encounters:   06/14/22 60   03/28/22 54   09/01/21 64     Body mass index is 21.12 kg/m².     ECHO:    Conclusions      Summary   limited echo   Ejection fraction is visually estimated at 30%.   There was moderate global hypokinesis of the left ventricle.   Left Ventricular size is Mildly increased .   Mildly increased left ventricle wall thickness.   No evidence of any pericardial effusion.      Signature      ----------------------------------------------------------------   Electronically signed by Mik Latham MD (Interpreting   physician) on 02/27/2020 at 05:15 PM   ----------------------------------------------------------------     Summary   Left Ventricular size is Moderately increased .   Normal left ventricular wall thickness.   There was severe global hypokinesis of the left ventricle.   Systolic function was severely reduced.   Ejection fraction is visually estimated at 20%.   The left atrium is Moderately dilated.   Mildly enlarged right atrium size.   Mild tricuspid regurgitation visualized.   Right ventricular systolic pressure measures 40 mmhg.   Aortic aneurysm noted in the ascending aorta .   Aortic aneurysm measures 4 cm .      Signature      ----------------------------------------------------------------   Electronically signed by Toby Lerner MD (Interpreting   physician) on 11/26/2019 at 12:25 PM   ----------------------------------------------------------------        CATH/STRESS:   CONCLUSIONS:  1.  Severe cardiomyopathy, probably mixed etiology ischemic and  nonischemic. 2.  RCA,  involving mid RCA.  Distal RCA fills via collaterals from  left to right. 3.  Severe distal left circumflex and OM2 lesions, status post  successful PCI with stenting.     PLAN:  1.  Continue inpatient care. 2.  Optimize medical therapy for coronary disease. 3.  High intensity statin.   4.  Aspirin 81 mg p.o. daily for 1 month.  The patient will be on  anticoagulation for new-onset atrial fibrillation, probably will be  started on Eliquis which can be started in the morning.  Stop aspirin  after 1 month if the patient remains on anticoagulation. 5.  Plavix 75 mg p.o. daily for 1 year. 6.  Optimize medical therapy for heart failure. 7.  LifeVest is indicated before discharge. 8.  May consider RCA  PCI in the future if medical therapy fails. 9.  Monitor in telemetry.     Findings and plan of care were discussed with the patient in details.     Masha Terrell MD     D: 11/27/2019 17:34:32             Results reviewed:  BNP: No results found for: BNP  CBC:   Lab Results   Component Value Date    WBC 9.9 12/02/2020    RBC 4.30 12/02/2020    HGB 14.9 12/02/2020    HCT 43.1 12/02/2020     12/02/2020     CMP:    Lab Results   Component Value Date     08/25/2021    K 5.1 08/25/2021    K 3.7 03/06/2020    CL 97 08/25/2021    CO2 23 08/25/2021    BUN 9 08/25/2021    CREATININE 0.7 08/25/2021    LABGLOM >90 08/25/2021    GLUCOSE 73 08/25/2021    GLUCOSE 59 12/31/2019    CALCIUM 9.4 08/25/2021     Hepatic Function Panel:    Lab Results   Component Value Date    ALKPHOS 79 08/25/2021    ALT 77 08/25/2021    AST 93 08/25/2021    PROT 7.0 08/25/2021    BILITOT 1.2 08/25/2021    BILIDIR 0.4 08/25/2021    LABALBU 4.6 08/25/2021     Magnesium:    Lab Results   Component Value Date    MG 1.9 08/25/2021     PT/INR:    Lab Results   Component Value Date    INR 1.45 02/05/2021     Lipids:    Lab Results   Component Value Date    TRIG 58 08/25/2021    HDL 71 08/25/2021    LDLCALC 33 08/25/2021       ASSESSMENT AND PLAN:      Diagnosis Orders   1. CHF (congestive heart failure), NYHA class II, chronic, systolic ([facility] [address]     2. Ischemic cardiomyopathy     3. Coronary artery disease involving native heart without angina pectoris, unspecified vessel or lesion type     4. Paroxysmal atrial fibrillation (HCC)  apixaban (ELIQUIS) 5 MG TABS tablet   5.  Essential hypertension       Continue:  GDMT:              ACE/ARB/ARNI - Entresto 49/51 BID              BB - Coreg 12.5 BID              Diuretic - none  AA - Aldactone 12.5/day - stopped d/t Hyperkalemia  SGLT2 -  none  Vasodilator - none  Continue Current medications: Norvasc, Eliquis, Brilinta (not on - not sure when stopped - per Cath note continue x 1 yr - he is much past this)  HFrEF (30%)  ICM  ICD   CAD s/p PCI (11/2019)  PAF - on Eliquis    Stable, appears Euvolemic  Lab reviewed - stable  Repeat blood work prior to [employer] in Aug  BP/HR stable   No med changes today   Continue diet/fluid adherence  Continue daily wts. Discussed smoking cessation/ETOH cessation for 3 min - pt not interested in quitting. F/U w/ Cardiology  F/U in clinic in 1 yr    Tolerating above noted HF meds, no ill side effects noted. Will continue to monitor kidney function and electrolytes. Will optimize as tolerated. Pt is compliant w/ medications. Total visit time of 30 minutes has been spent with patient on education of symptoms, management, medication, and plan of care; as well as review of chart: labs, ECHO, radiology reports, etc.   I personally spent more then 50% of the appt time face to face with the patient. · Daily weights  · Fluid restriction of 2 Liters per day  · Limit sodium in diet to around 4248-5214 mg/day  · Monitor BP  · Activity as tolerated     Patient was instructed to call the [address] for any changes in symptoms as noted in AVS.      Return in about 1 year (around 6/14/2023). or sooner if needed     Patient given educational materials - see patient instructions. We discussed the importance of weighing oneself and recording daily. We also discussed the importance of a low sodium diet, higher sodium foods to avoid and better low sodium food options. Patient verbalizes understanding of plan of care using teach back method, and is agreeable to the treatment plan.        Electronically signed by DYAN Toure CNP on 6/14/2022 at 4:08 PM

## 2022-07-21 ENCOUNTER — PROCEDURE VISIT (OUTPATIENT)
Dept: CARDIOLOGY CLINIC | Age: 67
End: 2022-07-21
Payer: MEDICARE

## 2022-07-21 DIAGNOSIS — I50.42 CHRONIC COMBINED SYSTOLIC AND DIASTOLIC CONGESTIVE HEART FAILURE (HCC): Primary | ICD-10-CM

## 2022-07-21 PROCEDURE — 93297 REM INTERROG DEV EVAL ICPMS: CPT | Performed by: INTERNAL MEDICINE

## 2022-07-21 PROCEDURE — G2066 INTER DEVC REMOTE 30D: HCPCS | Performed by: INTERNAL MEDICINE

## 2022-08-15 DIAGNOSIS — I25.10 CORONARY ARTERY DISEASE INVOLVING NATIVE CORONARY ARTERY OF NATIVE HEART WITHOUT ANGINA PECTORIS: ICD-10-CM

## 2022-08-15 RX ORDER — ATORVASTATIN CALCIUM 40 MG/1
80 TABLET, FILM COATED ORAL DAILY
Qty: 180 TABLET | Refills: 0 | Status: SHIPPED | OUTPATIENT
Start: 2022-08-15 | End: 2022-09-15

## 2022-08-18 ENCOUNTER — HOSPITAL ENCOUNTER (OUTPATIENT)
Age: 67
Discharge: HOME | End: 2022-08-18
Payer: MEDICARE

## 2022-08-18 DIAGNOSIS — Z95.810 ICD (IMPLANTABLE CARDIOVERTER-DEFIBRILLATOR) IN PLACE: ICD-10-CM

## 2022-08-18 DIAGNOSIS — E78.5 DYSLIPIDEMIA: ICD-10-CM

## 2022-08-18 DIAGNOSIS — I71.40 ABDOMINAL AORTIC ANEURYSM (AAA) WITHOUT RUPTURE: ICD-10-CM

## 2022-08-18 DIAGNOSIS — I10 ESSENTIAL HYPERTENSION: ICD-10-CM

## 2022-08-18 DIAGNOSIS — I48.0 PAROXYSMAL ATRIAL FIBRILLATION (HCC): ICD-10-CM

## 2022-08-18 DIAGNOSIS — Z95.820 S/P ANGIOPLASTY WITH STENT: ICD-10-CM

## 2022-08-18 DIAGNOSIS — I25.5 ISCHEMIC CARDIOMYOPATHY: ICD-10-CM

## 2022-08-18 DIAGNOSIS — Z72.0 TOBACCO ABUSE: ICD-10-CM

## 2022-08-18 DIAGNOSIS — I25.10 CORONARY ARTERY DISEASE INVOLVING NATIVE CORONARY ARTERY OF NATIVE HEART WITHOUT ANGINA PECTORIS: ICD-10-CM

## 2022-08-18 DIAGNOSIS — I50.42 CHRONIC COMBINED SYSTOLIC AND DIASTOLIC CONGESTIVE HEART FAILURE (HCC): ICD-10-CM

## 2022-08-18 LAB
ALBUMIN SERPL-MCNC: 4.4 G/DL (ref 3.5–5.1)
ALP BLD-CCNC: 96 U/L (ref 38–126)
ALT SERPL-CCNC: 60 U/L (ref 11–66)
ANION GAP SERPL CALCULATED.3IONS-SCNC: 12 MEQ/L (ref 8–16)
AST SERPL-CCNC: 108 U/L (ref 5–40)
BILIRUB SERPL-MCNC: 1.2 MG/DL (ref 0.3–1.2)
BILIRUBIN DIRECT: 0.3 MG/DL (ref 0–0.3)
BUN BLDV-MCNC: 7 MG/DL (ref 7–22)
CALCIUM SERPL-MCNC: 10 MG/DL (ref 8.5–10.5)
CHLORIDE BLD-SCNC: 101 MEQ/L (ref 98–111)
CHOLESTEROL, TOTAL: 108 MG/DL (ref 100–199)
CO2: 28 MEQ/L (ref 23–33)
CREAT SERPL-MCNC: 0.7 MG/DL (ref 0.4–1.2)
GFR SERPL CREATININE-BSD FRML MDRD: > 90 ML/MIN/1.73M2
GLUCOSE BLD-MCNC: 84 MG/DL (ref 70–108)
HDLC SERPL-MCNC: 63 MG/DL
LDL CHOLESTEROL CALCULATED: 28 MG/DL
MAGNESIUM: 1.9 MG/DL (ref 1.6–2.4)
POTASSIUM SERPL-SCNC: 5 MEQ/L (ref 3.5–5.2)
SODIUM BLD-SCNC: 141 MEQ/L (ref 135–145)
TOTAL PROTEIN: 7.1 G/DL (ref 6.1–8)
TRIGL SERPL-MCNC: 85 MG/DL (ref 0–199)

## 2022-08-18 PROCEDURE — 80061 LIPID PANEL: CPT

## 2022-08-18 PROCEDURE — 80053 COMPREHEN METABOLIC PANEL: CPT

## 2022-08-18 PROCEDURE — 82248 BILIRUBIN DIRECT: CPT

## 2022-08-18 PROCEDURE — 36415 COLL VENOUS BLD VENIPUNCTURE: CPT

## 2022-08-18 PROCEDURE — 83735 ASSAY OF MAGNESIUM: CPT

## 2022-08-24 ENCOUNTER — PROCEDURE VISIT (OUTPATIENT)
Dept: CARDIOLOGY CLINIC | Age: 67
End: 2022-08-24
Payer: MEDICARE

## 2022-08-24 DIAGNOSIS — I50.42 CHRONIC COMBINED SYSTOLIC AND DIASTOLIC CONGESTIVE HEART FAILURE (HCC): Primary | ICD-10-CM

## 2022-08-24 PROCEDURE — 93297 REM INTERROG DEV EVAL ICPMS: CPT | Performed by: INTERNAL MEDICINE

## 2022-08-24 PROCEDURE — G2066 INTER DEVC REMOTE 30D: HCPCS | Performed by: INTERNAL MEDICINE

## 2022-08-24 NOTE — PROGRESS NOTES
[facility] Medtronic Single ICD Optivol  Pt of [place]    Battery 9.8 years    Optivol WNL    Hx PAF- taking eliquis  Episodes  Afib

## 2022-09-15 ENCOUNTER — OFFICE VISIT (OUTPATIENT)
Dept: CARDIOLOGY CLINIC | Age: 67
End: 2022-09-15
Payer: MEDICARE

## 2022-09-15 VITALS
SYSTOLIC BLOOD PRESSURE: 126 MMHG | HEIGHT: 63 IN | BODY MASS INDEX: 21.4 KG/M2 | HEART RATE: 62 BPM | WEIGHT: 120.8 LBS | DIASTOLIC BLOOD PRESSURE: 81 MMHG

## 2022-09-15 DIAGNOSIS — I10 ESSENTIAL HYPERTENSION: ICD-10-CM

## 2022-09-15 DIAGNOSIS — E78.5 DYSLIPIDEMIA: ICD-10-CM

## 2022-09-15 DIAGNOSIS — Z72.0 TOBACCO ABUSE: ICD-10-CM

## 2022-09-15 DIAGNOSIS — I47.29 NONSUSTAINED VENTRICULAR TACHYCARDIA: ICD-10-CM

## 2022-09-15 DIAGNOSIS — I48.0 PAROXYSMAL ATRIAL FIBRILLATION (HCC): ICD-10-CM

## 2022-09-15 DIAGNOSIS — Z95.820 S/P ANGIOPLASTY WITH STENT: ICD-10-CM

## 2022-09-15 DIAGNOSIS — F10.10 ETOH ABUSE: ICD-10-CM

## 2022-09-15 DIAGNOSIS — Z95.810 ICD (IMPLANTABLE CARDIOVERTER-DEFIBRILLATOR) IN PLACE: ICD-10-CM

## 2022-09-15 DIAGNOSIS — I25.5 ISCHEMIC CARDIOMYOPATHY: ICD-10-CM

## 2022-09-15 DIAGNOSIS — I50.42 CHRONIC COMBINED SYSTOLIC AND DIASTOLIC CONGESTIVE HEART FAILURE (HCC): ICD-10-CM

## 2022-09-15 DIAGNOSIS — I25.10 CORONARY ARTERY DISEASE INVOLVING NATIVE CORONARY ARTERY OF NATIVE HEART WITHOUT ANGINA PECTORIS: Primary | ICD-10-CM

## 2022-09-15 PROCEDURE — G8420 CALC BMI NORM PARAMETERS: HCPCS | Performed by: INTERNAL MEDICINE

## 2022-09-15 PROCEDURE — 4004F PT TOBACCO SCREEN RCVD TLK: CPT | Performed by: INTERNAL MEDICINE

## 2022-09-15 PROCEDURE — 1123F ACP DISCUSS/DSCN MKR DOCD: CPT | Performed by: INTERNAL MEDICINE

## 2022-09-15 PROCEDURE — G8427 DOCREV CUR MEDS BY ELIG CLIN: HCPCS | Performed by: INTERNAL MEDICINE

## 2022-09-15 PROCEDURE — 3017F COLORECTAL CA SCREEN DOC REV: CPT | Performed by: INTERNAL MEDICINE

## 2022-09-15 PROCEDURE — 99214 OFFICE O/P EST MOD 30 MIN: CPT | Performed by: INTERNAL MEDICINE

## 2022-09-15 RX ORDER — ATORVASTATIN CALCIUM 40 MG/1
40 TABLET, FILM COATED ORAL DAILY
Qty: 30 TABLET | Refills: 11 | Status: SHIPPED | OUTPATIENT
Start: 2022-09-15

## 2022-09-15 NOTE — PROGRESS NOTES
Chief Complaint   Patient presents with    Follow-up    Coronary Artery Disease    Congestive Heart Failure       Originally Seen in hospital for new CHF and new CMP and New onset atr fib  Had cath and sent home on vest and oMT      5 month follow up. EKG done 3-. Pt states he stopped taking aldactone 12.5 po QOD a few months ago- doesn't remember when. States he ran out     Denied cp,  palpitations, dizziness or edema    Sob on exertion stable    Record reviewed    Patient Seen, Chart, Consults notes, Labs, Radiology studies reviewed.     Has beed on asa/plavix and apixaban now  On apixaban and asa      Feel good today and post D/c    Patient Active Problem List   Diagnosis    Acute systolic (congestive) heart failure (HCC)    Tobacco abuse    ETOH abuse    Essential hypertension    Paroxysmal atrial fibrillation (HCC)    Nonsustained ventricular tachycardia (HCC)    Abdominal aortic aneurysm (AAA) without rupture ([address]    Coronary artery disease involving native coronary artery of native heart without angina pectoris    Ischemic cardiomyopathy    S/P angioplasty with stent- LCX/OM nov 2019    Chronic combined systolic and diastolic congestive heart failure ([address]    Dyslipidemia    Cardiomyopathy ([address]    Medtronic single ICD     Anticoagulated on Coumadin       Past Surgical History:   Procedure Laterality Date    COLONOSCOPY Left 5/19/2021    COLONOSCOPY WITH BIOPSY performed by Darylene Brazier, MD at [address] [place] [address]  11/27/2019    Stent Circ and OM    EYE SURGERY Right     cataract    PACEMAKER PLACEMENT      TONSILLECTOMY AND ADENOIDECTOMY         No Known Allergies     Family History   Problem Relation Age of Onset    Heart Attack Mother     Heart Attack Father     Cancer Maternal Aunt     Heart Attack Maternal Grandmother     Heart Attack Maternal Grandfather     Heart Attack Paternal Grandmother     Stroke Paternal Grandmother     Heart Attack Paternal Grandfather     Breast Cancer Neg Hx     Diabetes Neg Hx     Kidney Disease Neg Hx         Social History     Socioeconomic History    Marital status:      Spouse name: Not on file    Number of children: Not on file    Years of education: Not on file    Highest education level: Not on file   Occupational History    Not on file   Tobacco Use    Smoking status: Every Day     Packs/day: 1.00     Years: 50.00     Pack years: 50.00     Types: Cigarettes    Smokeless tobacco: Never   Vaping Use    Vaping Use: Never used   Substance and Sexual Activity    Alcohol use: Yes     Comment: Gin daily - drink with lemon lime soda: 1/3 of L     Drug use: No    Sexual activity: Not on file   Other Topics Concern    Not on file   Social History Narrative    Not on file     Social Determinants of Health     Financial Resource Strain: Not on file   Food Insecurity: Not on file   Transportation Needs: Not on file   Physical Activity: Not on file   Stress: Not on file   Social Connections: Not on file   Intimate Partner Violence: Not on file   Housing Stability: Not on file       Current Outpatient Medications   Medication Sig Dispense Refill    atorvastatin (LIPITOR) 40 MG tablet Take 2 tablets by mouth in the morning. 180 tablet 0    apixaban (ELIQUIS) 5 MG TABS tablet Take 1 tablet by mouth 2 times daily 60 tablet 5    amLODIPine (NORVASC) 5 MG tablet Take 1 tablet by mouth daily 30 tablet 8    carvedilol (COREG) 12.5 MG tablet Take 1 tablet by mouth 2 times daily 60 tablet 5    sacubitril-valsartan (ENTRESTO) 49-51 MG per tablet Take 1 tablet by mouth 2 times daily 60 tablet 5    albuterol sulfate HFA (VENTOLIN HFA) 108 (90 Base) MCG/ACT inhaler Inhale 2 puffs into the lungs 4 times daily as needed for Wheezing 54 g 1    nitroGLYCERIN (NITROSTAT) 0.4 MG SL tablet For chest pain/ angina ,up to max of 3 total doses.  If no relief after 1 dose, call 911. 25 tablet 1    Multiple Vitamins-Minerals (CENTRUM SILVER 50+MEN) TABS Take 1 tablet by mouth daily       No current facility-administered medications for this visit. Review of Systems -     General ROS: negative  Psychological ROS: negative  Hematological and Lymphatic ROS: No history of blood clots or bleeding disorder. Respiratory ROS: no cough,  or wheezing, the rest see HPI  Cardiovascular ROS: See HPI  Gastrointestinal ROS: negative  Genito-Urinary ROS: no dysuria, trouble voiding, or hematuria  Musculoskeletal ROS: negative  Neurological ROS: no TIA or stroke symptoms  Dermatological ROS: negative      Blood pressure 126/81, pulse 62, height 5' 3\" (1.6 m), weight 120 lb 12.8 oz (54.8 kg). Physical Examination:    General appearance - alert, well appearing, and in no distress  HEENT- Pink conjunctiva  , Non-icteri sclera,PERRLA  Mental status - alert, oriented to person, place, and time  Neck - supple, no significant adenopathy, no JVD, or carotid bruits  Chest - clear to auscultation, no wheezes, rales or rhonchi, symmetric air entry  Heart - normal rate, regular rhythm, normal S1, S2, no murmurs, rubs, clicks or gallops  Abdomen - soft, nontender, nondistended, no masses or organomegaly  SEBLE- no CVA or flank tenderness, no suprapubic tenderness  Neurological - alert, oriented, normal speech, no focal findings or movement disorder noted  Musculoskeletal/limbs - no joint tenderness, deformity or swelling   - peripheral pulses normal, no pedal edema, no clubbing or cyanosis  Skin - normal coloration and turgor, no rashes, no suspicious skin lesions noted  Psych- appropriate mood and affect    Lab  No results for input(s): CKTOTAL, CKMB, CKMBINDEX, TROPONINI in the last 72 hours.   CBC:   Lab Results   Component Value Date/Time    WBC 9.9 12/02/2020 03:03 PM    RBC 4.30 12/02/2020 03:03 PM    HGB 14.9 12/02/2020 03:03 PM    HCT 43.1 12/02/2020 03:03 PM    .2 12/02/2020 03:03 PM    MCH 34.7 12/02/2020 03:03 PM    MCHC 34.6 12/02/2020 03:03 PM    RDW 13.7 06/08/2013 03:03 AM     12/02/2020 03:03 PM    MPV 10.9 12/02/2020 03:03 PM     BMP:    Lab Results   Component Value Date/Time     08/18/2022 12:54 PM    K 5.0 08/18/2022 12:54 PM    K 3.7 03/06/2020 06:41 AM     08/18/2022 12:54 PM    CO2 28 08/18/2022 12:54 PM    BUN 7 08/18/2022 12:54 PM    LABALBU 4.4 08/18/2022 12:54 PM    CREATININE 0.7 08/18/2022 12:54 PM    CALCIUM 10.0 08/18/2022 12:54 PM    LABGLOM >90 08/18/2022 12:54 PM    GLUCOSE 84 08/18/2022 12:54 PM    GLUCOSE 59 12/31/2019 02:52 PM     Hepatic Function Panel:    Lab Results   Component Value Date/Time    ALKPHOS 96 08/18/2022 12:54 PM    ALT 60 08/18/2022 12:54 PM     08/18/2022 12:54 PM    PROT 7.1 08/18/2022 12:54 PM    BILITOT 1.2 08/18/2022 12:54 PM    BILIDIR 0.3 08/18/2022 12:54 PM    LABALBU 4.4 08/18/2022 12:54 PM     Magnesium:    Lab Results   Component Value Date/Time    MG 1.9 08/18/2022 12:54 PM     Warfarin PT/INR:  No components found for: PTPATWAR, PTINRWAR  HgBA1c:  No results found for: LABA1C  FLP:    Lab Results   Component Value Date/Time    TRIG 85 08/18/2022 12:54 PM    HDL 63 08/18/2022 12:54 PM    LDLCALC 28 08/18/2022 12:54 PM     TSH:    Lab Results   Component Value Date/Time    TSH 1.360 11/25/2019 11:48 PM   cath    FINDINGS:  HEMODYNAMIC RESULTS:  LVEDP 29 mmHg. LEFT VENTRICULOGRAPHY:  Ejection fraction seems to be severely reduced  indicative of severe cardiomyopathy. EF 20%. CORONARY ANGIOGRAM:  1. RCA:  RCA has mild diffuse disease in the proximal segment. Mid RCA  has chronic total occlusion. _____ estimated at around 20 mL. RCA is a  dominant vessel. It receives left-to-right collaterals that fill the  LPL, LPDA and distal RCA. 2.  Left main coronary artery:  Left main coronary artery is patent  without significant stenosis. 3.  Left circumflex artery:  Proximal LCX has mild diffuse disease. OM1  is a large branching vessel that has no significant stenosis.   Distal  left circumflex artery into the OM2 branch had significant bifurcation  lesion estimated at 70% to 80%. It becomes more severe in OM2 up to  90%. 4. LAD:  Left anterior descending artery has mild diffuse disease with  mild calcification in the proximal segment. Mid LAD seems to be patent  without significant stenosis. Distal LAD has a 30% to 50% lesion. MEDICATIONS:  See MAR. COMPLICATIONS:  None. ACCESS:  Right radial artery access. Vasc Band was applied for  hemostasis. CONCLUSIONS:  1. Severe cardiomyopathy, probably mixed etiology ischemic and  nonischemic. 2.  RCA,  involving mid RCA. Distal RCA fills via collaterals from  left to right. 3.  Severe distal left circumflex and OM2 lesions, status post  successful PCI with stenting. PLAN:  1. Continue inpatient care. 2.  Optimize medical therapy for coronary disease. 3.  High intensity statin. 4.  Aspirin 81 mg p.o. daily for 1 month. The patient will be on  anticoagulation for new-onset atrial fibrillation, probably will be  started on Eliquis which can be started in the morning. Stop aspirin  after 1 month if the patient remains on anticoagulation. 5.  Plavix 75 mg p.o. daily for 1 year. 6.  Optimize medical therapy for heart failure. 7.  LifeVest is indicated before discharge. 8.  May consider RCA  PCI in the future if medical therapy fails. 9.  Monitor in telemetry. Findings and plan of care were discussed with the patient in details. Freddy Sahni MD     D: 11/27/2019 17:34:32         Conclusions      Summary   Left Ventricular size is Moderately increased . Normal left ventricular wall thickness. There was severe global hypokinesis of the left ventricle. Systolic function was severely reduced. Ejection fraction is visually estimated at 20%. The left atrium is Moderately dilated. Mildly enlarged right atrium size. Mild tricuspid regurgitation visualized.    Right ventricular systolic pressure measures 40 mmhg.   Aortic aneurysm noted in the ascending aorta . Aortic aneurysm measures 4 cm . Signature      ----------------------------------------------------------------   Electronically signed by Merlinda Jaeger MD (Interpreting   physician) on 11/26/2019 at 12:25 PM    ekg 3/30/21  Sinus  Rhythm   Low voltage in limb leads.    -Poor R-wave progression -may be secondary to pulmonary disease   consider old anterior infarct.    -  Nonspecific T-abnormality. ABNORMAL   No acute abn    ekg 3/28/22    Sinus  Rhythm   -  Nonspecific T-abnormality. ABNORMAL         Assessment   Diagnosis Orders   1. Coronary artery disease involving native coronary artery of native heart without angina pectoris        2. Chronic combined systolic and diastolic congestive heart failure ([address]        3. Ischemic cardiomyopathy        4. Paroxysmal atrial fibrillation (HCC)        5. Essential hypertension        6. Dyslipidemia        7. Nonsustained ventricular tachycardia ([address]        8. S/P angioplasty with stent- LCX/OM nov 2019        9. Medtronic single ICD         10. Tobacco abuse        11. ETOH abuse              Recent admission DX Nov 2019  ASSESSMENT:  1. Acute combined systolic-diastolic congestive heart failure  exacerbation, new onset. 2.  Cardiomyopathy, newly diagnosed, ejection fraction 20% on this  admission. 3.  Paroxysmal atrial fibrillation, AFib is not captured on the 12-lead  EKG; however, it has been there in the telemetry in the night for couple  of hours and rate between 90 to 103.  4.  Episode of nonsustained VT composed of 6 beats, rate 150 beats per  minute. 5.  Hypertension, poorly controlled, needs a better control. 6.  Tobacco abuse. 7.  Alcohol abuse. 8.  Family history of coronary artery disease. 9.  Hyponatremia, sodium dropped from 134 yesterday to 130 today, so the  patient needs fluid restriction and avoid thiazide diuretics. 10.  The patient has multiple issues.   CHADS-VASc of 3 with  hypertension, congestive heart failure with cardiomyopathy and age 75. Plan     The  most  current meds and labs reviewed    Continue the current treatment and with constant vigilance to changes in symptoms and also any potential side effects. Return for care or seek medical attention immediately if symptoms got worse and/or develop new symptoms. Cardiomyopathy: improving, no CHF symptoms, no change in clinical condition. Will need periodic echocardiograms depending on symptoms. Cont entresto  Cont  coreg to 12.5 po bid       ICD interrogation  0-8/2022  Mdt single icd  Okay   optivol wnl  No abnormality noted       Paroxysmal atrial fibrillation, AFib is not captured on the 12-lead  EKG; however, it has been there in the telemetry in the night for couple  of hours and rate between 90 to 103. Cont  apixaban     Congestive heart failure: no evidence of fluid overload today, no recent hospitalization for CHF  Cont entresto and  Off   aldactone 12.5 po qod doing well and k was 5.1  Shreyas President has been off bumex 0.5 po bid or lasix since feb 2020 per information from his pharmacy and doing well without it. NO need to resume    Coronary artery disease, seems to be stable. Denies angina or change in breathing pattern  Hx of AZUCENA  Cont  plavix 75 po qd  Cont apixaban    Hypertension, on medical treatment. Seems to be under good control. Patient is compliant with medical cont norvasc 5 mg po qd       Hyperlipidemia: on statins, followed periodically. Patient need periodic lipid and liver profile. AST elevated  Decrease lipitor 40 mg po qd from 80  LFT in 5 months  Pat advised to decrease etoh drink 'he drink 1/3 bottle gin daily    Lab prior to next visit    Over all pat is stable and doing well    Need lab prior to next visit    Discussed use, benefit, and side effects of prescribed medications. All patient questions answered. Pt voiced understanding.  Instructed to continue current medications, diet and exercise. Continue risk factor modification and medical management. Patient agreed with treatment plan. Follow up as directed.     RTC in 5 months with TERESO Perdomo, [facility]

## 2022-09-19 RX ORDER — CARVEDILOL 12.5 MG/1
TABLET ORAL
Qty: 60 TABLET | Refills: 0 | Status: SHIPPED | OUTPATIENT
Start: 2022-09-19 | End: 2022-10-26

## 2022-09-28 ENCOUNTER — PROCEDURE VISIT (OUTPATIENT)
Dept: CARDIOLOGY CLINIC | Age: 67
End: 2022-09-28
Payer: MEDICARE

## 2022-09-28 DIAGNOSIS — Z95.810 ICD (IMPLANTABLE CARDIOVERTER-DEFIBRILLATOR) IN PLACE: Primary | ICD-10-CM

## 2022-09-28 PROCEDURE — 93295 DEV INTERROG REMOTE 1/2/MLT: CPT | Performed by: INTERNAL MEDICINE

## 2022-09-28 PROCEDURE — 93296 REM INTERROG EVL PM/IDS: CPT | Performed by: INTERNAL MEDICINE

## 2022-09-28 NOTE — PROGRESS NOTES
Dr Ebenezer Rebollar pt   Medtronic single icd   Vvi 40      V paced <0.1%    Rv waves 13  Rv impedence 437  Rv 46  Svc 64    Vent threshold 0.75 @ 0.4    Vent amplitude 2 @ 0.4    Known afib/elquis   Afib burden <0.1%      Optivol wnl

## 2022-10-26 DIAGNOSIS — I25.10 CORONARY ARTERY DISEASE INVOLVING NATIVE CORONARY ARTERY OF NATIVE HEART WITHOUT ANGINA PECTORIS: Primary | ICD-10-CM

## 2022-10-26 RX ORDER — CARVEDILOL 12.5 MG/1
TABLET ORAL
Qty: 60 TABLET | Refills: 0 | Status: SHIPPED | OUTPATIENT
Start: 2022-10-26 | End: 2022-12-02

## 2022-11-02 ENCOUNTER — PROCEDURE VISIT (OUTPATIENT)
Dept: CARDIOLOGY CLINIC | Age: 67
End: 2022-11-02
Payer: MEDICARE

## 2022-11-02 DIAGNOSIS — I50.42 CHRONIC COMBINED SYSTOLIC AND DIASTOLIC CONGESTIVE HEART FAILURE (HCC): Primary | ICD-10-CM

## 2022-11-02 PROCEDURE — G2066 INTER DEVC REMOTE 30D: HCPCS | Performed by: INTERNAL MEDICINE

## 2022-11-02 PROCEDURE — 93297 REM INTERROG DEV EVAL ICPMS: CPT | Performed by: INTERNAL MEDICINE

## 2022-11-02 NOTE — PROGRESS NOTES
[facility] Medtronic Single ICD Optivol  Pt of [place]    Battery 9.7 years    Optivol WNL    Hx afib - taking eliquis    Episodes  10/29/22- NS VT rate 171  Afib vs PACs

## 2022-11-02 NOTE — PROGRESS NOTES
Reviewed  No atr fib on this interro-rather freq PVCs noted  One NSVT short lasting noted  No new action  On coreg

## 2022-12-02 DIAGNOSIS — I25.10 CORONARY ARTERY DISEASE INVOLVING NATIVE CORONARY ARTERY OF NATIVE HEART WITHOUT ANGINA PECTORIS: ICD-10-CM

## 2022-12-02 RX ORDER — CARVEDILOL 12.5 MG/1
TABLET ORAL
Qty: 60 TABLET | Refills: 0 | Status: SHIPPED | OUTPATIENT
Start: 2022-12-02 | End: 2022-12-05 | Stop reason: SDUPTHER

## 2022-12-05 DIAGNOSIS — I25.10 CORONARY ARTERY DISEASE INVOLVING NATIVE CORONARY ARTERY OF NATIVE HEART WITHOUT ANGINA PECTORIS: ICD-10-CM

## 2022-12-05 RX ORDER — CARVEDILOL 12.5 MG/1
TABLET ORAL
Qty: 60 TABLET | Refills: 2 | Status: SHIPPED | OUTPATIENT
Start: 2022-12-05 | End: 2023-01-05

## 2022-12-07 ENCOUNTER — PROCEDURE VISIT (OUTPATIENT)
Dept: CARDIOLOGY CLINIC | Age: 67
End: 2022-12-07
Payer: MEDICARE

## 2022-12-07 DIAGNOSIS — I50.42 CHRONIC COMBINED SYSTOLIC AND DIASTOLIC CONGESTIVE HEART FAILURE (HCC): Primary | ICD-10-CM

## 2022-12-07 PROCEDURE — G2066 INTER DEVC REMOTE 30D: HCPCS | Performed by: INTERNAL MEDICINE

## 2022-12-07 PROCEDURE — 93297 REM INTERROG DEV EVAL ICPMS: CPT | Performed by: INTERNAL MEDICINE

## 2023-01-09 DIAGNOSIS — I25.10 CORONARY ARTERY DISEASE INVOLVING NATIVE CORONARY ARTERY OF NATIVE HEART WITHOUT ANGINA PECTORIS: ICD-10-CM

## 2023-01-09 RX ORDER — CARVEDILOL 12.5 MG/1
TABLET ORAL
Qty: 60 TABLET | Refills: 2 | Status: SHIPPED | OUTPATIENT
Start: 2023-01-09 | End: 2023-02-09

## 2023-01-12 ENCOUNTER — PROCEDURE VISIT (OUTPATIENT)
Dept: CARDIOLOGY CLINIC | Age: 68
End: 2023-01-12

## 2023-01-12 DIAGNOSIS — Z95.810 ICD (IMPLANTABLE CARDIOVERTER-DEFIBRILLATOR) IN PLACE: Primary | ICD-10-CM

## 2023-01-12 NOTE — PROGRESS NOTES
[facility] Medtronic Single lead ICD   Pt of [place]    Battery 9.5 years    Presenting rhythm VS    RV impedance 456    RV shock 52  SVC shock 70    RV wave 14.5    V threshold 0.75 @ 0.40  V amplitude 2.0 @ 0.40    Programmed mode VVI 40    V paced <0.1%     AFib burden 0.6%  Hx afib - taking eliquis     Episodes   afib    Optivol WNL

## 2023-01-30 RX ORDER — AMLODIPINE BESYLATE 5 MG/1
5 TABLET ORAL DAILY
Qty: 30 TABLET | Refills: 2 | Status: SHIPPED | OUTPATIENT
Start: 2023-01-30

## 2023-02-01 DIAGNOSIS — I48.0 PAROXYSMAL ATRIAL FIBRILLATION (HCC): ICD-10-CM

## 2023-02-15 ENCOUNTER — PROCEDURE VISIT (OUTPATIENT)
Dept: CARDIOLOGY CLINIC | Age: 68
End: 2023-02-15
Payer: MEDICARE

## 2023-02-15 DIAGNOSIS — I50.42 CHRONIC COMBINED SYSTOLIC AND DIASTOLIC CONGESTIVE HEART FAILURE (HCC): Primary | ICD-10-CM

## 2023-02-15 PROCEDURE — 93297 REM INTERROG DEV EVAL ICPMS: CPT | Performed by: INTERNAL MEDICINE

## 2023-02-15 PROCEDURE — G2066 INTER DEVC REMOTE 30D: HCPCS | Performed by: INTERNAL MEDICINE

## 2023-02-15 NOTE — PROGRESS NOTES
DR Claudio Bio PT   MEDTRONIC CARELINK OPTIVOL REMOTE   BATTERY 9.4 YRS REMAINING    KNOWN AFIB/ ELIQUIS   AFIB BURDEN 1.9%    OPTIVOL WNL

## 2023-03-22 ENCOUNTER — PROCEDURE VISIT (OUTPATIENT)
Dept: CARDIOLOGY CLINIC | Age: 68
End: 2023-03-22

## 2023-03-22 DIAGNOSIS — I50.42 CHRONIC COMBINED SYSTOLIC AND DIASTOLIC CONGESTIVE HEART FAILURE (HCC): Primary | ICD-10-CM

## 2023-03-22 NOTE — PROGRESS NOTES
DR Vinayak Rocha PT   MEDTRONIC CARELINK OPTIVOL REMOTE   BATTERY 9.4 YRS REMAINING    KNOWN AFIB/ ELIQUIS   AFIB BURDEN 2.1%    OPTIVOL WNL

## 2023-04-04 DIAGNOSIS — I25.10 CORONARY ARTERY DISEASE INVOLVING NATIVE CORONARY ARTERY OF NATIVE HEART WITHOUT ANGINA PECTORIS: ICD-10-CM

## 2023-04-04 RX ORDER — CARVEDILOL 12.5 MG/1
TABLET ORAL
Qty: 60 TABLET | Refills: 2 | Status: SHIPPED | OUTPATIENT
Start: 2023-04-04 | End: 2023-05-05

## 2023-04-20 DIAGNOSIS — I48.0 PAROXYSMAL ATRIAL FIBRILLATION (HCC): ICD-10-CM

## 2023-04-20 RX ORDER — SACUBITRIL AND VALSARTAN 49; 51 MG/1; MG/1
1 TABLET, FILM COATED ORAL 2 TIMES DAILY
Qty: 60 TABLET | Refills: 0 | Status: SHIPPED | OUTPATIENT
Start: 2023-04-20 | End: 2023-05-20

## 2023-04-26 ENCOUNTER — PROCEDURE VISIT (OUTPATIENT)
Dept: CARDIOLOGY CLINIC | Age: 68
End: 2023-04-26

## 2023-04-26 DIAGNOSIS — I50.42 CHRONIC COMBINED SYSTOLIC AND DIASTOLIC CONGESTIVE HEART FAILURE (HCC): Primary | ICD-10-CM

## 2023-04-26 NOTE — PROGRESS NOTES
[facility] Medtronic Single ICD Optivol  Pt of [place]    Battery 9.2 years    Optivol WNL    Hx afib - taking eliquis  Episodes  Afib

## 2023-06-12 ENCOUNTER — TELEPHONE (OUTPATIENT)
Dept: INTERNAL MEDICINE CLINIC | Age: 68
End: 2023-06-12

## 2023-06-20 ENCOUNTER — TELEPHONE (OUTPATIENT)
Dept: CARDIOLOGY CLINIC | Age: 68
End: 2023-06-20

## 2023-07-18 ENCOUNTER — NURSE ONLY (OUTPATIENT)
Dept: CARDIOLOGY CLINIC | Age: 68
End: 2023-07-18
Payer: MEDICARE

## 2023-07-18 ENCOUNTER — HOSPITAL ENCOUNTER (OUTPATIENT)
Age: 68
Discharge: HOME | End: 2023-07-18
Payer: MEDICARE

## 2023-07-18 ENCOUNTER — OFFICE VISIT (OUTPATIENT)
Dept: CARDIOLOGY CLINIC | Age: 68
End: 2023-07-18

## 2023-07-18 VITALS
HEART RATE: 75 BPM | OXYGEN SATURATION: 100 % | DIASTOLIC BLOOD PRESSURE: 70 MMHG | BODY MASS INDEX: 21.36 KG/M2 | SYSTOLIC BLOOD PRESSURE: 110 MMHG | WEIGHT: 120.6 LBS

## 2023-07-18 DIAGNOSIS — I50.22 CHF NYHA CLASS II, CHRONIC, SYSTOLIC (HCC): Primary | ICD-10-CM

## 2023-07-18 DIAGNOSIS — I25.10 CORONARY ARTERY DISEASE INVOLVING NATIVE CORONARY ARTERY OF NATIVE HEART WITHOUT ANGINA PECTORIS: ICD-10-CM

## 2023-07-18 DIAGNOSIS — I25.5 ISCHEMIC CARDIOMYOPATHY: ICD-10-CM

## 2023-07-18 DIAGNOSIS — Z95.810 ICD (IMPLANTABLE CARDIOVERTER-DEFIBRILLATOR) IN PLACE: Primary | ICD-10-CM

## 2023-07-18 DIAGNOSIS — I48.0 PAROXYSMAL ATRIAL FIBRILLATION (HCC): ICD-10-CM

## 2023-07-18 DIAGNOSIS — F10.10 ETOH ABUSE: ICD-10-CM

## 2023-07-18 DIAGNOSIS — Z72.0 TOBACCO ABUSE: ICD-10-CM

## 2023-07-18 DIAGNOSIS — I50.22 CHF NYHA CLASS II, CHRONIC, SYSTOLIC (HCC): ICD-10-CM

## 2023-07-18 LAB
ANION GAP SERPL CALC-SCNC: 11 MEQ/L (ref 8–16)
BUN SERPL-MCNC: 8 MG/DL (ref 7–22)
CALCIUM SERPL-MCNC: 9.8 MG/DL (ref 8.5–10.5)
CHLORIDE SERPL-SCNC: 90 MEQ/L (ref 98–111)
CO2 SERPL-SCNC: 28 MEQ/L (ref 23–33)
CREAT SERPL-MCNC: 0.7 MG/DL (ref 0.4–1.2)
DEPRECATED RDW RBC AUTO: 45.4 FL (ref 35–45)
ERYTHROCYTE [DISTWIDTH] IN BLOOD BY AUTOMATED COUNT: 12.2 % (ref 11.5–14.5)
GFR SERPL CREATININE-BSD FRML MDRD: > 60 ML/MIN/1.73M2
GLUCOSE SERPL-MCNC: 108 MG/DL (ref 70–108)
HCT VFR BLD AUTO: 44.7 % (ref 42–52)
HGB BLD-MCNC: 16.1 GM/DL (ref 14–18)
MCH RBC QN AUTO: 36.3 PG (ref 26–33)
MCHC RBC AUTO-ENTMCNC: 36 GM/DL (ref 32.2–35.5)
MCV RBC AUTO: 100.7 FL (ref 80–94)
PLATELET # BLD AUTO: 129 THOU/MM3 (ref 130–400)
PMV BLD AUTO: 11.3 FL (ref 9.4–12.4)
POTASSIUM SERPL-SCNC: 4.7 MEQ/L (ref 3.5–5.2)
RBC # BLD AUTO: 4.44 MILL/MM3 (ref 4.7–6.1)
SODIUM SERPL-SCNC: 129 MEQ/L (ref 135–145)
WBC # BLD AUTO: 7.7 THOU/MM3 (ref 4.8–10.8)

## 2023-07-18 PROCEDURE — 80048 BASIC METABOLIC PNL TOTAL CA: CPT

## 2023-07-18 PROCEDURE — 93282 PRGRMG EVAL IMPLANTABLE DFB: CPT | Performed by: INTERNAL MEDICINE

## 2023-07-18 PROCEDURE — 36415 COLL VENOUS BLD VENIPUNCTURE: CPT

## 2023-07-18 PROCEDURE — 85027 COMPLETE CBC AUTOMATED: CPT

## 2023-07-18 RX ORDER — CARVEDILOL 6.25 MG/1
6.25 TABLET ORAL 2 TIMES DAILY
Qty: 60 TABLET | Refills: 1 | Status: SHIPPED | OUTPATIENT
Start: 2023-07-18

## 2023-07-18 ASSESSMENT — ENCOUNTER SYMPTOMS
ABDOMINAL DISTENTION: 0
COUGH: 0
SHORTNESS OF BREATH: 0

## 2023-07-18 NOTE — PATIENT INSTRUCTIONS
You may receive a survey regarding the care you received during your visit. Your input is valuable to us. We encourage you to complete and return your survey. We hope you will choose us in the future for your healthcare needs. Your nurses today were Tori and [employer].   Office hours:   Mon-Thurs 8-4:30  Friday 8-12  Phone: [phone]    Continue:  Continue current medications  Daily weights and record  Fluid restriction of 2 Liters per day  Limit sodium in diet to around 2349-5194 mg/day  Monitor BP  Activity as tolerated     Call the [address] for any of the following symptoms:   Weight gain of 2-3 pounds in 1 day or 5 pounds in 1 week  Increased shortness of breath  Shortness of breath while laying down  Cough  Chest pain  Swelling in feet, ankles or legs  Bloating in abdomen  Fatigue

## 2023-07-18 NOTE — PROGRESS NOTES
Medtronic single ICD in office     9 years on device   Rv imped 456  Shock 45  SVC 62  0.1% paced   Optivol WNl  R waves 13.3  Threshold 0.75 @ 0.4  Shock pathways progammed to B> AX March 9, 2020 implant

## 2023-07-18 NOTE — PROGRESS NOTES
right.  3.  Severe distal left circumflex and OM2 lesions, status post  successful PCI with stenting. PLAN:  1. Continue inpatient care. 2.  Optimize medical therapy for coronary disease. 3.  High intensity statin. 4.  Aspirin 81 mg p.o. daily for 1 month. The patient will be on  anticoagulation for new-onset atrial fibrillation, probably will be  started on Eliquis which can be started in the morning. Stop aspirin  after 1 month if the patient remains on anticoagulation. 5.  Plavix 75 mg p.o. daily for 1 year. 6.  Optimize medical therapy for heart failure. 7.  LifeVest is indicated before discharge. 8.  May consider RCA  PCI in the future if medical therapy fails. 9.  Monitor in telemetry. Findings and plan of care were discussed with the patient in details.      Milly Prader, MD     D: 11/27/2019 17:34:32             Results reviewed:  BNP: No results found for: BNP  CBC:   Lab Results   Component Value Date/Time    WBC 9.9 12/02/2020 03:03 PM    RBC 4.30 12/02/2020 03:03 PM    HGB 14.9 12/02/2020 03:03 PM    HCT 43.1 12/02/2020 03:03 PM     12/02/2020 03:03 PM     CMP:    Lab Results   Component Value Date/Time     08/18/2022 12:54 PM    K 5.0 08/18/2022 12:54 PM    K 3.7 03/06/2020 06:41 AM     08/18/2022 12:54 PM    CO2 28 08/18/2022 12:54 PM    BUN 7 08/18/2022 12:54 PM    CREATININE 0.7 08/18/2022 12:54 PM    LABGLOM >90 08/18/2022 12:54 PM    GLUCOSE 84 08/18/2022 12:54 PM    GLUCOSE 59 12/31/2019 02:52 PM    CALCIUM 10.0 08/18/2022 12:54 PM     Hepatic Function Panel:    Lab Results   Component Value Date/Time    ALKPHOS 96 08/18/2022 12:54 PM    ALT 60 08/18/2022 12:54 PM     08/18/2022 12:54 PM    PROT 7.1 08/18/2022 12:54 PM    BILITOT 1.2 08/18/2022 12:54 PM    BILIDIR 0.3 08/18/2022 12:54 PM    LABALBU 4.4 08/18/2022 12:54 PM     Magnesium:    Lab Results   Component Value Date/Time    MG 1.9 08/18/2022 12:54 PM     PT/INR:    Lab Results   Component

## 2023-07-19 ENCOUNTER — TELEPHONE (OUTPATIENT)
Dept: CARDIOLOGY CLINIC | Age: 68
End: 2023-07-19

## 2023-07-19 DIAGNOSIS — I50.22 CHF NYHA CLASS II, CHRONIC, SYSTOLIC (HCC): Primary | ICD-10-CM

## 2023-07-19 NOTE — TELEPHONE ENCOUNTER
----- Message from DYAN Good CNP sent at 7/19/2023  8:41 AM EDT -----  Regarding: FW:   Labs look good except sodium low. Need keep fluid intake at 1.5-2L.   Repeat BMP in 1 month  ----- Message -----  From: Jocelin Chakraborty Incoming Lab Results From Soft  Sent: 7/18/2023   4:08 PM EDT  To: DYAN Good CNP

## 2023-08-23 ENCOUNTER — PROCEDURE VISIT (OUTPATIENT)
Dept: CARDIOLOGY CLINIC | Age: 68
End: 2023-08-23

## 2023-08-23 ENCOUNTER — TELEPHONE (OUTPATIENT)
Dept: CARDIOLOGY CLINIC | Age: 68
End: 2023-08-23

## 2023-08-23 DIAGNOSIS — I50.22 CHF NYHA CLASS II, CHRONIC, SYSTOLIC (HCC): Primary | ICD-10-CM

## 2023-08-23 NOTE — PROGRESS NOTES
[facility] Med[facility] Single [facility] Optivol  Pt of [place]/Fior Prasad- CHF    Battery 8.9 years    Optivol elevated. Will send to CHF to address.      Hx AF- taking eliquis  Episodes  AF

## 2023-08-23 NOTE — TELEPHONE ENCOUNTER
[facility] Medtronic Single ICD Optivol  Pt of [place]/Fior Prasad- CHF    Battery 8.9 years    Optivol elevated.

## 2023-09-28 ENCOUNTER — PROCEDURE VISIT (OUTPATIENT)
Dept: CARDIOLOGY CLINIC | Age: 68
End: 2023-09-28
Payer: MEDICARE

## 2023-09-28 DIAGNOSIS — Z95.810 ICD (IMPLANTABLE CARDIOVERTER-DEFIBRILLATOR) IN PLACE: Primary | ICD-10-CM

## 2023-09-28 PROCEDURE — 93295 DEV INTERROG REMOTE 1/2/MLT: CPT | Performed by: INTERNAL MEDICINE

## 2023-09-28 PROCEDURE — 93296 REM INTERROG EVL PM/IDS: CPT | Performed by: INTERNAL MEDICINE

## 2023-09-28 NOTE — PROGRESS NOTES
[facility] Medtronic Single lead ICD   Pt of [place]    Battery 8.9 years    Presenting rhythm VS    RV impedance 437    RV shock 47  SVC shock 63    RV wave 14.5    V threshold 0.625 @ 0.40  V amplitude 2.0 @ 0.40    Programmed mode VVI 40    V paced <0.1%     Hx PAF- taking eliquis  AFib burden 0.1%     Episodes   AF    Optivol WNL

## 2023-11-08 ENCOUNTER — PROCEDURE VISIT (OUTPATIENT)
Dept: CARDIOLOGY CLINIC | Age: 68
End: 2023-11-08

## 2023-11-08 DIAGNOSIS — I50.22 CHF NYHA CLASS II, CHRONIC, SYSTOLIC (HCC): ICD-10-CM

## 2023-11-08 DIAGNOSIS — Z95.810 ICD (IMPLANTABLE CARDIOVERTER-DEFIBRILLATOR) IN PLACE: Primary | ICD-10-CM

## 2023-11-08 NOTE — PROGRESS NOTES
[facility]  Pt of [place]    Battery 8.6 years    Optivol WNL    Episodes  NSVT  AF vs PACs

## 2023-11-13 ENCOUNTER — TELEPHONE (OUTPATIENT)
Dept: CARDIOLOGY CLINIC | Age: 68
End: 2023-11-13

## 2023-11-13 NOTE — TELEPHONE ENCOUNTER
Progress Notes  Salvador Mata MD (Physician)   Cardiology  Icd check reviewd  Episodes Atr fib with CVR noted      Plan     Need office visit for further eval and managment      [facility] Medtronic Single ICD Optivol  Pt of [place]     Battery 8.6 years     Optivol WNL     Episodes  NSVT  AF vs PACs

## 2023-11-17 DIAGNOSIS — I25.10 CORONARY ARTERY DISEASE INVOLVING NATIVE CORONARY ARTERY OF NATIVE HEART WITHOUT ANGINA PECTORIS: ICD-10-CM

## 2023-11-17 RX ORDER — CARVEDILOL 6.25 MG/1
6.25 TABLET ORAL 2 TIMES DAILY
Qty: 60 TABLET | Refills: 1 | Status: SHIPPED | OUTPATIENT
Start: 2023-11-17

## 2023-11-17 NOTE — TELEPHONE ENCOUNTER
Chuckie Hernandez called requesting a refill on the following medications:  Requested Prescriptions     Pending Prescriptions Disp Refills    carvedilol (COREG) 6.25 MG tablet 60 tablet 1     Sig: Take 1 tablet by mouth 2 times daily     Pharmacy verified:[facility] op   .pv      Date of last visit:   Date of next visit (if applicable): Visit date not found

## 2023-12-13 ENCOUNTER — PROCEDURE VISIT (OUTPATIENT)
Dept: CARDIOLOGY CLINIC | Age: 68
End: 2023-12-13

## 2023-12-13 DIAGNOSIS — I50.22 CHF NYHA CLASS II, CHRONIC, SYSTOLIC (HCC): Primary | ICD-10-CM

## 2023-12-13 NOTE — PROGRESS NOTES
Dr Anton Le pt   Medtronic carelink optivol remote  Battery 8.6 yrs remaining  Known afib/ coumadin   3 high vent rate episodes      Optivol wnl

## 2024-01-02 ENCOUNTER — TELEPHONE (OUTPATIENT)
Dept: CARDIOLOGY CLINIC | Age: 69
End: 2024-01-02

## 2024-01-03 RX ORDER — ATORVASTATIN CALCIUM 40 MG/1
40 TABLET, FILM COATED ORAL DAILY
Qty: 30 TABLET | Refills: 11 | Status: SHIPPED | OUTPATIENT
Start: 2024-01-03

## 2024-01-03 RX ORDER — ATORVASTATIN CALCIUM 40 MG/1
40 TABLET, FILM COATED ORAL DAILY
Qty: 30 TABLET | Refills: 11 | OUTPATIENT
Start: 2024-01-03

## 2024-01-03 NOTE — TELEPHONE ENCOUNTER
Patient no show to last appt with Dr. Moody.   No future appt scheduled with Dr. Moody.   Attempted to call patient but [facility].  Future appt with Fior, would you be willing to refill Atorvastatin?

## 2024-01-17 ENCOUNTER — PROCEDURE VISIT (OUTPATIENT)
Dept: CARDIOLOGY CLINIC | Age: 69
End: 2024-01-17

## 2024-01-17 DIAGNOSIS — Z95.810 ICD (IMPLANTABLE CARDIOVERTER-DEFIBRILLATOR) IN PLACE: Primary | ICD-10-CM

## 2024-01-17 NOTE — PROGRESS NOTES
[facility]   Pt of [place]    Battery 8.4 years    Presenting rhythm VS    RV impedance 437    RV shock 50  SVC shock 69    RV wave 14.6    V threshold 0.75 @ 0.40  V amplitude 2.0 @ 0.40    Programmed mode VVI 40    V paced <0.1%     Hx AF - taking eliquis   AFib burden 0%     Episodes   12/30/23-VT vs RVR rate 194    Optivol WNL

## 2024-01-31 RX ORDER — SACUBITRIL AND VALSARTAN 49; 51 MG/1; MG/1
1 TABLET, FILM COATED ORAL 2 TIMES DAILY
Qty: 60 TABLET | Refills: 3 | OUTPATIENT
Start: 2024-01-31

## 2024-02-21 ENCOUNTER — PROCEDURE VISIT (OUTPATIENT)
Dept: CARDIOLOGY CLINIC | Age: 69
End: 2024-02-21

## 2024-02-21 DIAGNOSIS — I50.42 CHRONIC COMBINED SYSTOLIC AND DIASTOLIC CONGESTIVE HEART FAILURE (HCC): Primary | ICD-10-CM

## 2024-02-21 DIAGNOSIS — I25.10 CORONARY ARTERY DISEASE INVOLVING NATIVE CORONARY ARTERY OF NATIVE HEART WITHOUT ANGINA PECTORIS: ICD-10-CM

## 2024-02-21 RX ORDER — CARVEDILOL 6.25 MG/1
6.25 TABLET ORAL 2 TIMES DAILY
Qty: 60 TABLET | Refills: 12 | Status: SHIPPED | OUTPATIENT
Start: 2024-02-21

## 2024-03-21 DIAGNOSIS — I48.0 PAROXYSMAL ATRIAL FIBRILLATION (HCC): ICD-10-CM

## 2024-03-22 DIAGNOSIS — I48.0 PAROXYSMAL ATRIAL FIBRILLATION (HCC): ICD-10-CM

## 2024-03-27 ENCOUNTER — PROCEDURE VISIT (OUTPATIENT)
Dept: CARDIOLOGY CLINIC | Age: 69
End: 2024-03-27

## 2024-03-27 DIAGNOSIS — I50.22 CHF NYHA CLASS II, CHRONIC, SYSTOLIC (HCC): Primary | ICD-10-CM

## 2024-03-27 NOTE — PROGRESS NOTES
[facility] Medtronic Single ICD Optivol  Pt of [place]    Battery 8.3 years    Optivol WNL    Episodes  VT

## 2024-04-30 ENCOUNTER — TELEPHONE (OUTPATIENT)
Dept: PHARMACY | Facility: CLINIC | Age: 69
End: 2024-04-30

## 2024-04-30 NOTE — TELEPHONE ENCOUNTER
[facility] CLINICAL PHARMACY: ADHERENCE REVIEW  Identified care gap per [employer]: fills at Non-preferred pharmacy: [facility] (is LIS and/or DSNP, so \"preferred\" pharmacy network may not apply): Statin adherence    ASSESSMENT  STATIN ADHERENCE    Insurance Records claims through 24 (Prior Year PDC = not reported; YTD PDC = FIRST FILL; Potential Fail Date: 24):   ATORVASTATIN TAB 40MG last filled on 1/3/24 for 90 day supply. Next refill due: 24    Prescribed si tablet/capsule daily    Per chart, refills remain    Lab Results   Component Value Date    CHOL 108 2022    TRIG 85 2022    HDL 63 2022    LDLCALC 28 2022     ALT   Date Value Ref Range Status   2022 60 11 - 66 U/L Final     AST   Date Value Ref Range Status   2022 108 (H) 5 - 40 U/L Final     The ASCVD Risk score (Germania DK, et al., 2019) failed to calculate for the following reasons:    The valid total cholesterol range is 130 to 320 mg/dL     PLAN  Per insurer report, LIS-1 - may be able to receive up to a 100-day supply for the same cost as a 30-day supply.    The following are interventions that have been identified:   Patient OVERDUE refilling atorvastatin 40mg daily (was due @) and active on home medication list.   Patient eligible for 100 day supply of rxs (LIS1; currently filling 30ds carvedilol, Entresto and Eliquis - has refills remaining that can be combined for 90ds fills)    Attempting to reach patient to review - unable to leave message.    Per [facility]: can combine refills for Entresto, Eliquis and carvedilol, 90ds of each $0 for patient and will refill atorvastatin, so all 4 90-ds rxs ready/fill at same time for patient.     Letter sent to patient.     Last Visit: 23 with cardiology  Next Visit: 24 with cardiology    Dawn Kelly, PharmD, BCACP  Population Health Pharmacist  [facility] Clinical Pharmacy  Department, toll free: [phone], option

## 2024-05-01 ENCOUNTER — PROCEDURE VISIT (OUTPATIENT)
Dept: CARDIOLOGY CLINIC | Age: 69
End: 2024-05-01
Payer: MEDICARE

## 2024-05-01 DIAGNOSIS — Z95.810 ICD (IMPLANTABLE CARDIOVERTER-DEFIBRILLATOR) IN PLACE: Primary | ICD-10-CM

## 2024-05-01 PROCEDURE — 93295 DEV INTERROG REMOTE 1/2/MLT: CPT | Performed by: INTERNAL MEDICINE

## 2024-05-01 PROCEDURE — 93296 REM INTERROG EVL PM/IDS: CPT | Performed by: INTERNAL MEDICINE

## 2024-05-01 NOTE — PROGRESS NOTES
[facility] single ICD     4/3/24 VT treated successfully with one round ATP        8.1 years on device   Rv imped 437  Shock 46  SVC 62  R waves 13.1  Threshold 0.75 @ 0.4  <0.1% paced   Optivol WNL

## 2024-06-12 ENCOUNTER — PROCEDURE VISIT (OUTPATIENT)
Dept: CARDIOLOGY CLINIC | Age: 69
End: 2024-06-12

## 2024-06-12 DIAGNOSIS — I50.22 CHF NYHA CLASS II, CHRONIC, SYSTOLIC (HCC): Primary | ICD-10-CM

## 2024-06-12 NOTE — PROGRESS NOTES
[facility] Medtronic Single ICD Optivol  Pt of [place]    Battery 8 years    Optivol WNL    Episodes   1 episode VT lasting 1 sec        
Noted.  
Cell Phone/PDA (specify)/Clothing

## 2024-07-30 ENCOUNTER — TELEPHONE (OUTPATIENT)
Dept: CARDIOLOGY CLINIC | Age: 69
End: 2024-07-30

## 2024-08-06 ENCOUNTER — HOSPITAL ENCOUNTER (OUTPATIENT)
Age: 69
Discharge: HOME | End: 2024-08-06
Payer: MEDICARE

## 2024-08-06 ENCOUNTER — OFFICE VISIT (OUTPATIENT)
Dept: CARDIOLOGY CLINIC | Age: 69
End: 2024-08-06

## 2024-08-06 ENCOUNTER — NURSE ONLY (OUTPATIENT)
Dept: CARDIOLOGY CLINIC | Age: 69
End: 2024-08-06
Payer: MEDICARE

## 2024-08-06 VITALS
DIASTOLIC BLOOD PRESSURE: 84 MMHG | OXYGEN SATURATION: 98 % | SYSTOLIC BLOOD PRESSURE: 164 MMHG | HEART RATE: 61 BPM | BODY MASS INDEX: 22.5 KG/M2 | WEIGHT: 127 LBS | HEIGHT: 63 IN

## 2024-08-06 DIAGNOSIS — Z95.810 ICD (IMPLANTABLE CARDIOVERTER-DEFIBRILLATOR) IN PLACE: Primary | ICD-10-CM

## 2024-08-06 DIAGNOSIS — I50.32 CHF NYHA CLASS II, CHRONIC, DIASTOLIC (HCC): Primary | ICD-10-CM

## 2024-08-06 DIAGNOSIS — I25.10 CORONARY ARTERY DISEASE INVOLVING NATIVE CORONARY ARTERY OF NATIVE HEART WITHOUT ANGINA PECTORIS: ICD-10-CM

## 2024-08-06 DIAGNOSIS — Z72.0 TOBACCO ABUSE: ICD-10-CM

## 2024-08-06 DIAGNOSIS — I10 ESSENTIAL HYPERTENSION: ICD-10-CM

## 2024-08-06 DIAGNOSIS — I50.32 CHF NYHA CLASS II, CHRONIC, DIASTOLIC (HCC): ICD-10-CM

## 2024-08-06 LAB
ANION GAP SERPL CALC-SCNC: 13 MEQ/L (ref 8–16)
BUN SERPL-MCNC: 8 MG/DL (ref 7–22)
CALCIUM SERPL-MCNC: 9.3 MG/DL (ref 8.5–10.5)
CHLORIDE SERPL-SCNC: 98 MEQ/L (ref 98–111)
CO2 SERPL-SCNC: 25 MEQ/L (ref 23–33)
CREAT SERPL-MCNC: 0.7 MG/DL (ref 0.4–1.2)
GFR SERPL CREATININE-BSD FRML MDRD: > 90 ML/MIN/1.73M2
GLUCOSE SERPL-MCNC: 107 MG/DL (ref 70–108)
POTASSIUM SERPL-SCNC: 3.8 MEQ/L (ref 3.5–5.2)
SODIUM SERPL-SCNC: 136 MEQ/L (ref 135–145)

## 2024-08-06 PROCEDURE — 80048 BASIC METABOLIC PNL TOTAL CA: CPT

## 2024-08-06 PROCEDURE — 36415 COLL VENOUS BLD VENIPUNCTURE: CPT

## 2024-08-06 PROCEDURE — 93282 PRGRMG EVAL IMPLANTABLE DFB: CPT | Performed by: INTERNAL MEDICINE

## 2024-08-06 RX ORDER — CARVEDILOL 12.5 MG/1
12.5 TABLET ORAL 2 TIMES DAILY
Qty: 60 TABLET | Refills: 11 | Status: SHIPPED | OUTPATIENT
Start: 2024-08-06

## 2024-08-06 ASSESSMENT — ENCOUNTER SYMPTOMS
ABDOMINAL DISTENTION: 0
COUGH: 0
SHORTNESS OF BREATH: 0

## 2024-08-06 NOTE — PATIENT INSTRUCTIONS
You may receive a survey regarding the care you received during your visit.  Your input is valuable to us.  We encourage you to complete and return your survey.  We hope you will choose us in the future for your healthcare needs.    Your nurses today were Darling.  Office hours:   Mon-Thurs 8-4:30  Friday 8-12  Phone: [phone]    Continue:  Continue current medications  Daily weights and record  Fluid restriction of 2 Liters per day  Limit sodium in diet to around 1568-5258 mg/day  Monitor BP  Activity as tolerated     Call the Heart Failure Clinic for any of the following symptoms:   Weight gain of 3 pounds in 1 day or 5 pounds in 1 week  Increased shortness of breath  Shortness of breath while laying down  Cough  Chest pain  Swelling in feet, ankles or legs  Bloating in abdomen  Fatigue

## 2024-08-06 NOTE — PROGRESS NOTES
Dr lovett pt   Medtronic single icd check in office / no carelink     Presents in vs 77    Vvi 40    Rv waves 15  Rv impedence 475  Rv 45  Svc 65    Vent threshold 0.75 @ 0.4    Vent amplitude 2 @ 0.4    Optivol wnl    5/12/24 1 high vent rate episode for :01 seconds       Known afib/ eliquis   Afib burden <0.1%

## 2024-08-06 NOTE — PROGRESS NOTES
Heart Failure Clinic       Visit Date: 8/6/2024  Cardiologist:  Dr. Moody  Primary Care Physician: Teresa Mancilla, APRN - CNP    Lawrence Peterson is a 68 y.o. male who presents today for:  Chief Complaint   Patient presents with    Congestive Heart Failure         HPI:   Lawrence Peterson is a 68 y.o. male who presents to the office for a follow up patient visit in the heart failure clinic.  Accompanied by no one  Lives at [facility]     Type HF: Combined EF 20%>>30%>>50% (9/2021)  Cause: ICM/NICM   Device: Single ICD  HX:  CAD s/p PCI OM, Lcx (2019), PAF (Eliquis), HTN, Asthma, hx ETOH (1/3L Gin/day x years), Tobacco abuse (50+ years)      Hospitalization r/t Heart Failure:  11/25-12/1 = CHF exac - went in w/ SOB, increased swelling.  + CXR.  BNP 51754 - down to 4723 on discharge.  IV Lasix.  Discharge wt 124#  LHC - nonischemic/ischemic EF 20%.     Was not on any meds prior to admission    6/2022 - 119#  slowed down little over last year.  Not doing as much - feels just r/t getting older  Denies fluid overload or worsening SOB.   No fluid on exam.   Still drinking daily and smoking 1/2-1 pack/day    7/2023:  120#  Still smoking 1/pk/day.  ETOH 1/3 bottle gin/day  Had device check today - Optivol WNL  C/o Little lightheadedness.   BP little low today - not check at home  Legs get tired w/ walking - no pain.   Breathing same.  No wt gain  No fluid on exam.      TODAY 8/2024 - 127#  Not been admitted since last OV  Still smoking, still ETOH - about 1/3L day.   Some SOB - no worse.  Humidity hinders.  No fluid issues, none on exam.   Device check today - optivol wnl      Past Medical History:   Diagnosis Date    Asthma     Atrial fibrillation (HCC) 11/2019    CAD S/P percutaneous coronary angioplasty 11/27/2019    Cardiomyopathy (HCC) 11/2019    Medtronic single ICD  3/12/2020    Pneumonia     Ulcer      Past Surgical History:   Procedure Laterality Date    COLONOSCOPY Left 5/19/2021    COLONOSCOPY

## 2024-08-28 ENCOUNTER — PROCEDURE VISIT (OUTPATIENT)
Dept: CARDIOLOGY CLINIC | Age: 69
End: 2024-08-28
Payer: MEDICARE

## 2024-08-28 DIAGNOSIS — I50.32 CHF NYHA CLASS II, CHRONIC, DIASTOLIC (HCC): Primary | ICD-10-CM

## 2024-08-28 PROCEDURE — 93297 REM INTERROG DEV EVAL ICPMS: CPT | Performed by: INTERNAL MEDICINE

## 2024-08-28 NOTE — PROGRESS NOTES
[facility] Med[facility] Opt[facility]ol  Pt of [place]    Battery 7.9 years    Optivol WNL    Episodes  NS VT

## 2024-10-31 ENCOUNTER — TELEPHONE (OUTPATIENT)
Dept: CARDIOLOGY CLINIC | Age: 69
End: 2024-10-31

## 2024-10-31 NOTE — TELEPHONE ENCOUNTER
PT MISSED HIS DEVICE CHECK IN THE OFFICE TODAY    TRIED TO CALL THIS PT AND UNABLE TO REACH  JUST NEES R/S

## 2024-12-03 ENCOUNTER — TELEPHONE (OUTPATIENT)
Dept: PHARMACY | Facility: CLINIC | Age: 69
End: 2024-12-03

## 2024-12-03 NOTE — TELEPHONE ENCOUNTER
[facility] CLINICAL PHARMACY: ADHERENCE REVIEW  Identified care gap per [employer]: fills at Non-preferred pharmacy: [facility]: Statin adherence    ASSESSMENT  STATIN ADHERENCE    Insurance Records claims through 24 (Prior Year PDC = not reported; YTD PDC = 82%; Potential Fail Date: 12.10.24):   ATORVASTATIN TAB 40MG last filled on 24 for 90 day supply. Next refill due: 24    Prescribed si tablet/capsule daily    Per Insurer Portal: last filled on same    Per [facility]: will get  30 day supply ready to  since past due.    Lab Results   Component Value Date    CHOL 108 2022    TRIG 85 2022    HDL 63 2022     Lab Results   Component Value Date    LDL 28 2022      ALT   Date Value Ref Range Status   2022 60 11 - 66 U/L Final     AST   Date Value Ref Range Status   2022 108 (H) 5 - 40 U/L Final     The ASCVD Risk score (Germania DK, et al., 2019) failed to calculate for the following reasons:    The valid total cholesterol range is 130 to 320 mg/dL     PLAN  Per insurer report, LIS-1 - may be able to receive up to a 100-day supply for the same cost as a 30-day supply.    The following are interventions that have been identified:   Patient OVERDUE refilling Atorvastatin and active on home medication list.     Attempting to reach patient to review - unable to leave message. Letter sent to patient.        Last Visit: none  Next Visit: none    Cecy Staples CPhT  [facility]   [facility]  Toll Free: [phone] Option 1    For Pharmacy Admin Tracking Only    Program: [facility] [employer]  CPA in place:  No  Recommendation Provided To: Pharmacy: 1  Intervention Detail: Adherence Monitorin  Intervention Accepted By: Pharmacy: 1  Gap Closed?: No   Time Spent (min): 15

## 2025-01-26 ENCOUNTER — HOSPITAL ENCOUNTER (INPATIENT)
Age: 70
LOS: 2 days | Discharge: HOME HEALTH | DRG: 896 | End: 2025-01-29
Admitting: INTERNAL MEDICINE
Payer: MEDICARE

## 2025-01-26 ENCOUNTER — APPOINTMENT (OUTPATIENT)
Dept: GENERAL RADIOLOGY | Age: 70
DRG: 896 | End: 2025-01-26
Payer: MEDICARE

## 2025-01-26 DIAGNOSIS — S72.115D CLOSED NONDISPLACED FRACTURE OF GREATER TROCHANTER OF LEFT FEMUR WITH ROUTINE HEALING: ICD-10-CM

## 2025-01-26 DIAGNOSIS — D68.9 COAGULOPATHY (HCC): ICD-10-CM

## 2025-01-26 DIAGNOSIS — S72.002A CLOSED FRACTURE OF LEFT HIP, INITIAL ENCOUNTER (HCC): Primary | ICD-10-CM

## 2025-01-26 PROCEDURE — 73502 X-RAY EXAM HIP UNI 2-3 VIEWS: CPT

## 2025-01-26 PROCEDURE — 99285 EMERGENCY DEPT VISIT HI MDM: CPT

## 2025-01-27 ENCOUNTER — APPOINTMENT (OUTPATIENT)
Dept: CT IMAGING | Age: 70
DRG: 896 | End: 2025-01-27
Payer: MEDICARE

## 2025-01-27 ENCOUNTER — APPOINTMENT (OUTPATIENT)
Dept: GENERAL RADIOLOGY | Age: 70
DRG: 896 | End: 2025-01-27
Payer: MEDICARE

## 2025-01-27 PROBLEM — I50.32 CHRONIC HEART FAILURE WITH PRESERVED EJECTION FRACTION (HCC): Status: RESOLVED | Noted: 2019-11-25 | Resolved: 2025-01-27

## 2025-01-27 PROBLEM — E78.5 DYSLIPIDEMIA: Status: RESOLVED | Noted: 2020-01-10 | Resolved: 2025-01-27

## 2025-01-27 PROBLEM — R53.1 GENERALIZED WEAKNESS: Status: ACTIVE | Noted: 2025-01-27

## 2025-01-27 PROBLEM — S72.115D CLOSED NONDISPLACED FRACTURE OF GREATER TROCHANTER OF LEFT FEMUR WITH ROUTINE HEALING: Status: ACTIVE | Noted: 2025-01-27

## 2025-01-27 PROBLEM — Z95.810 ICD (IMPLANTABLE CARDIOVERTER-DEFIBRILLATOR) IN PLACE: Status: RESOLVED | Noted: 2020-03-12 | Resolved: 2025-01-27

## 2025-01-27 PROBLEM — I47.29 NONSUSTAINED VENTRICULAR TACHYCARDIA (HCC): Status: RESOLVED | Noted: 2019-11-26 | Resolved: 2025-01-27

## 2025-01-27 PROBLEM — I25.10 CORONARY ARTERY DISEASE INVOLVING NATIVE CORONARY ARTERY OF NATIVE HEART WITHOUT ANGINA PECTORIS: Status: RESOLVED | Noted: 2020-01-07 | Resolved: 2025-01-27

## 2025-01-27 PROBLEM — I50.32 CHRONIC HEART FAILURE WITH PRESERVED EJECTION FRACTION (HCC): Status: ACTIVE | Noted: 2019-11-25

## 2025-01-27 PROBLEM — Z79.01 ANTICOAGULATED ON COUMADIN: Status: RESOLVED | Noted: 2020-09-02 | Resolved: 2025-01-27

## 2025-01-27 PROBLEM — I10 ESSENTIAL HYPERTENSION: Status: RESOLVED | Noted: 2019-11-26 | Resolved: 2025-01-27

## 2025-01-27 PROBLEM — E87.1 HYPONATREMIA: Status: ACTIVE | Noted: 2025-01-27

## 2025-01-27 PROBLEM — Z72.0 TOBACCO ABUSE: Status: RESOLVED | Noted: 2019-11-26 | Resolved: 2025-01-27

## 2025-01-27 PROBLEM — Z95.820 S/P ANGIOPLASTY WITH STENT: Status: RESOLVED | Noted: 2020-01-10 | Resolved: 2025-01-27

## 2025-01-27 PROBLEM — I42.9 CARDIOMYOPATHY (HCC): Status: RESOLVED | Noted: 2020-01-10 | Resolved: 2025-01-27

## 2025-01-27 PROBLEM — F10.10 CHRONIC ALCOHOL ABUSE: Status: ACTIVE | Noted: 2025-01-27

## 2025-01-27 PROBLEM — I42.9 CARDIOMYOPATHY (HCC): Status: RESOLVED | Noted: 2020-03-06 | Resolved: 2025-01-27

## 2025-01-27 PROBLEM — F10.10 CHRONIC ALCOHOL ABUSE: Status: ACTIVE | Noted: 2019-11-26

## 2025-01-27 LAB
ALBUMIN SERPL BCG-MCNC: 3.7 G/DL (ref 3.5–5.1)
ALP SERPL-CCNC: 67 U/L (ref 38–126)
ALT SERPL W/O P-5'-P-CCNC: 43 U/L (ref 11–66)
AMPHETAMINES UR QL SCN: NEGATIVE
ANION GAP SERPL CALC-SCNC: 13 MEQ/L (ref 8–16)
AST SERPL-CCNC: 55 U/L (ref 5–40)
BARBITURATES UR QL SCN: NEGATIVE
BASOPHILS ABSOLUTE: 0 THOU/MM3 (ref 0–0.1)
BASOPHILS NFR BLD AUTO: 0.5 %
BENZODIAZ UR QL SCN: NEGATIVE
BILIRUB SERPL-MCNC: 1.1 MG/DL (ref 0.3–1.2)
BUN SERPL-MCNC: 19 MG/DL (ref 7–22)
BZE UR QL SCN: NEGATIVE
CALCIUM SERPL-MCNC: 9.2 MG/DL (ref 8.5–10.5)
CANNABINOIDS UR QL SCN: NEGATIVE
CHLORIDE SERPL-SCNC: 94 MEQ/L (ref 98–111)
CO2 SERPL-SCNC: 25 MEQ/L (ref 23–33)
CREAT SERPL-MCNC: 0.9 MG/DL (ref 0.4–1.2)
DEPRECATED RDW RBC AUTO: 46.5 FL (ref 35–45)
EKG ATRIAL RATE: 61 BPM
EKG P AXIS: 33 DEGREES
EKG P-R INTERVAL: 142 MS
EKG Q-T INTERVAL: 426 MS
EKG QRS DURATION: 104 MS
EKG QTC CALCULATION (BAZETT): 428 MS
EKG R AXIS: 11 DEGREES
EKG T AXIS: 80 DEGREES
EKG VENTRICULAR RATE: 61 BPM
EOSINOPHIL NFR BLD AUTO: 1.6 %
EOSINOPHILS ABSOLUTE: 0.1 THOU/MM3 (ref 0–0.4)
ERYTHROCYTE [DISTWIDTH] IN BLOOD BY AUTOMATED COUNT: 12.7 % (ref 11.5–14.5)
ETHANOL SERPL-MCNC: < 0.01 % (ref 0–0.08)
ETHANOL SERPL-MCNC: < 0.01 % (ref 0–0.08)
FENTANYL: NEGATIVE
GFR SERPL CREATININE-BSD FRML MDRD: > 90 ML/MIN/1.73M2
GLUCOSE SERPL-MCNC: 108 MG/DL (ref 70–108)
HCT VFR BLD AUTO: 40.2 % (ref 42–52)
HGB BLD-MCNC: 14.2 GM/DL (ref 14–18)
IMM GRANULOCYTES # BLD AUTO: 0.04 THOU/MM3 (ref 0–0.07)
IMM GRANULOCYTES NFR BLD AUTO: 0.5 %
LYMPHOCYTES ABSOLUTE: 1.3 THOU/MM3 (ref 1–4.8)
LYMPHOCYTES NFR BLD AUTO: 16.6 %
MCH RBC QN AUTO: 35.3 PG (ref 26–33)
MCHC RBC AUTO-ENTMCNC: 35.3 GM/DL (ref 32.2–35.5)
MCV RBC AUTO: 100 FL (ref 80–94)
MONOCYTES ABSOLUTE: 1.1 THOU/MM3 (ref 0.4–1.3)
MONOCYTES NFR BLD AUTO: 13.4 %
NEUTROPHILS ABSOLUTE: 5.4 THOU/MM3 (ref 1.8–7.7)
NEUTROPHILS NFR BLD AUTO: 67.4 %
NRBC BLD AUTO-RTO: 0 /100 WBC
OPIATES UR QL SCN: POSITIVE
ORIGINAL SAMPLE NUMBER: NORMAL
OSMOLALITY SERPL CALC.SUM OF ELEC: 267.3 MOSMOL/KG (ref 275–300)
OSMOLALITY UR: NORMAL MOSMOL/KG (ref 250–750)
OXYCODONE: NEGATIVE
PCP UR QL SCN: NEGATIVE
PLATELET # BLD AUTO: 197 THOU/MM3 (ref 130–400)
PMV BLD AUTO: 9.9 FL (ref 9.4–12.4)
POTASSIUM SERPL-SCNC: 4.5 MEQ/L (ref 3.5–5.2)
PROT SERPL-MCNC: 6.6 G/DL (ref 6.1–8)
RBC # BLD AUTO: 4.02 MILL/MM3 (ref 4.7–6.1)
REFERENCE LOCATION: NORMAL
REFERENCE RANGE: NORMAL
SODIUM SERPL-SCNC: 132 MEQ/L (ref 135–145)
SODIUM UR-SCNC: 46 MEQ/L
TEST RESULTS WITH UNITS: NORMAL
TEST(S) BEING PERFORMED: NORMAL
WBC # BLD AUTO: 8 THOU/MM3 (ref 4.8–10.8)

## 2025-01-27 PROCEDURE — 83935 ASSAY OF URINE OSMOLALITY: CPT

## 2025-01-27 PROCEDURE — 94761 N-INVAS EAR/PLS OXIMETRY MLT: CPT

## 2025-01-27 PROCEDURE — 6370000000 HC RX 637 (ALT 250 FOR IP): Performed by: INTERNAL MEDICINE

## 2025-01-27 PROCEDURE — 80053 COMPREHEN METABOLIC PANEL: CPT

## 2025-01-27 PROCEDURE — 94640 AIRWAY INHALATION TREATMENT: CPT

## 2025-01-27 PROCEDURE — 99223 1ST HOSP IP/OBS HIGH 75: CPT

## 2025-01-27 PROCEDURE — 80307 DRUG TEST PRSMV CHEM ANLYZR: CPT

## 2025-01-27 PROCEDURE — 6370000000 HC RX 637 (ALT 250 FOR IP)

## 2025-01-27 PROCEDURE — 96375 TX/PRO/DX INJ NEW DRUG ADDON: CPT

## 2025-01-27 PROCEDURE — 6360000002 HC RX W HCPCS: Performed by: PHYSICIAN ASSISTANT

## 2025-01-27 PROCEDURE — 6370000000 HC RX 637 (ALT 250 FOR IP): Performed by: PHYSICIAN ASSISTANT

## 2025-01-27 PROCEDURE — 82077 ASSAY SPEC XCP UR&BREATH IA: CPT

## 2025-01-27 PROCEDURE — 2500000003 HC RX 250 WO HCPCS

## 2025-01-27 PROCEDURE — 1200000003 HC TELEMETRY R&B

## 2025-01-27 PROCEDURE — 71045 X-RAY EXAM CHEST 1 VIEW: CPT

## 2025-01-27 PROCEDURE — 36415 COLL VENOUS BLD VENIPUNCTURE: CPT

## 2025-01-27 PROCEDURE — 93005 ELECTROCARDIOGRAM TRACING: CPT | Performed by: PHYSICIAN ASSISTANT

## 2025-01-27 PROCEDURE — 85025 COMPLETE CBC W/AUTO DIFF WBC: CPT

## 2025-01-27 PROCEDURE — 84300 ASSAY OF URINE SODIUM: CPT

## 2025-01-27 PROCEDURE — 72192 CT PELVIS W/O DYE: CPT

## 2025-01-27 PROCEDURE — 93010 ELECTROCARDIOGRAM REPORT: CPT | Performed by: INTERNAL MEDICINE

## 2025-01-27 PROCEDURE — 96374 THER/PROPH/DIAG INJ IV PUSH: CPT

## 2025-01-27 RX ORDER — IPRATROPIUM BROMIDE AND ALBUTEROL SULFATE 2.5; .5 MG/3ML; MG/3ML
1 SOLUTION RESPIRATORY (INHALATION) 3 TIMES DAILY
Status: DISCONTINUED | OUTPATIENT
Start: 2025-01-27 | End: 2025-01-28

## 2025-01-27 RX ORDER — ONDANSETRON 4 MG/1
4 TABLET, ORALLY DISINTEGRATING ORAL EVERY 8 HOURS PRN
Status: DISCONTINUED | OUTPATIENT
Start: 2025-01-27 | End: 2025-01-29 | Stop reason: HOSPADM

## 2025-01-27 RX ORDER — LORAZEPAM 2 MG/ML
2 INJECTION INTRAMUSCULAR
Status: DISCONTINUED | OUTPATIENT
Start: 2025-01-27 | End: 2025-01-29 | Stop reason: HOSPADM

## 2025-01-27 RX ORDER — LORAZEPAM 1 MG/1
2 TABLET ORAL
Status: DISCONTINUED | OUTPATIENT
Start: 2025-01-27 | End: 2025-01-29 | Stop reason: HOSPADM

## 2025-01-27 RX ORDER — ACETAMINOPHEN 325 MG/1
650 TABLET ORAL EVERY 6 HOURS PRN
Status: DISCONTINUED | OUTPATIENT
Start: 2025-01-27 | End: 2025-01-29 | Stop reason: HOSPADM

## 2025-01-27 RX ORDER — HYDROCODONE BITARTRATE AND ACETAMINOPHEN 5; 325 MG/1; MG/1
2 TABLET ORAL EVERY 4 HOURS PRN
Status: DISCONTINUED | OUTPATIENT
Start: 2025-01-27 | End: 2025-01-29 | Stop reason: HOSPADM

## 2025-01-27 RX ORDER — FOLIC ACID 1 MG/1
1 TABLET ORAL DAILY
Status: DISCONTINUED | OUTPATIENT
Start: 2025-01-27 | End: 2025-01-29 | Stop reason: HOSPADM

## 2025-01-27 RX ORDER — LORAZEPAM 1 MG/1
3 TABLET ORAL
Status: DISCONTINUED | OUTPATIENT
Start: 2025-01-27 | End: 2025-01-29 | Stop reason: HOSPADM

## 2025-01-27 RX ORDER — SODIUM CHLORIDE 0.9 % (FLUSH) 0.9 %
5-40 SYRINGE (ML) INJECTION PRN
Status: DISCONTINUED | OUTPATIENT
Start: 2025-01-27 | End: 2025-01-29 | Stop reason: HOSPADM

## 2025-01-27 RX ORDER — PREDNISONE 20 MG/1
40 TABLET ORAL DAILY
Status: DISCONTINUED | OUTPATIENT
Start: 2025-01-27 | End: 2025-01-29 | Stop reason: HOSPADM

## 2025-01-27 RX ORDER — LORAZEPAM 1 MG/1
4 TABLET ORAL
Status: DISCONTINUED | OUTPATIENT
Start: 2025-01-27 | End: 2025-01-29 | Stop reason: HOSPADM

## 2025-01-27 RX ORDER — ACETAMINOPHEN 650 MG/1
650 SUPPOSITORY RECTAL EVERY 6 HOURS PRN
Status: DISCONTINUED | OUTPATIENT
Start: 2025-01-27 | End: 2025-01-29 | Stop reason: HOSPADM

## 2025-01-27 RX ORDER — MORPHINE SULFATE 2 MG/ML
2 INJECTION, SOLUTION INTRAMUSCULAR; INTRAVENOUS ONCE
Status: COMPLETED | OUTPATIENT
Start: 2025-01-27 | End: 2025-01-27

## 2025-01-27 RX ORDER — SODIUM CHLORIDE 0.9 % (FLUSH) 0.9 %
5-40 SYRINGE (ML) INJECTION EVERY 12 HOURS SCHEDULED
Status: DISCONTINUED | OUTPATIENT
Start: 2025-01-27 | End: 2025-01-29 | Stop reason: HOSPADM

## 2025-01-27 RX ORDER — POTASSIUM CHLORIDE 1500 MG/1
40 TABLET, EXTENDED RELEASE ORAL PRN
Status: DISCONTINUED | OUTPATIENT
Start: 2025-01-27 | End: 2025-01-29 | Stop reason: HOSPADM

## 2025-01-27 RX ORDER — ALBUTEROL SULFATE 0.83 MG/ML
2.5 SOLUTION RESPIRATORY (INHALATION) EVERY 4 HOURS PRN
Status: DISCONTINUED | OUTPATIENT
Start: 2025-01-27 | End: 2025-01-29 | Stop reason: HOSPADM

## 2025-01-27 RX ORDER — ALBUTEROL SULFATE 0.83 MG/ML
2.5 SOLUTION RESPIRATORY (INHALATION)
Status: DISCONTINUED | OUTPATIENT
Start: 2025-01-27 | End: 2025-01-27

## 2025-01-27 RX ORDER — ONDANSETRON 2 MG/ML
4 INJECTION INTRAMUSCULAR; INTRAVENOUS ONCE
Status: COMPLETED | OUTPATIENT
Start: 2025-01-27 | End: 2025-01-27

## 2025-01-27 RX ORDER — POLYETHYLENE GLYCOL 3350 17 G/17G
17 POWDER, FOR SOLUTION ORAL DAILY PRN
Status: DISCONTINUED | OUTPATIENT
Start: 2025-01-27 | End: 2025-01-29 | Stop reason: HOSPADM

## 2025-01-27 RX ORDER — LANOLIN ALCOHOL/MO/W.PET/CERES
100 CREAM (GRAM) TOPICAL DAILY
Status: DISCONTINUED | OUTPATIENT
Start: 2025-01-27 | End: 2025-01-29 | Stop reason: HOSPADM

## 2025-01-27 RX ORDER — LORAZEPAM 2 MG/ML
4 INJECTION INTRAMUSCULAR
Status: DISCONTINUED | OUTPATIENT
Start: 2025-01-27 | End: 2025-01-29 | Stop reason: HOSPADM

## 2025-01-27 RX ORDER — HYDROCODONE BITARTRATE AND ACETAMINOPHEN 5; 325 MG/1; MG/1
1 TABLET ORAL ONCE
Status: COMPLETED | OUTPATIENT
Start: 2025-01-27 | End: 2025-01-27

## 2025-01-27 RX ORDER — HYDROCODONE BITARTRATE AND ACETAMINOPHEN 5; 325 MG/1; MG/1
1 TABLET ORAL EVERY 4 HOURS PRN
Status: DISCONTINUED | OUTPATIENT
Start: 2025-01-27 | End: 2025-01-29 | Stop reason: HOSPADM

## 2025-01-27 RX ORDER — LORAZEPAM 1 MG/1
1 TABLET ORAL
Status: DISCONTINUED | OUTPATIENT
Start: 2025-01-27 | End: 2025-01-29 | Stop reason: HOSPADM

## 2025-01-27 RX ORDER — POTASSIUM CHLORIDE 7.45 MG/ML
10 INJECTION INTRAVENOUS PRN
Status: DISCONTINUED | OUTPATIENT
Start: 2025-01-27 | End: 2025-01-29 | Stop reason: HOSPADM

## 2025-01-27 RX ORDER — MAGNESIUM SULFATE IN WATER 40 MG/ML
2000 INJECTION, SOLUTION INTRAVENOUS PRN
Status: DISCONTINUED | OUTPATIENT
Start: 2025-01-27 | End: 2025-01-29 | Stop reason: HOSPADM

## 2025-01-27 RX ORDER — LORAZEPAM 2 MG/ML
3 INJECTION INTRAMUSCULAR
Status: DISCONTINUED | OUTPATIENT
Start: 2025-01-27 | End: 2025-01-29 | Stop reason: HOSPADM

## 2025-01-27 RX ORDER — SODIUM CHLORIDE 9 MG/ML
INJECTION, SOLUTION INTRAVENOUS PRN
Status: DISCONTINUED | OUTPATIENT
Start: 2025-01-27 | End: 2025-01-29 | Stop reason: HOSPADM

## 2025-01-27 RX ORDER — LORAZEPAM 2 MG/ML
1 INJECTION INTRAMUSCULAR
Status: DISCONTINUED | OUTPATIENT
Start: 2025-01-27 | End: 2025-01-29 | Stop reason: HOSPADM

## 2025-01-27 RX ORDER — ONDANSETRON 2 MG/ML
4 INJECTION INTRAMUSCULAR; INTRAVENOUS EVERY 6 HOURS PRN
Status: DISCONTINUED | OUTPATIENT
Start: 2025-01-27 | End: 2025-01-29 | Stop reason: HOSPADM

## 2025-01-27 RX ADMIN — MORPHINE SULFATE 2 MG: 2 INJECTION, SOLUTION INTRAMUSCULAR; INTRAVENOUS at 05:24

## 2025-01-27 RX ADMIN — HYDROCODONE BITARTRATE AND ACETAMINOPHEN 1 TABLET: 5; 325 TABLET ORAL at 18:49

## 2025-01-27 RX ADMIN — HYDROCODONE BITARTRATE AND ACETAMINOPHEN 1 TABLET: 5; 325 TABLET ORAL at 01:51

## 2025-01-27 RX ADMIN — IPRATROPIUM BROMIDE AND ALBUTEROL SULFATE 1 DOSE: .5; 3 SOLUTION RESPIRATORY (INHALATION) at 11:07

## 2025-01-27 RX ADMIN — SODIUM CHLORIDE, PRESERVATIVE FREE 10 ML: 5 INJECTION INTRAVENOUS at 20:21

## 2025-01-27 RX ADMIN — IPRATROPIUM BROMIDE AND ALBUTEROL SULFATE 1 DOSE: .5; 3 SOLUTION RESPIRATORY (INHALATION) at 15:34

## 2025-01-27 RX ADMIN — PREDNISONE 40 MG: 20 TABLET ORAL at 09:33

## 2025-01-27 RX ADMIN — ONDANSETRON 4 MG: 2 INJECTION INTRAMUSCULAR; INTRAVENOUS at 05:24

## 2025-01-27 RX ADMIN — IPRATROPIUM BROMIDE AND ALBUTEROL SULFATE 1 DOSE: .5; 3 SOLUTION RESPIRATORY (INHALATION) at 22:07

## 2025-01-27 ASSESSMENT — PAIN - FUNCTIONAL ASSESSMENT
PAIN_FUNCTIONAL_ASSESSMENT: NONE - DENIES PAIN

## 2025-01-27 ASSESSMENT — ENCOUNTER SYMPTOMS
SHORTNESS OF BREATH: 1
CONSTIPATION: 0
SHORTNESS OF BREATH: 0
CHOKING: 0
WHEEZING: 0
SINUS PRESSURE: 0
FACIAL SWELLING: 0
SORE THROAT: 0
VOMITING: 0
NAUSEA: 0
DIARRHEA: 0
CHEST TIGHTNESS: 0
ABDOMINAL PAIN: 0
RHINORRHEA: 0
BACK PAIN: 1
COLOR CHANGE: 0

## 2025-01-27 ASSESSMENT — PAIN SCALES - GENERAL
PAINLEVEL_OUTOF10: 6
PAINLEVEL_OUTOF10: 7

## 2025-01-27 ASSESSMENT — PAIN DESCRIPTION - LOCATION: LOCATION: HIP

## 2025-01-27 ASSESSMENT — LIFESTYLE VARIABLES
HOW MANY STANDARD DRINKS CONTAINING ALCOHOL DO YOU HAVE ON A TYPICAL DAY: 3 OR 4
HOW OFTEN DO YOU HAVE A DRINK CONTAINING ALCOHOL: 4 OR MORE TIMES A WEEK

## 2025-01-27 ASSESSMENT — PAIN DESCRIPTION - ORIENTATION: ORIENTATION: LEFT

## 2025-01-27 NOTE — DISCHARGE INSTR - COC
Continuity of Care Form    Patient Name: Lawrence Peterson   :  1955  MRN:  [id number]    Admit date:  2025  Discharge date:  ***    Code Status Order: Prior   Advance Directives:   Advance Care Flowsheet Documentation             Admitting Physician:  No admitting provider for patient encounter.  PCP: Teresa Ochoa, APRN - CNP    Discharging Nurse: ***  Discharging Hospital Unit/Room#:   Discharging Unit Phone Number: ***    Emergency Contact:   No emergency contact information on file.    Past Surgical History:  Past Surgical History:   Procedure Laterality Date    COLONOSCOPY Left 2021    COLONOSCOPY WITH BIOPSY performed by Sharif Gomez MD at [facility]    CORONARY ANGIOPLASTY WITH STENT PLACEMENT  2019    Stent Circ and OM    EYE SURGERY Right     cataract    PACEMAKER PLACEMENT      TONSILLECTOMY AND ADENOIDECTOMY         Immunization History:   Immunization History   Administered Date(s) Administered    COVID-19, MODERNA Bivalent, (age 12y+), IM, 50 mcg/0.5 mL 2022    COVID-19, PFIZER PURPLE top, DILUTE for use, (age 12 y+), 30mcg/0.3mL 2021, 2021, 2021    Hep A, HAVRIX, VAQTA, (age 19y+), IM, 1mL 10/09/2019, 2020    Influenza Live, intranasal, LAIV3 2017    Influenza Virus Vaccine 2019, 2020    Influenza, AFLURIA, FLUZONE, (age 6-35 m), IM, Quadv PF, 0.25mL 2016    Influenza, FLUARIX, FLULAVAL, FLUZONE (age 6 mo+) and AFLURIA, (age 3 y+), Quadv PF, 0.5mL 10/09/2019    Pneumococcal, PCV-13, PREVNAR 13, (age 6w+), IM, 0.5mL 10/09/2019    TD 5LF, TENIVAC, (age 7y+), IM, 0.5mL 04/15/2016    Td vaccine (adult) 2006       Active Problems:  Patient Active Problem List   Diagnosis Code    Acute systolic (congestive) heart failure (HCC) I50.21    Tobacco abuse Z72.0    ETOH abuse F10.10    Essential hypertension I10    Paroxysmal atrial fibrillation (HCC) I48.0    Nonsustained ventricular tachycardia (HCC) I47.29

## 2025-01-27 NOTE — CONSULTS
AOD SBIRT attempted. This clinician asked pt if he was willing to answer some questions in regard to substance use and if he was interested I a resource packet. Pt stated \"no\" and would not speak further with this clinician.   
Date/Time    WBC 8.0 01/27/2025 04:59 AM    RBC 4.02 01/27/2025 04:59 AM     BMP:  Lab Results   Component Value Date/Time    GLUCOSE 108 01/27/2025 04:59 AM    GLUCOSE 59 12/31/2019 02:52 PM    CO2 25 01/27/2025 04:59 AM    BUN 19 01/27/2025 04:59 AM    CREATININE 0.9 01/27/2025 04:59 AM    CALCIUM 9.2 01/27/2025 04:59 AM     PT/INR:   Lab Results   Component Value Date/Time    INR 1.45 02/05/2021 03:43 PM    APTT 32.9 03/06/2020 06:41 AM     Type and Screen:   Lab Results   Component Value Date/Time    LABANTI NEG 03/06/2020 06:41 AM     CRP: No results found for: \"CRP\"  ESR: No results found for: \"SEDRATE\"  HgBA1c:  No results found for: \"LABA1C\"        Radiology:     CT: CT pelvis without contrast     Comparison: CR/SR - XR HIP LEFT (2-3 VIEWS) - 1/27/25 00:07 EST     Findings:  Mildly displaced obliquely oriented fracture involving the posterior   margin of the left greater trochanter. Finding is best identified on axial   image 60 of series 601 and coronal image 70 of series 602. No dislocation.   Degenerative changes of the bilateral hips with partial loss of hip joint   space. Associated soft tissue swelling within the subcutaneous fat   superficial to the greater trochanter on the left.  Degenerative changes of the visualized lower lumbar spine. Grade 1   anterolisthesis of L4-L5  Atherosclerotic change involving the abdominal aorta with continuation   into the iliacs.  No visualized bowel obstruction, pneumoperitoneum, or pneumatosis. Mild   dilatation of the distal rectum with stool.  Scattered colonic diverticula without diverticulitis.  Urinary bladder is nondistended.     IMPRESSION:  1. Mildly displaced acute obliquely oriented fracture involving the   posterior margin of the left greater trochanter. Finding is best   identified on axial image 60 of series 601 and coronal image 70 of series   602. No dislocation.  Radiology report reviewed.    ASSESSMENT:  69 y.o. male with a left greater trochanter

## 2025-01-27 NOTE — ED NOTES
Patient in bed with unlabored and even breathing. Patient has no questions or concerns at this time.   
Patient resting in bed, unlabored respirations, pt denied pain, updated about admission, notified that we ordered breakfast tray. Pt has no further questions or concerns at this time. Call light within reach  
Patient resting in personal wheelchair. Pt updated on POC: awaiting CT results. Respirations easy and unlabored. No distress noted. Call light within reach. Visitor at bedside.  
Pt appears to be sleeping. Respirations are unlabored and even.   
Pt given warm blanket per request. Updated on plan of care. Voiced no needs. Call light in reach.  
Pt is up in bed with unlabored and even respirations. Pt was updated on plan of care and has no further questions or concerns at this time.   
Pt up in bed and eating. Discussed plan of care with patient and they have no further concerns or questions at this time.   
Pt updated on plan of care at this time. Voiced no needs. Call light in reach.  
Pt updated on plan of care. Voiced no needs. Call light in reach.  
Pt. Resting in bed with even and unlabored respirations. Pt. Denies any pain. Medicated per mar. Provided lunch box. Pt. Updated about plan of care and treatment. ortho at bedside. Pt. Has no further concerns, questions or needs at this time. Call light within reach.     
Pt. Resting in bed with even and unlabored respirations. Pt. Denies any pain. Pt. Updated about plan of care and treatment. Family at bedside. Pt. Has no further concerns, questions or needs at this time. Call light within reach.     
Pt. Resting in bed with even and unlabored respirations. Pt. Denies any pain. Pt. Updated about plan of care and treatment. Pt. Has no further concerns, questions or needs at this time. Call light within reach.     
Report given to Becky DORAN.  
Report provided by ESPINOZA townsend. Pt. Resting in bed with even and unlabored respirations. Pt. Denies any pain.  Pt. Updated about plan of care and treatment. Family at bedside. Pt. Has no further concerns, questions or needs at this time. Call light within reach.     
percutaneous coronary angioplasty 11/27/2019    Cardiomyopathy (HCC) 11/2019    Medtronic single ICD  03/12/2020    Nonsustained ventricular tachycardia (HCC) 11/26/2019    Pneumonia     Ulcer            Electronically signed by Becky Buckley RN on 1/27/2025 at 6:04 PM

## 2025-01-27 NOTE — ED PROVIDER NOTES
Transfer of care from Cheryl Lima PA-C to Pawan Cano PA-C and 0600 on 1/27/2025.    Patient has a nonsurgical hip fracture and is currently awaiting admission into the hospital.    Reassessment: Patient is laying down in the ED bed comfortable.  He states that he does not have any pain as long as he does not move around.  No current complaints.      Final diagnosis: Closed hip fracture of the left hip, coagulopathy     Rosy Cano PA-C  01/27/25 8480     Strong peripheral pulses

## 2025-01-27 NOTE — ED PROVIDER NOTES
[facility] EMERGENCY DEPT      Pt Name: Lawrence Peterson  MRN: [id number]  Birthdate 1955  Date of evaluation: 1/26/2025  Provider: Cheryl Lima PA-C    CHIEF COMPLAINT       Chief Complaint   Patient presents with    Hip Pain     left       Nurses Notes reviewed and I agree except as noted in the HPI.      HISTORY OF PRESENT ILLNESS    Lawrence Peterson is a 69 y.o. male with history of atrial fibrillation, CAD, cardiomyopathy, CHF and hypertension who presents through the lobby from home driven by friend for left hip pain.  Patient reports left hip and low back pain since 1-19.  Patient's pain occurs with any weightbearing on the left lower extremity.  He is unable to walk and has been crawling around his apartment.  He denies any fall or trauma.  He admits that he drinks alcohol but denies any blackouts or the possibility that he did not remember falling.  Patient's friend brought him a wheelchair and drove him to the ER.  Patient denies numbness, weakness, abdominal pain, chest pain, dyspnea, headache, dizziness, or other complaints.  Patient has taken no medications for pain.  Patient is a smoker.  Patient is on Eliquis due to atrial fibrillation.    Location/Symptom: Left hip and left low back pain  Timing/Onset: 1-19  Context/Setting: Spontaneous onset with no fall or trauma  Quality: Aching, sharp  Duration: Intermittent  Modifying Factors: Occurs with weightbearing  Severity: Moderate    REVIEW OF SYSTEMS     Review of Systems   Constitutional:  Negative for appetite change, diaphoresis and fever.   HENT:  Negative for facial swelling.    Respiratory:  Negative for shortness of breath.    Cardiovascular:  Negative for chest pain.   Gastrointestinal:  Negative for abdominal pain, nausea and vomiting.   Musculoskeletal:  Positive for arthralgias, back pain and gait problem.   Skin:  Negative for rash and wound.   Neurological:  Negative for dizziness, weakness, light-headedness and numbness.

## 2025-01-27 NOTE — ED TRIAGE NOTES
Patient presents to ED with chief complaint of left sided hip pain. Patient states that the pain started a few days ago, and normally it goes away, but this time it didn't. Patient resting in bed. Respirations easy and unlabored. No distress noted. Call light within reach.

## 2025-01-27 NOTE — H&P
Hospitalist History & Physical     Patient: Lawrence Petreson 69 y.o. male : 1955  Date of Admission: 2025  CODE STATUS: Prior    Date of Service: Pt seen/examined on 25 and Admitted to Inpatient with expected LOS greater than two midnights due to medical therapy.     ASSESSMENT AND PLAN    Active Problems:    Chronic alcohol abuse  Patient states he does drink consistently for decades.  This is likely the cause of his generalized weakness and repeated falls.  He states that he had quit for up to 1 year in the past however is unable to remember when that was.  He reports no withdrawal symptoms during that time period. Denies hallucinations, seizures, tremors, mood changes, headache  He states his last drink was 2 days ago.  He drinks 1/3 L of gin per day.  Patient has no interest in quitting at this time.  Addiction services has been consulted.  CIWA protocol initiated.  Patient reports withdrawal 0 symptoms at this time.  CIWA score 0    Generalized weakness  Patient has generalized weakness and is unable to care for himself at home.  This is likely due to problem 1.  He reports this is a chronic issue.  PT/OT to evaluate and treat    Closed nondisplaced fracture of greater trochanter of left femur with routine healing  Patient reports no fall.  He reports no injury.  X-ray on admission showed nondisplaced fracture greater trochanter.  Orthopedic was consulted by ED and stated that they would see him outpatient however patient is not able to perform ADLs given fracture.  Norco pain panel.  Weightbearing as tolerated with walker and assistance.  PT/OT to evaluate and treat.  Patient to be discharged to SNF for rehab.  Follow-up with orthopedics outpatient upon discharge    Chronic wheezing  Suspect COPD due to history and tobacco use.  DuoNeb every 4 hours while awake.  Recommend pulmonology workup outpatient.    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  Serum sodium on

## 2025-01-28 ENCOUNTER — APPOINTMENT (OUTPATIENT)
Dept: GENERAL RADIOLOGY | Age: 70
DRG: 896 | End: 2025-01-28
Payer: MEDICARE

## 2025-01-28 LAB
ALBUMIN SERPL BCG-MCNC: 3.6 G/DL (ref 3.5–5.1)
ALP SERPL-CCNC: 63 U/L (ref 38–126)
ALT SERPL W/O P-5'-P-CCNC: 35 U/L (ref 11–66)
AMMONIA PLAS-MCNC: 12 UMOL/L (ref 11–60)
ANION GAP SERPL CALC-SCNC: 10 MEQ/L (ref 8–16)
AST SERPL-CCNC: 36 U/L (ref 5–40)
BACTERIA: NORMAL
BASOPHILS ABSOLUTE: 0 THOU/MM3 (ref 0–0.1)
BASOPHILS NFR BLD AUTO: 0.2 %
BILIRUB CONJ SERPL-MCNC: 0.3 MG/DL (ref 0.1–13.8)
BILIRUB SERPL-MCNC: 0.8 MG/DL (ref 0.3–1.2)
BILIRUB UR QL STRIP: NEGATIVE
BUN SERPL-MCNC: 18 MG/DL (ref 7–22)
CA-I BLD ISE-SCNC: 1.21 MMOL/L (ref 1.12–1.32)
CALCIUM SERPL-MCNC: 9.4 MG/DL (ref 8.5–10.5)
CASTS #/AREA URNS LPF: NORMAL /LPF
CASTS #/AREA URNS LPF: NORMAL /LPF
CHARACTER UR: CLEAR
CHARCOAL URNS QL MICRO: NORMAL
CHLORIDE SERPL-SCNC: 96 MEQ/L (ref 98–111)
CO2 SERPL-SCNC: 27 MEQ/L (ref 23–33)
COLOR UR: YELLOW
CREAT SERPL-MCNC: 1 MG/DL (ref 0.4–1.2)
CRYSTALS URNS QL MICRO: NORMAL
DEPRECATED RDW RBC AUTO: 46.3 FL (ref 35–45)
EOSINOPHIL NFR BLD AUTO: 0.2 %
EOSINOPHILS ABSOLUTE: 0 THOU/MM3 (ref 0–0.4)
EPITHELIAL CELLS, UA: NORMAL /HPF
ERYTHROCYTE [DISTWIDTH] IN BLOOD BY AUTOMATED COUNT: 12.6 % (ref 11.5–14.5)
FOLATE SERPL-MCNC: 13.8 NG/ML (ref 4.8–24.2)
GFR SERPL CREATININE-BSD FRML MDRD: 80 ML/MIN/1.73M2
GLUCOSE SERPL-MCNC: 98 MG/DL (ref 70–108)
GLUCOSE UR QL STRIP.AUTO: NEGATIVE MG/DL
HCT VFR BLD AUTO: 39.5 % (ref 42–52)
HGB BLD-MCNC: 14.1 GM/DL (ref 14–18)
HGB UR QL STRIP.AUTO: NEGATIVE
IMM GRANULOCYTES # BLD AUTO: 0.05 THOU/MM3 (ref 0–0.07)
IMM GRANULOCYTES NFR BLD AUTO: 0.4 %
KETONES UR QL STRIP.AUTO: NEGATIVE
LEUKOCYTE ESTERASE UR QL STRIP.AUTO: NEGATIVE
LIPASE SERPL-CCNC: 29.1 U/L (ref 5.6–51.3)
LYMPHOCYTES ABSOLUTE: 1.1 THOU/MM3 (ref 1–4.8)
LYMPHOCYTES NFR BLD AUTO: 8.4 %
MCH RBC QN AUTO: 35.5 PG (ref 26–33)
MCHC RBC AUTO-ENTMCNC: 35.7 GM/DL (ref 32.2–35.5)
MCV RBC AUTO: 99.5 FL (ref 80–94)
MONOCYTES ABSOLUTE: 1.3 THOU/MM3 (ref 0.4–1.3)
MONOCYTES NFR BLD AUTO: 10.3 %
NEUTROPHILS ABSOLUTE: 10.5 THOU/MM3 (ref 1.8–7.7)
NEUTROPHILS NFR BLD AUTO: 80.5 %
NITRITE UR QL STRIP.AUTO: NEGATIVE
NRBC BLD AUTO-RTO: 0 /100 WBC
OSMOLALITY SERPL CALC.SUM OF ELEC: 268.3 MOSMOL/KG (ref 275–300)
PH UR STRIP.AUTO: 7.5 [PH] (ref 5–9)
PHOSPHATE SERPL-MCNC: 3.4 MG/DL (ref 2.4–4.7)
PLATELET # BLD AUTO: 194 THOU/MM3 (ref 130–400)
PMV BLD AUTO: 9.7 FL (ref 9.4–12.4)
POTASSIUM SERPL-SCNC: 4.9 MEQ/L (ref 3.5–5.2)
PROT SERPL-MCNC: 6.1 G/DL (ref 6.1–8)
PROT UR STRIP.AUTO-MCNC: NEGATIVE MG/DL
RBC # BLD AUTO: 3.97 MILL/MM3 (ref 4.7–6.1)
RBC #/AREA URNS HPF: NORMAL /HPF
RENAL EPI CELLS #/AREA URNS HPF: NORMAL /[HPF]
SODIUM SERPL-SCNC: 133 MEQ/L (ref 135–145)
SPECIFIC GRAVITY UA: 1.02 (ref 1–1.03)
T4 FREE SERPL-MCNC: 1.49 NG/DL (ref 0.93–1.68)
TSH SERPL DL<=0.005 MIU/L-ACNC: 0.64 UIU/ML (ref 0.4–4.2)
UROBILINOGEN, URINE: 1 EU/DL (ref 0–1)
VIT B12 SERPL-MCNC: 719 PG/ML (ref 211–911)
WBC # BLD AUTO: 13.1 THOU/MM3 (ref 4.8–10.8)
WBC #/AREA URNS HPF: NORMAL /HPF
YEAST LIKE FUNGI URNS QL MICRO: NORMAL

## 2025-01-28 PROCEDURE — 6370000000 HC RX 637 (ALT 250 FOR IP): Performed by: INTERNAL MEDICINE

## 2025-01-28 PROCEDURE — 74018 RADEX ABDOMEN 1 VIEW: CPT

## 2025-01-28 PROCEDURE — 82607 VITAMIN B-12: CPT

## 2025-01-28 PROCEDURE — 94760 N-INVAS EAR/PLS OXIMETRY 1: CPT

## 2025-01-28 PROCEDURE — 1200000003 HC TELEMETRY R&B

## 2025-01-28 PROCEDURE — 85025 COMPLETE CBC W/AUTO DIFF WBC: CPT

## 2025-01-28 PROCEDURE — 84439 ASSAY OF FREE THYROXINE: CPT

## 2025-01-28 PROCEDURE — 2500000003 HC RX 250 WO HCPCS

## 2025-01-28 PROCEDURE — 82140 ASSAY OF AMMONIA: CPT

## 2025-01-28 PROCEDURE — 84443 ASSAY THYROID STIM HORMONE: CPT

## 2025-01-28 PROCEDURE — 36415 COLL VENOUS BLD VENIPUNCTURE: CPT

## 2025-01-28 PROCEDURE — 81001 URINALYSIS AUTO W/SCOPE: CPT

## 2025-01-28 PROCEDURE — 84100 ASSAY OF PHOSPHORUS: CPT

## 2025-01-28 PROCEDURE — 6370000000 HC RX 637 (ALT 250 FOR IP)

## 2025-01-28 PROCEDURE — 82746 ASSAY OF FOLIC ACID SERUM: CPT

## 2025-01-28 PROCEDURE — 82330 ASSAY OF CALCIUM: CPT

## 2025-01-28 PROCEDURE — 80048 BASIC METABOLIC PNL TOTAL CA: CPT

## 2025-01-28 PROCEDURE — 83690 ASSAY OF LIPASE: CPT

## 2025-01-28 PROCEDURE — 99233 SBSQ HOSP IP/OBS HIGH 50: CPT

## 2025-01-28 PROCEDURE — 94640 AIRWAY INHALATION TREATMENT: CPT

## 2025-01-28 PROCEDURE — 80076 HEPATIC FUNCTION PANEL: CPT

## 2025-01-28 RX ORDER — CARVEDILOL 6.25 MG/1
12.5 TABLET ORAL 2 TIMES DAILY
Status: DISCONTINUED | OUTPATIENT
Start: 2025-01-28 | End: 2025-01-29 | Stop reason: HOSPADM

## 2025-01-28 RX ORDER — CARVEDILOL 6.25 MG/1
12.5 TABLET ORAL 2 TIMES DAILY
Status: DISCONTINUED | OUTPATIENT
Start: 2025-01-28 | End: 2025-01-28

## 2025-01-28 RX ORDER — IPRATROPIUM BROMIDE AND ALBUTEROL SULFATE 2.5; .5 MG/3ML; MG/3ML
1 SOLUTION RESPIRATORY (INHALATION)
Status: DISCONTINUED | OUTPATIENT
Start: 2025-01-28 | End: 2025-01-29

## 2025-01-28 RX ORDER — HYDRALAZINE HYDROCHLORIDE 20 MG/ML
10 INJECTION INTRAMUSCULAR; INTRAVENOUS EVERY 6 HOURS PRN
Status: DISCONTINUED | OUTPATIENT
Start: 2025-01-28 | End: 2025-01-29 | Stop reason: HOSPADM

## 2025-01-28 RX ORDER — MULTIVITAMIN WITH IRON
1 TABLET ORAL DAILY
Status: DISCONTINUED | OUTPATIENT
Start: 2025-01-28 | End: 2025-01-29 | Stop reason: HOSPADM

## 2025-01-28 RX ORDER — DOCUSATE SODIUM 100 MG/1
200 CAPSULE, LIQUID FILLED ORAL 2 TIMES DAILY
Status: DISCONTINUED | OUTPATIENT
Start: 2025-01-28 | End: 2025-01-29 | Stop reason: HOSPADM

## 2025-01-28 RX ORDER — ATORVASTATIN CALCIUM 40 MG/1
40 TABLET, FILM COATED ORAL DAILY
Status: DISCONTINUED | OUTPATIENT
Start: 2025-01-28 | End: 2025-01-29 | Stop reason: HOSPADM

## 2025-01-28 RX ORDER — HYDRALAZINE HYDROCHLORIDE 20 MG/ML
10 INJECTION INTRAMUSCULAR; INTRAVENOUS EVERY 6 HOURS PRN
Status: DISCONTINUED | OUTPATIENT
Start: 2025-01-28 | End: 2025-01-28

## 2025-01-28 RX ADMIN — SODIUM CHLORIDE, PRESERVATIVE FREE 10 ML: 5 INJECTION INTRAVENOUS at 09:07

## 2025-01-28 RX ADMIN — IPRATROPIUM BROMIDE AND ALBUTEROL SULFATE 1 DOSE: .5; 3 SOLUTION RESPIRATORY (INHALATION) at 07:38

## 2025-01-28 RX ADMIN — IPRATROPIUM BROMIDE AND ALBUTEROL SULFATE 1 DOSE: .5; 3 SOLUTION RESPIRATORY (INHALATION) at 17:43

## 2025-01-28 RX ADMIN — APIXABAN 5 MG: 5 TABLET, FILM COATED ORAL at 20:38

## 2025-01-28 RX ADMIN — CARVEDILOL 12.5 MG: 6.25 TABLET, FILM COATED ORAL at 17:43

## 2025-01-28 RX ADMIN — PREDNISONE 40 MG: 20 TABLET ORAL at 09:05

## 2025-01-28 RX ADMIN — SACUBITRIL AND VALSARTAN 1 TABLET: 49; 51 TABLET, FILM COATED ORAL at 17:43

## 2025-01-28 RX ADMIN — SODIUM CHLORIDE, PRESERVATIVE FREE 10 ML: 5 INJECTION INTRAVENOUS at 20:38

## 2025-01-28 RX ADMIN — FOLIC ACID 1 MG: 1 TABLET ORAL at 09:05

## 2025-01-28 RX ADMIN — Medication 100 MG: at 09:04

## 2025-01-28 RX ADMIN — Medication 1 TABLET: at 12:51

## 2025-01-28 RX ADMIN — ATORVASTATIN CALCIUM 40 MG: 40 TABLET, FILM COATED ORAL at 17:43

## 2025-01-28 NOTE — PLAN OF CARE
Problem: Respiratory - Adult  Goal: Clear lung sounds  Outcome: Progressing   Pt continues on aerosols to clear lung sounds/improve aeration. Patient mutually agreed on goals.

## 2025-01-28 NOTE — PLAN OF CARE
Problem: Respiratory - Adult  Goal: Clear lung sounds  1/28/2025 1056 by Luna Villasenor, RN  Outcome: Progressing  1/28/2025 0741 by Gema Neumann RCP  Outcome: Progressing     Problem: Discharge Planning  Goal: Discharge to home or other facility with appropriate resources  Outcome: Progressing     Problem: Safety - Adult  Goal: Free from fall injury  Outcome: Progressing     Problem: Skin/Tissue Integrity  Goal: Skin integrity remains intact  Description: 1.  Monitor for areas of redness and/or skin breakdown  2.  Assess vascular access sites hourly  3.  Every 4-6 hours minimum:  Change oxygen saturation probe site  4.  Every 4-6 hours:  If on nasal continuous positive airway pressure, respiratory therapy assess nares and determine need for appliance change or resting period  Outcome: Progressing  Flowsheets (Taken 1/28/2025 0930)  Skin Integrity Remains Intact: Monitor for areas of redness and/or skin breakdown     Problem: ABCDS Injury Assessment  Goal: Absence of physical injury  Outcome: Progressing

## 2025-01-29 VITALS
BODY MASS INDEX: 22.31 KG/M2 | RESPIRATION RATE: 18 BRPM | SYSTOLIC BLOOD PRESSURE: 133 MMHG | HEIGHT: 62 IN | TEMPERATURE: 97.8 F | DIASTOLIC BLOOD PRESSURE: 81 MMHG | WEIGHT: 121.25 LBS | HEART RATE: 91 BPM | OXYGEN SATURATION: 92 %

## 2025-01-29 PROCEDURE — 97166 OT EVAL MOD COMPLEX 45 MIN: CPT

## 2025-01-29 PROCEDURE — 6370000000 HC RX 637 (ALT 250 FOR IP)

## 2025-01-29 PROCEDURE — 2500000003 HC RX 250 WO HCPCS

## 2025-01-29 PROCEDURE — 97535 SELF CARE MNGMENT TRAINING: CPT

## 2025-01-29 PROCEDURE — 99239 HOSP IP/OBS DSCHRG MGMT >30: CPT | Performed by: NURSE PRACTITIONER

## 2025-01-29 PROCEDURE — 6370000000 HC RX 637 (ALT 250 FOR IP): Performed by: INTERNAL MEDICINE

## 2025-01-29 PROCEDURE — 97530 THERAPEUTIC ACTIVITIES: CPT

## 2025-01-29 RX ORDER — FOLIC ACID 1 MG/1
1 TABLET ORAL DAILY
Qty: 30 TABLET | Refills: 1 | Status: SHIPPED | OUTPATIENT
Start: 2025-01-30

## 2025-01-29 RX ORDER — PSEUDOEPHEDRINE HCL 30 MG
200 TABLET ORAL 2 TIMES DAILY PRN
Qty: 60 CAPSULE | Refills: 0 | Status: SHIPPED | OUTPATIENT
Start: 2025-01-29 | End: 2025-02-28

## 2025-01-29 RX ORDER — PREDNISONE 20 MG/1
40 TABLET ORAL DAILY
Qty: 2 TABLET | Refills: 0 | Status: SHIPPED | OUTPATIENT
Start: 2025-01-30 | End: 2025-01-31

## 2025-01-29 RX ORDER — LANOLIN ALCOHOL/MO/W.PET/CERES
100 CREAM (GRAM) TOPICAL DAILY
Qty: 30 TABLET | Refills: 1 | Status: SHIPPED | OUTPATIENT
Start: 2025-01-30

## 2025-01-29 RX ORDER — HYDROCODONE BITARTRATE AND ACETAMINOPHEN 5; 325 MG/1; MG/1
1 TABLET ORAL EVERY 4 HOURS PRN
Qty: 12 TABLET | Refills: 0 | Status: SHIPPED | OUTPATIENT
Start: 2025-01-29 | End: 2025-02-01

## 2025-01-29 RX ADMIN — APIXABAN 5 MG: 5 TABLET, FILM COATED ORAL at 08:49

## 2025-01-29 RX ADMIN — HYDROCODONE BITARTRATE AND ACETAMINOPHEN 1 TABLET: 5; 325 TABLET ORAL at 11:01

## 2025-01-29 RX ADMIN — Medication 100 MG: at 08:49

## 2025-01-29 RX ADMIN — FOLIC ACID 1 MG: 1 TABLET ORAL at 08:49

## 2025-01-29 RX ADMIN — PREDNISONE 40 MG: 20 TABLET ORAL at 08:49

## 2025-01-29 RX ADMIN — ATORVASTATIN CALCIUM 40 MG: 40 TABLET, FILM COATED ORAL at 08:49

## 2025-01-29 RX ADMIN — Medication 1 TABLET: at 08:49

## 2025-01-29 RX ADMIN — DOCUSATE SODIUM 200 MG: 100 CAPSULE, LIQUID FILLED ORAL at 08:48

## 2025-01-29 RX ADMIN — SODIUM CHLORIDE, PRESERVATIVE FREE 10 ML: 5 INJECTION INTRAVENOUS at 08:49

## 2025-01-29 RX ADMIN — SACUBITRIL AND VALSARTAN 1 TABLET: 49; 51 TABLET, FILM COATED ORAL at 08:49

## 2025-01-29 RX ADMIN — CARVEDILOL 12.5 MG: 6.25 TABLET, FILM COATED ORAL at 08:49

## 2025-01-29 ASSESSMENT — PAIN DESCRIPTION - DESCRIPTORS: DESCRIPTORS: ACHING

## 2025-01-29 ASSESSMENT — PAIN SCALES - GENERAL
PAINLEVEL_OUTOF10: 5
PAINLEVEL_OUTOF10: 6

## 2025-01-29 ASSESSMENT — PAIN DESCRIPTION - LOCATION: LOCATION: HIP

## 2025-01-29 ASSESSMENT — PAIN DESCRIPTION - ORIENTATION: ORIENTATION: LEFT

## 2025-01-29 NOTE — PROGRESS NOTES
AVS reviewed with patient. Patient received his medications from outpatient pharmacy. Patient is also aware of his appointments that he is to follow up with. Patient transported home via wheelchair with all of his belongings by [employer].  
[facility]  INPATIENT OCCUPATIONAL THERAPY  [facility] ORTHOPEDICS 7K  EVALUATION      Discharge Recommendations: 24 hour supervision or assist (inpatient therapy stay)  Equipment Recommendations: Yes monitor for needs      Time In: 918  Time Out: 955  Timed Code Treatment Minutes: 27 Minutes  Minutes: 37          Date: 2025  Patient Name: Lawrence Peterson,   Gender: male      MRN: [id number]  : 1955  (69 y.o.)  Referring Practitioner: Karey Ordonez PA-C  Diagnosis: Chronic alcohol abuse  Additional Pertinent Hx: Per chart review; \"Lawrence Peterson is a 69 y.o. male with history of atrial fibrillation, CAD, cardiomyopathy, CHF and hypertension who presents through the lobby from home driven by friend for left hip pain.  Patient reports left hip and low back pain since .  Patient's pain occurs with any weightbearing on the left lower extremity.  He is unable to walk and has been crawling around his apartment.  He denies any fall or trauma.  He admits that he drinks alcohol but denies any blackouts or the possibility that he did not remember falling.  Patient's friend brought him a wheelchair and drove him to the ER.  Patient denies numbness, weakness, abdominal pain, chest pain, dyspnea, headache, dizziness, or other complaints.  Patient has taken no medications for pain.  Patient is a smoker.  Patient is on Eliquis due to atrial fibrillation.\"    Restrictions/Precautions:  Restrictions/Precautions: General Precautions, Weight Bearing, Fall Risk  Left Lower Extremity Weight Bearing: Toe Touch Weight Bearing  Position Activity Restriction  Other Position/Activity Restrictions: no active hip abduction, no passive hip adduction beyond neutral    Subjective  Chart Reviewed: Yes, Orders, Progress Notes, History and Physical, Imaging  Patient assessed for rehabilitation services?: Yes  Family / Caregiver Present: No    Subjective: patient supine in bed upon OT arrival and agreeable to 
[facility]  OCCUPATIONAL THERAPY MISSED TREATMENT NOTE  [facility] ORTHOPEDICS 7K  7K-/020-A      Date: 2025  Patient Name: Lawrence Peterson        CSN: [id number]   : 1955  (69 y.o.)  Gender: male                REASON FOR MISSED TREATMENT:  Pt resting in bed upon arrival. Pt irritable and dismissive of therapist during encouragement for OT eval stating, \"What do you want? I've done enough testing today.\" Educated for the role of OT and benefits of increasing mobility and safety following new WB'ing restrictions. Pt stated he would be agreeable tomorrow, will follow up as appropriate.                
Pt admitted to  7K20 from ED and via cart/stretcher.     Complaints: fall, left hip pain.      IV none infusing into the forearm left, condition patent and no redness. IV site free of s/s of infection or infiltration. Vital signs obtained. Assessment and data collection initiated.     Two nurse skin assessment performed by Lenka GARRETT RN and Monica GARRETT RN. Oriented to room.     Explained patients right to have family, representative or physician notified of their admission.  Patient has Declined for physician to be notified.  Patient has Declined for family/representative to be notified.    The patient is interested in [facility]’s meds to beds program?:  Yes    Policies and procedures for 7 explained. All questions answered with no further questions at this time. Fall prevention discussed with patient.  Bed alarm on. Call light in reach.        
[facility]  PHYSICAL THERAPY MISSED TREATMENT NOTE  [facility] ORTHOPEDICS 7K    Date: 2025  Patient Name: Lawrence Peterson        MRN: [id number]   : 1955  (69 y.o.)  Gender: male                REASON FOR MISSED TREATMENT:  Patient refused treatment.      Pt reporting \"its too soon\" and refusing PT at this time. Educated pt on non-op management and the benefits of therapy and goals of teaching pt how to mobilize with new Wbing status/precautions. Pt still refusing at this time. Will try back as able        
Traumatic left femur fracture  -- Other - I will add my own diagnosis  -- Disagree - Not applicable / Not valid  -- Disagree - Clinically unable to determine / Unknown  -- Refer to Clinical Documentation Reviewer    PROVIDER RESPONSE TEXT:    Provider is clinically unable to determine a response to this query.  unable to reason for fracture; he may have fallen related to his alcoholism    Query created by: Colton Avila on 1/29/2025 8:42 AM      QUERY TEXT:    Patient admitted with left femur fracture. Noted to have generalized weakness,   falls and unable to care for self. Documentation reflects \"chronic alcohol   abuse, this is likely the cause of his generalized weakness and repeated   falls\" in H&P.  Then documentation is dropped from next progress notes.  If   possible, please document in the progress notes and discharge summary the   underlying cause of generalized weakness and repeated falls:    The medical record reflects the following:    Risk Factors: 70 yo, chronic alcohol abuse  Clinical Indicators: presents with left hip pain, has been crawling around   apartment and unable to ambulate, unable to care for self at home, per H&P   \"chronic alcohol abuse, this is likely the cause of his generalized weakness   and repeated falls.\"  Treatment: PT/OT, possible referral for rehab, CIWA, MV, folic acid, Thiamine  Options provided:  -- Generalized weakness due to chronic alcohol abuse confirmed  -- Generalized weakness due to age related physical debility/frailty  -- Generalized weakness due to chronic alcohol abuse and age related physical   debility/frailty  -- Generalized weakness due to, Please specify cause of generalized weakness  -- Other - I will add my own diagnosis  -- Disagree - Not applicable / Not valid  -- Disagree - Clinically unable to determine / Unknown  -- Refer to Clinical Documentation Reviewer    PROVIDER RESPONSE TEXT:    Generalized weakness due to chronic alcohol abuse confirmed 
01/27/2025 04:29 AM All CTs at this facility use dose modulation techniques and iterative reconstructions, and/or weight-based dosing when appropriate to reduce radiation to a low as reasonably achievable.    XR HIP LEFT (2-3 VIEWS)    Result Date: 1/27/2025  2 view left hip Comparison: None Findings: Acute minimally displaced fracture of the posterior margin of the left femoral greater trochanter. The finding is best identified on the oblique radiograph. Degenerative changes of the left hip with partial loss of hip joint space. Atherosclerosis of the femoral vasculature.     1. Acute minimally displaced fracture of the posterior margin of the left femoral greater trochanter. The finding is best identified on the oblique radiograph. This document has been electronically signed by: Bhupinder Orr DO on 01/27/2025 03:06 AM      Electronically signed by Regina Moreland PA-C on 1/28/2025 at 9:10 AM

## 2025-01-29 NOTE — RT PROTOCOL NOTE
RT Inhaler-Nebulizer Bronchodilator Protocol Note    There is a bronchodilator order in the chart from a provider indicating to follow the RT Bronchodilator Protocol and there is an “Initiate RT Inhaler-Nebulizer Bronchodilator Protocol” order as well (see protocol at bottom of note).    CXR Findings:  XR CHEST PORTABLE    Result Date: 1/27/2025  Stable radiographic appearance of the chest. No evidence of an acute process. **This report has been created using voice recognition software. It may contain minor errors which are inherent in voice recognition technology.** Electronically signed by Dr. Lala Eddy      The findings from the last RT Protocol Assessment were as follows:   History Pulmonary Disease: Smoker 15 pack years or more  Respiratory Pattern: Dyspnea on exertion or RR 21-25 bpm  Breath Sounds: Inspiratory and expiratory or bilateral wheezing and/or rhonchi  Cough: Strong, spontaneous, non-productive  Indication for Bronchodilator Therapy:    Bronchodilator Assessment Score: 9    Aerosolized bronchodilator medication orders have been revised according to the RT Inhaler-Nebulizer Bronchodilator Protocol below.    Respiratory Therapist to perform RT Therapy Protocol Assessment initially then follow the protocol.  Repeat RT Therapy Protocol Assessment PRN for score 0-3 or on second treatment, BID, and PRN for scores above 3.    No Indications - adjust the frequency to every 6 hours PRN wheezing or bronchospasm, if no treatments needed after 48 hours then discontinue using Per Protocol order mode.     If indication present, adjust the RT bronchodilator orders based on the Bronchodilator Assessment Score as indicated below.  Use Inhaler orders unless patient has one or more of the following: on home nebulizer, not able to hold breath for 10 seconds, is not alert and oriented, cannot activate and use MDI correctly, or respiratory rate 25 breaths per minute or more, then use the equivalent nebulizer order(s) 
RT Inhaler-Nebulizer Bronchodilator Protocol Note    There is a bronchodilator order in the chart from a provider indicating to follow the RT Bronchodilator Protocol and there is an “Initiate RT Inhaler-Nebulizer Bronchodilator Protocol” order as well (see protocol at bottom of note).    CXR Findings:  XR CHEST PORTABLE    Result Date: 1/27/2025  Stable radiographic appearance of the chest. No evidence of an acute process. **This report has been created using voice recognition software. It may contain minor errors which are inherent in voice recognition technology.** Electronically signed by Dr. Lala Eddy      The findings from the last RT Protocol Assessment were as follows:   History Pulmonary Disease: Smoker 15 pack years or more  Respiratory Pattern: Dyspnea on exertion or RR 21-25 bpm  Breath Sounds: Slightly diminished and/or crackles  Cough: Strong, spontaneous, non-productive  Indication for Bronchodilator Therapy: Decreased or absent breath sounds  Bronchodilator Assessment Score: 5    Aerosolized bronchodilator medication orders have been revised according to the RT Inhaler-Nebulizer Bronchodilator Protocol below.    Respiratory Therapist to perform RT Therapy Protocol Assessment initially then follow the protocol.  Repeat RT Therapy Protocol Assessment PRN for score 0-3 or on second treatment, BID, and PRN for scores above 3.    No Indications - adjust the frequency to every 6 hours PRN wheezing or bronchospasm, if no treatments needed after 48 hours then discontinue using Per Protocol order mode.     If indication present, adjust the RT bronchodilator orders based on the Bronchodilator Assessment Score as indicated below.  Use Inhaler orders unless patient has one or more of the following: on home nebulizer, not able to hold breath for 10 seconds, is not alert and oriented, cannot activate and use MDI correctly, or respiratory rate 25 breaths per minute or more, then use the equivalent nebulizer 
RT Inhaler-Nebulizer Bronchodilator Protocol Note    There is a bronchodilator order in the chart from a provider indicating to follow the RT Bronchodilator Protocol and there is an “Initiate RT Inhaler-Nebulizer Bronchodilator Protocol” order as well (see protocol at bottom of note).    CXR Findings:  XR CHEST PORTABLE    Result Date: 1/27/2025  Stable radiographic appearance of the chest. No evidence of an acute process. **This report has been created using voice recognition software. It may contain minor errors which are inherent in voice recognition technology.** Electronically signed by Dr. Lala Eddy      The findings from the last RT Protocol Assessment were as follows:   History Pulmonary Disease: Smoker 15 pack years or more  Respiratory Pattern: Dyspnea on exertion or RR 21-25 bpm  Breath Sounds: Slightly diminished and/or crackles  Cough: Strong, spontaneous, non-productive  Indication for Bronchodilator Therapy: Decreased or absent breath sounds  Bronchodilator Assessment Score: 5    Aerosolized bronchodilator medication orders have been revised according to the RT Inhaler-Nebulizer Bronchodilator Protocol below.    Respiratory Therapist to perform RT Therapy Protocol Assessment initially then follow the protocol.  Repeat RT Therapy Protocol Assessment PRN for score 0-3 or on second treatment, BID, and PRN for scores above 3.    No Indications - adjust the frequency to every 6 hours PRN wheezing or bronchospasm, if no treatments needed after 48 hours then discontinue using Per Protocol order mode.     If indication present, adjust the RT bronchodilator orders based on the Bronchodilator Assessment Score as indicated below.  Use Inhaler orders unless patient has one or more of the following: on home nebulizer, not able to hold breath for 10 seconds, is not alert and oriented, cannot activate and use MDI correctly, or respiratory rate 25 breaths per minute or more, then use the equivalent nebulizer 
for wheezing or increased work of breathing using Per Protocol order mode.        4-6 - enter or revise RT Bronchodilator order(s) to two equivalent RT bronchodilator orders with one order with BID Frequency and one order with Frequency of every 4 hours PRN wheezing or increased work of breathing using Per Protocol order mode.        7-10 - enter or revise RT Bronchodilator order(s) to two equivalent RT bronchodilator orders with one order with TID Frequency and one order with Frequency of every 4 hours PRN wheezing or increased work of breathing using Per Protocol order mode.       11-13 - enter or revise RT Bronchodilator order(s) to one equivalent RT bronchodilator order with QID Frequency and an Albuterol order with Frequency of every 4 hours PRN wheezing or increased work of breathing using Per Protocol order mode.      Greater than 13 - enter or revise RT Bronchodilator order(s) to one equivalent RT bronchodilator order with every 4 hours Frequency and an Albuterol order with Frequency of every 2 hours PRN wheezing or increased work of breathing using Per Protocol order mode.     RT to enter RT Home Evaluation for COPD & MDI Assessment order using Per Protocol order mode.    Electronically signed by Christiano Mcneal RCP on 1/29/2025 at 8:39 AM

## 2025-01-29 NOTE — DISCHARGE SUMMARY
the pain any longer and had his friend drive him to the ER.  He denies any falls.  He denies any injury.  He reports substantial alcohol intake but denies that this had any cause of falls or memory loss.  He denies any numbness tingling, increased urinary incontinence, or bowel incontinence, fever, chills, chest pain, increased shortness of breath, back pain, muscle pain, lightheadedness, dizziness.     He denied any issues except hip pain and was requesting discharge. See above for hospital course         The patient was seen and examined on day of discharge and this discharge summary is in conjunction with any daily progress note from day of discharge.    The patient is discharged in stable condition.       Exam:   Vitals:  Vitals:    01/28/25 2036 01/28/25 2328 01/29/25 0845 01/29/25 1131   BP: 133/72 (!) 144/85 133/81    Pulse: 78 77 91    Resp: 16 17 18 18   Temp: 97.5 °F (36.4 °C) 97.9 °F (36.6 °C) 97.8 °F (36.6 °C)    TempSrc: Oral Oral Oral    SpO2: 94% 94% 92%    Weight:       Height:         Weight: Weight - Scale: 55 kg (121 lb 4.1 oz)     General appearance: No apparent distress, well developed, appears stated age.  Eyes:  Pupils equal, round, and reactive to light. Conjunctivae/corneas clear.  HENT: Head normal in appearance. External nares normal.  Oral mucosa moist without lesions.  Hearing grossly intact.   Neck: Supple, with full range of motion. Trachea midline.  No gross JVD appreciated.  Respiratory:  Normal respiratory effort. Clear to auscultation, bilaterally without rales or wheezes or rhonchi.  Cardiovascular: Normal rate, regular rhythm with normal S1/S2 without murmurs.  No lower extremity edema.   Abdomen: Soft, non-tender, non-distended with normal bowel sounds.  Musculoskeletal: tenderness to the left hip with decreased ROM  Skin: Warm and dry. No rashes or lesions.  Neurologic:  No focal sensory/motor deficits in the upper and lower extremities. Cranial nerves:  grossly non-focal 2-12.

## 2025-01-29 NOTE — CARE COORDINATION
1/28/25, 7:15 AM EST    DISCHARGE PLANNING EVALUATION    Consult for consideration of rehab,  Addiction Services pierce has seen and offered resources which patient declined, deferred to Addiction Services sw  
1/29/25, 1:19 PM EST    DISCHARGE ON GOING EVALUATION    [facility] day: 2  Location: -20/020-A Reason for admit: Coagulopathy ([facility]) [D68.9]  Chronic alcohol abuse [F10.10]  Closed fracture of left hip, initial encounter ([facility]) [S72.002A]     Procedures: none    Imaging since last note: na    Barriers to Discharge: no plans for surgery, work with therapy here today, needs DME.    PCP: Teresa Ochoa, APRN - CNP  Readmission Risk Score: 14.2    Patient Goals/Plan/Treatment Preferences: planning home possible today; lives on [address]. Requests RW and ride to home; ambulette. Order placed for DME. Declined HH.      1:38 PM  Notified [facility]; pt accepted per Shameka Mckeon RN.    F/U appt with ortho and PCP. See AVS.  Outpatient follow up Dr Tam, podiatry, for BLE toenails.     2:18 PM  Messaged Deneen PERALTA with ortho; they agree to follow for 2 weeks for HH. Updated Shameka with HH.    Lawrence Peterson was evaluated today and a DME order was entered for a wheeled walker because he requires this to successfully complete daily living tasks of toileting, personal cares, and ambulating.  A wheeled walker is necessary due to the patient's unsteady gait, upper body weakness, and inability to  an ambulation device; and he can ambulate only by pushing a walker instead of a lesser assistive device such as a cane, crutch, or standard walker.  The need for this equipment was discussed with the patient and he understands and is in agreement.            1/29/25, 1:36 PM EST    Patient goals/plan/ treatment preferences discussed by  and .  Patient goals/plan/ treatment preferences reviewed with patient/ family.  Patient/ family verbalize understanding of discharge plan and are in agreement with goal/plan/treatment preferences.  Understanding was demonstrated using the teach back method.  AVS provided by RN at time of discharge, which includes all necessary 
  Prior Functional Level Independent in ADLs/IADLs   Current Functional Level Independent in ADLs/IADLs   Can patient return to prior living arrangement Yes   Ability to make needs known: Good   Family able to assist with home care needs: No   Would you like for me to discuss the discharge plan with any other family members/significant others, and if so, who? No   Financial Resources Medicare   Community Resources None   Social/Functional History   Active  No   Patient's  Info Friend, Pool   Discharge Planning   Type of Residence Apartment   Living Arrangements Alone   Current Services Prior To Admission None   Potential Assistance Needed N/A   DME Ordered? No   Potential Assistance Purchasing Medications No   Type of Home Care Services None   Patient expects to be discharged to: Apartment   Services At/After Discharge   Transition of Care Consult (CM Consult) Discharge Planning   Services At/After Discharge DME   Mode of Transport at Discharge Other (see comment)  (Friend vs cab)   Confirm Follow Up Transport Friends   Condition of Participation: Discharge Planning   The Plan for Transition of Care is related to the following treatment goals: stop hip pain

## 2025-01-30 ENCOUNTER — CARE COORDINATION (OUTPATIENT)
Dept: CASE MANAGEMENT | Age: 70
End: 2025-01-30

## 2025-01-30 NOTE — CARE COORDINATION
Care Transitions Note    Initial Call - Call within 2 business days of discharge: Yes-1st attempt    Attempted to reach patient for transitions of care follow up. Unable to reach patient.    Outreach Attempts:   Unable to leave message.     Patient: Lawrence Peterson    Patient : 1955   MRN: [id number]    Reason for Admission:  Closed fracture of left hip  Discharge Date: 25  RURS: Readmission Risk Score: 14.2    Last Discharge Facility       Date Complaint Diagnosis Description Type Department Provider    25 Hip Pain Closed fracture of left hip, initial encounter (HCC) ... ED to Hosp-Admission (Discharged) (ADMITTED) Kimmie Munoz, APRN - CNP; Brian...            Was this an external facility discharge? No    Follow Up Appointment:   Patient does not have a follow up appointment scheduled at time of call.  Did not reach  Future Appointments         Provider Specialty Dept Phone    2025 2:30 PM Fior Prasad, APRN - CNP Cardiology [phone]            Plan for follow-up on next business day.      Marina Romero RN

## 2025-01-31 ENCOUNTER — CARE COORDINATION (OUTPATIENT)
Dept: CASE MANAGEMENT | Age: 70
End: 2025-01-31

## 2025-01-31 NOTE — CARE COORDINATION
Care Transitions Note    Initial Call - Call within 2 business days of discharge: Yes-2nd attempt    Attempted to reach patient for transitions of care follow up. Unable to reach patient.  Will send to [facility] for possible enrollment.    Outreach Attempts:   HIPAA compliant voicemail left for patient.     Patient: Lawrence Peterson    Patient : 1955   MRN: [id number]    Reason for Admission: Closed fracture of left hip  Discharge Date: 25  RURS: Readmission Risk Score: 14.2    Last Discharge Facility       Date Complaint Diagnosis Description Type Department Provider    25 Hip Pain Closed fracture of left hip, initial encounter (HCC) ... ED to Hosp-Admission (Discharged) (ADMITTED) [facility] Kimmie Guerrero, APRN - CNP; Brian...            Was this an external facility discharge? No    Follow Up Appointment:   Patient does not have a follow up appointment scheduled at time of call.  Did not reach  Future Appointments         Provider Specialty Dept Phone    2025 2:30 PM Fior Prasad, APRN - CNP Cardiology [phone]            No further follow-up call indicated     Marina Romero RN

## 2025-02-06 ENCOUNTER — CARE COORDINATION (OUTPATIENT)
Dept: CARE COORDINATION | Age: 70
End: 2025-02-06

## 2025-02-06 NOTE — CARE COORDINATION
Ambulatory Care Coordination Note     2/6/2025 10:40 AM     Patient outreach attempt by this ACM today to offer care management services. ACM was unable to reach the patient by telephone today;   Received recording that number has calling restrictions.       ACM: Yaritza Simental RN     Care Summary Note: Lawrence was referred to care coordination by CTN.  They were not able to reach pt following d/c from hospital.  Pt admitted for left hip fx.  Has h/o: HTN, alcohol/tobacco abuse, CAD, HF.     PCP/Specialist follow up:   Future Appointments         Provider Specialty Dept Phone    8/5/2025 2:30 PM Fior Prasad, APRN - CNP Cardiology [phone]            Follow Up:   Plan for next ACM outreach in approximately 1-2 days  to complete:  - outreach attempt to offer care management services.

## 2025-02-10 ENCOUNTER — CARE COORDINATION (OUTPATIENT)
Dept: CARE COORDINATION | Age: 70
End: 2025-02-10

## 2025-02-10 NOTE — CARE COORDINATION
Ambulatory Care Coordination Note     2/10/2025 9:23 AM     Patient outreach attempt by this ACM today to perform care management follow up . ACM was unable to reach the patient by telephone today;   No answer.  Recording stating person you are trying to reach has calling restrictions.      ACM: Yaritza Simental, RN     Care Summary Note: Lawrence was referred to care coordination by CTN.  They were not able to reach pt following d/c from hospital.  Pt admitted for left hip fx.  Has h/o: HTN, alcohol/tobacco abuse, CAD, HF.        PCP/Specialist follow up:   Future Appointments         Provider Specialty Dept Phone    8/5/2025 2:30 PM Fior Prasad, APRN - CNP Cardiology [phone]            Follow Up:   Plan for next ACM outreach in approximately 1-2 days  to complete:  - outreach attempt to offer care management services.

## 2025-02-13 ENCOUNTER — CARE COORDINATION (OUTPATIENT)
Dept: CARE COORDINATION | Age: 70
End: 2025-02-13

## 2025-02-13 NOTE — CARE COORDINATION
Ambulatory Care Coordination Note     2/13/2025 9:58 AM     patient outreach attempt by this ACM today to offer care management services. ACM was unable to reach the patient by telephone today;   No answer. Received message that number has calling restrictions.      Patient closed (unable to reach patient) from the High Risk Care Management program on 2/13/2025.   No further Ambulatory Care Manager follow up scheduled.

## 2025-03-31 ENCOUNTER — HOSPITAL ENCOUNTER (INPATIENT)
Age: 70
LOS: 3 days | Discharge: HOME | DRG: 287 | End: 2025-04-05
Attending: EMERGENCY MEDICINE | Admitting: PHYSICIAN ASSISTANT
Payer: COMMERCIAL

## 2025-03-31 ENCOUNTER — APPOINTMENT (OUTPATIENT)
Dept: GENERAL RADIOLOGY | Age: 70
DRG: 287 | End: 2025-03-31
Payer: COMMERCIAL

## 2025-03-31 DIAGNOSIS — Z45.02 ICD (IMPLANTABLE CARDIOVERTER-DEFIBRILLATOR) DISCHARGE: ICD-10-CM

## 2025-03-31 DIAGNOSIS — R07.9 CHEST PAIN, UNSPECIFIED TYPE: ICD-10-CM

## 2025-03-31 DIAGNOSIS — E87.6 HYPOKALEMIA: ICD-10-CM

## 2025-03-31 DIAGNOSIS — E87.1 HYPONATREMIA: ICD-10-CM

## 2025-03-31 DIAGNOSIS — K82.8 BILIARY DYSKINESIA: ICD-10-CM

## 2025-03-31 DIAGNOSIS — Z45.02 AICD DISCHARGE: Primary | ICD-10-CM

## 2025-03-31 DIAGNOSIS — I50.22 CHRONIC SYSTOLIC CONGESTIVE HEART FAILURE (HCC): ICD-10-CM

## 2025-03-31 LAB
ALBUMIN SERPL BCG-MCNC: 4.4 G/DL (ref 3.4–4.9)
ALP SERPL-CCNC: 116 U/L (ref 40–129)
ALT SERPL W/O P-5'-P-CCNC: 45 U/L (ref 10–50)
ANION GAP SERPL CALC-SCNC: 17 MEQ/L (ref 8–16)
ANION GAP SERPL CALC-SCNC: 25 MEQ/L (ref 8–16)
AST SERPL-CCNC: 87 U/L (ref 10–50)
BASOPHILS ABSOLUTE: 0.1 THOU/MM3 (ref 0–0.1)
BASOPHILS NFR BLD AUTO: 1 %
BILIRUB CONJ SERPL-MCNC: 0.8 MG/DL (ref 0–0.2)
BILIRUB SERPL-MCNC: 2.1 MG/DL (ref 0.3–1.2)
BUN SERPL-MCNC: 11 MG/DL (ref 8–23)
BUN SERPL-MCNC: 12 MG/DL (ref 8–23)
CALCIUM SERPL-MCNC: 9.5 MG/DL (ref 8.8–10.2)
CALCIUM SERPL-MCNC: 9.5 MG/DL (ref 8.8–10.2)
CHLORIDE SERPL-SCNC: 85 MEQ/L (ref 98–111)
CHLORIDE SERPL-SCNC: 91 MEQ/L (ref 98–111)
CO2 SERPL-SCNC: 19 MEQ/L (ref 22–29)
CO2 SERPL-SCNC: 21 MEQ/L (ref 22–29)
CREAT SERPL-MCNC: 1 MG/DL (ref 0.7–1.2)
CREAT SERPL-MCNC: 1.4 MG/DL (ref 0.7–1.2)
DEPRECATED RDW RBC AUTO: 48.4 FL (ref 35–45)
EKG ATRIAL RATE: 96 BPM
EKG P AXIS: 67 DEGREES
EKG P-R INTERVAL: 136 MS
EKG Q-T INTERVAL: 354 MS
EKG QRS DURATION: 104 MS
EKG QTC CALCULATION (BAZETT): 447 MS
EKG R AXIS: 72 DEGREES
EKG T AXIS: 88 DEGREES
EKG VENTRICULAR RATE: 96 BPM
EOSINOPHIL NFR BLD AUTO: 1.1 %
EOSINOPHILS ABSOLUTE: 0.1 THOU/MM3 (ref 0–0.4)
ERYTHROCYTE [DISTWIDTH] IN BLOOD BY AUTOMATED COUNT: 13.3 % (ref 11.5–14.5)
ETHANOL SERPL-MCNC: < 0.01 % (ref 0–0.08)
ETHANOL SERPL-MCNC: < 0.01 % (ref 0–0.08)
GFR SERPL CREATININE-BSD FRML MDRD: 54 ML/MIN/1.73M2
GFR SERPL CREATININE-BSD FRML MDRD: 80 ML/MIN/1.73M2
GLUCOSE SERPL-MCNC: 172 MG/DL (ref 74–109)
GLUCOSE SERPL-MCNC: 212 MG/DL (ref 74–109)
HCT VFR BLD AUTO: 48.4 % (ref 42–52)
HGB BLD-MCNC: 16.7 GM/DL (ref 14–18)
IMM GRANULOCYTES # BLD AUTO: 0.04 THOU/MM3 (ref 0–0.07)
IMM GRANULOCYTES NFR BLD AUTO: 0.5 %
LYMPHOCYTES ABSOLUTE: 2 THOU/MM3 (ref 1–4.8)
LYMPHOCYTES NFR BLD AUTO: 25 %
MAGNESIUM SERPL-MCNC: 1.6 MG/DL (ref 1.6–2.6)
MCH RBC QN AUTO: 33.7 PG (ref 26–33)
MCHC RBC AUTO-ENTMCNC: 34.5 GM/DL (ref 32.2–35.5)
MCV RBC AUTO: 97.8 FL (ref 80–94)
MONOCYTES ABSOLUTE: 0.6 THOU/MM3 (ref 0.4–1.3)
MONOCYTES NFR BLD AUTO: 7.5 %
NEUTROPHILS ABSOLUTE: 5.1 THOU/MM3 (ref 1.8–7.7)
NEUTROPHILS NFR BLD AUTO: 64.9 %
NRBC BLD AUTO-RTO: 0 /100 WBC
NT-PROBNP SERPL IA-MCNC: 2621 PG/ML (ref 0–124)
OSMOLALITY SERPL CALC.SUM OF ELEC: 262.8 MOSMOL/KG (ref 275–300)
OSMOLALITY SERPL CALC.SUM OF ELEC: 264.6 MOSMOL/KG (ref 275–300)
PLATELET # BLD AUTO: 126 THOU/MM3 (ref 130–400)
PMV BLD AUTO: 11.7 FL (ref 9.4–12.4)
POTASSIUM SERPL-SCNC: 3.4 MEQ/L (ref 3.5–5.2)
POTASSIUM SERPL-SCNC: 4.1 MEQ/L (ref 3.5–5.2)
PROT SERPL-MCNC: 7.5 G/DL (ref 6.4–8.3)
RBC # BLD AUTO: 4.95 MILL/MM3 (ref 4.7–6.1)
SODIUM SERPL-SCNC: 129 MEQ/L (ref 135–145)
SODIUM SERPL-SCNC: 129 MEQ/L (ref 135–145)
TROPONIN, HIGH SENSITIVITY: 52 NG/L (ref 0–12)
TROPONIN, HIGH SENSITIVITY: 96 NG/L (ref 0–12)
WBC # BLD AUTO: 7.8 THOU/MM3 (ref 4.8–10.8)

## 2025-03-31 PROCEDURE — 80053 COMPREHEN METABOLIC PANEL: CPT

## 2025-03-31 PROCEDURE — 6370000000 HC RX 637 (ALT 250 FOR IP): Performed by: EMERGENCY MEDICINE

## 2025-03-31 PROCEDURE — 84484 ASSAY OF TROPONIN QUANT: CPT

## 2025-03-31 PROCEDURE — 93005 ELECTROCARDIOGRAM TRACING: CPT | Performed by: EMERGENCY MEDICINE

## 2025-03-31 PROCEDURE — 83880 ASSAY OF NATRIURETIC PEPTIDE: CPT

## 2025-03-31 PROCEDURE — 93010 ELECTROCARDIOGRAM REPORT: CPT | Performed by: INTERNAL MEDICINE

## 2025-03-31 PROCEDURE — 71046 X-RAY EXAM CHEST 2 VIEWS: CPT

## 2025-03-31 PROCEDURE — 82077 ASSAY SPEC XCP UR&BREATH IA: CPT

## 2025-03-31 PROCEDURE — 36415 COLL VENOUS BLD VENIPUNCTURE: CPT

## 2025-03-31 PROCEDURE — 82248 BILIRUBIN DIRECT: CPT

## 2025-03-31 PROCEDURE — G0378 HOSPITAL OBSERVATION PER HR: HCPCS

## 2025-03-31 PROCEDURE — 99285 EMERGENCY DEPT VISIT HI MDM: CPT

## 2025-03-31 PROCEDURE — 83735 ASSAY OF MAGNESIUM: CPT

## 2025-03-31 PROCEDURE — 85025 COMPLETE CBC W/AUTO DIFF WBC: CPT

## 2025-03-31 RX ORDER — ASPIRIN 81 MG/1
324 TABLET, CHEWABLE ORAL ONCE
Status: COMPLETED | OUTPATIENT
Start: 2025-03-31 | End: 2025-03-31

## 2025-03-31 RX ORDER — SODIUM CHLORIDE 0.9 % (FLUSH) 0.9 %
5-40 SYRINGE (ML) INJECTION PRN
Status: DISCONTINUED | OUTPATIENT
Start: 2025-03-31 | End: 2025-04-01

## 2025-03-31 RX ORDER — LORAZEPAM 2 MG/ML
2 INJECTION INTRAMUSCULAR
Status: DISCONTINUED | OUTPATIENT
Start: 2025-03-31 | End: 2025-03-31

## 2025-03-31 RX ORDER — POTASSIUM CHLORIDE 7.45 MG/ML
10 INJECTION INTRAVENOUS PRN
Status: ACTIVE | OUTPATIENT
Start: 2025-03-31 | End: 2025-04-04

## 2025-03-31 RX ORDER — POLYETHYLENE GLYCOL 3350 17 G/17G
17 POWDER, FOR SOLUTION ORAL DAILY PRN
Status: DISCONTINUED | OUTPATIENT
Start: 2025-03-31 | End: 2025-04-05 | Stop reason: HOSPADM

## 2025-03-31 RX ORDER — POTASSIUM CHLORIDE 1500 MG/1
40 TABLET, EXTENDED RELEASE ORAL PRN
Status: ACTIVE | OUTPATIENT
Start: 2025-03-31 | End: 2025-04-04

## 2025-03-31 RX ORDER — ONDANSETRON 4 MG/1
4 TABLET, ORALLY DISINTEGRATING ORAL EVERY 8 HOURS PRN
Status: DISCONTINUED | OUTPATIENT
Start: 2025-03-31 | End: 2025-04-05 | Stop reason: HOSPADM

## 2025-03-31 RX ORDER — SODIUM CHLORIDE 9 MG/ML
INJECTION, SOLUTION INTRAVENOUS PRN
Status: DISCONTINUED | OUTPATIENT
Start: 2025-03-31 | End: 2025-04-05 | Stop reason: HOSPADM

## 2025-03-31 RX ORDER — ONDANSETRON 2 MG/ML
4 INJECTION INTRAMUSCULAR; INTRAVENOUS EVERY 6 HOURS PRN
Status: DISCONTINUED | OUTPATIENT
Start: 2025-03-31 | End: 2025-04-05 | Stop reason: HOSPADM

## 2025-03-31 RX ORDER — LORAZEPAM 2 MG/ML
1 INJECTION INTRAMUSCULAR
Status: DISCONTINUED | OUTPATIENT
Start: 2025-03-31 | End: 2025-03-31

## 2025-03-31 RX ORDER — ACETAMINOPHEN 325 MG/1
650 TABLET ORAL EVERY 6 HOURS PRN
Status: DISCONTINUED | OUTPATIENT
Start: 2025-03-31 | End: 2025-04-05 | Stop reason: HOSPADM

## 2025-03-31 RX ORDER — PHENOBARBITAL SODIUM 65 MG/ML
130 INJECTION, SOLUTION INTRAMUSCULAR; INTRAVENOUS
Status: DISCONTINUED | OUTPATIENT
Start: 2025-03-31 | End: 2025-04-05 | Stop reason: HOSPADM

## 2025-03-31 RX ORDER — POTASSIUM CHLORIDE 1500 MG/1
40 TABLET, EXTENDED RELEASE ORAL ONCE
Status: COMPLETED | OUTPATIENT
Start: 2025-03-31 | End: 2025-04-01

## 2025-03-31 RX ORDER — PHENOBARBITAL SODIUM 65 MG/ML
260 INJECTION, SOLUTION INTRAMUSCULAR; INTRAVENOUS
Status: DISCONTINUED | OUTPATIENT
Start: 2025-03-31 | End: 2025-04-05 | Stop reason: HOSPADM

## 2025-03-31 RX ORDER — LANOLIN ALCOHOL/MO/W.PET/CERES
100 CREAM (GRAM) TOPICAL DAILY
Status: DISCONTINUED | OUTPATIENT
Start: 2025-04-01 | End: 2025-04-05 | Stop reason: HOSPADM

## 2025-03-31 RX ORDER — CARVEDILOL 6.25 MG/1
6.25 TABLET ORAL 2 TIMES DAILY WITH MEALS
Status: DISCONTINUED | OUTPATIENT
Start: 2025-04-01 | End: 2025-04-01

## 2025-03-31 RX ORDER — MAGNESIUM SULFATE IN WATER 40 MG/ML
2000 INJECTION, SOLUTION INTRAVENOUS ONCE
Status: COMPLETED | OUTPATIENT
Start: 2025-03-31 | End: 2025-04-01

## 2025-03-31 RX ORDER — MAGNESIUM SULFATE IN WATER 40 MG/ML
2000 INJECTION, SOLUTION INTRAVENOUS PRN
Status: DISCONTINUED | OUTPATIENT
Start: 2025-03-31 | End: 2025-04-05 | Stop reason: HOSPADM

## 2025-03-31 RX ORDER — ATORVASTATIN CALCIUM 40 MG/1
40 TABLET, FILM COATED ORAL DAILY
Status: DISCONTINUED | OUTPATIENT
Start: 2025-04-01 | End: 2025-04-05 | Stop reason: HOSPADM

## 2025-03-31 RX ORDER — 0.9 % SODIUM CHLORIDE 0.9 %
500 INTRAVENOUS SOLUTION INTRAVENOUS ONCE
Status: COMPLETED | OUTPATIENT
Start: 2025-03-31 | End: 2025-04-01

## 2025-03-31 RX ORDER — SODIUM CHLORIDE 0.9 % (FLUSH) 0.9 %
5-40 SYRINGE (ML) INJECTION PRN
Status: DISCONTINUED | OUTPATIENT
Start: 2025-03-31 | End: 2025-04-05 | Stop reason: HOSPADM

## 2025-03-31 RX ORDER — LORAZEPAM 2 MG/ML
3 INJECTION INTRAMUSCULAR
Status: DISCONTINUED | OUTPATIENT
Start: 2025-03-31 | End: 2025-03-31

## 2025-03-31 RX ORDER — LORAZEPAM 1 MG/1
4 TABLET ORAL
Status: DISCONTINUED | OUTPATIENT
Start: 2025-03-31 | End: 2025-03-31

## 2025-03-31 RX ORDER — SODIUM CHLORIDE 0.9 % (FLUSH) 0.9 %
5-40 SYRINGE (ML) INJECTION EVERY 12 HOURS SCHEDULED
Status: DISCONTINUED | OUTPATIENT
Start: 2025-03-31 | End: 2025-04-01

## 2025-03-31 RX ORDER — ACETAMINOPHEN 650 MG/1
650 SUPPOSITORY RECTAL EVERY 6 HOURS PRN
Status: DISCONTINUED | OUTPATIENT
Start: 2025-03-31 | End: 2025-04-05 | Stop reason: HOSPADM

## 2025-03-31 RX ORDER — SODIUM CHLORIDE 0.9 % (FLUSH) 0.9 %
5-40 SYRINGE (ML) INJECTION EVERY 12 HOURS SCHEDULED
Status: DISCONTINUED | OUTPATIENT
Start: 2025-03-31 | End: 2025-04-05 | Stop reason: HOSPADM

## 2025-03-31 RX ORDER — LORAZEPAM 1 MG/1
1 TABLET ORAL
Status: DISCONTINUED | OUTPATIENT
Start: 2025-03-31 | End: 2025-03-31

## 2025-03-31 RX ORDER — LORAZEPAM 2 MG/ML
4 INJECTION INTRAMUSCULAR
Status: DISCONTINUED | OUTPATIENT
Start: 2025-03-31 | End: 2025-03-31

## 2025-03-31 RX ORDER — FOLIC ACID 1 MG/1
1 TABLET ORAL DAILY
Status: DISCONTINUED | OUTPATIENT
Start: 2025-04-01 | End: 2025-04-05 | Stop reason: HOSPADM

## 2025-03-31 RX ORDER — LORAZEPAM 1 MG/1
2 TABLET ORAL
Status: DISCONTINUED | OUTPATIENT
Start: 2025-03-31 | End: 2025-03-31

## 2025-03-31 RX ORDER — LORAZEPAM 1 MG/1
3 TABLET ORAL
Status: DISCONTINUED | OUTPATIENT
Start: 2025-03-31 | End: 2025-03-31

## 2025-03-31 RX ADMIN — ASPIRIN 324 MG: 81 TABLET, CHEWABLE ORAL at 20:19

## 2025-03-31 ASSESSMENT — PAIN SCALES - GENERAL: PAINLEVEL_OUTOF10: 3

## 2025-03-31 NOTE — ED TRIAGE NOTES
Pt presents to the ED via EMS with c/o chest pain. States he was pushing a grocery cart when pain started. Currently denies any chest pain. Pt states when it occurred it was \"just a shock that came and gone before they even came\". Arrived with IV in place. EKG Complete

## 2025-03-31 NOTE — ED PROVIDER NOTES
[facility]  EMERGENCY DEPARTMENT ENCOUNTER          Pt Name: Lawrence Peterson  MRN: [id number]  Birthdate 1955  Date of evaluation: 3/31/2025    CHIEF COMPLAINT       Chief Complaint   Patient presents with    Chest Pain       Nurses Notes reviewed and I agree except as noted in the HPI.    HISTORY OF PRESENT ILLNESS    Lawrence Peterson is a 69 y.o. pleasant male who presents to the emergency department for evaluation of \"had some shocks in my chest this afternoon\".  Patient reports that around 4 PM he was pushing a grocery cart and he felt a shock in his chest, states that this sensation lasted a couple of seconds and was diffusely located across his chest.  He denies syncope, fall, or trauma.  He denies any preceding symptoms such as lightheadedness, faintness or other symptoms.  He denies nausea, vomiting, sweats.  He denies palpitations or syncope.  Denies shortness of breath, lower extremity pain or swelling, fever, or cough.  He denies abdominal pain, urinary or bowel complaints.  He reports he has never experienced a sensation like this before.  He states he has 2 stents and believes he has a pacemaker but is unsure whether or not he has an AICD.  He is on Eliquis, believes that he is also on a medication for his cholesterol.  He does continue to smoke cigarettes.  He reports that he has missed his last 2 cardiology appointments.  He is unsure of his cardiologist name.  The patient has no other acute complaints at this time.        REVIEW OF SYSTEMS   Review of Systems    PAST MEDICAL AND SURGICAL HISTORY     Past Medical History:   Diagnosis Date    Asthma     Atrial fibrillation (HCC) 11/2019    CAD S/P percutaneous coronary angioplasty 11/27/2019    Cardiomyopathy (HCC) 11/2019    [employer]tronic single ICD  03/12/2020    Nonsustained ventricular tachycardia (HCC) 11/26/2019    Pneumonia     Ulcer      Past Surgical History:   Procedure Laterality Date    COLONOSCOPY Left  visit)    ED Medications administered this visit:   Medications   potassium chloride (KLOR-CON M) extended release tablet 40 mEq (has no administration in time range)   sodium chloride 0.9 % bolus 500 mL (has no administration in time range)   magnesium sulfate 2000 mg in 50 mL IVPB premix (has no administration in time range)   sodium chloride flush 0.9 % injection 5-40 mL (has no administration in time range)   sodium chloride flush 0.9 % injection 5-40 mL (has no administration in time range)   0.9 % sodium chloride infusion (has no administration in time range)   LORazepam (ATIVAN) tablet 1 mg (has no administration in time range)     Or   LORazepam (ATIVAN) injection 1 mg (has no administration in time range)     Or   LORazepam (ATIVAN) tablet 2 mg (has no administration in time range)     Or   LORazepam (ATIVAN) injection 2 mg (has no administration in time range)     Or   LORazepam (ATIVAN) tablet 3 mg (has no administration in time range)     Or   LORazepam (ATIVAN) injection 3 mg (has no administration in time range)     Or   LORazepam (ATIVAN) tablet 4 mg (has no administration in time range)     Or   LORazepam (ATIVAN) injection 4 mg (has no administration in time range)   apixaban (ELIQUIS) tablet 5 mg (has no administration in time range)   atorvastatin (LIPITOR) tablet 40 mg (has no administration in time range)   carvedilol (COREG) tablet 6.25 mg (has no administration in time range)   thiamine tablet 100 mg (has no administration in time range)   sacubitril-valsartan (ENTRESTO) 49-51 MG per tablet 1 tablet (has no administration in time range)   folic acid (FOLVITE) tablet 1 mg (has no administration in time range)   sodium chloride flush 0.9 % injection 5-40 mL (has no administration in time range)   sodium chloride flush 0.9 % injection 5-40 mL (has no administration in time range)   0.9 % sodium chloride infusion (has no administration in time range)   potassium chloride (KLOR-CON M) extended

## 2025-04-01 ENCOUNTER — APPOINTMENT (OUTPATIENT)
Age: 70
DRG: 287 | End: 2025-04-01
Payer: COMMERCIAL

## 2025-04-01 ENCOUNTER — APPOINTMENT (OUTPATIENT)
Dept: ULTRASOUND IMAGING | Age: 70
DRG: 287 | End: 2025-04-01
Payer: COMMERCIAL

## 2025-04-01 PROBLEM — I50.22 CHRONIC SYSTOLIC CONGESTIVE HEART FAILURE (HCC): Status: ACTIVE | Noted: 2025-04-01

## 2025-04-01 PROBLEM — I25.5 ISCHEMIC CARDIOMYOPATHY: Status: ACTIVE | Noted: 2025-04-01

## 2025-04-01 LAB
ANION GAP SERPL CALC-SCNC: 11 MEQ/L (ref 8–16)
BUN SERPL-MCNC: 10 MG/DL (ref 8–23)
CALCIUM SERPL-MCNC: 9.4 MG/DL (ref 8.8–10.2)
CHLORIDE SERPL-SCNC: 97 MEQ/L (ref 98–111)
CO2 SERPL-SCNC: 27 MEQ/L (ref 22–29)
CREAT SERPL-MCNC: 0.8 MG/DL (ref 0.7–1.2)
DEPRECATED RDW RBC AUTO: 47 FL (ref 35–45)
ECHO AO ASC DIAM: 3.9 CM
ECHO AO ASCENDING AORTA INDEX: 2.62 CM/M2
ECHO AV AREA PEAK VELOCITY: 2.2 CM2
ECHO AV AREA VTI: 2.1 CM2
ECHO AV AREA/BSA PEAK VELOCITY: 1.5 CM2/M2
ECHO AV AREA/BSA VTI: 1.4 CM2/M2
ECHO AV CUSP MM: 1.3 CM
ECHO AV MEAN GRADIENT: 2 MMHG
ECHO AV MEAN VELOCITY: 0.6 M/S
ECHO AV PEAK GRADIENT: 2 MMHG
ECHO AV PEAK VELOCITY: 0.8 M/S
ECHO AV VELOCITY RATIO: 0.63
ECHO AV VTI: 14 CM
ECHO BSA: 1.49 M2
ECHO LA AREA 2C: 8.1 CM2
ECHO LA AREA 4C: 8.6 CM2
ECHO LA DIAMETER INDEX: 1.81 CM/M2
ECHO LA DIAMETER: 2.7 CM
ECHO LA MAJOR AXIS: 3.3 CM
ECHO LA MINOR AXIS: 3.4 CM
ECHO LA VOL BP: 17 ML (ref 18–58)
ECHO LA VOL MOD A2C: 16 ML (ref 18–58)
ECHO LA VOL MOD A4C: 18 ML (ref 18–58)
ECHO LA VOL/BSA BIPLANE: 11 ML/M2 (ref 16–34)
ECHO LA VOLUME INDEX MOD A2C: 11 ML/M2 (ref 16–34)
ECHO LA VOLUME INDEX MOD A4C: 12 ML/M2 (ref 16–34)
ECHO LV E' LATERAL VELOCITY: 5.2 CM/S
ECHO LV E' SEPTAL VELOCITY: 4.6 CM/S
ECHO LV EDV A2C: 31 ML
ECHO LV EDV A4C: 25 ML
ECHO LV EDV INDEX A4C: 17 ML/M2
ECHO LV EDV NDEX A2C: 21 ML/M2
ECHO LV EF PHYSICIAN: 35 %
ECHO LV EJECTION FRACTION A2C: 25 %
ECHO LV EJECTION FRACTION A4C: 38 %
ECHO LV EJECTION FRACTION BIPLANE: 34 % (ref 55–100)
ECHO LV ESV A2C: 23 ML
ECHO LV ESV A4C: 16 ML
ECHO LV ESV INDEX A2C: 15 ML/M2
ECHO LV ESV INDEX A4C: 11 ML/M2
ECHO LVOT AREA: 3.1 CM2
ECHO LVOT AV VTI INDEX: 0.68
ECHO LVOT DIAM: 2 CM
ECHO LVOT MEAN GRADIENT: 1 MMHG
ECHO LVOT PEAK GRADIENT: 1 MMHG
ECHO LVOT PEAK VELOCITY: 0.5 M/S
ECHO LVOT STROKE VOLUME INDEX: 20 ML/M2
ECHO LVOT SV: 29.8 ML
ECHO LVOT VTI: 9.5 CM
ECHO MV A VELOCITY: 0.69 M/S
ECHO MV E DECELERATION TIME (DT): 134 MS
ECHO MV E VELOCITY: 0.37 M/S
ECHO MV E/A RATIO: 0.54
ECHO MV E/E' LATERAL: 7.12
ECHO MV E/E' RATIO (AVERAGED): 7.58
ECHO MV E/E' SEPTAL: 8.04
ECHO PV MAX VELOCITY: 0.5 M/S
ECHO PV PEAK GRADIENT: 1 MMHG
ECHO RV TAPSE: 1.2 CM (ref 1.7–?)
ECHO TV E WAVE: 0.4 M/S
ERYTHROCYTE [DISTWIDTH] IN BLOOD BY AUTOMATED COUNT: 13.2 % (ref 11.5–14.5)
GFR SERPL CREATININE-BSD FRML MDRD: > 90 ML/MIN/1.73M2
GLUCOSE SERPL-MCNC: 110 MG/DL (ref 74–109)
HCT VFR BLD AUTO: 45.1 % (ref 42–52)
HGB BLD-MCNC: 15.8 GM/DL (ref 14–18)
LACTATE SERPL-SCNC: 1.9 MMOL/L (ref 0.5–2)
MAGNESIUM SERPL-MCNC: 2.1 MG/DL (ref 1.6–2.6)
MCH RBC QN AUTO: 33.8 PG (ref 26–33)
MCHC RBC AUTO-ENTMCNC: 35 GM/DL (ref 32.2–35.5)
MCV RBC AUTO: 96.4 FL (ref 80–94)
PLATELET # BLD AUTO: 99 THOU/MM3 (ref 130–400)
PMV BLD AUTO: 11.1 FL (ref 9.4–12.4)
POTASSIUM SERPL-SCNC: 4.6 MEQ/L (ref 3.5–5.2)
RBC # BLD AUTO: 4.68 MILL/MM3 (ref 4.7–6.1)
SCAN OF BLOOD SMEAR: NORMAL
SODIUM SERPL-SCNC: 135 MEQ/L (ref 135–145)
TROPONIN, HIGH SENSITIVITY: 100 NG/L (ref 0–12)
WBC # BLD AUTO: 7.2 THOU/MM3 (ref 4.8–10.8)

## 2025-04-01 PROCEDURE — 6360000002 HC RX W HCPCS

## 2025-04-01 PROCEDURE — 96366 THER/PROPH/DIAG IV INF ADDON: CPT

## 2025-04-01 PROCEDURE — 93306 TTE W/DOPPLER COMPLETE: CPT

## 2025-04-01 PROCEDURE — 93306 TTE W/DOPPLER COMPLETE: CPT | Performed by: INTERNAL MEDICINE

## 2025-04-01 PROCEDURE — 6370000000 HC RX 637 (ALT 250 FOR IP)

## 2025-04-01 PROCEDURE — G0378 HOSPITAL OBSERVATION PER HR: HCPCS

## 2025-04-01 PROCEDURE — 80048 BASIC METABOLIC PNL TOTAL CA: CPT

## 2025-04-01 PROCEDURE — 2500000003 HC RX 250 WO HCPCS

## 2025-04-01 PROCEDURE — 76705 ECHO EXAM OF ABDOMEN: CPT

## 2025-04-01 PROCEDURE — 84484 ASSAY OF TROPONIN QUANT: CPT

## 2025-04-01 PROCEDURE — 85027 COMPLETE CBC AUTOMATED: CPT

## 2025-04-01 PROCEDURE — 99222 1ST HOSP IP/OBS MODERATE 55: CPT | Performed by: INTERNAL MEDICINE

## 2025-04-01 PROCEDURE — 6370000000 HC RX 637 (ALT 250 FOR IP): Performed by: INTERNAL MEDICINE

## 2025-04-01 PROCEDURE — 6370000000 HC RX 637 (ALT 250 FOR IP): Performed by: EMERGENCY MEDICINE

## 2025-04-01 PROCEDURE — 83735 ASSAY OF MAGNESIUM: CPT

## 2025-04-01 PROCEDURE — 2580000003 HC RX 258: Performed by: EMERGENCY MEDICINE

## 2025-04-01 PROCEDURE — 36415 COLL VENOUS BLD VENIPUNCTURE: CPT

## 2025-04-01 PROCEDURE — 99223 1ST HOSP IP/OBS HIGH 75: CPT | Performed by: INTERNAL MEDICINE

## 2025-04-01 PROCEDURE — 83605 ASSAY OF LACTIC ACID: CPT

## 2025-04-01 PROCEDURE — 96365 THER/PROPH/DIAG IV INF INIT: CPT

## 2025-04-01 RX ORDER — METOPROLOL SUCCINATE 25 MG/1
25 TABLET, EXTENDED RELEASE ORAL DAILY
Status: DISCONTINUED | OUTPATIENT
Start: 2025-04-01 | End: 2025-04-05 | Stop reason: HOSPADM

## 2025-04-01 RX ADMIN — APIXABAN 5 MG: 5 TABLET, FILM COATED ORAL at 01:22

## 2025-04-01 RX ADMIN — ATORVASTATIN CALCIUM 40 MG: 40 TABLET, FILM COATED ORAL at 08:24

## 2025-04-01 RX ADMIN — SODIUM CHLORIDE, PRESERVATIVE FREE 10 ML: 5 INJECTION INTRAVENOUS at 01:21

## 2025-04-01 RX ADMIN — SACUBITRIL AND VALSARTAN 1 TABLET: 49; 51 TABLET, FILM COATED ORAL at 01:22

## 2025-04-01 RX ADMIN — Medication 100 MG: at 08:23

## 2025-04-01 RX ADMIN — MAGNESIUM SULFATE HEPTAHYDRATE 2000 MG: 40 INJECTION, SOLUTION INTRAVENOUS at 00:43

## 2025-04-01 RX ADMIN — SODIUM CHLORIDE, PRESERVATIVE FREE 10 ML: 5 INJECTION INTRAVENOUS at 19:49

## 2025-04-01 RX ADMIN — SACUBITRIL AND VALSARTAN 1 TABLET: 49; 51 TABLET, FILM COATED ORAL at 08:24

## 2025-04-01 RX ADMIN — METOPROLOL SUCCINATE 25 MG: 25 TABLET, EXTENDED RELEASE ORAL at 19:49

## 2025-04-01 RX ADMIN — APIXABAN 5 MG: 5 TABLET, FILM COATED ORAL at 19:49

## 2025-04-01 RX ADMIN — APIXABAN 5 MG: 5 TABLET, FILM COATED ORAL at 08:24

## 2025-04-01 RX ADMIN — CARVEDILOL 6.25 MG: 6.25 TABLET, FILM COATED ORAL at 08:23

## 2025-04-01 RX ADMIN — SODIUM CHLORIDE, PRESERVATIVE FREE 10 ML: 5 INJECTION INTRAVENOUS at 08:30

## 2025-04-01 RX ADMIN — FOLIC ACID 1 MG: 1 TABLET ORAL at 08:24

## 2025-04-01 RX ADMIN — SODIUM CHLORIDE 500 ML: 0.9 INJECTION, SOLUTION INTRAVENOUS at 00:41

## 2025-04-01 RX ADMIN — POTASSIUM CHLORIDE 40 MEQ: 20 TABLET, EXTENDED RELEASE ORAL at 01:22

## 2025-04-01 NOTE — PROGRESS NOTES
Patient arrived per cart to 3B 37 from ED. Heart monitor applied and vitals taken.  Admission paperwork completed.  Explained to patient that [facility] is not responsible for any lost or stolen items.  Patient verbalized understanding. Oriented to room and use of call light and bed controls.  Patient denies pain or needs. No signs of distress noted.  Bed locked & in low position, side-rails up x2.  Call light in reach.  Reminded patient to call nurse if any needs arise.     2 person skin assessment performed by this nurse and Jessica RN.    Explained patients right to have family, representative or physician notified of their admission.  Patient has Declined for physician to be notified.  Patient has Declined for family/representative to be notified.

## 2025-04-01 NOTE — PROGRESS NOTES
Utilize [facility] alcohol withdrawal scale (Based on [place] Modified Alcohol Withdrawal Scale).  Tabulate score based on classifications including Tremor, Sweating, Hallucination, Orientation, and Agitation.    Tremor: 0  Sweatin  Hallucinations: 0  Orientation: 0  Agitation: 0  Total Score: 0  Action perform as described below     Tremor:  No tremor is 0 points.  Tremor on movement is 1 point.  Tremor at rest is 2 points.  Sweating: No Sweat 0 points. Moist is 1 point.  Drenching sweats is 2 points.  Hallucinations: No present 0 points. Dissuadable is 1 point. Not dissuadable is 2 points.  Orientation: Oriented 0 points. Vague/detached 1 point. Disoriented/no contact 2 points.  Agitation: Calm 0 points.  Anxious 1 point. Panicky 2 points.    Check scale every 2 hours.  Discontinue scoring with 4 consecutive scorings of 0.  Scale 0: No phenobarbital given.  Re-assess every 60 minutes as needed.   Scale 1-3: Phenobarbital 130 mg IV over 3 minutes. Re-assess every 60 minutes as needed.  May administer every 60 minutes to a maximum dose of phenobarbital 1040 mg in 24 hours!  Scale 4-8: Phenobarbital 260 mg IV over 5 minutes.  Re-assess every 60 minutes as needed. May administer every 60 minutes to a maximum dose of phenobarbital 1040mg in 24 hours!  Scale 9-10: Transfer to ICU (if not already in ICU).  Administer 10mg/kg phenobarbital IV over 60 minutes.  Maximum dose phenobarbital is 1040mg in 24 hours!

## 2025-04-01 NOTE — PROGRESS NOTES
Utilize [facility] alcohol withdrawal scale (Based on [place] Modified Alcohol Withdrawal Scale).  Tabulate score based on classifications including Tremor, Sweating, Hallucination, Orientation, and Agitation.    Tremor: 0  Sweatin  Hallucinations: 0  Orientation: 0  Agitation: 0  Total Score: 0  Action perform as described below     Tremor:  No tremor is 0 points.  Tremor on movement is 1 point.  Tremor at rest is 2 points.  Sweating: No Sweat 0 points. Moist is 1 point.  Drenching sweats is 2 points.  Hallucinations: No present 0 points. Dissuadable is 1 point. Not dissuadable is 2 points.  Orientation: Oriented 0 points. Vague/detached 1 point. Disoriented/no contact 2 points.  Agitation: Calm 0 points.  Anxious 1 point. Panicky 2 points.    Check scale every 2 hours.  Discontinue scoring with 4 consecutive scorings of 0.  Scale 0: No phenobarbital given.  Re-assess every 60 minutes as needed.   Scale 1-3: Phenobarbital 130 mg IV over 3 minutes. Re-assess every 60 minutes as needed.  May administer every 60 minutes to a maximum dose of phenobarbital 1040 mg in 24 hours!  Scale 4-8: Phenobarbital 260 mg IV over 5 minutes.  Re-assess every 60 minutes as needed. May administer every 60 minutes to a maximum dose of phenobarbital 1040mg in 24 hours!  Scale 9-10: Transfer to ICU (if not already in ICU).  Administer 10mg/kg phenobarbital IV over 60 minutes.  Maximum dose phenobarbital is 1040mg in 24 hours!

## 2025-04-01 NOTE — PLAN OF CARE
Care plan reviewed with patient and patient verbalized understanding of the plan of care and contribute to goal setting.     Problem: Discharge Planning  Goal: Discharge to home or other facility with appropriate resources  Outcome: Not Progressing

## 2025-04-01 NOTE — PROGRESS NOTES
Patient resting in bed watching television. Patient voiced no needs or concerns at this time. Call light within reach.

## 2025-04-01 NOTE — CARE COORDINATION
Case Management Assessment Initial Evaluation    Date/Time of Evaluation: 4/1/2025 7:20 AM  Assessment Completed by: Stephanie Tam RN    If patient is discharged prior to next notation, then this note serves as note for discharge by case management.    Patient Name: Lawrence Peterson                   YOB: 1955  Diagnosis: Hypokalemia [E87.6]  Hyponatremia [E87.1]  ICD (implantable cardioverter-defibrillator) discharge [Z45.02]  AICD discharge [Z45.02]                   Date / Time: 3/31/2025  6:34 PM  Location: [address] [place] [address]-A     Patient Admission Status: Observation   Readmission Risk Low 0-14, Mod 15-19), High > 20: Readmission Risk Score: 14.2    Current PCP: Teresa Ochoa APRN - [facility]  Health Care Decision Makers:     Additional Case Management Notes: Patient was at the grocery store when he felt his ICD shock him. Per [employer], patient received 7 shocks. Hospitalist following. Cardiology to see. Telemetry. Eliquis. Coreg. Folic Acid. Multivitamin. Thiamine. Entresto. Phenobarbital per ETOH scale. Na 129. Troponin 96 (52) Pro-BNP 2621    Procedures: none    Imaging:   3/31 CXR: Negative  4/1 US Gallbladder: Cholelithiasis, with borderline thickened gallbladder wall. 2. No significant biliary ductal dilatation for patient age. 3. Echogenic hepatic parenchyma suggesting diffuse hepatocellular disease, commonly steatosis.    Patient Goals/Plan/Treatment Preferences: Met w/ Lawrence. Verified insurance and PCP. He is independent. Gets transportation from his friend Pool. Has a RW. Denies needs for DME, HH or OP services. Plans to return [facility].    04/01/25 0830   Service Assessment   Patient Orientation Alert and Oriented   Cognition Alert   History Provided By Patient   Primary Caregiver Self   Accompanied By/Relationship n/a   Support Systems Friends/Neighbors   Patient's Healthcare Decision Maker is: Patient Declined (Legal Next of Kin Remains as Decision Maker)  (Has no family;  refused to provide contacts or fill out HCPOA paperwork)   PCP Verified by CM Yes   Last Visit to PCP Within last year   Prior Functional Level Independent in ADLs/IADLs   Current Functional Level Independent in ADLs/IADLs   Can patient return to prior living arrangement Yes   Ability to make needs known: Good   Family able to assist with home care needs: No   Would you like for me to discuss the discharge plan with any other family members/significant others, and if so, who? No   Financial Resources Medicare   Community Resources None   CM/SW Referral ADLs/IADLs;DME   Social/Functional History   Lives With Alone   Type of Home Apartment   Active  No   Patient's  Info Friend, Pool   Discharge Planning   Type of Residence Apartment   Living Arrangements Alone   Current Services Prior To Admission Durable Medical Equipment   Current DME Prior to Arrival Walker   Potential Assistance Needed N/A   DME Ordered? No   Potential Assistance Purchasing Medications No   Type of Home Care Services None   Patient expects to be discharged to: Apartment   History of falls? 1   Services At/After Discharge   Transition of Care Consult (CM Consult) Discharge Planning   Mode of Transport at Discharge Other (see comment)  (Friend)   Confirm Follow Up Transport Friends   Condition of Participation: Discharge Planning   The Plan for Transition of Care is related to the following treatment goals: \"Make sure I can get to my walker\"

## 2025-04-01 NOTE — CONSULTS
[facility]  Consult    Patient's Name/Date of Birth: Lawrence Peterson / 1955 (69 y.o.)    Date: April 1, 2025     Referring Provider: Srikanth Manriquez MD    CHIEF COMPLAINT: ICD discharged, Chest pain    CONSULT FOR: ICD shock, SVT history    HPI: This is a pleasant 69 y.o. male presents following ICD discharge with subsequent chest pain.  Past medical history significant for CHF with improved EF secondary to ischemic cardiomyopathy s/p ICD placement, paroxysmal atrial fibrillation, nonsustained V. Tach.  Patient reports he was at [facility] when he felt his ICD discharge 3 times.  At that time he denied any chest pain, palpitations, diaphoresis, lightheadedness, dizziness, or any other symptoms.  Following this patient reported some chest pain.  ED provider spoke to ICD rep who reported that the patient received 7 shocks.  Is unclear as to what rhythm patient was in at that time.  On admission patient denied any chest pain.    Patient seen and evaluated bedside this morning.  He denied any chest pain, diaphoresis, shakes, fever, chills, palpitations.  Patient was already questioning when he would be able to be discharged.  Troponin trend: 52-.     Echo: 9/24/2021 showed EF of 50% with no regional left ventricular wall motion abnormalities.    All labs, EKG's, diagnostic testing and images as well as cardiac cath, stress testing were reviewed during this encounter    Past Medical History:   Diagnosis Date    Asthma     Atrial fibrillation (HCC) 11/2019    CAD S/P percutaneous coronary angioplasty 11/27/2019    Cardiomyopathy 11/2019    CHF (congestive heart failure) (HCC)     Medtronic single ICD  03/12/2020    Nonsustained ventricular tachycardia (HCC) 11/26/2019    Pneumonia     Ulcer      Past Surgical History:   Procedure Laterality Date    COLONOSCOPY Left 5/19/2021    COLONOSCOPY WITH BIOPSY performed by Sharif Gomez MD at [facility]    CORONARY ANGIOPLASTY

## 2025-04-01 NOTE — PROGRESS NOTES
No changes to previous assessment and vitals are charted in the flowsheets.Patient resting in bed. No concerns voiced. Call light within reach.

## 2025-04-01 NOTE — H&P
(HCC) 11/26/2019    Pneumonia     Ulcer          Procedure Laterality Date    COLONOSCOPY Left 5/19/2021    COLONOSCOPY WITH BIOPSY performed by Sharif Gomez MD at [facility]    CORONARY ANGIOPLASTY WITH STENT PLACEMENT  11/27/2019    Stent Circ and OM    EYE SURGERY Right     cataract    PACEMAKER PLACEMENT      TONSILLECTOMY AND ADENOIDECTOMY           Problem Relation Age of Onset    Heart Attack Mother     Heart Attack Father     Cancer Maternal Aunt     Heart Attack Maternal Grandmother     Heart Attack Maternal Grandfather     Heart Attack Paternal Grandmother     Stroke Paternal Grandmother     Heart Attack Paternal Grandfather     Breast Cancer Neg Hx     Diabetes Neg Hx     Kidney Disease Neg Hx      Social History     Socioeconomic History    Marital status:      Spouse name: None    Number of children: None    Years of education: None    Highest education level: None   Tobacco Use    Smoking status: Every Day     Current packs/day: 1.00     Average packs/day: 1 pack/day for 50.0 years (50.0 ttl pk-yrs)     Types: Cigarettes    Smokeless tobacco: Never   Vaping Use    Vaping status: Never Used   Substance and Sexual Activity    Alcohol use: Yes     Comment: Gin daily - drink with lemon lime soda: 1/3 of L     Drug use: No     Social Drivers of Health     Financial Resource Strain: Medium Risk (1/6/2020)    Overall Financial Resource Strain (CARDIA)     Difficulty of Paying Living Expenses: Somewhat hard   Food Insecurity: No Food Insecurity (4/1/2025)    Hunger Vital Sign     Worried About Running Out of Food in the Last Year: Never true     Ran Out of Food in the Last Year: Never true   Transportation Needs: Unmet Transportation Needs (4/1/2025)    PRAPARE - Transportation     Lack of Transportation (Medical): Yes     Lack of Transportation (Non-Medical): Yes   Physical Activity: Insufficiently Active (1/6/2020)    Exercise Vital Sign     Days of Exercise per Week: 2 days     Minutes of

## 2025-04-01 NOTE — PLAN OF CARE
I have performed physical exam of this patient this morning and reviewed his chart the patient's ICD discharge at least 7 times patient states the week before he had no issues and essentially has had no issues up until yesterday, he normally is active daily and does his own shopping and walks, he has had no chest pain preceding these events, he also has a history of chronic alcohol use and drinks approximately a third of gin per day subsequently has been placed on phenobarbital protocol in anticipation of alcohol withdrawal at the time my physical survey in the morning he was interactive alert and tolerating breakfast, however he may be having auditory hallucinations this afternoon therefore anticipate at least a greater than 3-day hospital stay in anticipation of alcohol withdrawal his hyponatremia is chronic based on chart review and currently serum sodium is 129 he appears to have a history of ischemic cardiomyopathy and has no signs of fluid overload he has no chest pain at least on my physical exam in the morning and mild hyperbilirubinemia of note he also has cholelithiasis with no yasmine signs of gallbladder infection but based on imaging interpretation warrants monitoring will also follow cardiology recommendations regarding any further interventions based on his presentation and elevated high-sensitivity troponin although there are no yasmine ischemic changes on telemetry at this time

## 2025-04-01 NOTE — ED NOTES
Spoke to [employer]; expected findings. Pt received 7 shocks today per [employer] tech, states it is unclear if it is SVT or not. Is sending results over to us. States the pacemaker defib appears to be functioning as expected

## 2025-04-01 NOTE — ED NOTES
Patient transported to  [facility] by cart in stable condition.   Patient monitored on cardiac telemetry.   IV line is patent.

## 2025-04-01 NOTE — ED NOTES
Pt moved to room 24, placed on tele  IV and blood work established   Patient resting in bed. Respirations easy and unlabored. No distress noted. Call light within reach.

## 2025-04-01 NOTE — PROGRESS NOTES
Patient is alert and oriented x4. Patient has full sensation on upper extremities. Hand grasp was moderate, bilaterally. Capillary refill and skin turgor both were less than 3 seconds, bilaterally. Patients chest expansion symmetrical and unlabored. Breath sounds were clear bilaterally. Heart sounds regular. Patients abdomen is active in all quadrants. Patient has sensation present in lower extremities, bilaterally. Skin turgor and capillary refill both less than 3 seconds, bilaterally. Patient complains of no pain at this time. Patient resting in bed, awaiting breakfast.

## 2025-04-02 ENCOUNTER — PREP FOR PROCEDURE (OUTPATIENT)
Dept: CARDIOLOGY | Age: 70
End: 2025-04-02

## 2025-04-02 PROBLEM — Z45.02 ICD (IMPLANTABLE CARDIOVERTER-DEFIBRILLATOR) DISCHARGE: Status: ACTIVE | Noted: 2025-04-02

## 2025-04-02 PROCEDURE — 99232 SBSQ HOSP IP/OBS MODERATE 35: CPT | Performed by: PHYSICIAN ASSISTANT

## 2025-04-02 PROCEDURE — 6370000000 HC RX 637 (ALT 250 FOR IP): Performed by: NURSE PRACTITIONER

## 2025-04-02 PROCEDURE — 1200000000 HC SEMI PRIVATE

## 2025-04-02 PROCEDURE — 1200000003 HC TELEMETRY R&B

## 2025-04-02 PROCEDURE — 2500000003 HC RX 250 WO HCPCS

## 2025-04-02 PROCEDURE — 6370000000 HC RX 637 (ALT 250 FOR IP): Performed by: INTERNAL MEDICINE

## 2025-04-02 PROCEDURE — 6370000000 HC RX 637 (ALT 250 FOR IP)

## 2025-04-02 PROCEDURE — 99232 SBSQ HOSP IP/OBS MODERATE 35: CPT | Performed by: NURSE PRACTITIONER

## 2025-04-02 RX ORDER — ASPIRIN 325 MG
325 TABLET ORAL ONCE
Status: CANCELLED | OUTPATIENT
Start: 2025-04-02 | End: 2025-04-02

## 2025-04-02 RX ORDER — SODIUM CHLORIDE 0.9 % (FLUSH) 0.9 %
5-40 SYRINGE (ML) INJECTION EVERY 12 HOURS SCHEDULED
Status: CANCELLED | OUTPATIENT
Start: 2025-04-02

## 2025-04-02 RX ORDER — NITROGLYCERIN 0.4 MG/1
0.4 TABLET SUBLINGUAL EVERY 5 MIN PRN
Status: CANCELLED | OUTPATIENT
Start: 2025-04-02

## 2025-04-02 RX ORDER — SODIUM CHLORIDE 9 MG/ML
INJECTION, SOLUTION INTRAVENOUS PRN
Status: CANCELLED | OUTPATIENT
Start: 2025-04-02

## 2025-04-02 RX ORDER — SODIUM CHLORIDE 0.9 % (FLUSH) 0.9 %
5-40 SYRINGE (ML) INJECTION PRN
Status: CANCELLED | OUTPATIENT
Start: 2025-04-02

## 2025-04-02 RX ORDER — SODIUM CHLORIDE 9 MG/ML
INJECTION, SOLUTION INTRAVENOUS CONTINUOUS
Status: CANCELLED | OUTPATIENT
Start: 2025-04-02

## 2025-04-02 RX ADMIN — METOPROLOL SUCCINATE 25 MG: 25 TABLET, EXTENDED RELEASE ORAL at 08:56

## 2025-04-02 RX ADMIN — Medication 100 MG: at 08:56

## 2025-04-02 RX ADMIN — SACUBITRIL AND VALSARTAN 0.5 TABLET: 49; 51 TABLET, FILM COATED ORAL at 08:56

## 2025-04-02 RX ADMIN — APIXABAN 5 MG: 5 TABLET, FILM COATED ORAL at 20:57

## 2025-04-02 RX ADMIN — SODIUM CHLORIDE, PRESERVATIVE FREE 10 ML: 5 INJECTION INTRAVENOUS at 08:57

## 2025-04-02 RX ADMIN — SODIUM CHLORIDE, PRESERVATIVE FREE 10 ML: 5 INJECTION INTRAVENOUS at 20:58

## 2025-04-02 RX ADMIN — ATORVASTATIN CALCIUM 40 MG: 40 TABLET, FILM COATED ORAL at 08:56

## 2025-04-02 RX ADMIN — SACUBITRIL AND VALSARTAN 0.5 TABLET: 49; 51 TABLET, FILM COATED ORAL at 20:56

## 2025-04-02 RX ADMIN — APIXABAN 5 MG: 5 TABLET, FILM COATED ORAL at 08:57

## 2025-04-02 RX ADMIN — FOLIC ACID 1 MG: 1 TABLET ORAL at 08:56

## 2025-04-02 NOTE — PROGRESS NOTES
Patient is alert and oriented x4. Patient is agitated more than he was today. Patient has full sensation in his upper extremities. Capillary refill and skin turgor less than 3 seconds bilaterally. Hand grasp was moderate, bilaterally. Patients chest expansion symmetrical and unlabored. Breath sounds clear bilaterally. Heart sounds regular. Patient's abdomen is active in all 4 quadrants. Patient has full sensation present in lower extremities, bilaterally. Skin turgor and capillary refill both less than 3 seconds, bilaterally. Patient complains on pain at this time. Patient resting in bed, awaiting breakfast.

## 2025-04-02 NOTE — PLAN OF CARE
Problem: Discharge Planning  Goal: Discharge to home or other facility with appropriate resources  Outcome: Progressing  Flowsheets (Taken 4/2/2025 0540)  Discharge to home or other facility with appropriate resources:   Identify barriers to discharge with patient and caregiver   Identify discharge learning needs (meds, wound care, etc)     Problem: Safety - Adult  Goal: Free from fall injury  Outcome: Progressing  Note: Bed locked & in low position, call light in reach, side-rails up x2, bed/chair alarm utilized, non-slip socks on when ambulating, reminded patient to use call light to call for assistance.      Problem: Chronic Conditions and Co-morbidities  Goal: Patient's chronic conditions and co-morbidity symptoms are monitored and maintained or improved  Outcome: Progressing  Flowsheets (Taken 4/2/2025 0540)  Care Plan - Patient's Chronic Conditions and Co-Morbidity Symptoms are Monitored and Maintained or Improved:   Monitor and assess patient's chronic conditions and comorbid symptoms for stability, deterioration, or improvement   Update acute care plan with appropriate goals if chronic or comorbid symptoms are exacerbated and prevent overall improvement and discharge   Collaborate with multidisciplinary team to address chronic and comorbid conditions and prevent exacerbation or deterioration     Problem: Cardiovascular - Adult  Goal: Maintains optimal cardiac output and hemodynamic stability  Outcome: Progressing  Flowsheets (Taken 4/2/2025 0541)  Maintains optimal cardiac output and hemodynamic stability:   Monitor blood pressure and heart rate   Monitor urine output and notify Licensed Independent Practitioner for values outside of normal range   Assess for signs of decreased cardiac output     Problem: Cardiovascular - Adult  Goal: Absence of cardiac dysrhythmias or at baseline  Outcome: Progressing  Flowsheets (Taken 4/2/2025 0541)  Absence of cardiac dysrhythmias or at baseline:   Monitor cardiac rate  and rhythm   Assess for signs of decreased cardiac output   Care plan reviewed with patient.  Patient verbalizes understanding of the care plan and contributed to goal setting.

## 2025-04-02 NOTE — CONSULTS
[facility]  Cardiac Electrophysiology Consult    Patient's Name/Date of Birth: Lawrence Peterson / 1955 (69 y.o.)    Date: April 1, 2025     Referring Provider: Srikanth Manriquez MD    CHIEF COMPLAINT:    Assessment:   1. AICD discharge, appears inappropriate. He was entirely asymptomatic. Intracardiac EGM reviewed, HR around 180s. Near field and far field EGM very similar to presenting/baseline sinus rhythm EGM, suggestive of SVT vs atrial arrhythmia. After shock, morphology changed, which is expected. CL is very consistent, making AF unlikely.  -350ms.  - Keel K >4, and Mg >2. Consider adding aldactone  - Reduce entresto dose to allow for more BP to increase metoprolol   - stop coreg  - Will want to adjust ICD parameters to increase treatment threshold above 200.   - Consider adding atrial lead if he has increasing arrhythmia burden     2. Ischemic cardiomyopathy  - Consider adding SGLT2i  - Reduce entresto dose, BP too low.   - Switch coreg to metoprolol.    3. Alcohol abuse - drinks 1/3-1L gin daily. Strongly recommended abstinence.      HPI: This is a pleasant 69 y.o. male presents with 7 AICD shocks. The patient states that he was pushing a shopping cart from [email or web address] to his building. He had no prodromal sx. He denies dizziness, lightheadedness, syncope. Denies anything unusual. No prior shocks. ICD interrogation reviewed.      ECG shows SR, nonspecific changes.  Tele, brief NSVT.            Echo: No results found for this or any previous visit.       All labs, EKG's, diagnostic testing and images as well as cardiac cath, stress testing were reviewed during this encounter    Past Medical History:   Diagnosis Date    Alcohol use     Drinks daily    Asthma     Atrial fibrillation (HCC) 11/2019    CAD S/P percutaneous coronary angioplasty 11/27/2019    Cardiomyopathy 11/2019    CHF (congestive heart failure) (HCC)     [employer] single ICD  03/12/2020    Nonsustained

## 2025-04-02 NOTE — PROGRESS NOTES
RN called [employer] to have them come and adjust patient's ICD threshold to give treatment above 200 per [facility] cardiology CNP. [employer] rep will come tomorrow 4/3 after 12:00 to make the adjustments. Will continue to monitor

## 2025-04-02 NOTE — PROGRESS NOTES
Patient resting in bed. Patient voices no concerns or needs at this time. Call light within reach.

## 2025-04-02 NOTE — PROGRESS NOTES
Cardiology Progress Note      Patient:  Lawrence Peterson  YOB: 1955  MRN: [id number]   Acct: [id number]  Admit Date:  3/31/2025  Primary Cardiologist: last seen bony 2022  Seen by Dr. Jacques     Per prior cardiology consult note-  CHIEF COMPLAINT: ICD discharged, Chest pain    CONSULT FOR: ICD shock, SVT history     HPI: This is a pleasant 69 y.o. male presents following ICD discharge with subsequent chest pain.  Past medical history significant for CHF with improved EF secondary to ischemic cardiomyopathy s/p ICD placement, paroxysmal atrial fibrillation, nonsustained V. Tach.  Patient reports he was at [facility] when he felt his ICD discharge 3 times.  At that time he denied any chest pain, palpitations, diaphoresis, lightheadedness, dizziness, or any other symptoms.  Following this patient reported some chest pain.  ED provider spoke to ICD rep who reported that the patient received 7 shocks.  Is unclear as to what rhythm patient was in at that time.  On admission patient denied any chest pain.     Patient seen and evaluated bedside this morning.  He denied any chest pain, diaphoresis, shakes, fever, chills, palpitations.  Patient was already questioning when he would be able to be discharged.  Troponin trend: 52-.        Subjective (Events in last 24 hours):   Pt in bed   No chest pain or SOB today     Discussed with pt cardiac cath need and why - pt agrees to cath     Objective:   BP (!) 181/116   Pulse 75   Temp 97.4 °F (36.3 °C) (Oral)   Resp 18   Ht 1.575 m (5' 2.01\")   Wt 50.5 kg (111 lb 6.4 oz)   SpO2 99%   BMI 20.37 kg/m²        TELEMETRY: SR no ectopy     Physical Exam:  General Appearance: alert and oriented to person, place and time, in no acute distress  Cardiovascular: normal rate, regular rhythm, normal S1 and S2, no murmurs, rubs, clicks, or gallops, distal pulses intact,   Pulmonary/Chest: clear to auscultation bilaterally- no wheezes, rales or rhonchi, normal    Lab Results   Component Value Date/Time    TRIG 85 08/18/2022 12:54 PM    HDL 63 08/18/2022 12:54 PM       TSH:    Lab Results   Component Value Date/Time    TSH 0.641 01/28/2025 07:06 AM         Assessment:    ICD shocks   - interrogation with ICD firing   - EP reviewed --- no VT or AFB -- atrial / SVT arrhyhtmia - up BB - may need atrial lead if further arrhythmias persist     CDMP EF 35-40% recent echo  was 50% 2021  - ICD - single lead   - ? CAD or atrial arrhythmia induced       Chronic hyponatremia    Chronic ETOH abuse -- watch for withdrawals  Smoker       Hx CAD   Last cath 2019: Severe distal left circumflex and OM2 lesions, status post successful PCI with stenting. RCA,  involving mid RCA.  Distal RCA fills via collaterals from  left to right.      Hx AFB / NSVT      Plan:  Pt follows chf clinic --- hasn't seen Dr. Moody since 9/2022  Will have [employer] rep reprogram ICD  Cath tomorrow   Follow          Electronically signed by DYAN Rasheed CNP on 4/2/2025 at 10:56 AM

## 2025-04-02 NOTE — PROGRESS NOTES
Hospitalist Progress Note      Patient:  Lawrence Peterson 69 y.o. male     : 1955  Unit/Bed:[address] [place] [address]-A  Date of Admission: 3/31/2025        ASSESSMENT AND PLAN    Active Problems  ICD discharge in patient with Heart failure with improved EF 2/2 ICM s/p ICD  [employer] to be on site tomorrow to adjust ICD settings per EP   ICD interrogation demonstrating- Near field and far field EGM very similar to presenting/baseline sinus rhythm EGM, suggestive of SVT vs atrial arrhythmia. After shock, morphology changed, which is expected    Keep potassium above 4, magnesium above 2  Echo demonstrating LVEF 35-40% and G1DD - Cardio planning [facility] tomorrow     Chronic alcohol abuse:   patient reports drinking one third to a liter of gin daily, last drink 3/30.  Patient not interested in quitting.  Phenobarb protocol given Ativan shortage.  Seizure precautions.  Thiamine folate.  Multivitamin.  Has not required any PRN phenobarb     Chronic hyponatremia- resolved:   Sodium 129 on admit- improved to 135    In the setting of chronic alcohol abuse.    LIANNA, resolved: Initial creatinine 1.4 on presentation. Resolved-     Elevated troponin: Troponin 52, 96. 100  Likely in the setting of above.    Cardiology planning for [facility] tomorrow    Patient denying current chest pain.    Hyperbilirubinemia:   Total bilirubin 2.1, direct bilirubin 0.8.  This is new compared to prior.   Liver US with cholelithiasis and thickened gallbladder wall.  Hepatic parenchyma consistent with steatosis- consistent with FLD   Recommend HIDA outpatient   Repeat LFT tomorrow     Chronic Conditions (reviewed and stable unless otherwise stated)   CAD s/p PCI  Paroxysmal atrial fibrillation on Eliquis, secondary hypercoagulable state  Nonsustained V. Tach  Asthma  Chronic tobacco use  HTN  HLD        LDA: []CVC / []PICC / []Midline / []Jamison / []Drains / []Mediport / [x]None  Antibiotics: none  Steroids: none   Labs (still needed?): [x]Yes / []No  IVF (still  rate, regular rhythm with normal S1/S2 without murmurs.    No lower extremity edema.   Abdomen: Soft, non-tender, non-distended with normal bowel sounds. Negative murphys   Musculoskeletal: No joint swelling or tenderness. Normal tone. No abnormal movements.   Skin: Warm and dry. No rashes or lesions.  Neurologic:  No focal sensory/motor deficits in the upper or lower extremities. Cranial nerves:  grossly non-focal 2-12.     Psychiatric: Alert and oriented, normal insight and thought content.   Capillary Refill: Brisk,< 3 seconds.  Peripheral Pulses: +2 palpable, equal bilaterally.       Labs/Radiology: See chart or assessment above.     Electronically signed by Ruth Michaud PA-C on 4/2/2025 at 4:56 PM

## 2025-04-02 NOTE — PLAN OF CARE
Care plan reviewed with patient and patient verbalized understanding of the plan of care and contribute to goal setting.     Problem: Discharge Planning  Goal: Discharge to home or other facility with appropriate resources  4/2/2025 1048 by James Castellanos, RN  Outcome: Not Progressing  4/2/2025 0540 by Benjamin Harrison, RN  Outcome: Progressing  Flowsheets (Taken 4/2/2025 0540)  Discharge to home or other facility with appropriate resources:   Identify barriers to discharge with patient and caregiver   Identify discharge learning needs (meds, wound care, etc)

## 2025-04-03 LAB
ABO GROUP BLD: NORMAL
ALBUMIN SERPL BCG-MCNC: 3.4 G/DL (ref 3.4–4.9)
ALP SERPL-CCNC: 89 U/L (ref 40–129)
ALT SERPL W/O P-5'-P-CCNC: 35 U/L (ref 10–50)
ANION GAP SERPL CALC-SCNC: 9 MEQ/L (ref 8–16)
ANION GAP SERPL CALC-SCNC: 9 MEQ/L (ref 8–16)
APTT PPP: 31.2 SECONDS (ref 22–38)
AST SERPL-CCNC: 59 U/L (ref 10–50)
BILIRUB CONJ SERPL-MCNC: 0.2 MG/DL (ref 0–0.2)
BILIRUB SERPL-MCNC: 0.4 MG/DL (ref 0.3–1.2)
BUN SERPL-MCNC: 17 MG/DL (ref 8–23)
BUN SERPL-MCNC: 18 MG/DL (ref 8–23)
CALCIUM SERPL-MCNC: 9.1 MG/DL (ref 8.8–10.2)
CALCIUM SERPL-MCNC: 9.3 MG/DL (ref 8.8–10.2)
CHLORIDE SERPL-SCNC: 99 MEQ/L (ref 98–111)
CHLORIDE SERPL-SCNC: 99 MEQ/L (ref 98–111)
CHOLEST SERPL-MCNC: 127 MG/DL (ref 100–199)
CO2 SERPL-SCNC: 26 MEQ/L (ref 22–29)
CO2 SERPL-SCNC: 26 MEQ/L (ref 22–29)
CREAT SERPL-MCNC: 0.8 MG/DL (ref 0.7–1.2)
CREAT SERPL-MCNC: 0.8 MG/DL (ref 0.7–1.2)
DEPRECATED RDW RBC AUTO: 49.4 FL (ref 35–45)
EKG ATRIAL RATE: 67 BPM
EKG P AXIS: 50 DEGREES
EKG P-R INTERVAL: 140 MS
EKG Q-T INTERVAL: 394 MS
EKG QRS DURATION: 98 MS
EKG QTC CALCULATION (BAZETT): 416 MS
EKG R AXIS: 51 DEGREES
EKG T AXIS: 59 DEGREES
EKG VENTRICULAR RATE: 67 BPM
ERYTHROCYTE [DISTWIDTH] IN BLOOD BY AUTOMATED COUNT: 13.4 % (ref 11.5–14.5)
GFR SERPL CREATININE-BSD FRML MDRD: > 90 ML/MIN/1.73M2
GFR SERPL CREATININE-BSD FRML MDRD: > 90 ML/MIN/1.73M2
GLUCOSE SERPL-MCNC: 106 MG/DL (ref 74–109)
GLUCOSE SERPL-MCNC: 95 MG/DL (ref 74–109)
HCT VFR BLD AUTO: 43 % (ref 42–52)
HDLC SERPL-MCNC: 42 MG/DL
HGB BLD-MCNC: 14.8 GM/DL (ref 14–18)
IAT IGG-SP REAG SERPL QL: NORMAL
INR PPP: 1.1 (ref 0.85–1.13)
LDLC SERPL CALC-MCNC: 69 MG/DL
MCH RBC QN AUTO: 34.2 PG (ref 26–33)
MCHC RBC AUTO-ENTMCNC: 34.4 GM/DL (ref 32.2–35.5)
MCV RBC AUTO: 99.3 FL (ref 80–94)
PLATELET # BLD AUTO: 102 THOU/MM3 (ref 130–400)
PMV BLD AUTO: 12.1 FL (ref 9.4–12.4)
POTASSIUM SERPL-SCNC: 4.2 MEQ/L (ref 3.5–5.2)
POTASSIUM SERPL-SCNC: 4.8 MEQ/L (ref 3.5–5.2)
PROT SERPL-MCNC: 5.9 G/DL (ref 6.4–8.3)
RBC # BLD AUTO: 4.33 MILL/MM3 (ref 4.7–6.1)
RH BLD: NORMAL
SODIUM SERPL-SCNC: 134 MEQ/L (ref 135–145)
SODIUM SERPL-SCNC: 134 MEQ/L (ref 135–145)
TRIGL SERPL-MCNC: 82 MG/DL (ref 0–199)
WBC # BLD AUTO: 7.8 THOU/MM3 (ref 4.8–10.8)

## 2025-04-03 PROCEDURE — 6370000000 HC RX 637 (ALT 250 FOR IP): Performed by: PHYSICIAN ASSISTANT

## 2025-04-03 PROCEDURE — 1200000000 HC SEMI PRIVATE

## 2025-04-03 PROCEDURE — 6370000000 HC RX 637 (ALT 250 FOR IP)

## 2025-04-03 PROCEDURE — 80053 COMPREHEN METABOLIC PANEL: CPT

## 2025-04-03 PROCEDURE — 85730 THROMBOPLASTIN TIME PARTIAL: CPT

## 2025-04-03 PROCEDURE — 86885 COOMBS TEST INDIRECT QUAL: CPT

## 2025-04-03 PROCEDURE — 2500000003 HC RX 250 WO HCPCS

## 2025-04-03 PROCEDURE — 86901 BLOOD TYPING SEROLOGIC RH(D): CPT

## 2025-04-03 PROCEDURE — 2580000003 HC RX 258: Performed by: PHYSICIAN ASSISTANT

## 2025-04-03 PROCEDURE — 93010 ELECTROCARDIOGRAM REPORT: CPT | Performed by: INTERNAL MEDICINE

## 2025-04-03 PROCEDURE — 82248 BILIRUBIN DIRECT: CPT

## 2025-04-03 PROCEDURE — 6370000000 HC RX 637 (ALT 250 FOR IP): Performed by: NURSE PRACTITIONER

## 2025-04-03 PROCEDURE — 80061 LIPID PANEL: CPT

## 2025-04-03 PROCEDURE — 36415 COLL VENOUS BLD VENIPUNCTURE: CPT

## 2025-04-03 PROCEDURE — 85610 PROTHROMBIN TIME: CPT

## 2025-04-03 PROCEDURE — 2500000003 HC RX 250 WO HCPCS: Performed by: PHYSICIAN ASSISTANT

## 2025-04-03 PROCEDURE — 99232 SBSQ HOSP IP/OBS MODERATE 35: CPT | Performed by: NURSE PRACTITIONER

## 2025-04-03 PROCEDURE — 6370000000 HC RX 637 (ALT 250 FOR IP): Performed by: INTERNAL MEDICINE

## 2025-04-03 PROCEDURE — 85027 COMPLETE CBC AUTOMATED: CPT

## 2025-04-03 PROCEDURE — 93005 ELECTROCARDIOGRAM TRACING: CPT | Performed by: PHYSICIAN ASSISTANT

## 2025-04-03 PROCEDURE — 86900 BLOOD TYPING SEROLOGIC ABO: CPT

## 2025-04-03 RX ORDER — SODIUM CHLORIDE 0.9 % (FLUSH) 0.9 %
5-40 SYRINGE (ML) INJECTION PRN
Status: DISCONTINUED | OUTPATIENT
Start: 2025-04-03 | End: 2025-04-05 | Stop reason: HOSPADM

## 2025-04-03 RX ORDER — SODIUM CHLORIDE 9 MG/ML
INJECTION, SOLUTION INTRAVENOUS CONTINUOUS
Status: DISCONTINUED | OUTPATIENT
Start: 2025-04-03 | End: 2025-04-03

## 2025-04-03 RX ORDER — SODIUM CHLORIDE 0.9 % (FLUSH) 0.9 %
5-40 SYRINGE (ML) INJECTION PRN
Status: CANCELLED | OUTPATIENT
Start: 2025-04-03

## 2025-04-03 RX ORDER — NITROGLYCERIN 0.4 MG/1
0.4 TABLET SUBLINGUAL EVERY 5 MIN PRN
Status: DISCONTINUED | OUTPATIENT
Start: 2025-04-03 | End: 2025-04-05 | Stop reason: HOSPADM

## 2025-04-03 RX ORDER — SODIUM CHLORIDE 9 MG/ML
INJECTION, SOLUTION INTRAVENOUS CONTINUOUS
Status: CANCELLED | OUTPATIENT
Start: 2025-04-03

## 2025-04-03 RX ORDER — NITROGLYCERIN 0.4 MG/1
0.4 TABLET SUBLINGUAL EVERY 5 MIN PRN
Status: CANCELLED | OUTPATIENT
Start: 2025-04-03

## 2025-04-03 RX ORDER — ASPIRIN 325 MG
325 TABLET ORAL ONCE
Status: CANCELLED | OUTPATIENT
Start: 2025-04-03 | End: 2025-04-03

## 2025-04-03 RX ORDER — SODIUM CHLORIDE 9 MG/ML
INJECTION, SOLUTION INTRAVENOUS PRN
Status: DISCONTINUED | OUTPATIENT
Start: 2025-04-03 | End: 2025-04-05 | Stop reason: HOSPADM

## 2025-04-03 RX ORDER — SODIUM CHLORIDE 0.9 % (FLUSH) 0.9 %
5-40 SYRINGE (ML) INJECTION EVERY 12 HOURS SCHEDULED
Status: CANCELLED | OUTPATIENT
Start: 2025-04-03

## 2025-04-03 RX ORDER — ASPIRIN 325 MG
325 TABLET ORAL ONCE
Status: COMPLETED | OUTPATIENT
Start: 2025-04-03 | End: 2025-04-03

## 2025-04-03 RX ORDER — SODIUM CHLORIDE 9 MG/ML
INJECTION, SOLUTION INTRAVENOUS PRN
Status: CANCELLED | OUTPATIENT
Start: 2025-04-03

## 2025-04-03 RX ORDER — SODIUM CHLORIDE 0.9 % (FLUSH) 0.9 %
5-40 SYRINGE (ML) INJECTION EVERY 12 HOURS SCHEDULED
Status: DISCONTINUED | OUTPATIENT
Start: 2025-04-03 | End: 2025-04-05 | Stop reason: HOSPADM

## 2025-04-03 RX ADMIN — SODIUM CHLORIDE: 0.9 INJECTION, SOLUTION INTRAVENOUS at 08:02

## 2025-04-03 RX ADMIN — METOPROLOL SUCCINATE 25 MG: 25 TABLET, EXTENDED RELEASE ORAL at 08:04

## 2025-04-03 RX ADMIN — FOLIC ACID 1 MG: 1 TABLET ORAL at 08:04

## 2025-04-03 RX ADMIN — SACUBITRIL AND VALSARTAN 0.5 TABLET: 49; 51 TABLET, FILM COATED ORAL at 20:28

## 2025-04-03 RX ADMIN — SACUBITRIL AND VALSARTAN 0.5 TABLET: 49; 51 TABLET, FILM COATED ORAL at 08:05

## 2025-04-03 RX ADMIN — Medication 100 MG: at 08:04

## 2025-04-03 RX ADMIN — SODIUM CHLORIDE, PRESERVATIVE FREE 10 ML: 5 INJECTION INTRAVENOUS at 20:28

## 2025-04-03 RX ADMIN — SODIUM CHLORIDE, PRESERVATIVE FREE 10 ML: 5 INJECTION INTRAVENOUS at 07:57

## 2025-04-03 RX ADMIN — ATORVASTATIN CALCIUM 40 MG: 40 TABLET, FILM COATED ORAL at 08:04

## 2025-04-03 RX ADMIN — ASPIRIN 325 MG: 325 TABLET ORAL at 08:07

## 2025-04-03 ASSESSMENT — PAIN SCALES - GENERAL
PAINLEVEL_OUTOF10: 0

## 2025-04-03 NOTE — PROGRESS NOTES
Hospitalist Progress Note      Patient:  Lawrence Peterson 69 y.o. male     : 1955  Unit/Bed:[address] [place] [address]-A  Date of Admission: 3/31/2025        ASSESSMENT AND PLAN    Active Problems  ICD discharge in patient with Heart failure with improved EF 2/2 ICM s/p ICD  [employer] to be on site today to adjust ICD settings per EP   ICD interrogation demonstrating- Near field and far field EGM very similar to presenting/baseline sinus rhythm EGM, suggestive of SVT vs atrial arrhythmia. After shock, morphology changed, which is expected    Keep potassium above 4, magnesium above 2  Echo demonstrating LVEF 35-40% and G1DD - [facility] planned for tomorrow     Chronic alcohol abuse:   patient reports drinking one third to a liter of gin daily, last drink 3/30.  Patient not interested in quitting.  Phenobarb protocol given Ativan shortage.  Seizure precautions.  Thiamine folate.  Multivitamin.  Has not required any PRN phenobarb     Chronic hyponatremia- resolved:   Sodium 129 on admit- improved to 135    In the setting of chronic alcohol abuse.    LIANNA, resolved: Initial creatinine 1.4 on presentation. Resolved-     Elevated troponin: Troponin 52, 96. 100  Likely in the setting of above.    Cardiology planning for [facility] today   Patient denying current chest pain.    Hyperbilirubinemia:   Total bilirubin 2.1, direct bilirubin 0.8.  This is new compared to prior.   Liver US with cholelithiasis and thickened gallbladder wall.  Hepatic parenchyma consistent with steatosis- consistent with FLD   Recommend HIDA outpatient   Repeat LFT with improvement in AST. Bili normal     Chronic Conditions (reviewed and stable unless otherwise stated)   CAD s/p PCI  Paroxysmal atrial fibrillation on Eliquis, secondary hypercoagulable state  Nonsustained V. Tach  Asthma  Chronic tobacco use  HTN  HLD        LDA: []CVC / []PICC / []Midline / []Jamison / []Drains / []Mediport / [x]None  Antibiotics: none  Steroids: none   Labs (still needed?): [x]Yes /

## 2025-04-03 NOTE — FLOWSHEET NOTE
Welcome to 3B folder given to patient which includes the following handouts:  1. Welcome Letter  2. Medication Side Effects  3. Patent Safety brochure  4. Charley Nomination brochure  5. Employee Recognition Card  6. M in the box Bedside Medication Teaching Sheet  7. 3B phone numbers & HIPPA code card     How to care for your heart after coronary artery disease educational booklet and cardiac cath discharge instructions discussed and given to the patient.

## 2025-04-03 NOTE — CARE COORDINATION
DISCHARGE PLANNING EVALUATION  4/3/25, 1:42 PM EDT    Reason for Referral: Provider rec HH for Medication Mgmt  Decision Maker: Makes his own decisions.   Current Services: Denies that the has any services.   New Services Requested: Refusing new services.  [facility]/ [facility]  He has a friend that he told CHARMAINE helps him as needed.    Payment Source:[facility]  Transportation at Discharge: friend  Post-acute (PAC) provider list was provided to patient. Patient was informed of their freedom to choose PAC provider. Discussed and offered to show the patient the relevant PAC Providers quality and resource use measures on Medicare Compare web site via computer based on patient's goals of care and treatment preferences. Questions regarding selection process were answered.      Teach Back Method used with Lawrence regarding care plan and discharge planning.  Patient verbalized understanding of the plan of care and contribute to goal setting.       Patient preferences and discharge plan: Patient denies needs.  He told SW that he does not have any issues understanding and taking his medications.      Electronically signed by DANI Lopez on 4/3/2025 at 1:42 PM

## 2025-04-03 NOTE — PROCEDURES
PROCEDURE NOTE  Date: 4/3/2025   Name: Lawrence BRAR Kristen  YOB: 1955    ProceduresEKG completed, given to Ryann DORAN

## 2025-04-03 NOTE — PLAN OF CARE
Problem: Discharge Planning  Goal: Discharge to home or other facility with appropriate resources  Outcome: Progressing  Flowsheets (Taken 4/2/2025 0540 by Benjamin Harrison RN)  Discharge to home or other facility with appropriate resources:   Identify barriers to discharge with patient and caregiver   Identify discharge learning needs (meds, wound care, etc)  Note: He is from home alone and plans on returning there at discharge.       Problem: Safety - Adult  Goal: Free from fall injury  4/3/2025 1001 by Vidhya Lyn RN  Outcome: Progressing  Flowsheets (Taken 4/3/2025 1000)  Free From Fall Injury: Instruct family/caregiver on patient safety  Note: Bed locked & in low position, call light in reach, side-rails up x2, bed/chair alarm utilized, non-slip socks on when ambulating, reminded patient to use call light to call for assistance.    4/2/2025 2051 by Emerson Kraus RN  Outcome: Progressing     Problem: Chronic Conditions and Co-morbidities  Goal: Patient's chronic conditions and co-morbidity symptoms are monitored and maintained or improved  4/3/2025 1001 by Vidhya Lyn RN  Outcome: Progressing  Flowsheets (Taken 4/2/2025 0540 by Benjamin Harrison RN)  Care Plan - Patient's Chronic Conditions and Co-Morbidity Symptoms are Monitored and Maintained or Improved:   Monitor and assess patient's chronic conditions and comorbid symptoms for stability, deterioration, or improvement   Update acute care plan with appropriate goals if chronic or comorbid symptoms are exacerbated and prevent overall improvement and discharge   Collaborate with multidisciplinary team to address chronic and comorbid conditions and prevent exacerbation or deterioration  Note: He is on Eliquis for Atrial Fib.  4/2/2025 2051 by Emerson Kraus RN  Outcome: Progressing     Problem: Cardiovascular - Adult  Goal: Maintains optimal cardiac output and hemodynamic stability  4/3/2025 1001 by Vidhya Lyn RN  Outcome:

## 2025-04-03 NOTE — PLAN OF CARE
Problem: Safety - Adult  Goal: Free from fall injury  4/2/2025 2051 by Emerson Kraus RN  Outcome: Progressing  4/2/2025 1048 by James Castellanos RN  Outcome: Progressing     Problem: Chronic Conditions and Co-morbidities  Goal: Patient's chronic conditions and co-morbidity symptoms are monitored and maintained or improved  4/2/2025 2051 by Emerson Kraus RN  Outcome: Progressing  4/2/2025 1048 by James Castellanos RN  Outcome: Progressing     Problem: Cardiovascular - Adult  Goal: Maintains optimal cardiac output and hemodynamic stability  4/2/2025 2051 by Emerson Kraus RN  Outcome: Progressing  4/2/2025 1048 by James Castellanos RN  Outcome: Progressing  Goal: Absence of cardiac dysrhythmias or at baseline  4/2/2025 2051 by Emerson Kraus RN  Outcome: Progressing  4/2/2025 1048 by James Castellanos RN  Outcome: Progressing     Problem: Discharge Planning  Goal: Discharge to home or other facility with appropriate resources  4/2/2025 1048 by James Castellanos RN  Outcome: Not Progressing

## 2025-04-03 NOTE — PROGRESS NOTES
A SPECIAL NOTE  The patient was in a hurry to have breakfast before procedure. Tried to engage in conversation, but he was too hungry to talk. He wanted me to help him find breakfast. I was successful in finding the right person to give him breakfast.    Please continue to visit.

## 2025-04-03 NOTE — PROGRESS NOTES
Cardiology Progress Note      Patient:  Lawrence Peterson  YOB: 1955  MRN: [id number]   Acct: [id number]  Admit Date:  3/31/2025  Primary Cardiologist: last seen bony 2022  Seen by Dr. Jacques     Per prior cardiology consult note-  CHIEF COMPLAINT: ICD discharged, Chest pain    CONSULT FOR: ICD shock, SVT history     HPI: This is a pleasant 69 y.o. male presents following ICD discharge with subsequent chest pain.  Past medical history significant for CHF with improved EF secondary to ischemic cardiomyopathy s/p ICD placement, paroxysmal atrial fibrillation, nonsustained V. Tach.  Patient reports he was at [facility] when he felt his ICD discharge 3 times.  At that time he denied any chest pain, palpitations, diaphoresis, lightheadedness, dizziness, or any other symptoms.  Following this patient reported some chest pain.  ED provider spoke to ICD rep who reported that the patient received 7 shocks.  Is unclear as to what rhythm patient was in at that time.  On admission patient denied any chest pain.     Patient seen and evaluated bedside this morning.  He denied any chest pain, diaphoresis, shakes, fever, chills, palpitations.  Patient was already questioning when he would be able to be discharged.  Troponin trend: 52-.        Subjective (Events in last 24 hours):   Pt in bed   No chest pain or SOB today     Discussed with pt cardiac cath need and why - pt agrees to cath       4/3/25  Pt in bed   No chest pain or SOB     Tele SR no ectopy  BP stable     Cath canceled for today - eliquis dose   For tomorrow afternoon       Objective:   BP (!) 161/98   Pulse 79   Temp 98 °F (36.7 °C) (Oral)   Resp 20   Ht 1.575 m (5' 2.01\")   Wt 53.6 kg (118 lb 2.7 oz)   SpO2 99%   BMI 21.61 kg/m²        TELEMETRY: SR no ectopy     Physical Exam:  General Appearance: alert and oriented to person, place and time, in no acute distress  Cardiovascular: normal rate, regular rhythm, normal S1 and S2, no  Grade I diastolic dysfunction with normal LAP.   Left Atrium Left atrium size is normal.   Right Ventricle Right ventricle size is normal. Normal systolic function.   Right Atrium Right atrium size is normal.   Aortic Valve Valve structure is normal. No regurgitation. No stenosis.   Mitral Valve Valve structure is normal. No regurgitation. No stenosis noted.   Tricuspid Valve Valve structure is normal. No regurgitation. No stenosis noted.   Pulmonic Valve The pulmonic valve visualization is suboptimal but appears to be functioning normally. Physiologically normal regurgitation. No stenosis noted.   Aorta Normal sized aortic root. Mildly dilated ascending aorta. Ao ascending diameter is 3.9 cm.   IVC/Hepatic Veins IVC diameter is less than or equal to 21 mm and decreases greater than 50% during inspiration; therefore the estimated right atrial pressure is normal (~3 mmHg). IVC size is normal.   Pericardium No pericardial effusion.         Left Heart Cath: 11/27/2019  LEFT VENTRICULOGRAPHY:  Ejection fraction seems to be severely reduced  indicative of severe cardiomyopathy.  EF 20%.     CORONARY ANGIOGRAM:  1.  RCA:  RCA has mild diffuse disease in the proximal segment.  Mid RCA  has chronic total occlusion.  _____ estimated at around 20 mL.  RCA is a  dominant vessel.  It receives left-to-right collaterals that fill the  LPL, LPDA and distal RCA.  2.  Left main coronary artery:  Left main coronary artery is patent  without significant stenosis.  3.  Left circumflex artery:  Proximal LCX has mild diffuse disease.  OM1  is a large branching vessel that has no significant stenosis.  Distal  left circumflex artery into the OM2 branch had significant bifurcation  lesion estimated at 70% to 80%.  It becomes more severe in OM2 up to  90%.  4.  LAD:  Left anterior descending artery has mild diffuse disease with  mild calcification in the proximal segment.  Mid LAD seems to be patent  without significant stenosis.  Distal

## 2025-04-04 LAB
ANION GAP SERPL CALC-SCNC: 9 MEQ/L (ref 8–16)
ANION GAP SERPL CALC-SCNC: 9 MEQ/L (ref 8–16)
APTT PPP: 30.9 SECONDS (ref 22–38)
BUN SERPL-MCNC: 16 MG/DL (ref 8–23)
BUN SERPL-MCNC: 17 MG/DL (ref 8–23)
CALCIUM SERPL-MCNC: 9.1 MG/DL (ref 8.8–10.2)
CALCIUM SERPL-MCNC: 9.4 MG/DL (ref 8.8–10.2)
CHLORIDE SERPL-SCNC: 95 MEQ/L (ref 98–111)
CHLORIDE SERPL-SCNC: 98 MEQ/L (ref 98–111)
CHOLEST SERPL-MCNC: 126 MG/DL (ref 100–199)
CO2 SERPL-SCNC: 25 MEQ/L (ref 22–29)
CO2 SERPL-SCNC: 26 MEQ/L (ref 22–29)
CREAT SERPL-MCNC: 0.7 MG/DL (ref 0.7–1.2)
CREAT SERPL-MCNC: 0.8 MG/DL (ref 0.7–1.2)
DEPRECATED RDW RBC AUTO: 47.8 FL (ref 35–45)
EKG ATRIAL RATE: 62 BPM
EKG P AXIS: 45 DEGREES
EKG P-R INTERVAL: 142 MS
EKG Q-T INTERVAL: 416 MS
EKG QRS DURATION: 100 MS
EKG QTC CALCULATION (BAZETT): 422 MS
EKG R AXIS: 53 DEGREES
EKG T AXIS: 65 DEGREES
EKG VENTRICULAR RATE: 62 BPM
ERYTHROCYTE [DISTWIDTH] IN BLOOD BY AUTOMATED COUNT: 13.3 % (ref 11.5–14.5)
GFR SERPL CREATININE-BSD FRML MDRD: > 90 ML/MIN/1.73M2
GFR SERPL CREATININE-BSD FRML MDRD: > 90 ML/MIN/1.73M2
GLUCOSE SERPL-MCNC: 107 MG/DL (ref 74–109)
GLUCOSE SERPL-MCNC: 109 MG/DL (ref 74–109)
HCT VFR BLD AUTO: 41.2 % (ref 42–52)
HDLC SERPL-MCNC: 36 MG/DL
HGB BLD-MCNC: 14.3 GM/DL (ref 14–18)
INR PPP: 0.93 (ref 0.85–1.13)
LDLC SERPL CALC-MCNC: 71 MG/DL
MCH RBC QN AUTO: 33.8 PG (ref 26–33)
MCHC RBC AUTO-ENTMCNC: 34.7 GM/DL (ref 32.2–35.5)
MCV RBC AUTO: 97.4 FL (ref 80–94)
PLATELET # BLD AUTO: 119 THOU/MM3 (ref 130–400)
PMV BLD AUTO: 11.6 FL (ref 9.4–12.4)
POTASSIUM SERPL-SCNC: 3.9 MEQ/L (ref 3.5–5.2)
POTASSIUM SERPL-SCNC: 4.7 MEQ/L (ref 3.5–5.2)
RBC # BLD AUTO: 4.23 MILL/MM3 (ref 4.7–6.1)
SODIUM SERPL-SCNC: 129 MEQ/L (ref 135–145)
SODIUM SERPL-SCNC: 133 MEQ/L (ref 135–145)
TRIGL SERPL-MCNC: 97 MG/DL (ref 0–199)
WBC # BLD AUTO: 6.7 THOU/MM3 (ref 4.8–10.8)

## 2025-04-04 PROCEDURE — 2500000003 HC RX 250 WO HCPCS: Performed by: INTERNAL MEDICINE

## 2025-04-04 PROCEDURE — 85610 PROTHROMBIN TIME: CPT

## 2025-04-04 PROCEDURE — 2500000003 HC RX 250 WO HCPCS: Performed by: PHYSICIAN ASSISTANT

## 2025-04-04 PROCEDURE — C1894 INTRO/SHEATH, NON-LASER: HCPCS | Performed by: INTERNAL MEDICINE

## 2025-04-04 PROCEDURE — 93454 CORONARY ARTERY ANGIO S&I: CPT | Performed by: INTERNAL MEDICINE

## 2025-04-04 PROCEDURE — 93005 ELECTROCARDIOGRAM TRACING: CPT | Performed by: PHYSICIAN ASSISTANT

## 2025-04-04 PROCEDURE — 6370000000 HC RX 637 (ALT 250 FOR IP)

## 2025-04-04 PROCEDURE — 4A023N7 MEASUREMENT OF CARDIAC SAMPLING AND PRESSURE, LEFT HEART, PERCUTANEOUS APPROACH: ICD-10-PCS | Performed by: INTERNAL MEDICINE

## 2025-04-04 PROCEDURE — 80048 BASIC METABOLIC PNL TOTAL CA: CPT

## 2025-04-04 PROCEDURE — 85027 COMPLETE CBC AUTOMATED: CPT

## 2025-04-04 PROCEDURE — 1200000000 HC SEMI PRIVATE

## 2025-04-04 PROCEDURE — 6370000000 HC RX 637 (ALT 250 FOR IP): Performed by: PHYSICIAN ASSISTANT

## 2025-04-04 PROCEDURE — 2580000003 HC RX 258: Performed by: PHYSICIAN ASSISTANT

## 2025-04-04 PROCEDURE — 93010 ELECTROCARDIOGRAM REPORT: CPT | Performed by: INTERNAL MEDICINE

## 2025-04-04 PROCEDURE — 36415 COLL VENOUS BLD VENIPUNCTURE: CPT

## 2025-04-04 PROCEDURE — B2111ZZ FLUOROSCOPY OF MULTIPLE CORONARY ARTERIES USING LOW OSMOLAR CONTRAST: ICD-10-PCS | Performed by: INTERNAL MEDICINE

## 2025-04-04 PROCEDURE — 99232 SBSQ HOSP IP/OBS MODERATE 35: CPT | Performed by: STUDENT IN AN ORGANIZED HEALTH CARE EDUCATION/TRAINING PROGRAM

## 2025-04-04 PROCEDURE — 85730 THROMBOPLASTIN TIME PARTIAL: CPT

## 2025-04-04 PROCEDURE — 6370000000 HC RX 637 (ALT 250 FOR IP): Performed by: NURSE PRACTITIONER

## 2025-04-04 PROCEDURE — 99152 MOD SED SAME PHYS/QHP 5/>YRS: CPT | Performed by: INTERNAL MEDICINE

## 2025-04-04 PROCEDURE — 80061 LIPID PANEL: CPT

## 2025-04-04 PROCEDURE — 6360000004 HC RX CONTRAST MEDICATION: Performed by: INTERNAL MEDICINE

## 2025-04-04 PROCEDURE — 93458 L HRT ARTERY/VENTRICLE ANGIO: CPT | Performed by: INTERNAL MEDICINE

## 2025-04-04 PROCEDURE — 6370000000 HC RX 637 (ALT 250 FOR IP): Performed by: INTERNAL MEDICINE

## 2025-04-04 PROCEDURE — C1769 GUIDE WIRE: HCPCS | Performed by: INTERNAL MEDICINE

## 2025-04-04 PROCEDURE — 2709999900 HC NON-CHARGEABLE SUPPLY: Performed by: INTERNAL MEDICINE

## 2025-04-04 PROCEDURE — 6360000002 HC RX W HCPCS: Performed by: INTERNAL MEDICINE

## 2025-04-04 RX ORDER — SODIUM CHLORIDE 9 MG/ML
INJECTION, SOLUTION INTRAVENOUS CONTINUOUS
Status: DISCONTINUED | OUTPATIENT
Start: 2025-04-04 | End: 2025-04-05 | Stop reason: HOSPADM

## 2025-04-04 RX ORDER — SODIUM CHLORIDE 9 MG/ML
INJECTION, SOLUTION INTRAVENOUS PRN
Status: DISCONTINUED | OUTPATIENT
Start: 2025-04-04 | End: 2025-04-05 | Stop reason: HOSPADM

## 2025-04-04 RX ORDER — NITROGLYCERIN 0.4 MG/1
0.4 TABLET SUBLINGUAL EVERY 5 MIN PRN
Status: DISCONTINUED | OUTPATIENT
Start: 2025-04-04 | End: 2025-04-05 | Stop reason: HOSPADM

## 2025-04-04 RX ORDER — FENTANYL CITRATE 50 UG/ML
INJECTION, SOLUTION INTRAMUSCULAR; INTRAVENOUS PRN
Status: DISCONTINUED | OUTPATIENT
Start: 2025-04-04 | End: 2025-04-04 | Stop reason: HOSPADM

## 2025-04-04 RX ORDER — SODIUM CHLORIDE 0.9 % (FLUSH) 0.9 %
5-40 SYRINGE (ML) INJECTION PRN
Status: DISCONTINUED | OUTPATIENT
Start: 2025-04-04 | End: 2025-04-05 | Stop reason: HOSPADM

## 2025-04-04 RX ORDER — METOPROLOL SUCCINATE 25 MG/1
25 TABLET, EXTENDED RELEASE ORAL DAILY
Qty: 30 TABLET | Refills: 3 | Status: SHIPPED | OUTPATIENT
Start: 2025-04-05

## 2025-04-04 RX ORDER — IOPAMIDOL 755 MG/ML
INJECTION, SOLUTION INTRAVASCULAR PRN
Status: DISCONTINUED | OUTPATIENT
Start: 2025-04-04 | End: 2025-04-04 | Stop reason: HOSPADM

## 2025-04-04 RX ORDER — SODIUM CHLORIDE 0.9 % (FLUSH) 0.9 %
5-40 SYRINGE (ML) INJECTION EVERY 12 HOURS SCHEDULED
Status: DISCONTINUED | OUTPATIENT
Start: 2025-04-04 | End: 2025-04-05 | Stop reason: HOSPADM

## 2025-04-04 RX ORDER — MIDAZOLAM HYDROCHLORIDE 1 MG/ML
INJECTION, SOLUTION INTRAMUSCULAR; INTRAVENOUS PRN
Status: DISCONTINUED | OUTPATIENT
Start: 2025-04-04 | End: 2025-04-04 | Stop reason: HOSPADM

## 2025-04-04 RX ORDER — SODIUM CHLORIDE 9 MG/ML
INJECTION, SOLUTION INTRAVENOUS CONTINUOUS
Status: CANCELLED | OUTPATIENT
Start: 2025-04-04

## 2025-04-04 RX ORDER — ASPIRIN 325 MG
325 TABLET ORAL ONCE
Status: COMPLETED | OUTPATIENT
Start: 2025-04-04 | End: 2025-04-04

## 2025-04-04 RX ADMIN — SACUBITRIL AND VALSARTAN 0.5 TABLET: 49; 51 TABLET, FILM COATED ORAL at 07:56

## 2025-04-04 RX ADMIN — SODIUM CHLORIDE, PRESERVATIVE FREE 10 ML: 5 INJECTION INTRAVENOUS at 07:56

## 2025-04-04 RX ADMIN — SACUBITRIL AND VALSARTAN 0.5 TABLET: 49; 51 TABLET, FILM COATED ORAL at 20:12

## 2025-04-04 RX ADMIN — ATORVASTATIN CALCIUM 40 MG: 40 TABLET, FILM COATED ORAL at 07:56

## 2025-04-04 RX ADMIN — ASPIRIN 325 MG: 325 TABLET ORAL at 09:23

## 2025-04-04 RX ADMIN — Medication 100 MG: at 07:56

## 2025-04-04 RX ADMIN — METOPROLOL SUCCINATE 25 MG: 25 TABLET, EXTENDED RELEASE ORAL at 07:56

## 2025-04-04 RX ADMIN — SODIUM CHLORIDE: 0.9 INJECTION, SOLUTION INTRAVENOUS at 09:26

## 2025-04-04 RX ADMIN — SODIUM CHLORIDE, PRESERVATIVE FREE 10 ML: 5 INJECTION INTRAVENOUS at 20:12

## 2025-04-04 RX ADMIN — FOLIC ACID 1 MG: 1 TABLET ORAL at 07:56

## 2025-04-04 NOTE — PLAN OF CARE
Problem: Discharge Planning  Goal: Discharge to home or other facility with appropriate resources  Outcome: Progressing  Flowsheets (Taken 4/2/2025 0540 by Benjamin Harrison RN)  Discharge to home or other facility with appropriate resources:   Identify barriers to discharge with patient and caregiver   Identify discharge learning needs (meds, wound care, etc)     Problem: Safety - Adult  Goal: Free from fall injury  Outcome: Progressing  Flowsheets (Taken 4/3/2025 1000 by Vidhya Lyn RN)  Free From Fall Injury: Instruct family/caregiver on patient safety     Problem: Chronic Conditions and Co-morbidities  Goal: Patient's chronic conditions and co-morbidity symptoms are monitored and maintained or improved  Outcome: Progressing  Flowsheets (Taken 4/2/2025 0540 by Benjamin Harrison RN)  Care Plan - Patient's Chronic Conditions and Co-Morbidity Symptoms are Monitored and Maintained or Improved:   Monitor and assess patient's chronic conditions and comorbid symptoms for stability, deterioration, or improvement   Update acute care plan with appropriate goals if chronic or comorbid symptoms are exacerbated and prevent overall improvement and discharge   Collaborate with multidisciplinary team to address chronic and comorbid conditions and prevent exacerbation or deterioration     Problem: Cardiovascular - Adult  Goal: Maintains optimal cardiac output and hemodynamic stability  Outcome: Progressing  Flowsheets (Taken 4/2/2025 0541 by Benjamin Harrison RN)  Maintains optimal cardiac output and hemodynamic stability:   Monitor blood pressure and heart rate   Monitor urine output and notify Licensed Independent Practitioner for values outside of normal range   Assess for signs of decreased cardiac output  Goal: Absence of cardiac dysrhythmias or at baseline  Outcome: Progressing  Flowsheets (Taken 4/2/2025 0541 by Benjamin Harrison RN)  Absence of cardiac dysrhythmias or at baseline:   Monitor cardiac rate and rhythm   Assess for  signs of decreased cardiac output     Problem: ABCDS Injury Assessment  Goal: Absence of physical injury  Outcome: Progressing  Flowsheets (Taken 4/4/2025 1512)  Absence of Physical Injury: Implement safety measures based on patient assessment

## 2025-04-04 NOTE — PROGRESS NOTES
Cardiology Progress Note      Patient:  Lawrence Peterson  YOB: 1955  MRN: [id number]   Acct: [id number]  Admit Date:  3/31/2025  Primary Cardiologist: Bradford Moody MD  Seen by Dr. Jacques      Per prior cardiology consult note-  CHIEF COMPLAINT: ICD discharged, Chest pain    CONSULT FOR: ICD shock, SVT history     HPI: This is a pleasant 69 y.o. male presents following ICD discharge with subsequent chest pain.  Past medical history significant for CHF with improved EF secondary to ischemic cardiomyopathy s/p ICD placement, paroxysmal atrial fibrillation, nonsustained V. Tach.  Patient reports he was at [facility] when he felt his ICD discharge 3 times.  At that time he denied any chest pain, palpitations, diaphoresis, lightheadedness, dizziness, or any other symptoms.  Following this patient reported some chest pain.  ED provider spoke to ICD rep who reported that the patient received 7 shocks.  Is unclear as to what rhythm patient was in at that time.  On admission patient denied any chest pain.     Patient seen and evaluated bedside this morning.  He denied any chest pain, diaphoresis, shakes, fever, chills, palpitations.  Patient was already questioning when he would be able to be discharged.  Troponin trend: 52-.     Subjective (Events in last 24 hours):   Pt awake, alert. NAD. Denies cp or sob. He is feeling well today and frustrated about being NPO and maybe needing to stay again tonight. No other concerns today.     Objective:   BP (!) 163/101   Pulse 74   Temp 98 °F (36.7 °C) (Oral)   Resp 18   Ht 1.575 m (5' 2.01\")   Wt 49.8 kg (109 lb 12.8 oz)   SpO2 96%   BMI 20.08 kg/m²      vss  TELEMETRY: nsr, occ pvc     Physical Exam:  General Appearance: alert and oriented to person, place and time, in no acute distress  Cardiovascular: normal rate, regular rhythm, normal S1 and S2, no murmurs, rubs, clicks, or gallops, distal pulses intact, no carotid bruits, no JVD  Pulmonary/Chest:  clear to auscultation bilaterally- no wheezes, rales or rhonchi, normal air movement, no respiratory distress  Abdomen: soft, non-tender, non-distended, normal bowel sounds, no masses   Extremities: no cyanosis, clubbing or edema, pulse   Skin: warm and dry, no rash or erythema  Neurological: alert, oriented, normal speech, no focal findings or movement disorder noted    Medications:    sodium chloride flush  5-40 mL IntraVENous 2 times per day    sodium chloride flush  5-40 mL IntraVENous 2 times per day    sacubitril-valsartan  0.5 tablet Oral BID    metoprolol succinate  25 mg Oral Daily    sodium chloride flush  5-40 mL IntraVENous 2 times per day    [Held by provider] apixaban  5 mg Oral BID    atorvastatin  40 mg Oral Daily    thiamine  100 mg Oral Daily    folic acid  1 mg Oral Daily      sodium chloride 75 mL/hr at 04/04/25 0926    sodium chloride      sodium chloride      sodium chloride      sodium chloride       sodium chloride flush, 5-40 mL, PRN  sodium chloride, , PRN  nitroGLYCERIN, 0.4 mg, Q5 Min PRN  sodium chloride flush, 5-40 mL, PRN  sodium chloride, , PRN  nitroGLYCERIN, 0.4 mg, Q5 Min PRN  sodium chloride flush, 5-40 mL, PRN  sodium chloride, , PRN  sodium chloride, , PRN  potassium chloride, 40 mEq, PRN   Or  potassium alternative oral replacement, 40 mEq, PRN   Or  potassium chloride, 10 mEq, PRN  magnesium sulfate, 2,000 mg, PRN  ondansetron, 4 mg, Q8H PRN   Or  ondansetron, 4 mg, Q6H PRN  polyethylene glycol, 17 g, Daily PRN  acetaminophen, 650 mg, Q6H PRN   Or  acetaminophen, 650 mg, Q6H PRN  PHENobarbital, 130 mg, Q1H PRN   Or  PHENobarbital, 260 mg, Q1H PRN   Or  PHENobarbital (LUMINAL) 516.75 mg in sodium chloride 0.9 % 100 mL IVPB, 10 mg/kg, Daily PRN        Diagnostics:  EKG:     Echo:   4/1/25  Left Ventricle Moderately reduced left ventricular systolic function with a visually estimated EF of 35 - 40%. Left ventricle size is normal. Mildly increased wall thickness. Akinesis of the

## 2025-04-04 NOTE — PROGRESS NOTES
Price eval for Eliquis:    Eliquis 5mg #60: $141.44. No deductible.    Lawrence was on this previously paying $0 and it was last filled in January for 3 months, but he has changed insurance plans since then and now unfortunately has a copay.    He has also already used the 1 month free trial and is not eligible for that. He should be eligible (through his insurance) for the Medicare-D Prescription Payment Plan to level his copays over the remainder of the year until he hits his $2k out of pocket costs, but he will have to contact his plan to opt in.    Thanks!  Rosalind Swartz CPhT  Patient  Ext [phone]  Outpatient Pharmacy  4/4/2025,9:13 AM

## 2025-04-04 NOTE — CONSULTS
[facility]   HEART FAILURE PROGRAM      NAME:  Lawrence Peterson  MEDICAL RECORD NUMBER:  [id number]  AGE: 69 y.o.   GENDER: male  : 1955  Attending: Ruth Michaud PA-C TODAY'S DATE:  2025    Subjective:     VISIT TYPE: Bedside CHF Education      Code Status: Full Code    PAST MEDICAL HISTORY        Diagnosis Date    Alcohol use     Drinks daily    Asthma     Atrial fibrillation (HCC) 2019    CAD S/P percutaneous coronary angioplasty 2019    Cardiomyopathy 2019    CHF (congestive heart failure) ([facility])     Medtronic single ICD  2020    Nonsustained ventricular tachycardia ([facility]) 2019    NSVT (nonsustained ventricular tachycardia) ([facility])     Pneumonia     Ulcer        SOCIAL HISTORY    Social History     Tobacco Use    Smoking status: Every Day     Current packs/day: 1.00     Average packs/day: 1 pack/day for 50.0 years (50.0 ttl pk-yrs)     Types: Cigarettes    Smokeless tobacco: Never   Vaping Use    Vaping status: Never Used   Substance Use Topics    Alcohol use: Yes     Comment: Gin daily - drink with lemon lime soda: 1/3 of L     Drug use: No         Cardiac Medications       Nitrates       nitroGLYCERIN (NITROSTAT) SL tablet 0.4 mg 0.4 mg, SubLINGual, EVERY 5 MIN PRN, Place 1 tablet under tongue upon chest pain, wait 5 minutes and may repeat up to 3 doses in 15 minutes. Do not crush or break.      nitroGLYCERIN (NITROSTAT) SL tablet 0.4 mg 0.4 mg, SubLINGual, EVERY 5 MIN PRN, Place 1 tablet under tongue upon chest pain, wait 5 minutes and may repeat up to 3 doses in 15 minutes. Do not crush or break.        Beta Blockers Cardio-Selective       metoprolol succinate (TOPROL XL) extended release tablet 25 mg 25 mg, Oral, DAILY, Do not crush or chew.       HMG CoA Reductase Inhibitors       atorvastatin (LIPITOR) tablet 40 mg 40 mg, Oral, DAILY       Cardiovascular Agents Misc. - Combinations       sacubitril-valsartan (ENTRESTO) 49-51 MG per tablet 0.5 tablet

## 2025-04-04 NOTE — H&P
[facility]  Sedation/Analgesia History & Physical    Pt Name: Lawrence Peterson  Account number: [id number]  MRN: [id number]  YOB: 1955  Provider Performing Procedure: Fermin Razo MD MD  Referring Provider: Ruth Michaud PA-C   Primary Care Physician: Teresa Ochoa, APRN - CNP  Date: 4/4/2025    PRE-PROCEDURE    Code Status: FULL CODE  Brief History/Pre-Procedure Diagnosis:   ICD shocks  Cardiomyopathy    Consent: : I have discussed with the patient risks, benefits, and alternatives (and relevant risks, benefits, and side effects related to alternatives or not receiving care), and likelihood of the success.   The patient and/or representative appear to understand and agree to proceed.  The discussion encompasses risks, benefits, and side effects related to the alternatives and the risks related to not receiving the proposed care, treatment, and services.     The indication, risks and benefits of the procedure and possible therapeutic consequences and alternatives were discussed with the patient. The patient was given the opportunity to ask questions and to have them answered to his/her satisfaction. Risks of the procedure include but are not limited to the following: Bleeding, hematoma including retroperitoneal hemmorhage, infection, pain and discomfort, injury to the aorta and other blood vessels, rhythm disturbance, low blood pressure, myocardial infarction, stroke, kidney damage/failure, myocardial perforation, allergic reactions to sedatives/contrast material, loss of pulse/vascular compromise requiring surgery, aneurysm/pseudoaneurysm formation, possible loss of a limb/hand/leg, needing blood transfusion, requiring emergent open heart surgery or emergent coronary intervention, the need for intubation/respiratory support, the requirement for defibrillation/cardioversion, and death. Alternatives to and omission of the suggested procedure were discussed. The patient had  healthy patient  Class 2: Pt with mild to moderate systemic disease  Class 3: Severe systemic disease or disturbance  Class 4: Severe systemic disorders that are already life threatening.  Class 5: Moribund pt with little chances of survival, for more than 24 hours.  Mallampati I Airway Classification:   []1 []2 [x]3 []4    [x]Pre-procedure diagnostic studies complete and results available.   Comment:    [x]Previous sedation/anesthesia experiences assessed.   Comment:    [x]The patient is an appropriate candidate to undergo the planned procedure sedation and anesthesia. (Refer to nursing sedation/analgesia documentation record)  [x]Formulation and discussion of sedation/procedure plan, risks, and expectations with patient and/or responsible adult completed.  [x]Patient examined immediately prior to the procedure. (Refer to nursing sedation/analgesia documentation record)    Electronically signed by Fermin Razo MD on 4/4/25 at 12:32 PM EDT

## 2025-04-04 NOTE — DISCHARGE INSTRUCTIONS
Avoid alcohol   Resume eliquis today.       Discharge Instructions for Radial Heart Catherization  1.  Take it easy for 3-4 days.  2.  No driving for 2 days.  3.  No lifting of 5 lbs or more for 5 days with the affected arm.  4.  May shower after 24 hours.  5.  Remove arm board after 24 hours.  6.  Apply a band aid to the insertion site daily for 5 days.  Wash site daily with soap and water.  7.  No creams, ointments, or powders near the insertion site.   8.  No tub baths, swimming, hot tubs, or hand washing dishes for 1 week.  9.  Watch for signs of infection (redness, warmth, swelling, or pus drainage) or coolness of extremity and call physician if this occurs  10.  If bleeding occurs from insertion site, apply pressure and call 911.   11. Watch for numbness/tingling- if this occurs go to the Emergency Room immediately.  12. Apply Ice for 15 minutes with an hour break in between and alternate with heat compress for 15 minutes to reduce swelling for 3-5 days.            Learning About ICD Shocks  What are ICDs and ICD shocks?     An implantable cardioverter-defibrillator (ICD) is a device that is placed under the skin of your chest. It has one or two thin wires (called leads). With some ICDs, these leads are placed inside the heart. Other ICDs have a lead that is placed under the skin so that it lies near your heart. The ICD is always checking your heart for a life-threatening rapid heart rhythm. The ICD may try to slow the rhythm back to normal using electrical pulses. If the dangerous rhythm does not stop, the ICD sends an electric shock to the heart to restore a normal rhythm. The device then goes back to its watchful mode.  The idea of living with an ICD and getting shocked worries some people. The shock can be uncomfortable. It may feel like you are being kicked in the chest. For many people, getting a shock can cause anxiety and depression.  It's normal to be worried about living with an ICD. After all, you

## 2025-04-04 NOTE — PROCEDURES
PROCEDURE NOTE  Date: 4/4/2025   Name: Lawrence Luiskareladali  YOB: 1955    ProceduresEKG completed, given to Christopher DORAN

## 2025-04-04 NOTE — DISCHARGE SUMMARY
Hospital Medicine Discharge Summary      Patient Identification:   Lawrence Peterson   : 1955  MRN: [id number]   Account: [id number]      Patient's PCP: Teresa Ochoa APRN - CNP    Admit Date: 3/31/2025     Discharge Date:   2025    Admitting Physician: Ruth Michaud PA-C     Discharging Physician Assistant: Ruth Michaud PA-C     Discharge Diagnoses with Hospital Course:    ICD discharge in patient with Heart failure with improved EF 2/2 ICM s/p ICD  ICD interrogation demonstrating- Near field and far field EGM very similar to presenting/baseline sinus rhythm EGM, suggestive of SVT vs atrial arrhythmia. After shock, morphology changed, which is expected   EP consulted and [employer]tr[employer] evaluation to adjust the ICD settings  Echo demonstrating LVEF 35-40% and G1DD   [facility] completed [id number] with cardiology and CHF clinic as scheduled     Chronic alcohol abuse:   patient reports drinking one third to a liter of gin daily, last drink 3/30.   Patient not interested in quitting-extensive counseling was provided  Patient did not require any phenobarbital for withdrawal symptoms  Continue with thiamine and multivitamin on discharge     Chronic hyponatremia-fluctuating  Sodium 129 on admit- improved to 133  In the setting of chronic alcohol abuse.  Follow-up with PCP     LIANNA, resolved: Initial creatinine 1.4 on presentation. Resolved.  Creatinine at baseline of 0.7     Elevated troponin: Troponin 52, 96. 100  Likely in the setting of above.    Cardiology completed [facility] 2025    Hyperbilirubinemia:   Total bilirubin 2.1, direct bilirubin 0.8.  This is new compared to prior.   Liver US with cholelithiasis and thickened gallbladder wall.  Hepatic parenchyma consistent with steatosis- consistent with FLD   Recommend HIDA outpatient -ordered  Follow-up with Dr. Gomez for suspected alcoholic fatty liver disease  Repeat LFT with improvement in AST. Bili normal      Chronic Conditions  NURSE/COORDINATOR    Disposition:    [x] Home       [] TCU       [] Rehab       [] Psych       [] SNF       [] Long Term Care Facility       [] Other-    Condition at Discharge: Stable    Code Status:  Full Code       Patient Instructions:      Diet: Diet NPO Exceptions are: Sips of Water with Meds      Follow-up visits:   [facility]  [address]  [address]  [facility] [address] [phone]  Schedule an appointment as soon as possible for a visit in 1 week(s)  Please call to schedule for new patient/ hosptial follow up, office was closed.    Sharif Gomez MD  [address]  [facility] [address] [phone]    Follow up in 2 week(s)  Follow up on alcoholic liver disease and elevated LFT    Bradford Moody MD  [address]  [place] [id number]  [phone]    Follow up in 1 month(s)      Fior Prasad, APRN - CNP  [address]  [facility]  [facility] [address]  [phone]    Follow up on 4/22/2025           Discharge Medications:        Medication List        START taking these medications      metoprolol succinate 25 MG extended release tablet  Commonly known as: TOPROL XL  Take 1 tablet by mouth daily  Start taking on: April 5, 2025            CHANGE how you take these medications      sacubitril-valsartan 49-51 MG per tablet  Commonly known as: ENTRESTO  Take 0.5 tablets by mouth 2 times daily  What changed: how much to take            CONTINUE taking these medications      apixaban 5 MG Tabs tablet  Commonly known as: Eliquis  Take 1 tablet by mouth 2 times daily     atorvastatin 40 MG tablet  Commonly known as: Lipitor  Take 1 tablet by mouth daily     Centrum Silver 50+Men Tabs     folic acid 1 MG tablet  Commonly known as: FOLVITE  Take 1 tablet by mouth daily     nitroGLYCERIN 0.4 MG SL tablet  Commonly known as: Nitrostat  For chest pain/ angina ,up to max of 3 total doses. If no relief after 1 dose, call 911.     thiamine 100 MG tablet  Take 1 tablet by mouth

## 2025-04-04 NOTE — BRIEF OP NOTE
[facility]  Sedation/Analgesia Post Sedation Record    Pt Name: Lawrence Peterson  Account number: [id number]  MRN: [id number]  YOB: 1955  Procedure Performed By: Fermin Razo MD MD FACC, FSCAI, RPVI  Primary Care Physician: Teresa Ochoa, APRN - CNP  Date: 4/4/2025    POST-PROCEDURE    Physicians/Assistants: Fermin Razo MD, SUN, [facility], RPVI    Procedure Performed: Cor Angio    Sedation/Anesthesia: Versed/ Fentanyl and 2% xylocaine local anesthesia.      Estimated Blood Loss: < 50 ml.     Specimens Removed: None         Disposition of Specimen: N/A        Complications: No Immediate Complications.       Post-procedure Diagnosis/Findings:       GDMT   RF management      Electronically signed by Fermin Razo MD on 4/4/25 at 7:05 PM EDT   Interventional Cardiology

## 2025-04-04 NOTE — CARE COORDINATION
4/4/25, 3:10 PM EDT    DISCHARGE ON GOING EVALUATION    [facility] day: 2  Location: [facility]37/037-A Reason for admit: Hypokalemia [E87.6]  Hyponatremia [E87.1]  ICD (implantable cardioverter-defibrillator) discharge [Z45.02]  AICD discharge [Z45.02]     Procedures:   4/1 Echo: EF 35-40%. Akinesis of the following segments: basal inferoseptal and mid inferoseptal.   4/4 [facility] with Dr. Razo: scheduled    Imaging since last note: None    Barriers to Discharge: Hospitalist, Cardiology and EP following. ICD has been reprogrammed. IVF. Phenobarb prn.     PCP: Teresa Ochoa, DYAN - CNP  Readmission Risk Score: 15.4    Patient Goals/Plan/Treatment Preferences: Plans home to [facility] alone. His friend transports.     Possible weekend discharge.     4/4/25, 3:16 PM EDT    Patient goals/plan/ treatment preferences discussed by  and .  Patient goals/plan/ treatment preferences reviewed with patient/ family.  Patient/ family verbalize understanding of discharge plan and are in agreement with goal/plan/treatment preferences.  Understanding was demonstrated using the teach back method.  AVS provided by RN at time of discharge, which includes all necessary medical information pertaining to the patients current course of illness, treatment, post-discharge goals of care, and treatment preferences.     Services At/After Discharge: None

## 2025-04-05 VITALS
OXYGEN SATURATION: 97 % | HEART RATE: 69 BPM | BODY MASS INDEX: 20.2 KG/M2 | DIASTOLIC BLOOD PRESSURE: 89 MMHG | RESPIRATION RATE: 18 BRPM | HEIGHT: 62 IN | WEIGHT: 109.8 LBS | TEMPERATURE: 97.3 F | SYSTOLIC BLOOD PRESSURE: 154 MMHG

## 2025-04-05 PROBLEM — R07.9 CHEST PAIN: Status: ACTIVE | Noted: 2025-04-05

## 2025-04-05 LAB
ANION GAP SERPL CALC-SCNC: 9 MEQ/L (ref 8–16)
BUN SERPL-MCNC: 14 MG/DL (ref 8–23)
CALCIUM SERPL-MCNC: 8.6 MG/DL (ref 8.8–10.2)
CHLORIDE SERPL-SCNC: 100 MEQ/L (ref 98–111)
CO2 SERPL-SCNC: 25 MEQ/L (ref 22–29)
CREAT SERPL-MCNC: 0.8 MG/DL (ref 0.7–1.2)
ECHO BSA: 1.49 M2
GFR SERPL CREATININE-BSD FRML MDRD: > 90 ML/MIN/1.73M2
GLUCOSE SERPL-MCNC: 122 MG/DL (ref 74–109)
POTASSIUM SERPL-SCNC: 3.9 MEQ/L (ref 3.5–5.2)
SODIUM SERPL-SCNC: 134 MEQ/L (ref 135–145)

## 2025-04-05 PROCEDURE — 6370000000 HC RX 637 (ALT 250 FOR IP): Performed by: NURSE PRACTITIONER

## 2025-04-05 PROCEDURE — 36415 COLL VENOUS BLD VENIPUNCTURE: CPT

## 2025-04-05 PROCEDURE — 80048 BASIC METABOLIC PNL TOTAL CA: CPT

## 2025-04-05 PROCEDURE — 6370000000 HC RX 637 (ALT 250 FOR IP)

## 2025-04-05 PROCEDURE — 99238 HOSP IP/OBS DSCHRG MGMT 30/<: CPT | Performed by: OPHTHALMOLOGY

## 2025-04-05 PROCEDURE — 2500000003 HC RX 250 WO HCPCS: Performed by: INTERNAL MEDICINE

## 2025-04-05 PROCEDURE — 6370000000 HC RX 637 (ALT 250 FOR IP): Performed by: INTERNAL MEDICINE

## 2025-04-05 RX ORDER — SODIUM CHLORIDE 9 MG/ML
INJECTION, SOLUTION INTRAVENOUS PRN
Status: DISCONTINUED | OUTPATIENT
Start: 2025-04-05 | End: 2025-04-05 | Stop reason: HOSPADM

## 2025-04-05 RX ORDER — SODIUM CHLORIDE 0.9 % (FLUSH) 0.9 %
5-40 SYRINGE (ML) INJECTION PRN
Status: DISCONTINUED | OUTPATIENT
Start: 2025-04-05 | End: 2025-04-05 | Stop reason: HOSPADM

## 2025-04-05 RX ORDER — ACETAMINOPHEN 325 MG/1
650 TABLET ORAL EVERY 4 HOURS PRN
Status: DISCONTINUED | OUTPATIENT
Start: 2025-04-05 | End: 2025-04-05 | Stop reason: HOSPADM

## 2025-04-05 RX ORDER — SODIUM CHLORIDE 0.9 % (FLUSH) 0.9 %
5-40 SYRINGE (ML) INJECTION EVERY 12 HOURS SCHEDULED
Status: DISCONTINUED | OUTPATIENT
Start: 2025-04-05 | End: 2025-04-05 | Stop reason: HOSPADM

## 2025-04-05 RX ORDER — ATROPINE SULFATE 0.4 MG/ML
0.5 INJECTION, SOLUTION INTRAVENOUS
Status: DISCONTINUED | OUTPATIENT
Start: 2025-04-05 | End: 2025-04-05 | Stop reason: HOSPADM

## 2025-04-05 RX ADMIN — FOLIC ACID 1 MG: 1 TABLET ORAL at 08:58

## 2025-04-05 RX ADMIN — Medication 100 MG: at 08:58

## 2025-04-05 RX ADMIN — ATORVASTATIN CALCIUM 40 MG: 40 TABLET, FILM COATED ORAL at 08:58

## 2025-04-05 RX ADMIN — SODIUM CHLORIDE, PRESERVATIVE FREE 10 ML: 5 INJECTION INTRAVENOUS at 09:00

## 2025-04-05 RX ADMIN — METOPROLOL SUCCINATE 25 MG: 25 TABLET, EXTENDED RELEASE ORAL at 08:58

## 2025-04-05 RX ADMIN — SACUBITRIL AND VALSARTAN 0.5 TABLET: 49; 51 TABLET, FILM COATED ORAL at 08:58

## 2025-04-05 NOTE — PLAN OF CARE
Problem: Discharge Planning  Goal: Discharge to home or other facility with appropriate resources  Outcome: Progressing  Flowsheets (Taken 4/5/2025 0341)  Discharge to home or other facility with appropriate resources:   Identify barriers to discharge with patient and caregiver   Identify discharge learning needs (meds, wound care, etc)     Problem: Safety - Adult  Goal: Free from fall injury  Outcome: Progressing  Note: Bed locked & in low position, call light in reach, side-rails up x2, bed/chair alarm utilized, non-slip socks on when ambulating, reminded patient to use call light to call for assistance.      Problem: Chronic Conditions and Co-morbidities  Goal: Patient's chronic conditions and co-morbidity symptoms are monitored and maintained or improved  Outcome: Progressing  Flowsheets (Taken 4/5/2025 0341)  Care Plan - Patient's Chronic Conditions and Co-Morbidity Symptoms are Monitored and Maintained or Improved:   Monitor and assess patient's chronic conditions and comorbid symptoms for stability, deterioration, or improvement   Collaborate with multidisciplinary team to address chronic and comorbid conditions and prevent exacerbation or deterioration   Update acute care plan with appropriate goals if chronic or comorbid symptoms are exacerbated and prevent overall improvement and discharge     Problem: Cardiovascular - Adult  Goal: Maintains optimal cardiac output and hemodynamic stability  Outcome: Progressing  Flowsheets (Taken 4/5/2025 0341)  Maintains optimal cardiac output and hemodynamic stability:   Monitor blood pressure and heart rate   Monitor urine output and notify Licensed Independent Practitioner for values outside of normal range   Assess for signs of decreased cardiac output     Problem: Cardiovascular - Adult  Goal: Absence of cardiac dysrhythmias or at baseline  Outcome: Progressing  Flowsheets (Taken 4/5/2025 0341)  Absence of cardiac dysrhythmias or at baseline:   Monitor cardiac rate  and rhythm   Assess for signs of decreased cardiac output     Problem: ABCDS Injury Assessment  Goal: Absence of physical injury  Outcome: Progressing  Flowsheets (Taken 4/5/2025 0783)  Absence of Physical Injury: Implement safety measures based on patient assessment  Care plan reviewed with patient.  Patient verbalizes understanding of the care plan and contributed to goal setting.

## 2025-04-05 NOTE — CARE COORDINATION
CM received a call about how to set up a [employer] cab for patient. Instruction on cab checklist form in [employer] and collaboration with LACP.

## 2025-04-05 NOTE — PROGRESS NOTES
Hospitalist Progress Note      Patient:  Lawrence Peterson 69 y.o. male     : 1955  Unit/Bed:[address] [place] [address]-A  Date of Admission: 3/31/2025        ASSESSMENT AND PLAN    Active Problems  ICD discharge in patient with Heart failure with improved EF 2/2 ICM s/p ICD  [employer] to be on site today to adjust ICD settings per EP   ICD interrogation demonstrating- Near field and far field EGM very similar to presenting/baseline sinus rhythm EGM, suggestive of SVT vs atrial arrhythmia. After shock, morphology changed, which is expected    Keep potassium above 4, magnesium above 2  Echo demonstrating LVEF 35-40% and G1DD - [facility]   Discharge with f/u with CHF clinic    Chronic alcohol abuse:   patient reports drinking one third to a liter of gin daily, last drink 3/30.  Patient not interested in quitting.  Phenobarb protocol given Ativan shortage.  Seizure precautions.  Thiamine folate.  Multivitamin.  Has not required any PRN phenobarb   MVI    Chronic hyponatremia- resolved:   Sodium 129 on admit- improved to 135    In the setting of chronic alcohol abuse.  F/U with PCP    LIANNA, resolved: Initial creatinine 1.4 on presentation. Resolved-     Elevated troponin: Troponin 52, 96. 100  Likely in the setting of above.    Cardiology planning for [facility] today   Patient denying current chest pain.    Hyperbilirubinemia:   Total bilirubin 2.1, direct bilirubin 0.8.  This is new compared to prior.   Liver US with cholelithiasis and thickened gallbladder wall.  Hepatic parenchyma consistent with steatosis- consistent with FLD   Recommend HIDA outpatient   Repeat LFT with improvement in AST. Bili normal     Chronic Conditions (reviewed and stable unless otherwise stated)   CAD s/p PCI  Paroxysmal atrial fibrillation on Eliquis, secondary hypercoagulable state  Nonsustained V. Tach  Asthma  Chronic tobacco use  HTN  HLD        LDA: []CVC / []PICC / []Midline / []Jamison / []Drains / []Mediport / [x]None  Antibiotics: none  Steroids:  Oxybutynin Counseling:  I discussed with the patient the risks of oxybutynin including but not limited to skin rash, drowsiness, dry mouth, difficulty urinating, and blurred vision.

## 2025-04-05 NOTE — PROGRESS NOTES
Discharge teaching and instructions for diagnosis/procedure of CHF, AICD firing, post heart cath completed with patient using teachback method. AVS reviewed. Prescription given to patient, delivered yesterday by [facility]. Patient voiced understanding regarding prescription, medications, follow up appointments, and care of self at home. Discharged in a wheelchair to  home with support per EMS transportation ambulette. Patient stable at discharge.

## 2025-04-07 ENCOUNTER — FOLLOWUP TELEPHONE ENCOUNTER (OUTPATIENT)
Dept: ADMINISTRATIVE | Age: 70
End: 2025-04-07

## 2025-04-07 ENCOUNTER — FOLLOWUP TELEPHONE ENCOUNTER (OUTPATIENT)
Dept: INPATIENT UNIT | Age: 70
End: 2025-04-07

## 2025-04-07 PROCEDURE — 93297 REM INTERROG DEV EVAL ICPMS: CPT | Performed by: INTERNAL MEDICINE

## 2025-04-07 NOTE — TELEPHONE ENCOUNTER
I tried to call the patient back to let him know about testing that was order and the times of his appointments that he asked to be changed to afternoon. The call could not be completed due to restrictions the patient has set up on his phone. I attempted to reach him 3 times and all 3 attempts I was told the same message \"call cannot be completed at this time due to caller restrictions set up\"

## 2025-04-08 ENCOUNTER — TELEPHONE (OUTPATIENT)
Dept: INTERNAL MEDICINE CLINIC | Age: 70
End: 2025-04-08

## 2025-04-08 NOTE — TELEPHONE ENCOUNTER
Attempted to call patient for MAYUR call. Message that patient has calling restrictions. Unable to reach. Pt has appointment 4/14/25.

## 2025-04-22 ENCOUNTER — TELEPHONE (OUTPATIENT)
Dept: CARDIOLOGY CLINIC | Age: 70
End: 2025-04-22

## 2025-04-22 NOTE — TELEPHONE ENCOUNTER
Called to patient regarding no show  Phone has \"calling restrictions\"   Spoke with [facility] emergency contact but not on HIPAA  He will try to get in touch with patient and have him call office

## 2025-05-15 ENCOUNTER — TELEPHONE (OUTPATIENT)
Dept: CARDIOLOGY CLINIC | Age: 70
End: 2025-05-15

## 2025-05-15 NOTE — TELEPHONE ENCOUNTER
Patient no showed to CHF clinic. Called to patient. Phone not in service.  Called to friend [facility] on emergency contact- no answer. TRAMAINE

## 2025-05-15 NOTE — TELEPHONE ENCOUNTER
FYI-Pt contact [facility] [employer] on VM , went to get patient and pt was not available to go to appt. Will need to r/s., spoke to Pool will  let  Lawrence know to  call and reschedule.pt has no minutes on phone right now.    Fior

## 2025-05-18 ENCOUNTER — HOSPITAL ENCOUNTER (INPATIENT)
Age: 70
LOS: 2 days | Discharge: NURSING, REHAB OR LONG-TERM CARE | DRG: 563 | End: 2025-05-22
Attending: EMERGENCY MEDICINE
Payer: COMMERCIAL

## 2025-05-18 ENCOUNTER — APPOINTMENT (OUTPATIENT)
Dept: GENERAL RADIOLOGY | Age: 70
DRG: 563 | End: 2025-05-18
Payer: COMMERCIAL

## 2025-05-18 ENCOUNTER — APPOINTMENT (OUTPATIENT)
Dept: CT IMAGING | Age: 70
DRG: 563 | End: 2025-05-18
Payer: COMMERCIAL

## 2025-05-18 DIAGNOSIS — R94.31 ST SEGMENT DEPRESSION: Primary | ICD-10-CM

## 2025-05-18 DIAGNOSIS — Z95.810 AICD (AUTOMATIC CARDIOVERTER/DEFIBRILLATOR) PRESENT: ICD-10-CM
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
## 2025-05-18 DIAGNOSIS — S42.211A FX HUMERAL NECK, RIGHT, CLOSED, INITIAL ENCOUNTER: ICD-10-CM

## 2025-05-18 DIAGNOSIS — Z79.01 ANTICOAGULATED ON ELIQUIS: ICD-10-CM
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
## 2025-05-18 LAB
ABO GROUP BLD: NORMAL
ALBUMIN SERPL BCG-MCNC: 4 G/DL (ref 3.4–4.9)
ALP SERPL-CCNC: 84 U/L (ref 40–129)
ALT SERPL W/O P-5'-P-CCNC: 29 U/L (ref 10–50)
ANION GAP SERPL CALC-SCNC: 19 MEQ/L (ref 8–16)
APTT PPP: 28.2 SECONDS (ref 22–38)
APTT PPP: 38.8 SECONDS (ref 22–38)
APTT PPP: 44.4 SECONDS (ref 22–38)
AST SERPL-CCNC: 42 U/L (ref 10–50)
BASOPHILS ABSOLUTE: 0 THOU/MM3 (ref 0–0.1)
BASOPHILS NFR BLD AUTO: 0.4 %
BILIRUB CONJ SERPL-MCNC: 0.7 MG/DL (ref 0–0.2)
BILIRUB INDIRECT SERPL-MCNC: 0.6 MG/DL (ref 0–0.6)
BILIRUB SERPL-MCNC: 1.3 MG/DL (ref 0.3–1.2)
BILIRUB SERPL-MCNC: 1.6 MG/DL (ref 0.3–1.2)
BUN SERPL-MCNC: 18 MG/DL (ref 8–23)
CALCIUM SERPL-MCNC: 9.7 MG/DL (ref 8.8–10.2)
CHLORIDE SERPL-SCNC: 96 MEQ/L (ref 98–111)
CK SERPL-CCNC: 149 U/L (ref 39–308)
CO2 SERPL-SCNC: 23 MEQ/L (ref 22–29)
CREAT SERPL-MCNC: 0.9 MG/DL (ref 0.7–1.2)
DEPRECATED RDW RBC AUTO: 47.4 FL (ref 35–45)
EKG ATRIAL RATE: 111 BPM
EKG P AXIS: 79 DEGREES
EKG P-R INTERVAL: 138 MS
EKG Q-T INTERVAL: 348 MS
EKG QRS DURATION: 100 MS
EKG QTC CALCULATION (BAZETT): 473 MS
EKG R AXIS: 76 DEGREES
EKG T AXIS: -90 DEGREES
EKG VENTRICULAR RATE: 111 BPM
EOSINOPHIL NFR BLD AUTO: 0.3 %
EOSINOPHILS ABSOLUTE: 0 THOU/MM3 (ref 0–0.4)
ERYTHROCYTE [DISTWIDTH] IN BLOOD BY AUTOMATED COUNT: 13.2 % (ref 11.5–14.5)
ETHANOL SERPL-MCNC: < 0.01 % (ref 0–0.08)
FERRITIN SERPL IA-MCNC: 428 NG/ML (ref 30–400)
FOLATE SERPL-MCNC: 14.5 NG/ML (ref 4.6–34.8)
GFR SERPL CREATININE-BSD FRML MDRD: > 90 ML/MIN/1.73M2
GLUCOSE SERPL-MCNC: 112 MG/DL (ref 74–109)
HCT VFR BLD AUTO: 39 % (ref 42–52)
HEPARIN UNFRACTIONATED: 0.1 U/ML (ref 0.3–0.7)
HGB BLD-MCNC: 13.6 GM/DL (ref 14–18)
IAT IGG-SP REAG SERPL QL: NORMAL
IMM GRANULOCYTES # BLD AUTO: 0.03 THOU/MM3 (ref 0–0.07)
IMM GRANULOCYTES NFR BLD AUTO: 0.3 %
INR PPP: 1.03 (ref 0.85–1.13)
IRON SATN MFR SERPL: 15 % (ref 20–50)
IRON SERPL-MCNC: 31 UG/DL (ref 61–157)
LACTIC ACID, SEPSIS: 1.8 MMOL/L (ref 0.5–1.9)
LACTIC ACID, SEPSIS: 2.9 MMOL/L (ref 0.5–1.9)
LYMPHOCYTES ABSOLUTE: 1.2 THOU/MM3 (ref 1–4.8)
LYMPHOCYTES NFR BLD AUTO: 12.8 %
MAGNESIUM SERPL-MCNC: 1.8 MG/DL (ref 1.6–2.6)
MCH RBC QN AUTO: 33.6 PG (ref 26–33)
MCHC RBC AUTO-ENTMCNC: 34.9 GM/DL (ref 32.2–35.5)
MCV RBC AUTO: 96.3 FL (ref 80–94)
MONOCYTES ABSOLUTE: 0.9 THOU/MM3 (ref 0.4–1.3)
MONOCYTES NFR BLD AUTO: 10 %
NEUTROPHILS ABSOLUTE: 6.9 THOU/MM3 (ref 1.8–7.7)
NEUTROPHILS NFR BLD AUTO: 76.2 %
NRBC BLD AUTO-RTO: 0 /100 WBC
NT-PROBNP SERPL IA-MCNC: 2461 PG/ML (ref 0–124)
OSMOLALITY SERPL CALC.SUM OF ELEC: 278.3 MOSMOL/KG (ref 275–300)
PHOSPHATE SERPL-MCNC: 3.3 MG/DL (ref 2.5–4.5)
PLATELET # BLD AUTO: 194 THOU/MM3 (ref 130–400)
PMV BLD AUTO: 11.1 FL (ref 9.4–12.4)
POTASSIUM SERPL-SCNC: 3.8 MEQ/L (ref 3.5–5.2)
PROT SERPL-MCNC: 6.8 G/DL (ref 6.4–8.3)
RBC # BLD AUTO: 4.05 MILL/MM3 (ref 4.7–6.1)
RH BLD: NORMAL
SODIUM SERPL-SCNC: 138 MEQ/L (ref 135–145)
TIBC SERPL-MCNC: 206 UG/DL (ref 171–450)
TROPONIN, HIGH SENSITIVITY: 30 NG/L (ref 0–12)
TROPONIN, HIGH SENSITIVITY: 31 NG/L (ref 0–12)
VIT B12 SERPL-MCNC: 795 PG/ML (ref 232–1245)
WBC # BLD AUTO: 9 THOU/MM3 (ref 4.8–10.8)

## 2025-05-18 PROCEDURE — 99223 1ST HOSP IP/OBS HIGH 75: CPT

## 2025-05-18 PROCEDURE — 80053 COMPREHEN METABOLIC PANEL: CPT

## 2025-05-18 PROCEDURE — 73060 X-RAY EXAM OF HUMERUS: CPT

## 2025-05-18 PROCEDURE — 83605 ASSAY OF LACTIC ACID: CPT

## 2025-05-18 PROCEDURE — 73030 X-RAY EXAM OF SHOULDER: CPT

## 2025-05-18 PROCEDURE — 82247 BILIRUBIN TOTAL: CPT

## 2025-05-18 PROCEDURE — APPNB30 APP NON BILLABLE TIME 0-30 MINS

## 2025-05-18 PROCEDURE — 85610 PROTHROMBIN TIME: CPT

## 2025-05-18 PROCEDURE — 74177 CT ABD & PELVIS W/CONTRAST: CPT

## 2025-05-18 PROCEDURE — 86900 BLOOD TYPING SEROLOGIC ABO: CPT

## 2025-05-18 PROCEDURE — 82728 ASSAY OF FERRITIN: CPT

## 2025-05-18 PROCEDURE — 2580000003 HC RX 258

## 2025-05-18 PROCEDURE — 84484 ASSAY OF TROPONIN QUANT: CPT

## 2025-05-18 PROCEDURE — 99285 EMERGENCY DEPT VISIT HI MDM: CPT

## 2025-05-18 PROCEDURE — G0378 HOSPITAL OBSERVATION PER HR: HCPCS

## 2025-05-18 PROCEDURE — 6370000000 HC RX 637 (ALT 250 FOR IP)

## 2025-05-18 PROCEDURE — 2500000003 HC RX 250 WO HCPCS

## 2025-05-18 PROCEDURE — 83735 ASSAY OF MAGNESIUM: CPT

## 2025-05-18 PROCEDURE — 83880 ASSAY OF NATRIURETIC PEPTIDE: CPT

## 2025-05-18 PROCEDURE — 85520 HEPARIN ASSAY: CPT

## 2025-05-18 PROCEDURE — 85025 COMPLETE CBC W/AUTO DIFF WBC: CPT

## 2025-05-18 PROCEDURE — 6360000004 HC RX CONTRAST MEDICATION: Performed by: EMERGENCY MEDICINE

## 2025-05-18 PROCEDURE — 83540 ASSAY OF IRON: CPT

## 2025-05-18 PROCEDURE — 93010 ELECTROCARDIOGRAM REPORT: CPT | Performed by: INTERNAL MEDICINE

## 2025-05-18 PROCEDURE — 83550 IRON BINDING TEST: CPT

## 2025-05-18 PROCEDURE — 86901 BLOOD TYPING SEROLOGIC RH(D): CPT

## 2025-05-18 PROCEDURE — 82607 VITAMIN B-12: CPT

## 2025-05-18 PROCEDURE — 99283 EMERGENCY DEPT VISIT LOW MDM: CPT | Performed by: SURGERY

## 2025-05-18 PROCEDURE — 36415 COLL VENOUS BLD VENIPUNCTURE: CPT

## 2025-05-18 PROCEDURE — 82077 ASSAY SPEC XCP UR&BREATH IA: CPT

## 2025-05-18 PROCEDURE — 76376 3D RENDER W/INTRP POSTPROCES: CPT

## 2025-05-18 PROCEDURE — 71045 X-RAY EXAM CHEST 1 VIEW: CPT

## 2025-05-18 PROCEDURE — 84100 ASSAY OF PHOSPHORUS: CPT

## 2025-05-18 PROCEDURE — 71260 CT THORAX DX C+: CPT

## 2025-05-18 PROCEDURE — 6820000002 HC L2 INJURY CALL ACTIVATION: Performed by: SURGERY

## 2025-05-18 PROCEDURE — 86885 COOMBS TEST INDIRECT QUAL: CPT

## 2025-05-18 PROCEDURE — 85730 THROMBOPLASTIN TIME PARTIAL: CPT

## 2025-05-18 PROCEDURE — 72125 CT NECK SPINE W/O DYE: CPT

## 2025-05-18 PROCEDURE — 82248 BILIRUBIN DIRECT: CPT

## 2025-05-18 PROCEDURE — 82550 ASSAY OF CK (CPK): CPT

## 2025-05-18 PROCEDURE — 82746 ASSAY OF FOLIC ACID SERUM: CPT

## 2025-05-18 PROCEDURE — 93005 ELECTROCARDIOGRAM TRACING: CPT

## 2025-05-18 PROCEDURE — 70450 CT HEAD/BRAIN W/O DYE: CPT

## 2025-05-18 PROCEDURE — 6360000002 HC RX W HCPCS

## 2025-05-18 RX ORDER — HEPARIN SODIUM 1000 [USP'U]/ML
60 INJECTION, SOLUTION INTRAVENOUS; SUBCUTANEOUS ONCE
Status: DISCONTINUED | OUTPATIENT
Start: 2025-05-18 | End: 2025-05-18

## 2025-05-18 RX ORDER — PHENOBARBITAL 32.4 MG/1
32.4 TABLET ORAL EVERY 6 HOURS PRN
Status: DISCONTINUED | OUTPATIENT
Start: 2025-05-18 | End: 2025-05-18

## 2025-05-18 RX ORDER — ONDANSETRON 2 MG/ML
4 INJECTION INTRAMUSCULAR; INTRAVENOUS EVERY 6 HOURS PRN
Status: DISCONTINUED | OUTPATIENT
Start: 2025-05-18 | End: 2025-05-22 | Stop reason: HOSPADM

## 2025-05-18 RX ORDER — ONDANSETRON 4 MG/1
4 TABLET, ORALLY DISINTEGRATING ORAL EVERY 8 HOURS PRN
Status: CANCELLED | OUTPATIENT
Start: 2025-05-18

## 2025-05-18 RX ORDER — SODIUM CHLORIDE 0.9 % (FLUSH) 0.9 %
5-40 SYRINGE (ML) INJECTION PRN
Status: CANCELLED | OUTPATIENT
Start: 2025-05-18

## 2025-05-18 RX ORDER — POLYETHYLENE GLYCOL 3350 17 G/17G
17 POWDER, FOR SOLUTION ORAL DAILY
Status: CANCELLED | OUTPATIENT
Start: 2025-05-18

## 2025-05-18 RX ORDER — SODIUM CHLORIDE 0.9 % (FLUSH) 0.9 %
5-40 SYRINGE (ML) INJECTION EVERY 12 HOURS SCHEDULED
Status: DISCONTINUED | OUTPATIENT
Start: 2025-05-18 | End: 2025-05-18

## 2025-05-18 RX ORDER — MAGNESIUM SULFATE IN WATER 40 MG/ML
2000 INJECTION, SOLUTION INTRAVENOUS PRN
Status: CANCELLED | OUTPATIENT
Start: 2025-05-18

## 2025-05-18 RX ORDER — POTASSIUM CHLORIDE 7.45 MG/ML
10 INJECTION INTRAVENOUS PRN
Status: DISCONTINUED | OUTPATIENT
Start: 2025-05-18 | End: 2025-05-22 | Stop reason: HOSPADM

## 2025-05-18 RX ORDER — LANOLIN ALCOHOL/MO/W.PET/CERES
100 CREAM (GRAM) TOPICAL DAILY
Status: DISCONTINUED | OUTPATIENT
Start: 2025-05-18 | End: 2025-05-22 | Stop reason: HOSPADM

## 2025-05-18 RX ORDER — IOPAMIDOL 755 MG/ML
80 INJECTION, SOLUTION INTRAVASCULAR
Status: COMPLETED | OUTPATIENT
Start: 2025-05-18 | End: 2025-05-18

## 2025-05-18 RX ORDER — ONDANSETRON 2 MG/ML
4 INJECTION INTRAMUSCULAR; INTRAVENOUS EVERY 6 HOURS PRN
Status: CANCELLED | OUTPATIENT
Start: 2025-05-18

## 2025-05-18 RX ORDER — HEPARIN SODIUM 10000 [USP'U]/100ML
5-30 INJECTION, SOLUTION INTRAVENOUS CONTINUOUS
Status: DISCONTINUED | OUTPATIENT
Start: 2025-05-18 | End: 2025-05-18

## 2025-05-18 RX ORDER — POLYETHYLENE GLYCOL 3350 17 G/17G
17 POWDER, FOR SOLUTION ORAL DAILY PRN
Status: DISCONTINUED | OUTPATIENT
Start: 2025-05-18 | End: 2025-05-22 | Stop reason: HOSPADM

## 2025-05-18 RX ORDER — SODIUM CHLORIDE 0.9 % (FLUSH) 0.9 %
5-40 SYRINGE (ML) INJECTION PRN
Status: DISCONTINUED | OUTPATIENT
Start: 2025-05-18 | End: 2025-05-22 | Stop reason: HOSPADM

## 2025-05-18 RX ORDER — ONDANSETRON 4 MG/1
4 TABLET, ORALLY DISINTEGRATING ORAL EVERY 8 HOURS PRN
Status: DISCONTINUED | OUTPATIENT
Start: 2025-05-18 | End: 2025-05-22 | Stop reason: HOSPADM

## 2025-05-18 RX ORDER — FOLIC ACID 1 MG/1
1 TABLET ORAL DAILY
Status: DISCONTINUED | OUTPATIENT
Start: 2025-05-18 | End: 2025-05-22 | Stop reason: HOSPADM

## 2025-05-18 RX ORDER — SODIUM CHLORIDE 9 MG/ML
INJECTION, SOLUTION INTRAVENOUS PRN
Status: DISCONTINUED | OUTPATIENT
Start: 2025-05-18 | End: 2025-05-22 | Stop reason: HOSPADM

## 2025-05-18 RX ORDER — POTASSIUM CHLORIDE 7.45 MG/ML
10 INJECTION INTRAVENOUS PRN
Status: CANCELLED | OUTPATIENT
Start: 2025-05-18

## 2025-05-18 RX ORDER — OXYCODONE HYDROCHLORIDE 5 MG/1
5 TABLET ORAL EVERY 4 HOURS PRN
Refills: 0 | Status: CANCELLED | OUTPATIENT
Start: 2025-05-18

## 2025-05-18 RX ORDER — MULTIVITAMIN WITH IRON
1 TABLET ORAL DAILY
Status: DISCONTINUED | OUTPATIENT
Start: 2025-05-18 | End: 2025-05-22 | Stop reason: HOSPADM

## 2025-05-18 RX ORDER — SODIUM CHLORIDE 0.9 % (FLUSH) 0.9 %
5-40 SYRINGE (ML) INJECTION EVERY 12 HOURS SCHEDULED
Status: CANCELLED | OUTPATIENT
Start: 2025-05-18

## 2025-05-18 RX ORDER — HEPARIN SODIUM 1000 [USP'U]/ML
30 INJECTION, SOLUTION INTRAVENOUS; SUBCUTANEOUS PRN
Status: DISCONTINUED | OUTPATIENT
Start: 2025-05-18 | End: 2025-05-19

## 2025-05-18 RX ORDER — SODIUM CHLORIDE 9 MG/ML
INJECTION, SOLUTION INTRAVENOUS PRN
Status: CANCELLED | OUTPATIENT
Start: 2025-05-18

## 2025-05-18 RX ORDER — MORPHINE SULFATE 4 MG/ML
4 INJECTION, SOLUTION INTRAMUSCULAR; INTRAVENOUS
Refills: 0 | Status: CANCELLED | OUTPATIENT
Start: 2025-05-18

## 2025-05-18 RX ORDER — HEPARIN SODIUM 10000 [USP'U]/100ML
5-30 INJECTION, SOLUTION INTRAVENOUS CONTINUOUS
Status: DISCONTINUED | OUTPATIENT
Start: 2025-05-18 | End: 2025-05-19

## 2025-05-18 RX ORDER — LANOLIN ALCOHOL/MO/W.PET/CERES
100 CREAM (GRAM) TOPICAL DAILY
Status: DISCONTINUED | OUTPATIENT
Start: 2025-05-18 | End: 2025-05-18

## 2025-05-18 RX ORDER — HEPARIN SODIUM 1000 [USP'U]/ML
30 INJECTION, SOLUTION INTRAVENOUS; SUBCUTANEOUS PRN
Status: DISCONTINUED | OUTPATIENT
Start: 2025-05-18 | End: 2025-05-18

## 2025-05-18 RX ORDER — ACETAMINOPHEN 325 MG/1
650 TABLET ORAL EVERY 6 HOURS PRN
Status: DISCONTINUED | OUTPATIENT
Start: 2025-05-18 | End: 2025-05-22 | Stop reason: HOSPADM

## 2025-05-18 RX ORDER — HEPARIN SODIUM 1000 [USP'U]/ML
60 INJECTION, SOLUTION INTRAVENOUS; SUBCUTANEOUS ONCE
Status: COMPLETED | OUTPATIENT
Start: 2025-05-18 | End: 2025-05-18

## 2025-05-18 RX ORDER — ATORVASTATIN CALCIUM 40 MG/1
40 TABLET, FILM COATED ORAL
Status: DISCONTINUED | OUTPATIENT
Start: 2025-05-18 | End: 2025-05-22 | Stop reason: HOSPADM

## 2025-05-18 RX ORDER — PHENOBARBITAL SODIUM 65 MG/ML
130 INJECTION, SOLUTION INTRAMUSCULAR; INTRAVENOUS
Status: DISCONTINUED | OUTPATIENT
Start: 2025-05-18 | End: 2025-05-22 | Stop reason: HOSPADM

## 2025-05-18 RX ORDER — HEPARIN SODIUM 1000 [USP'U]/ML
60 INJECTION, SOLUTION INTRAVENOUS; SUBCUTANEOUS PRN
Status: DISCONTINUED | OUTPATIENT
Start: 2025-05-18 | End: 2025-05-18

## 2025-05-18 RX ORDER — HEPARIN SODIUM 1000 [USP'U]/ML
60 INJECTION, SOLUTION INTRAVENOUS; SUBCUTANEOUS PRN
Status: DISCONTINUED | OUTPATIENT
Start: 2025-05-18 | End: 2025-05-19

## 2025-05-18 RX ORDER — POTASSIUM CHLORIDE 29.8 MG/ML
20 INJECTION INTRAVENOUS PRN
Status: CANCELLED | OUTPATIENT
Start: 2025-05-18

## 2025-05-18 RX ORDER — SODIUM CHLORIDE 0.9 % (FLUSH) 0.9 %
10 SYRINGE (ML) INJECTION EVERY 12 HOURS SCHEDULED
Status: DISCONTINUED | OUTPATIENT
Start: 2025-05-18 | End: 2025-05-22 | Stop reason: HOSPADM

## 2025-05-18 RX ORDER — SODIUM CHLORIDE 9 MG/ML
INJECTION, SOLUTION INTRAVENOUS CONTINUOUS
Status: DISCONTINUED | OUTPATIENT
Start: 2025-05-18 | End: 2025-05-21

## 2025-05-18 RX ORDER — PHENOBARBITAL 32.4 MG/1
16.2 TABLET ORAL EVERY 6 HOURS PRN
Status: DISCONTINUED | OUTPATIENT
Start: 2025-05-20 | End: 2025-05-18

## 2025-05-18 RX ORDER — OXYCODONE HYDROCHLORIDE 5 MG/1
10 TABLET ORAL EVERY 4 HOURS PRN
Refills: 0 | Status: CANCELLED | OUTPATIENT
Start: 2025-05-18

## 2025-05-18 RX ORDER — MORPHINE SULFATE 2 MG/ML
2 INJECTION, SOLUTION INTRAMUSCULAR; INTRAVENOUS
Refills: 0 | Status: CANCELLED | OUTPATIENT
Start: 2025-05-18

## 2025-05-18 RX ORDER — SODIUM CHLORIDE 0.9 % (FLUSH) 0.9 %
10 SYRINGE (ML) INJECTION PRN
Status: DISCONTINUED | OUTPATIENT
Start: 2025-05-18 | End: 2025-05-22 | Stop reason: HOSPADM

## 2025-05-18 RX ORDER — POTASSIUM CHLORIDE 1500 MG/1
40 TABLET, EXTENDED RELEASE ORAL PRN
Status: DISCONTINUED | OUTPATIENT
Start: 2025-05-18 | End: 2025-05-22 | Stop reason: HOSPADM

## 2025-05-18 RX ORDER — METOPROLOL SUCCINATE 25 MG/1
25 TABLET, EXTENDED RELEASE ORAL DAILY
Status: DISCONTINUED | OUTPATIENT
Start: 2025-05-18 | End: 2025-05-22 | Stop reason: HOSPADM

## 2025-05-18 RX ORDER — PHENOBARBITAL SODIUM 65 MG/ML
260 INJECTION, SOLUTION INTRAMUSCULAR; INTRAVENOUS
Status: DISCONTINUED | OUTPATIENT
Start: 2025-05-18 | End: 2025-05-22 | Stop reason: HOSPADM

## 2025-05-18 RX ORDER — MAGNESIUM SULFATE IN WATER 40 MG/ML
2000 INJECTION, SOLUTION INTRAVENOUS PRN
Status: DISCONTINUED | OUTPATIENT
Start: 2025-05-18 | End: 2025-05-22 | Stop reason: HOSPADM

## 2025-05-18 RX ORDER — ACETAMINOPHEN 650 MG/1
650 SUPPOSITORY RECTAL EVERY 6 HOURS PRN
Status: DISCONTINUED | OUTPATIENT
Start: 2025-05-18 | End: 2025-05-22 | Stop reason: HOSPADM

## 2025-05-18 RX ORDER — ACETAMINOPHEN 325 MG/1
650 TABLET ORAL EVERY 4 HOURS PRN
Status: CANCELLED | OUTPATIENT
Start: 2025-05-18

## 2025-05-18 RX ADMIN — ATORVASTATIN CALCIUM 40 MG: 40 TABLET, FILM COATED ORAL at 20:18

## 2025-05-18 RX ADMIN — HEPARIN SODIUM 12 UNITS/KG/HR: 10000 INJECTION, SOLUTION INTRAVENOUS at 12:57

## 2025-05-18 RX ADMIN — Medication 100 MG: at 12:04

## 2025-05-18 RX ADMIN — HEPARIN SODIUM 3100 UNITS: 1000 INJECTION INTRAVENOUS; SUBCUTANEOUS at 12:57

## 2025-05-18 RX ADMIN — SODIUM CHLORIDE: 0.9 INJECTION, SOLUTION INTRAVENOUS at 12:02

## 2025-05-18 RX ADMIN — SODIUM CHLORIDE, PRESERVATIVE FREE 10 ML: 5 INJECTION INTRAVENOUS at 13:00

## 2025-05-18 RX ADMIN — METOPROLOL SUCCINATE 25 MG: 25 TABLET, EXTENDED RELEASE ORAL at 12:04

## 2025-05-18 RX ADMIN — Medication 1 TABLET: at 12:04

## 2025-05-18 RX ADMIN — IOPAMIDOL 80 ML: 755 INJECTION, SOLUTION INTRAVENOUS at 02:12

## 2025-05-18 RX ADMIN — HEPARIN SODIUM 1600 UNITS: 1000 INJECTION INTRAVENOUS; SUBCUTANEOUS at 20:30

## 2025-05-18 RX ADMIN — FOLIC ACID 1 MG: 1 TABLET ORAL at 12:03

## 2025-05-18 NOTE — PROCEDURES
PROCEDURE NOTE  Date: 5/18/2025   Name: Lawrence Peterson  YOB: 1955    Procedures Pacemaker downloaded

## 2025-05-18 NOTE — ED NOTES
C-spine cleared by Dr. Bañuelos. C-collar removed at this time.   
Dr. Murillo and resident at the bedside. This RN, Fior RN, and toño RN at the bedside.   
Handoff report given to ESPINOZA Costello.   
Pt back from CT at this time.   
Pt given a box lunch at this time. RR even and unlabored. Call light in reach.   
Pt given urinal at this time. Pt RR even and unlabored. No distress noted. Call light in reach. Pt denies any further requests at this time.   
Pt resting in bed with eyes closed. RR even and unlabored. Call light in reach. Pt denies any further requests at this time.   
Pt sitting on cot eatting snack. No needs expressed by patient.   
Pt to CT at this time in stable condition.    
Pt transported to K23 on cart in stable condition. Floor contacted before transport and spoke with Marixa.  
Pt used urinal at this time. RR even and unlabored. Call light in reach.   
  AICD (automatic cardioverter/defibrillator) present        Consults:  IP CONSULT TO ORTHOPEDIC SURGERY  IP CONSULT TO HOSPITALIST     Pain Score:       C-SSRS:   Risk of Suicide: No Risk    Sepsis Screening:       D[place]way Fall Risk:  [place] 1 Fall Risk  Presents to emergency department  because of falls (Syncope, seizure, or loss of consciousness): Yes  Age > 70: Yes  Altered Mental Status, Intoxication with alcohol or substance confusion (Disorientation, impaired judgment, poor safety awaremess, or inability to follow instructions): No  Impaired Mobility: Ambulates or transfers with assistive devices or assistance; Unable to ambulate or transer.: Yes  Nursing Judgement: Yes    Swallow Screening        Preferred Language:   English      ALLERGIES     Patient has no known allergies.    SURGICAL HISTORY       Past Surgical History:   Procedure Laterality Date    CARDIAC PROCEDURE N/A 4/4/2025    Left heart cath / coronary angiography performed by Fermin Razo MD at [facility] CARDIAC CATH LAB    COLONOSCOPY Left 5/19/2021    COLONOSCOPY WITH BIOPSY performed by Sharif Gomez MD at [facility] Endoscopy    CORONARY ANGIOPLASTY WITH STENT PLACEMENT  11/27/2019    Stent Circ and OM    EYE SURGERY Right     cataract    PACEMAKER PLACEMENT      TONSILLECTOMY AND ADENOIDECTOMY         PAST MEDICAL HISTORY       Past Medical History:   Diagnosis Date    Alcohol use     Drinks daily    Asthma     Atrial fibrillation (HCC) 11/2019    CAD S/P percutaneous coronary angioplasty 11/27/2019    Cardiomyopathy (HCC) 11/2019    CHF (congestive heart failure) ([facility])     Medtronic single ICD  03/12/2020    Nonsustained ventricular tachycardia (HCC) 11/26/2019    NSVT (nonsustained ventricular tachycardia) ([facility])     Pneumonia     Ulcer            Electronically signed by Pravin Castañeda RN on 5/18/2025 at 7:46 AM

## 2025-05-18 NOTE — ED TRIAGE NOTES
Pt present to the ED from home via EMS. EMS reports pt was found on the ground for an unknown amount of time. Pt states he does not know how long he was on the ground. Pt states he remembers the fall and did not lose consciousness. Pt states he slipped, fell onto carpet, and landed on his right arm. EMS applied C-collar prior to arrival. Pt states he only has pain in his right arm. EMS denies given any medications prior to arrival. Pt A&Ox4 at this time.

## 2025-05-18 NOTE — ED PROVIDER NOTES
[facility] EMERGENCY DEPARTMENT  EMERGENCY DEPARTMENT ENCOUNTER          Pt Name: Lawrence Peterson  MRN: [id number]  Birthdate 1955  Date of evaluation: 5/18/2025  Resident Physician: Mika Nieto MD EM Resident PGY-3  Attending Physician: Joan Murillo MD     CHIEF COMPLAINT       Chief Complaint   Patient presents with    Fall         HISTORY OF PRESENT ILLNESS    HPI  Lawrence Peterson is a 69 y.o. male who presents to the emergency department from home, brought in by EMS for evaluation of fall.    Patient on Eliquis history of intermittent A-fib with AICD in place recently admitted in the past couple months for multiple shocks from his AICD for runs of V. tach.  Patient states today that he fell on the ground in his home and was unable to get out.  States that he fell at some point when it was daylight and was found after it was dark.  Patient noted to have significant ecchymosis and gross deformity of right shoulder.  EMS reports finding patient lying on his back at home brought him in with cervical collar in place.  Patient denies any new numbness or weakness.  He denies any shocks from his AICD.    Patient was activated as a level 2 trauma for being found down on the ground, on Eliquis with heart rate in the 120s.  Initial primary survey airway intact bilateral breath sounds, no external sign of active bleeding 2+ radial, femoral, DP pulses.  Gross deformity to right shoulder with overlying ecchymosis.  Patient noted to have bilateral abrasions to the knees as well as some dried blood and ecchymosis on the right toes.  Point-of-care ultrasound FAST exam was negative.  Patient is hemodynamically stable no sign of hypotension.    The patient has no other acute complaints at this time.    ROS negative except as stated above.    PAST MEDICAL AND SURGICAL HISTORY     Past Medical History:   Diagnosis Date    Alcohol use     Drinks daily    Asthma     Atrial fibrillation (HCC) 11/2019    CAD S/P

## 2025-05-18 NOTE — ED PROVIDER NOTES
Transfer of Care Note:   Physician Signing out: Dr. Muryr  Receiving Physician: Petty Padron MD  Sign out time: 0630 am      Brief history:  69-year-old male with PMHx of CAD, AICD CHF, and Hx of alcoholism presented to the ED after having a fall and losing consciousness with unknown downtime.    Items pending that need to be checked:  Pending mission      Tentative Impression of patient:  Patient laying in bed comfortably    Expected disposition of patient:  Pending results, admitted.        Additional Assessment and results:   I have personally performed a face to face diagnostic evaluation on this patient. The patient's initial evaluation and plan have been discussed with the prior physician who initially evaluated the patient. Nursing Notes, Past Medical Hx, Past Surgical Hx, Social Hx, Allergies, vital signs and Family Hx were all reviewed.      Vitals:    05/18/25 0636   BP: 114/84   Pulse: (!) 107   Resp: 18   Temp:    SpO2: 96%     Physical Exam      Labs Reviewed   CBC WITH AUTO DIFFERENTIAL - Abnormal; Notable for the following components:       Result Value    RBC 4.05 (*)     Hemoglobin 13.6 (*)     Hematocrit 39.0 (*)     MCV 96.3 (*)     MCH 33.6 (*)     RDW-SD 47.4 (*)     All other components within normal limits   COMPREHENSIVE METABOLIC PANEL - Abnormal; Notable for the following components:    Glucose 112 (*)     Chloride 96 (*)     Total Bilirubin 1.6 (*)     All other components within normal limits   LACTATE, SEPSIS - Abnormal; Notable for the following components:    Lactic Acid, Sepsis 2.9 (*)     All other components within normal limits   APTT - Abnormal; Notable for the following components:    aPTT 38.8 (*)     All other components within normal limits   TROPONIN - Abnormal; Notable for the following components:    Troponin, High Sensitivity 31 (*)     All other components within normal limits   BRAIN NATRIURETIC PEPTIDE - Abnormal; Notable for the following components:    NT

## 2025-05-18 NOTE — PLAN OF CARE
Problem: Chronic Conditions and Co-morbidities  Goal: Patient's chronic conditions and co-morbidity symptoms are monitored and maintained or improved  Outcome: Progressing     Problem: Discharge Planning  Goal: Discharge to home or other facility with appropriate resources  Outcome: Progressing  Flowsheets (Taken 5/18/2025 1146)  Discharge to home or other facility with appropriate resources:   Identify barriers to discharge with patient and caregiver   Arrange for needed discharge resources and transportation as appropriate   Identify discharge learning needs (meds, wound care, etc)   Refer to discharge planning if patient needs post-hospital services based on physician order or complex needs related to functional status, cognitive ability or social support system     Problem: Safety - Adult  Goal: Free from fall injury  Outcome: Progressing     Problem: ABCDS Injury Assessment  Goal: Absence of physical injury  Outcome: Progressing     Problem: Skin/Tissue Integrity  Goal: Skin integrity remains intact  Description: 1.  Monitor for areas of redness and/or skin breakdown2.  Assess vascular access sites hourly3.  Every 4-6 hours minimum:  Change oxygen saturation probe site4.  Every 4-6 hours:  If on nasal continuous positive airway pressure, respiratory therapy assess nares and determine need for appliance change or resting period  Outcome: Progressing     Problem: Pain  Goal: Verbalizes/displays adequate comfort level or baseline comfort level  Outcome: Progressing     Problem: Cardiovascular - Adult  Goal: Maintains optimal cardiac output and hemodynamic stability  Outcome: Progressing  Goal: Absence of cardiac dysrhythmias or at baseline  Outcome: Progressing     Problem: Skin/Tissue Integrity - Adult  Goal: Skin integrity remains intact  Description: 1.  Monitor for areas of redness and/or skin breakdown2.  Assess vascular access sites hourly3.  Every 4-6 hours minimum:  Change oxygen saturation probe site4.

## 2025-05-18 NOTE — ED PROVIDER NOTES
ATTENDING NOTE:    I supervised and discussed the history, physical exam and the management of this patient with the resident. I reviewed the resident's note and agree with the documented findings and plan of care.  Please see my additional note.    I personally saw and examined the patient.  I have reviewed and agree with the resident's findings, including all diagnostic interpretations and treatment plans as written.  I was present for the key portion of any procedures performed and the inclusive time noted in any critical care statement.    Electronically verified by JESUS CARRENO      Critical Care    Performed by: Jesus Carreno MD  Authorized by: Jesus Carreno MD    Critical care provider statement:     Critical care time (minutes):  35    Critical care time was exclusive of:  Separately billable procedures and treating other patients and teaching time    Critical care was necessary to treat or prevent imminent or life-threatening deterioration of the following conditions:  Trauma    Critical care was time spent personally by me on the following activities:  Ordering and performing treatments and interventions, ordering and review of laboratory studies, ordering and review of radiographic studies, pulse oximetry, re-evaluation of patient's condition, review of old charts, development of treatment plan with patient or surrogate, discussions with primary provider, evaluation of patient's response to treatment, examination of patient and obtaining history from patient or surrogate    I assumed direction of critical care for this patient from another provider in my specialty: no      Care discussed with: admitting provider    Comments:      Patient seen after fall at home, states he believes he slipped. Seen emergently as level 2 trauma alert by ED and trauma service. Is on eliquis, reportedly fell hours ago during daylight, laid on floor and could not get up. On exam has ecchymosis of R shoulder, reports drinking

## 2025-05-18 NOTE — H&P
insight and thought content.   Capillary Refill: Brisk,< 3 seconds.  Peripheral Pulses: +2 palpable, equal bilaterally.       Labs: See chart    Medications Prior to Admission:   Prior to Admission Medications   Prescriptions Last Dose Informant Patient Reported? Taking?   Multiple Vitamins-Minerals (CENTRUM SILVER 50+MEN) TABS   Yes No   Sig: Take 1 tablet by mouth daily   apixaban (ELIQUIS) 5 MG TABS tablet   No No   Sig: Take 1 tablet by mouth 2 times daily   atorvastatin (LIPITOR) 40 MG tablet   No No   Sig: Take 1 tablet by mouth daily   folic acid (FOLVITE) 1 MG tablet   No No   Sig: Take 1 tablet by mouth daily   metoprolol succinate (TOPROL XL) 25 MG extended release tablet   No No   Sig: Take 1 tablet by mouth daily   nitroGLYCERIN (NITROSTAT) 0.4 MG SL tablet   No No   Sig: For chest pain/ angina ,up to max of 3 total doses. If no relief after 1 dose, call 911.   sacubitril-valsartan (ENTRESTO) 49-51 MG per tablet   No No   Sig: Take 0.5 tablets by mouth 2 times daily   thiamine 100 MG tablet   No No   Sig: Take 1 tablet by mouth daily      Facility-Administered Medications: None     Allergies:  Patient has no known allergies.    Past Medical, Family, Social, Surgical Hx      Diagnosis Date    Alcohol use     Drinks daily    Asthma     Atrial fibrillation ([facility]) 11/2019    CAD S/P percutaneous coronary angioplasty 11/27/2019    Cardiomyopathy ([facility]) 11/2019    CHF (congestive heart failure) ([facility])     Medtronic single ICD  03/12/2020    Nonsustained ventricular tachycardia ([facility]) 11/26/2019    NSVT (nonsustained ventricular tachycardia) ([facility])     Pneumonia     Ulcer          Procedure Laterality Date    CARDIAC PROCEDURE N/A 4/4/2025    Left heart cath / coronary angiography performed by Fermin Razo MD at [facility] CARDIAC CATH LAB    COLONOSCOPY Left 5/19/2021    COLONOSCOPY WITH BIOPSY performed by Sharif Gomez MD at [facility] Endoscopy    CORONARY ANGIOPLASTY WITH STENT PLACEMENT  11/27/2019    Stent Circ

## 2025-05-19 PROBLEM — E83.42 HYPOMAGNESEMIA: Status: ACTIVE | Noted: 2025-05-19

## 2025-05-19 PROBLEM — E87.6 HYPOKALEMIA: Status: ACTIVE | Noted: 2025-05-19

## 2025-05-19 LAB
ALBUMIN SERPL BCG-MCNC: 2.8 G/DL (ref 3.4–4.9)
ALP SERPL-CCNC: 56 U/L (ref 40–129)
ALT SERPL W/O P-5'-P-CCNC: 20 U/L (ref 10–50)
ANION GAP SERPL CALC-SCNC: 11 MEQ/L (ref 8–16)
APTT PPP: 44.1 SECONDS (ref 22–38)
APTT PPP: 64.2 SECONDS (ref 22–38)
APTT PPP: 79.8 SECONDS (ref 22–38)
AST SERPL-CCNC: 30 U/L (ref 10–50)
BILIRUB CONJ SERPL-MCNC: 0.5 MG/DL (ref 0–0.2)
BILIRUB SERPL-MCNC: 0.9 MG/DL (ref 0.3–1.2)
BUN SERPL-MCNC: 13 MG/DL (ref 8–23)
CALCIUM SERPL-MCNC: 8.1 MG/DL (ref 8.8–10.2)
CHLORIDE SERPL-SCNC: 102 MEQ/L (ref 98–111)
CO2 SERPL-SCNC: 22 MEQ/L (ref 22–29)
CREAT SERPL-MCNC: 0.6 MG/DL (ref 0.7–1.2)
DEPRECATED RDW RBC AUTO: 47.3 FL (ref 35–45)
ERYTHROCYTE [DISTWIDTH] IN BLOOD BY AUTOMATED COUNT: 13.2 % (ref 11.5–14.5)
GFR SERPL CREATININE-BSD FRML MDRD: > 90 ML/MIN/1.73M2
GLUCOSE SERPL-MCNC: 113 MG/DL (ref 74–109)
HCT VFR BLD AUTO: 31.4 % (ref 42–52)
HGB BLD-MCNC: 10.9 GM/DL (ref 14–18)
MAGNESIUM SERPL-MCNC: 1.4 MG/DL (ref 1.6–2.6)
MCH RBC QN AUTO: 33.7 PG (ref 26–33)
MCHC RBC AUTO-ENTMCNC: 34.7 GM/DL (ref 32.2–35.5)
MCV RBC AUTO: 97.2 FL (ref 80–94)
PLATELET # BLD AUTO: 171 THOU/MM3 (ref 130–400)
PMV BLD AUTO: 11 FL (ref 9.4–12.4)
POTASSIUM SERPL-SCNC: 3.4 MEQ/L (ref 3.5–5.2)
PROT SERPL-MCNC: 4.8 G/DL (ref 6.4–8.3)
RBC # BLD AUTO: 3.23 MILL/MM3 (ref 4.7–6.1)
SODIUM SERPL-SCNC: 135 MEQ/L (ref 135–145)
WBC # BLD AUTO: 7.3 THOU/MM3 (ref 4.8–10.8)

## 2025-05-19 PROCEDURE — 83735 ASSAY OF MAGNESIUM: CPT

## 2025-05-19 PROCEDURE — G0378 HOSPITAL OBSERVATION PER HR: HCPCS

## 2025-05-19 PROCEDURE — 97530 THERAPEUTIC ACTIVITIES: CPT

## 2025-05-19 PROCEDURE — 80076 HEPATIC FUNCTION PANEL: CPT

## 2025-05-19 PROCEDURE — 97166 OT EVAL MOD COMPLEX 45 MIN: CPT

## 2025-05-19 PROCEDURE — 85730 THROMBOPLASTIN TIME PARTIAL: CPT

## 2025-05-19 PROCEDURE — 6370000000 HC RX 637 (ALT 250 FOR IP): Performed by: STUDENT IN AN ORGANIZED HEALTH CARE EDUCATION/TRAINING PROGRAM

## 2025-05-19 PROCEDURE — 80048 BASIC METABOLIC PNL TOTAL CA: CPT

## 2025-05-19 PROCEDURE — 6370000000 HC RX 637 (ALT 250 FOR IP)

## 2025-05-19 PROCEDURE — 97116 GAIT TRAINING THERAPY: CPT

## 2025-05-19 PROCEDURE — 2580000003 HC RX 258

## 2025-05-19 PROCEDURE — 6360000002 HC RX W HCPCS

## 2025-05-19 PROCEDURE — 97162 PT EVAL MOD COMPLEX 30 MIN: CPT

## 2025-05-19 PROCEDURE — 36415 COLL VENOUS BLD VENIPUNCTURE: CPT

## 2025-05-19 PROCEDURE — 85027 COMPLETE CBC AUTOMATED: CPT

## 2025-05-19 PROCEDURE — 99233 SBSQ HOSP IP/OBS HIGH 50: CPT | Performed by: STUDENT IN AN ORGANIZED HEALTH CARE EDUCATION/TRAINING PROGRAM

## 2025-05-19 RX ADMIN — APIXABAN 5 MG: 5 TABLET, FILM COATED ORAL at 20:02

## 2025-05-19 RX ADMIN — SODIUM CHLORIDE: 0.9 INJECTION, SOLUTION INTRAVENOUS at 13:28

## 2025-05-19 RX ADMIN — SODIUM CHLORIDE: 0.9 INJECTION, SOLUTION INTRAVENOUS at 01:13

## 2025-05-19 RX ADMIN — Medication 100 MG: at 09:46

## 2025-05-19 RX ADMIN — Medication 1 TABLET: at 09:46

## 2025-05-19 RX ADMIN — FOLIC ACID 1 MG: 1 TABLET ORAL at 09:46

## 2025-05-19 RX ADMIN — METOPROLOL SUCCINATE 25 MG: 25 TABLET, EXTENDED RELEASE ORAL at 09:46

## 2025-05-19 RX ADMIN — HEPARIN SODIUM 18 UNITS/KG/HR: 10000 INJECTION, SOLUTION INTRAVENOUS at 07:57

## 2025-05-19 RX ADMIN — POTASSIUM CHLORIDE 40 MEQ: 1500 TABLET, EXTENDED RELEASE ORAL at 13:30

## 2025-05-19 RX ADMIN — ATORVASTATIN CALCIUM 40 MG: 40 TABLET, FILM COATED ORAL at 20:03

## 2025-05-19 RX ADMIN — HEPARIN SODIUM 1600 UNITS: 1000 INJECTION INTRAVENOUS; SUBCUTANEOUS at 07:55

## 2025-05-19 RX ADMIN — MAGNESIUM SULFATE HEPTAHYDRATE 2000 MG: 40 INJECTION, SOLUTION INTRAVENOUS at 15:53

## 2025-05-19 ASSESSMENT — LIFESTYLE VARIABLES
HOW OFTEN DO YOU HAVE A DRINK CONTAINING ALCOHOL: 4 OR MORE TIMES A WEEK
HOW MANY STANDARD DRINKS CONTAINING ALCOHOL DO YOU HAVE ON A TYPICAL DAY: 5 OR 6

## 2025-05-19 ASSESSMENT — PATIENT HEALTH QUESTIONNAIRE - PHQ9
SUM OF ALL RESPONSES TO PHQ QUESTIONS 1-9: 0
2. FEELING DOWN, DEPRESSED OR HOPELESS: NOT AT ALL
1. LITTLE INTEREST OR PLEASURE IN DOING THINGS: NOT AT ALL

## 2025-05-19 NOTE — PLAN OF CARE
Problem: Chronic Conditions and Co-morbidities  Goal: Patient's chronic conditions and co-morbidity symptoms are monitored and maintained or improved  Outcome: Progressing  Flowsheets (Taken 5/19/2025 1924)  Care Plan - Patient's Chronic Conditions and Co-Morbidity Symptoms are Monitored and Maintained or Improved:   Monitor and assess patient's chronic conditions and comorbid symptoms for stability, deterioration, or improvement   Collaborate with multidisciplinary team to address chronic and comorbid conditions and prevent exacerbation or deterioration     Problem: Discharge Planning  Goal: Discharge to home or other facility with appropriate resources  Outcome: Progressing  Flowsheets (Taken 5/19/2025 1924)  Discharge to home or other facility with appropriate resources:   Identify barriers to discharge with patient and caregiver   Arrange for needed discharge resources and transportation as appropriate     Problem: Safety - Adult  Goal: Free from fall injury  Outcome: Progressing  Flowsheets  Taken 5/19/2025 1924 by Jennifer Baum LPN  Free From Fall Injury: Instruct family/caregiver on patient safety  Taken 5/19/2025 1055 by Jennifer Woodall RN  Free From Fall Injury: Instruct family/caregiver on patient safety     Problem: ABCDS Injury Assessment  Goal: Absence of physical injury  Outcome: Progressing  Flowsheets  Taken 5/19/2025 1924 by Jennifer Baum LPN  Absence of Physical Injury: Implement safety measures based on patient assessment  Taken 5/19/2025 1055 by Jennifer Woodall RN  Absence of Physical Injury: Implement safety measures based on patient assessment     Problem: Skin/Tissue Integrity  Goal: Skin integrity remains intact  Description: 1.  Monitor for areas of redness and/or skin breakdown2.  Assess vascular access sites hourly3.  Every 4-6 hours minimum:  Change oxygen saturation probe site4.  Every 4-6 hours:  If on nasal continuous positive airway pressure, respiratory

## 2025-05-19 NOTE — CARE COORDINATION
Case Management Assessment Initial Evaluation    Date/Time of Evaluation: 2025 7:12 AM  Assessment Completed by: Cheryl Dougherty RN    If patient is discharged prior to next notation, then this note serves as note for discharge by case management.    Patient Name: Lawrence Peterson                   YOB: 1955  Diagnosis: AICD (automatic cardioverter/defibrillator) present [Z95.810]  Fall, initial encounter [W19.XXXA]  Fall at home, initial encounter [W19.XXXA, Y92.009]  Fx humeral neck, right, closed, initial encounter [S42.211A]  ST segment depression [R94.31]  Anticoagulated on Eliquis [Z79.01]                   Date / Time: 2025 12:22 AM  Location: [address] [place] [address]     Patient Admission Status: Observation   Readmission Risk Low 0-14, Mod 15-19), High > 20: Readmission Risk Score: 15    Current PCP: None, None  Health Care Decision Makers:     Additional Case Management Notes: From ED, PT/OT, Orthopedic Surgery consult, telemetry, Addiction Service consult, seizure precautions, Heparin drip, Zofran prn, Phenobarbital protocol for ETOH withdrawal.     Procedures:    Pacer interrogation    Imagin/18 CXR No consolidation or CHF.   Impacted, comminuted multipart fracture of the right humeral head and neck      CT Head WO Contrast 1. No acute intracranial process.   2. Old small lacunar infarcts involving the bilateral basal ganglia and   within the left cerebellum.   3. Subcortical/periventricular white matter hypoattenuation statistically   representing changes of chronic microvascular ischemia. Age related global   parenchymal volume loss.      XR Right shoulder 1. Comminuted fracture of the right humeral head involving the   lesser/greater tuberosities and extending to the articular surface.   Transverse impacted fracture of the right humeral neck.      XR Humerus Right 1. Comminuted fracture involving the right humeral head and humeral neck   as above.

## 2025-05-19 NOTE — CARE COORDINATION
5/19/25, 2:55 PM EDT    DISCHARGE PLANNING EVALUATION    Spoke with patient about discharge plan.  He prefers home but is willing to consider snf for rehab.  Will need precert for snf.  Searching for accepting facility.

## 2025-05-19 NOTE — PLAN OF CARE
Problem: Chronic Conditions and Co-morbidities  Goal: Patient's chronic conditions and co-morbidity symptoms are monitored and maintained or improved  5/18/2025 2241 by Poppy Yadav, RN  Outcome: Progressing     Problem: Safety - Adult  Goal: Free from fall injury  5/18/2025 2241 by Poppy Yadav, RN  Outcome: Progressing     Problem: ABCDS Injury Assessment  Goal: Absence of physical injury  5/18/2025 2241 by Popyp Yadav, RN  Outcome: Progressing   Care plan reviewed with patient.  Patient verbalize understanding of the plan of care and contribute to goal setting.

## 2025-05-19 NOTE — CARE COORDINATION
5/19/25, 7:48 AM EDT    DISCHARGE PLANNING EVALUATION    Consult for consideration of rehab, addiction services also consulted for this, will defer to Addiction Services.

## 2025-05-19 NOTE — CONSULTS
ADDICTION and TRAUMA SBIRT completed on 05/19/2025. Please see assessment and note for reference. Thanks.   
patient to schedule appointment with a counseling center    Intervention Completed:  Yes - Patient admitted to drinking six alcoholic beverages daily. Patient scored zero on ITSS. Patient declines utilizing a current outpatient provider or facility. Patient declined interest in decreasing use of alcohol stating \"No, it is good\".   continued to offer resource packet; patient declined.  encouraged patient to inform nurse of any changes in decision; once nursing staff is informed a  can return to review the resource packet and assist in scheduling an appointment.     When intervention is NOT completed you must provide your justification:  N/A    Injured Trauma Survivor Screening  1.  When you were injured or right afterward   Did you think you were going to die? RESPONSES; YES+1/NO: NO  Do you think this was done to you intentionally? NO    Since your injury  Have you felt more restless, tense or jumpy than usual? RESPONSES; YES+1/NO: NO  Have you found yourself unable to stop worrying? RESPONSES; YES+1/NO: NO  Do you find yourself thinking that the world is unsafe and that people are not to be trusted? RESPONSES; YES+1/NO: NO    TOTAL SCORE from ITSS Questions 1 and 2: 0  NOTE: A score of greater than or equal to 2 is considered positive for PTSD risk and is to receive a community resource packet to link with appropriate providers.    Recommendations/Referrals for Brief and/or Specialized Treatment Provided to Patient:     Patient admitted to drinking six alcoholic beverages daily. Patient scored zero on ITSS. Patient declines utilizing a current outpatient provider or facility. Patient declined interest in decreasing use of alcohol stating \"No, it is good\".   continued to offer resource packet; patient declined.  encouraged patient to inform nurse of any changes in decision; once nursing staff is informed a  can return to review the 
 XR SHOULDER RIGHT (MIN 2 VIEWS)  Order: [id number]   Status: Final result       Next appt: 05/29/2025 at 01:45 PM in Cardiology (Bradford Moody MD)    Test Result Released: No    0 Result Notes  Details    Reading Physician Reading Date Result Priority   Haydee Bhupinder GARCIA   [phone]     5/18/2025      Narrative & Impression  3 view right shoulder     Comparison: CR/SR - XR HUMERUS RIGHT (MIN 2 VIEWS) - 5/18/25 02:13 EDT  CT/SR - CT CHEST W CONTRAST - 5/18/25 02:04 EDT     Findings:     Comminuted fracture of the right humeral head which involves the   lesser/greater tuberosities. Avulsion of the greater tuberosity. The   fracture extends to the articular surface. Transverse impacted fracture of   the right humeral neck. Degenerative changes of the right   acromioclavicular and glenohumeral joints. Soft tissue swelling adjacent   to the fracture sites.     IMPRESSION:  1. Comminuted fracture of the right humeral head involving the   lesser/greater tuberosities and extending to the articular surface.   Transverse impacted fracture of the right humeral neck.     This document has been electronically signed by: Bhupinder Orr DO on   05/18/2025 03:38 AM        Exam Ended: 05/18/25 02:17 EDT Last Resulted: 05/18/25 03:38 EDT           IMPRESSION/RECOMMENDATIONS:    Right proximal humerus fracture    Discussed patient's findings with Dr. Valenzuela. Discussed with patient that Dr Valenzuela is recommending nonoperative management for now. Questions were addressed. Activities restrictions discussed. Continue use of sling and swathe. Internal Medicine to continue management anticoagulants as warranted.  
spent in care of patient:  25 minutes collectively between subjective/objective examination, chart review, documentation, clinical reasoning and discussion with attending regarding plan/interval changes.    Electronically signed by Kenny Powers PA-C on 5/18/2025 at 12:46 AM      Patient seen and examined independently by me 5/18/2025  Total time personally spent on this patient encounter was 35 minutes which includes :  Preparing to see the patient( reviewing tests and chart)  Obtaining and reviewing separately obtained history  Performing a medically appropriate examination and evaluation  Ordering medications, tests, or procedures  Counseling and educating the patient/family/caregiver  Care coordination  Referring and communicating with other healthcare professionals  Documenting clinical information in the EHR  Independent interpretation of results and communicating the results to patient and care team  This includes a direct physical exam as well as all the other encounter activities described above.   Time may be discontiguous.   Time does not include procedures.    Please see our orders that were directed and approved by me if there are any new ones for the updated patient care plan.    Above discussed and I agree with documentation and orders placed by FABIAN Fox    See any additional comments if needed below for any other updated orders and plans.     -- Comminuted fracture right femoral head.  Orthopedic consultation.  Nonweightbearing right upper extremity until seen by orthopedics.  Pain control.  Multiple significant comorbid conditions.  Stable otherwise from a trauma standpoint.  Most likely admitted to medicine.  Trauma services available as needed for reevaluation and hospital.  Otherwise, further care per ED, admitting and orthopedics.  All questions answered.  Patient in agreement with plan.    Electronically signed by Bhupinder Perez MD on 5/18/25 at 7:13 AM EDT
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
## 2025-05-20 LAB
ALBUMIN SERPL BCG-MCNC: 2.9 G/DL (ref 3.4–4.9)
ALP SERPL-CCNC: 60 U/L (ref 40–129)
ALT SERPL W/O P-5'-P-CCNC: 28 U/L (ref 10–50)
ANION GAP SERPL CALC-SCNC: 10 MEQ/L (ref 8–16)
AST SERPL-CCNC: 40 U/L (ref 10–50)
BILIRUB SERPL-MCNC: 0.6 MG/DL (ref 0.3–1.2)
BUN SERPL-MCNC: 7 MG/DL (ref 8–23)
CALCIUM SERPL-MCNC: 8.2 MG/DL (ref 8.8–10.2)
CHLORIDE SERPL-SCNC: 102 MEQ/L (ref 98–111)
CO2 SERPL-SCNC: 20 MEQ/L (ref 22–29)
CREAT SERPL-MCNC: 0.6 MG/DL (ref 0.7–1.2)
DEPRECATED RDW RBC AUTO: 47.4 FL (ref 35–45)
ERYTHROCYTE [DISTWIDTH] IN BLOOD BY AUTOMATED COUNT: 13.2 % (ref 11.5–14.5)
GFR SERPL CREATININE-BSD FRML MDRD: > 90 ML/MIN/1.73M2
GLUCOSE SERPL-MCNC: 184 MG/DL (ref 74–109)
HCT VFR BLD AUTO: 30.5 % (ref 42–52)
HGB BLD-MCNC: 10.6 GM/DL (ref 14–18)
MAGNESIUM SERPL-MCNC: 1.8 MG/DL (ref 1.6–2.6)
MCH RBC QN AUTO: 34 PG (ref 26–33)
MCHC RBC AUTO-ENTMCNC: 34.8 GM/DL (ref 32.2–35.5)
MCV RBC AUTO: 97.8 FL (ref 80–94)
PHOSPHATE SERPL-MCNC: 2.4 MG/DL (ref 2.5–4.5)
PLATELET # BLD AUTO: 170 THOU/MM3 (ref 130–400)
PMV BLD AUTO: 10.6 FL (ref 9.4–12.4)
POTASSIUM SERPL-SCNC: 3.8 MEQ/L (ref 3.5–5.2)
PROT SERPL-MCNC: 5 G/DL (ref 6.4–8.3)
RBC # BLD AUTO: 3.12 MILL/MM3 (ref 4.7–6.1)
SODIUM SERPL-SCNC: 132 MEQ/L (ref 135–145)
WBC # BLD AUTO: 8.7 THOU/MM3 (ref 4.8–10.8)

## 2025-05-20 PROCEDURE — 97116 GAIT TRAINING THERAPY: CPT

## 2025-05-20 PROCEDURE — 97530 THERAPEUTIC ACTIVITIES: CPT

## 2025-05-20 PROCEDURE — 1200000003 HC TELEMETRY R&B

## 2025-05-20 PROCEDURE — 85027 COMPLETE CBC AUTOMATED: CPT

## 2025-05-20 PROCEDURE — 2500000003 HC RX 250 WO HCPCS

## 2025-05-20 PROCEDURE — 80053 COMPREHEN METABOLIC PANEL: CPT

## 2025-05-20 PROCEDURE — 6370000000 HC RX 637 (ALT 250 FOR IP): Performed by: STUDENT IN AN ORGANIZED HEALTH CARE EDUCATION/TRAINING PROGRAM

## 2025-05-20 PROCEDURE — 2580000003 HC RX 258

## 2025-05-20 PROCEDURE — 83735 ASSAY OF MAGNESIUM: CPT

## 2025-05-20 PROCEDURE — 6370000000 HC RX 637 (ALT 250 FOR IP)

## 2025-05-20 PROCEDURE — 92523 SPEECH SOUND LANG COMPREHEN: CPT | Performed by: SPEECH-LANGUAGE PATHOLOGIST

## 2025-05-20 PROCEDURE — 99232 SBSQ HOSP IP/OBS MODERATE 35: CPT | Performed by: STUDENT IN AN ORGANIZED HEALTH CARE EDUCATION/TRAINING PROGRAM

## 2025-05-20 PROCEDURE — 97110 THERAPEUTIC EXERCISES: CPT

## 2025-05-20 PROCEDURE — 84100 ASSAY OF PHOSPHORUS: CPT

## 2025-05-20 PROCEDURE — 36415 COLL VENOUS BLD VENIPUNCTURE: CPT

## 2025-05-20 RX ADMIN — SODIUM CHLORIDE: 0.9 INJECTION, SOLUTION INTRAVENOUS at 16:53

## 2025-05-20 RX ADMIN — APIXABAN 5 MG: 5 TABLET, FILM COATED ORAL at 22:07

## 2025-05-20 RX ADMIN — ACETAMINOPHEN 650 MG: 325 TABLET ORAL at 15:37

## 2025-05-20 RX ADMIN — Medication 1 TABLET: at 09:01

## 2025-05-20 RX ADMIN — Medication 100 MG: at 09:01

## 2025-05-20 RX ADMIN — APIXABAN 5 MG: 5 TABLET, FILM COATED ORAL at 09:01

## 2025-05-20 RX ADMIN — SODIUM CHLORIDE, PRESERVATIVE FREE 10 ML: 5 INJECTION INTRAVENOUS at 22:07

## 2025-05-20 RX ADMIN — ATORVASTATIN CALCIUM 40 MG: 40 TABLET, FILM COATED ORAL at 22:07

## 2025-05-20 RX ADMIN — FOLIC ACID 1 MG: 1 TABLET ORAL at 09:01

## 2025-05-20 RX ADMIN — METOPROLOL SUCCINATE 25 MG: 25 TABLET, EXTENDED RELEASE ORAL at 09:01

## 2025-05-20 ASSESSMENT — PAIN SCALES - GENERAL
PAINLEVEL_OUTOF10: 0

## 2025-05-20 NOTE — CARE COORDINATION
5/20/25, 8:28 AM EDT    DISCHARGE PLANNING EVALUATION    Referrals sent to [facility] and [facility]

## 2025-05-20 NOTE — DISCHARGE INSTR - COC
Facility (if applicable)   Name:  Address:  Dialysis Schedule:  Phone:  Fax:    / signature: Electronically signed by DANI Oleary on 5/20/25 at 2:40 PM EDT    PHYSICIAN SECTION    Prognosis: Fair    Condition at Discharge: Stable    Rehab Potential (if transferring to Rehab): Fair    Recommended Labs or Other Treatments After Discharge: bmp/cbc    Physician Certification: I certify the above information and transfer of Lawrence Peterson  is necessary for the continuing treatment of the diagnosis listed and that he requires Skilled Nursing Facility for greater 30 days.     Update Admission H&P: No change in H&P    PHYSICIAN SIGNATURE:  Electronically signed by Mustapha Cassidy DO on 5/22/25 at 8:20 AM EDT

## 2025-05-20 NOTE — PLAN OF CARE
Problem: Discharge Planning  Goal: Discharge to home or other facility with appropriate resources  5/20/2025 0024 by Srikanth Jason RN  Outcome: Progressing     Problem: Safety - Adult  Goal: Free from fall injury  5/20/2025 0024 by Srikanth Jason RN  Outcome: Progressing     Problem: ABCDS Injury Assessment  Goal: Absence of physical injury  5/20/2025 0024 by Srikanth Jason RN  Outcome: Progressing     Problem: Skin/Tissue Integrity  Goal: Skin integrity remains intact  Description: 1.  Monitor for areas of redness and/or skin breakdown2.  Assess vascular access sites hourly3.  Every 4-6 hours minimum:  Change oxygen saturation probe site4.  Every 4-6 hours:  If on nasal continuous positive airway pressure, respiratory therapy assess nares and determine need for appliance change or resting period  5/20/2025 0024 by Srikanth Jason RN  Outcome: Progressing     Problem: Pain  Goal: Verbalizes/displays adequate comfort level or baseline comfort level  5/20/2025 0024 by Srikanth Jason RN  Outcome: Progressing     Problem: Cardiovascular - Adult  Goal: Maintains optimal cardiac output and hemodynamic stability  5/20/2025 0024 by Srikanth Jason RN  Outcome: Progressing     Problem: Skin/Tissue Integrity - Adult  Goal: Incisions, wounds, or drain sites healing without S/S of infection  5/20/2025 0024 by Srikanth Jason RN  Outcome: Progressing     Problem: Genitourinary - Adult  Goal: Absence of urinary retention  5/20/2025 0024 by Srikanth Jason RN  Outcome: Progressing     Problem: Infection - Adult  Goal: Absence of infection at discharge  5/20/2025 0024 by Srikanth Jason RN  Outcome: Progressing

## 2025-05-20 NOTE — CARE COORDINATION
5/20/25, 11:21 AM EDT    DISCHARGE PLANNING EVALUATION    Spoke with [facility] admissions, facility is reviewing, will need precert if accepting.  Facility plans on site visit today.

## 2025-05-20 NOTE — CARE COORDINATION
5/20/25, 1:49 PM EDT    DISCHARGE PLANNING EVALUATION    [facility] will accept and is starting precert.  Confirmed plan with patient, who met with facility staff.  He is in agreement with [facility] snf.

## 2025-05-20 NOTE — PLAN OF CARE
Problem: Chronic Conditions and Co-morbidities  Goal: Patient's chronic conditions and co-morbidity symptoms are monitored and maintained or improved  5/20/2025 1314 by Gloria Alvarenga RN  Outcome: Progressing  Flowsheets (Taken 5/20/2025 0858)  Care Plan - Patient's Chronic Conditions and Co-Morbidity Symptoms are Monitored and Maintained or Improved:   Monitor and assess patient's chronic conditions and comorbid symptoms for stability, deterioration, or improvement   Collaborate with multidisciplinary team to address chronic and comorbid conditions and prevent exacerbation or deterioration  5/20/2025 0024 by Srikanth Jason, RN  Outcome: Progressing     Problem: Discharge Planning  Goal: Discharge to home or other facility with appropriate resources  5/20/2025 1314 by Gloria Alvarenga RN  Outcome: Progressing  Flowsheets (Taken 5/20/2025 0858)  Discharge to home or other facility with appropriate resources:   Identify barriers to discharge with patient and caregiver   Arrange for needed discharge resources and transportation as appropriate  5/20/2025 0024 by Srikanth Jason RN  Outcome: Progressing     Problem: Safety - Adult  Goal: Free from fall injury  5/20/2025 1314 by Gloria Alvarenga RN  Outcome: Progressing  5/20/2025 0024 by Srikanth Jason RN  Outcome: Progressing     Problem: ABCDS Injury Assessment  Goal: Absence of physical injury  5/20/2025 1314 by Gloria Alvarenga RN  Outcome: Progressing  5/20/2025 0024 by Srikanth Jason RN  Outcome: Progressing     Problem: Skin/Tissue Integrity  Goal: Skin integrity remains intact  Description: 1.  Monitor for areas of redness and/or skin breakdown2.  Assess vascular access sites hourly3.  Every 4-6 hours minimum:  Change oxygen saturation probe site4.  Every 4-6 hours:  If on nasal continuous positive airway pressure, respiratory therapy assess nares and determine need for appliance change or resting period  5/20/2025 1314 by

## 2025-05-21 LAB
ANION GAP SERPL CALC-SCNC: 10 MEQ/L (ref 8–16)
BASOPHILS ABSOLUTE: 0.1 THOU/MM3 (ref 0–0.1)
BASOPHILS NFR BLD AUTO: 0.8 %
BUN SERPL-MCNC: 7 MG/DL (ref 8–23)
CALCIUM SERPL-MCNC: 8.5 MG/DL (ref 8.8–10.2)
CHLORIDE SERPL-SCNC: 104 MEQ/L (ref 98–111)
CO2 SERPL-SCNC: 21 MEQ/L (ref 22–29)
CREAT SERPL-MCNC: 0.6 MG/DL (ref 0.7–1.2)
DEPRECATED RDW RBC AUTO: 49.6 FL (ref 35–45)
EOSINOPHIL NFR BLD AUTO: 3.6 %
EOSINOPHILS ABSOLUTE: 0.3 THOU/MM3 (ref 0–0.4)
ERYTHROCYTE [DISTWIDTH] IN BLOOD BY AUTOMATED COUNT: 13.4 % (ref 11.5–14.5)
GFR SERPL CREATININE-BSD FRML MDRD: > 90 ML/MIN/1.73M2
GLUCOSE SERPL-MCNC: 117 MG/DL (ref 74–109)
HCT VFR BLD AUTO: 33.4 % (ref 42–52)
HGB BLD-MCNC: 11.3 GM/DL (ref 14–18)
IMM GRANULOCYTES # BLD AUTO: 0.04 THOU/MM3 (ref 0–0.07)
IMM GRANULOCYTES NFR BLD AUTO: 0.4 %
LYMPHOCYTES ABSOLUTE: 0.8 THOU/MM3 (ref 1–4.8)
LYMPHOCYTES NFR BLD AUTO: 8.8 %
MCH RBC QN AUTO: 33.9 PG (ref 26–33)
MCHC RBC AUTO-ENTMCNC: 33.8 GM/DL (ref 32.2–35.5)
MCV RBC AUTO: 100.3 FL (ref 80–94)
MONOCYTES ABSOLUTE: 0.6 THOU/MM3 (ref 0.4–1.3)
MONOCYTES NFR BLD AUTO: 7 %
NEUTROPHILS ABSOLUTE: 7.3 THOU/MM3 (ref 1.8–7.7)
NEUTROPHILS NFR BLD AUTO: 79.4 %
NRBC BLD AUTO-RTO: 0 /100 WBC
PLATELET # BLD AUTO: 173 THOU/MM3 (ref 130–400)
PMV BLD AUTO: 10.4 FL (ref 9.4–12.4)
POTASSIUM SERPL-SCNC: 3.6 MEQ/L (ref 3.5–5.2)
RBC # BLD AUTO: 3.33 MILL/MM3 (ref 4.7–6.1)
SODIUM SERPL-SCNC: 135 MEQ/L (ref 135–145)
WBC # BLD AUTO: 9.2 THOU/MM3 (ref 4.8–10.8)

## 2025-05-21 PROCEDURE — 6370000000 HC RX 637 (ALT 250 FOR IP): Performed by: STUDENT IN AN ORGANIZED HEALTH CARE EDUCATION/TRAINING PROGRAM

## 2025-05-21 PROCEDURE — 97110 THERAPEUTIC EXERCISES: CPT

## 2025-05-21 PROCEDURE — 85025 COMPLETE CBC W/AUTO DIFF WBC: CPT

## 2025-05-21 PROCEDURE — 1200000003 HC TELEMETRY R&B

## 2025-05-21 PROCEDURE — 80048 BASIC METABOLIC PNL TOTAL CA: CPT

## 2025-05-21 PROCEDURE — 97116 GAIT TRAINING THERAPY: CPT

## 2025-05-21 PROCEDURE — 36415 COLL VENOUS BLD VENIPUNCTURE: CPT

## 2025-05-21 PROCEDURE — 2580000003 HC RX 258

## 2025-05-21 PROCEDURE — 2500000003 HC RX 250 WO HCPCS

## 2025-05-21 PROCEDURE — 6370000000 HC RX 637 (ALT 250 FOR IP): Performed by: INTERNAL MEDICINE

## 2025-05-21 PROCEDURE — 97535 SELF CARE MNGMENT TRAINING: CPT

## 2025-05-21 PROCEDURE — 6370000000 HC RX 637 (ALT 250 FOR IP)

## 2025-05-21 RX ADMIN — METOPROLOL SUCCINATE 25 MG: 25 TABLET, EXTENDED RELEASE ORAL at 08:14

## 2025-05-21 RX ADMIN — Medication 1 TABLET: at 08:14

## 2025-05-21 RX ADMIN — SACUBITRIL AND VALSARTAN 0.5 TABLET: 49; 51 TABLET, FILM COATED ORAL at 20:24

## 2025-05-21 RX ADMIN — ATORVASTATIN CALCIUM 40 MG: 40 TABLET, FILM COATED ORAL at 20:24

## 2025-05-21 RX ADMIN — SODIUM CHLORIDE: 0.9 INJECTION, SOLUTION INTRAVENOUS at 06:26

## 2025-05-21 RX ADMIN — Medication 100 MG: at 08:14

## 2025-05-21 RX ADMIN — FOLIC ACID 1 MG: 1 TABLET ORAL at 08:14

## 2025-05-21 RX ADMIN — SACUBITRIL AND VALSARTAN 0.5 TABLET: 49; 51 TABLET, FILM COATED ORAL at 09:40

## 2025-05-21 RX ADMIN — SODIUM CHLORIDE, PRESERVATIVE FREE 10 ML: 5 INJECTION INTRAVENOUS at 20:24

## 2025-05-21 RX ADMIN — APIXABAN 5 MG: 5 TABLET, FILM COATED ORAL at 20:24

## 2025-05-21 RX ADMIN — APIXABAN 5 MG: 5 TABLET, FILM COATED ORAL at 08:14

## 2025-05-21 ASSESSMENT — PAIN SCALES - GENERAL
PAINLEVEL_OUTOF10: 0

## 2025-05-21 NOTE — CARE COORDINATION
5/21/25, 2:12 PM EDT    DISCHARGE PLANNING EVALUATION    Precert approved, transport available tomorrow, requested for 10:00 a.m., confirmed with patient and with snf.

## 2025-05-21 NOTE — PLAN OF CARE
Problem: Chronic Conditions and Co-morbidities  Goal: Patient's chronic conditions and co-morbidity symptoms are monitored and maintained or improved  Outcome: Progressing  Flowsheets (Taken 5/21/2025 0807)  Care Plan - Patient's Chronic Conditions and Co-Morbidity Symptoms are Monitored and Maintained or Improved:   Monitor and assess patient's chronic conditions and comorbid symptoms for stability, deterioration, or improvement   Collaborate with multidisciplinary team to address chronic and comorbid conditions and prevent exacerbation or deterioration     Problem: Discharge Planning  Goal: Discharge to home or other facility with appropriate resources  Outcome: Progressing  Flowsheets (Taken 5/21/2025 0807)  Discharge to home or other facility with appropriate resources:   Identify barriers to discharge with patient and caregiver   Arrange for needed discharge resources and transportation as appropriate   Identify discharge learning needs (meds, wound care, etc)     Problem: Safety - Adult  Goal: Free from fall injury  Outcome: Progressing     Problem: ABCDS Injury Assessment  Goal: Absence of physical injury  Outcome: Progressing     Problem: Skin/Tissue Integrity  Goal: Skin integrity remains intact  Description: 1.  Monitor for areas of redness and/or skin breakdown2.  Assess vascular access sites hourly3.  Every 4-6 hours minimum:  Change oxygen saturation probe site4.  Every 4-6 hours:  If on nasal continuous positive airway pressure, respiratory therapy assess nares and determine need for appliance change or resting period  Outcome: Progressing  Flowsheets (Taken 5/21/2025 0807)  Skin Integrity Remains Intact:   Assess vascular access sites hourly   Monitor for areas of redness and/or skin breakdown   Every 4-6 hours minimum:  Change oxygen saturation probe site     Problem: Pain  Goal: Verbalizes/displays adequate comfort level or baseline comfort level  Outcome: Progressing  Flowsheets (Taken 5/21/2025  Subjective   Patient ID: Paty Guthrie is a 64 y.o. female who presents for Follow-up.    HPI   routine follow up. chronic issues as per assessment and plan.     OARRS:  Dagmar Robin MD on 3/28/2024  2:02 PM  I have personally reviewed the OARRS report for Paty Guthrie. I have considered the risks of abuse, dependence, addiction and diversion and I believe that it is clinically appropriate for Paty Guthrie to be prescribed this medication    Is the patient prescribed a combination of a benzodiazepine and opioid?  No    Last Urine Drug Screen / ordered today: No  Recent Results (from the past 8760 hour(s))   Confirmation Opiate/Opioid/Benzo Prescription Compliance    Collection Time: 12/18/23  3:01 PM   Result Value Ref Range    Clonazepam <25 <25 ng/mL    7-Aminoclonazepam <25 <25 ng/mL    Alprazolam <25 <25 ng/mL    Alpha-Hydroxyalprazolam <25 <25 ng/mL    Midazolam <25 <25 ng/mL    Alpha-Hydroxymidazolam <25 <25 ng/mL    Chlordiazepoxide <25 <25 ng/mL    Diazepam <25 <25 ng/mL    Nordiazepam <25 <25 ng/mL    Temazepam <25 <25 ng/mL    Oxazepam <25 <25 ng/mL    Lorazepam 228 (H) <25 ng/mL    Methadone <25 <25 ng/mL    EDDP <25 <25 ng/mL    6-Acetylmorphine <25 <25 ng/mL    Codeine <50 <50 ng/mL    Hydrocodone <25 <25 ng/mL    Hydromorphone <25 <25 ng/mL    Morphine  <50 <50 ng/mL    Norhydrocodone <25 <25 ng/mL    Noroxycodone <25 <25 ng/mL    Oxycodone <25 <25 ng/mL    Oxymorphone <25 <25 ng/mL    Fentanyl <2.5 <2.5 ng/mL    Norfentanyl <2.5 <2.5 ng/mL    Tramadol <50 <50 ng/mL    O-Desmethyltramadol <50 <50 ng/mL    Zolpidem <25 <25 ng/mL    Zolpidem Metabolite (ZCA) <25 <25 ng/mL   Screen Opiate/Opioid/Benzo Prescription Compliance    Collection Time: 12/18/23  3:01 PM   Result Value Ref Range    Creatinine, Urine Random 13.1 (L) 20.0 - 320.0 mg/dL    Amphetamine Screen, Urine Presumptive Negative Presumptive Negative    Barbiturate Screen, Urine Presumptive Negative Presumptive Negative    Cannabinoid  Screen, Urine Presumptive Negative Presumptive Negative    Cocaine Metabolite Screen, Urine Presumptive Negative Presumptive Negative    PCP Screen, Urine Presumptive Negative Presumptive Negative     Results are as expected.         Controlled Substance Agreement: Benzodiazepine  Date of the Last Agreement: Dec. 2023  Reviewed Controlled Substance Agreement including but not limited to the benefits, risks, and alternatives to treatment with a Controlled Substance medication(s).    Benzodiazepines:  What is the patient's goal of therapy? Reduction of anxiety   Is this being achieved with current treatment? yes    OSVALDO-7:  Over the last 2 weeks, how often have you been bothered by any of the following problems?  Feeling nervous, anxious, or on edge: 0  Not being able to stop or control worryin  Worrying too much about different things: 0  Trouble relaxin  Being so restless that it is hard to sit still: 0  Becoming easily annoyed or irritable: 0  Feeling afraid as if something awful might happen: 1  OSVALDO-7 Total Score: 1        Activities of Daily Living:   Is your overall impression that this patient is benefiting (symptom reduction outweighs side effects) from benzodiazepine therapy? Yes     1. Physical Functioning: Better  2. Family Relationship: Better  3. Social Relationship: Better  4. Mood: Better  5. Sleep Patterns: Better  6. Overall Function: Better    Review of Systems   Constitutional:  Negative for chills, diaphoresis, fever and unexpected weight change.   HENT:  Negative for congestion, sinus pressure, sinus pain, sneezing and sore throat.    Respiratory:  Negative for cough, chest tightness, shortness of breath and wheezing.    Cardiovascular:  Negative for chest pain, palpitations and leg swelling.   Gastrointestinal:  Negative for abdominal pain, constipation, diarrhea, nausea and vomiting.   Endocrine: Negative for cold intolerance, heat intolerance, polydipsia, polyphagia and polyuria.  "  Genitourinary:  Negative for dysuria, frequency, hematuria and urgency.   Neurological:  Negative for dizziness, syncope, light-headedness, numbness and headaches.   Hematological:  Negative for adenopathy.   Psychiatric/Behavioral:  Negative for confusion and dysphoric mood. The patient is not nervous/anxious.        Objective   /84 (BP Location: Right arm, Patient Position: Sitting, BP Cuff Size: Adult)   Pulse 63   Resp 14   Ht 1.562 m (5' 1.5\")   Wt 64.4 kg (142 lb)   SpO2 94%   BMI 26.40 kg/m²     Physical Exam  Vitals and nursing note reviewed.   Constitutional:       General: She is not in acute distress.     Appearance: Normal appearance.   HENT:      Head: Normocephalic and atraumatic.      Nose: Nose normal.   Eyes:      Extraocular Movements: Extraocular movements intact.      Conjunctiva/sclera: Conjunctivae normal.      Pupils: Pupils are equal, round, and reactive to light.   Cardiovascular:      Rate and Rhythm: Normal rate and regular rhythm.      Heart sounds: No murmur heard.     No friction rub. No gallop.   Pulmonary:      Effort: Pulmonary effort is normal.      Breath sounds: Normal breath sounds. No wheezing, rhonchi or rales.   Abdominal:      General: Bowel sounds are normal. There is no distension.      Palpations: Abdomen is soft.      Tenderness: There is no abdominal tenderness.   Musculoskeletal:         General: Normal range of motion.      Cervical back: Normal range of motion and neck supple.   Skin:     General: Skin is warm and dry.   Neurological:      General: No focal deficit present.      Mental Status: She is alert and oriented to person, place, and time.      Deep Tendon Reflexes: Reflexes normal.   Psychiatric:         Mood and Affect: Mood normal.         Behavior: Behavior normal.         Thought Content: Thought content normal.         Judgment: Judgment normal.         Assessment/Plan   Problem List Items Addressed This Visit             ICD-10-CM    " Allergic rhinitis due to house dust mite - Primary J30.89     - continue fexofenadine          Relevant Orders    Vitamin B12    Comprehensive Metabolic Panel    TSH with reflex to Free T4 if abnormal    CBC and Auto Differential    Alejo's esophagus with dysplasia K22.719     - follows with GI         Relevant Orders    Vitamin B12    Comprehensive Metabolic Panel    TSH with reflex to Free T4 if abnormal    CBC and Auto Differential    BMI 26.0-26.9,adult Z68.26     - Encouraged healthy lifestyle, including adequate exercise and high fiber, low fat and low carb diet.          Relevant Orders    Vitamin B12    Comprehensive Metabolic Panel    TSH with reflex to Free T4 if abnormal    CBC and Auto Differential    Gastroesophageal reflux disease with esophagitis without hemorrhage K21.00     - controlled. Continue lansoprazole          Relevant Orders    Vitamin B12    Comprehensive Metabolic Panel    TSH with reflex to Free T4 if abnormal    CBC and Auto Differential    Hives L50.9     - continue fexofenadine         Relevant Orders    Vitamin B12    Comprehensive Metabolic Panel    TSH with reflex to Free T4 if abnormal    CBC and Auto Differential    Irritable bowel syndrome with diarrhea K58.0     - diet controlled.         Relevant Orders    Vitamin B12    Comprehensive Metabolic Panel    TSH with reflex to Free T4 if abnormal    CBC and Auto Differential    Leukopenia D72.819     - saw hematology. [place] to be benign. monitor         Relevant Orders    Vitamin B12    Comprehensive Metabolic Panel    TSH with reflex to Free T4 if abnormal    CBC and Auto Differential    Osteoporosis M81.0     - DEXA ordered          Relevant Orders    Vitamin D 25-Hydroxy,Total (for eval of Vitamin D levels)    Vitamin B12    XR DEXA bone density    Overweight with body mass index (BMI) of 26 to 26.9 in adult E66.3, Z68.26     - Encouraged healthy lifestyle, including adequate exercise and high fiber, low fat and low carb diet.           Relevant Orders    Vitamin B12    Comprehensive Metabolic Panel    TSH with reflex to Free T4 if abnormal    CBC and Auto Differential    Psychophysiological insomnia F51.04     - stable. continue lorazepam. Has tried many other options and has not been successful         Relevant Medications    LORazepam (Ativan) 2 mg tablet    Other Relevant Orders    Vitamin B12    Comprehensive Metabolic Panel    TSH with reflex to Free T4 if abnormal    CBC and Auto Differential     Other Visit Diagnoses         Codes    Lipid screening     Z13.220    Relevant Orders    Lipid Panel    Need for hepatitis C screening test     Z11.59    Relevant Orders    Hepatitis C Antibody    Other general symptoms and signs     R68.89    Relevant Orders    TSH with reflex to Free T4 if abnormal

## 2025-05-21 NOTE — PLAN OF CARE
Problem: Chronic Conditions and Co-morbidities  Goal: Patient's chronic conditions and co-morbidity symptoms are monitored and maintained or improved  5/21/2025 0051 by Tiffany Morris RN  Outcome: Progressing  Flowsheets (Taken 5/20/2025 0858 by Gloria Alvarenga RN)  Care Plan - Patient's Chronic Conditions and Co-Morbidity Symptoms are Monitored and Maintained or Improved:   Monitor and assess patient's chronic conditions and comorbid symptoms for stability, deterioration, or improvement   Collaborate with multidisciplinary team to address chronic and comorbid conditions and prevent exacerbation or deterioration  5/20/2025 1314 by Gloria Alvarenga RN  Outcome: Progressing  Flowsheets (Taken 5/20/2025 0858)  Care Plan - Patient's Chronic Conditions and Co-Morbidity Symptoms are Monitored and Maintained or Improved:   Monitor and assess patient's chronic conditions and comorbid symptoms for stability, deterioration, or improvement   Collaborate with multidisciplinary team to address chronic and comorbid conditions and prevent exacerbation or deterioration     Problem: Discharge Planning  Goal: Discharge to home or other facility with appropriate resources  5/21/2025 0051 by Tiffany Morris RN  Outcome: Progressing  Flowsheets (Taken 5/20/2025 0858 by Gloria Alvarenga RN)  Discharge to home or other facility with appropriate resources:   Identify barriers to discharge with patient and caregiver   Arrange for needed discharge resources and transportation as appropriate  5/20/2025 1314 by Gloria Alvarenga RN  Outcome: Progressing  Flowsheets (Taken 5/20/2025 0858)  Discharge to home or other facility with appropriate resources:   Identify barriers to discharge with patient and caregiver   Arrange for needed discharge resources and transportation as appropriate     Problem: Safety - Adult  Goal: Free from fall injury  5/21/2025 0051 by Tiffany Morris RN  Outcome: Progressing  Flowsheets (Taken 5/19/2025 1924 by

## 2025-05-21 NOTE — CARE COORDINATION
5/21/25, 9:43 AM EDT    DISCHARGE PLANNING EVALUATION    Precert for [facility], will accept when approved

## 2025-05-22 VITALS
HEART RATE: 81 BPM | TEMPERATURE: 98.2 F | BODY MASS INDEX: 21.33 KG/M2 | SYSTOLIC BLOOD PRESSURE: 127 MMHG | WEIGHT: 115.9 LBS | DIASTOLIC BLOOD PRESSURE: 96 MMHG | OXYGEN SATURATION: 97 % | HEIGHT: 62 IN | RESPIRATION RATE: 18 BRPM

## 2025-05-22 LAB
ANION GAP SERPL CALC-SCNC: 11 MEQ/L (ref 8–16)
BASOPHILS ABSOLUTE: 0.1 THOU/MM3 (ref 0–0.1)
BASOPHILS NFR BLD AUTO: 0.6 %
BUN SERPL-MCNC: 10 MG/DL (ref 8–23)
CALCIUM SERPL-MCNC: 8.5 MG/DL (ref 8.8–10.2)
CHLORIDE SERPL-SCNC: 99 MEQ/L (ref 98–111)
CO2 SERPL-SCNC: 20 MEQ/L (ref 22–29)
CREAT SERPL-MCNC: 0.6 MG/DL (ref 0.7–1.2)
DEPRECATED RDW RBC AUTO: 46.7 FL (ref 35–45)
EOSINOPHIL NFR BLD AUTO: 3 %
EOSINOPHILS ABSOLUTE: 0.3 THOU/MM3 (ref 0–0.4)
ERYTHROCYTE [DISTWIDTH] IN BLOOD BY AUTOMATED COUNT: 13.1 % (ref 11.5–14.5)
GFR SERPL CREATININE-BSD FRML MDRD: > 90 ML/MIN/1.73M2
GLUCOSE SERPL-MCNC: 138 MG/DL (ref 74–109)
HCT VFR BLD AUTO: 32.2 % (ref 42–52)
HGB BLD-MCNC: 11.3 GM/DL (ref 14–18)
IMM GRANULOCYTES # BLD AUTO: 0.04 THOU/MM3 (ref 0–0.07)
IMM GRANULOCYTES NFR BLD AUTO: 0.4 %
LYMPHOCYTES ABSOLUTE: 0.9 THOU/MM3 (ref 1–4.8)
LYMPHOCYTES NFR BLD AUTO: 10 %
MCH RBC QN AUTO: 33.6 PG (ref 26–33)
MCHC RBC AUTO-ENTMCNC: 35.1 GM/DL (ref 32.2–35.5)
MCV RBC AUTO: 95.8 FL (ref 80–94)
MONOCYTES ABSOLUTE: 0.9 THOU/MM3 (ref 0.4–1.3)
MONOCYTES NFR BLD AUTO: 10.1 %
NEUTROPHILS ABSOLUTE: 7.1 THOU/MM3 (ref 1.8–7.7)
NEUTROPHILS NFR BLD AUTO: 75.9 %
NRBC BLD AUTO-RTO: 0 /100 WBC
PLATELET # BLD AUTO: 196 THOU/MM3 (ref 130–400)
PMV BLD AUTO: 11 FL (ref 9.4–12.4)
POTASSIUM SERPL-SCNC: 3.7 MEQ/L (ref 3.5–5.2)
RBC # BLD AUTO: 3.36 MILL/MM3 (ref 4.7–6.1)
SODIUM SERPL-SCNC: 130 MEQ/L (ref 135–145)
WBC # BLD AUTO: 9.4 THOU/MM3 (ref 4.8–10.8)

## 2025-05-22 PROCEDURE — 2500000003 HC RX 250 WO HCPCS

## 2025-05-22 PROCEDURE — 85025 COMPLETE CBC W/AUTO DIFF WBC: CPT

## 2025-05-22 PROCEDURE — 36415 COLL VENOUS BLD VENIPUNCTURE: CPT

## 2025-05-22 PROCEDURE — 80048 BASIC METABOLIC PNL TOTAL CA: CPT

## 2025-05-22 PROCEDURE — 6370000000 HC RX 637 (ALT 250 FOR IP)

## 2025-05-22 PROCEDURE — 6370000000 HC RX 637 (ALT 250 FOR IP): Performed by: INTERNAL MEDICINE

## 2025-05-22 PROCEDURE — 6370000000 HC RX 637 (ALT 250 FOR IP): Performed by: STUDENT IN AN ORGANIZED HEALTH CARE EDUCATION/TRAINING PROGRAM

## 2025-05-22 RX ADMIN — FOLIC ACID 1 MG: 1 TABLET ORAL at 08:06

## 2025-05-22 RX ADMIN — SACUBITRIL AND VALSARTAN 0.5 TABLET: 49; 51 TABLET, FILM COATED ORAL at 08:06

## 2025-05-22 RX ADMIN — METOPROLOL SUCCINATE 25 MG: 25 TABLET, EXTENDED RELEASE ORAL at 08:06

## 2025-05-22 RX ADMIN — Medication 100 MG: at 08:06

## 2025-05-22 RX ADMIN — SODIUM CHLORIDE, PRESERVATIVE FREE 10 ML: 5 INJECTION INTRAVENOUS at 08:06

## 2025-05-22 RX ADMIN — Medication 1 TABLET: at 08:06

## 2025-05-22 RX ADMIN — APIXABAN 5 MG: 5 TABLET, FILM COATED ORAL at 08:06

## 2025-05-22 NOTE — PROGRESS NOTES
Hospitalist Progress Note      Patient:  Lawrence Peterson 69 y.o. male     : 1955  Unit/Bed:[address] [place] [address]-A  Date of Admission: 2025        ASSESSMENT AND PLAN       #Traumatic fall, prox humeral fx: Presented as level 2 trauma following ground-level fall. R shoulder deformity/ecchymosis present. Orthopedics/trauma following, no surgical intervention and options for conservative treatment pain control, neurovascular checks/ice/NWB to RUE/sling/NPO/PT/OT.  Discontinue IVF as patient has good p.o. intake  #Acute macrocytic anemia: Hgb 13.6 on admission, previously normal during admission. Mild decline, follow-up B12/folate/iron studies.   #CAD: [facility] : LAD 45% distal stenosis, LCx with patent stent, RCA  with L-R/R-R collaterals. Troponin 31-30 on admission. Continue telemetry, no AP noted at home. Recommend consideration of ASA initiation following surgical intervention if performed. Patient does remain high-risk of falls/bleeding given alcohol abuse history  #Chronic systolic heart failure, ICM, hx NSVT/paroxysmal A-fib: ECHO 25: EF 35-40%, G1DD. S/p AICD. Recent ICD discharged noted requiring admission. EP previously consulted and settings adjusted. Home Regimen: Toprol, Entresto, Replete electrolytes, holding  Entresto given symptoms of lightheadedness and dizziness, continued on Toprol. No SGLT2i, aldactone noted at home - will defer to OP cardiologist. Pacer interrogated in ED - unable to locate - will re-send.  Resume PTA Eliquis and discontinue heparin drip  #Alcohol abuse: Drinks ~1/3L gin daily. Not interested in quitting per prior documentation. Addiction services consulted. Thiamine/folic acid/MV. Pheno prn ordered - recent admission without withdrawal symptoms noted. On assessment, patient alert/oriented though frequently repeats previously discussed items. Unclear baseline mentation, cog eval ordered  #Hyperbilirubinemia: Total bilirubin 1.6 on admission, recent admission noted with 
  Physician Progress Note      PATIENT:               SUMI RICE  CSN #:                  [id number]  :                       1955  ADMIT DATE:       2025 12:22 AM  DISCH DATE:  RESPONDING  PROVIDER #:        Hoa Serrano MD          QUERY TEXT:    Based on your medical judgment, please clarify these findings and document if   any of the following are being evaluated and/or treated:    The clinical indicators include:  70yo, male, chf, cad, cva   H&P \"pAF: [id number] 4. Home regimen: Eliquis\"  Options provided:  -- Secondary hypercoagulable state in a patient with atrial fibrillation  -- Other - I will add my own diagnosis  -- Disagree - Not applicable / Not valid  -- Disagree - Clinically unable to determine / Unknown  -- Refer to Clinical Documentation Reviewer    PROVIDER RESPONSE TEXT:    This patient has secondary hypercoagulable state in a patient with atrial   fibrillation.    Query created by: Mita Storm on 2025 10:43 AM      Electronically signed by:  Hoa Serrano MD 2025 11:06 AM          
ADDICTION SBIRT attempted. Patient with nursing staff. JULIAN  to re-attempt at later time.   
ADDICTION and TRAUMA SBIRT attempted; patient with nursing staff. JULIAN  to re-attempt at later time.   
[facility]  PHYSICAL THERAPY MISSED TREATMENT NOTE  [facility]    Date: 2025  Patient Name: Lawrence Peterson        MRN: [id number]   : 1955  (69 y.o.)  Gender: male   Referring Practitioner: Jose Brewster MD            REASON FOR MISSED TREATMENT:  Pt in bed on arrival nursing ok'daniel therapy reported that he has transport set up for this am. Pt declined therapy at this time and he refused to sit up for breakfast noted min irritability when encouragement given  .             
[facility]  SPEECH THERAPY  [facility]  Speech - Language - Cognitive Evaluation    Discharge Recommendations: SNF    SLP Individual Minutes  Time In: 0745  Time Out: 0801  Minutes: 16  Timed Code Treatment Minutes: 0 Minutes       Date: 2025  Patient Name: Lawrence Peterson      CSN: [id number]   : 1955  (69 y.o.)  Gender: male   Referring Physician:  Jose Brewster MD  Diagnosis: Fall at home, initial encounter  Precautions: Fall risk, Seizure precautions  History of Present Illness/Injury: Patient admitted with the above diagnosis. Per physician H&P: \"Lawrence Peterson is a 69 y.o. male with PMHx of CHF, PAF, HTN, HLD, alcohol abuse who presents to [facility] with fall.       Patient presents to [facility] for evaluation of fall.  He denied any LOC, states he believes he slipped at home and fell to the floor.  He states that he believes the son was up when he fell, however he was unable to get up and states he was not found until much later.  He reports right upper extremity pain that is worsened with movement with significant bruising noted.  He has a known history of chronic systolic heart failure, previous AICD placement with recent admission due to inappropriate ICD shocks with adjustments made at that time.  He also has a history of atrial fibrillation on Eliquis at home.  Patient has a significant history of alcohol abuse, reportedly drinking 1/3 L of liquor daily.  On arrival, patient denied any chest pain, shortness of breath, abdominal pain, weakness, numbness, fevers, chills, nausea, vomiting.       ED course: On arrival to [facility], patient underwent pan scan due to traumatic fall.  Who presented as a level 2 trauma alert.  Trauma did evaluate the patient in the ED and recommended admission to hospitalist or orthopedic service due to right proximal humeral fracture.  He received no medications in the ED.  Patient was subsequently mated for further evaluation and 
[facility]  OCCUPATIONAL THERAPY MISSED TREATMENT NOTE  [facility] 7K  7K-23/023-A      Date: 2025  Patient Name: Lawrence Peterson        CSN: [id number]   : 1955  (69 y.o.)  Gender: male                REASON FOR MISSED TREATMENT: Patient Refused.  OT attempted to see patient 2x this date. 1st attempt pt declining to get OOB d/t not eating breakfast. Pt educated on importance of eating in recliner. Pt continued to declined. OT checked back after breakfast, Pt continuing to decline. Pt educated on importance of participating in therapy. Pt stated, \"I don't want to do it right now!\" OT will check back as time allows.                 
[facility]  [facility]  Occupational Therapy  Daily Note    Discharge Recommendations: Subacute/skilled nursing facility  Equipment Recommendations: Yes Bear-walker      Time In: 1416  Time Out: 1439  Timed Code Treatment Minutes: 23 Minutes  Minutes: 23          Date: 2025  Patient Name: Lawrence Peterson,   Gender: male      Room: [facility]23/023-A  MRN: [id number]  : 1955  (69 y.o.)  Referring Practitioner: Jose Brewster MD  Diagnosis: Fall at home, initial encounter  Additional Pertinent Hx: Per EMR, \"He is a 69 y.o. male presenting at [facility] by activation of level 2 trauma, brought by EMS following a fall but says he didn't lose consciousness. He says he fell at home. He believes he slipped. He says he did not lose consciousness. He believes he fell when the sun was up but he was unable to get up off the floor. Patient complains of right upper extremity pain. Slightly worse with movement. Still able to move his extremity though. Notable bruising present. He does take blood thinners and is on Eliquis. He was recently seen in the ER and admitted because he was getting shocked by his ICD and has heart failure. Patient reports drinking 1/3 of liquor daily. Per last discharge summary, patient not interested in quitting. He also has chronic tobacco use. Patient denies chest pain, shortness of breath, cough, headache, dizziness, lightheadedness, numbness, paraesthesias, weakness, chills, fevers, abdominal pain, nausea, vomiting, diarrhea, neck pain, or back pain. \"    Restrictions/Precautions:  Restrictions/Precautions: Weight Bearing, Fall Risk  Right Upper Extremity Weight Bearing: Non Weight Bearing  Right Upper Extremity Brace/Splint: sling and swathe  Required Braces or Orthoses  Right Upper Extremity Brace/Splint: Sling  Right Upper Extremity Brace/Splint: sling and swathe     Social/Functional History:  Lives With: Alone  Type of Home: Apartment  Home Layout: One level  Home 
[facility]  [facility]  Occupational Therapy  Daily Note    Discharge Recommendations: Subacute/skilled nursing facility and Patient would benefit from continued OT at discharge  Equipment Recommendations: Yes Bear-walker    Time In: 08  Time Out: 0815  Timed Code Treatment Minutes: 8 Minutes  Minutes: 8          Date: 2025  Patient Name: Lawrence Peterson,   Gender: male      Room: [address] [place] [address]  MRN: [id number]  : 1955  (69 y.o.)  Referring Practitioner: Jose Brewster MD  Diagnosis: Fall at home, initial encounter  Additional Pertinent Hx: Per EMR, \"He is a 69 y.o. male presenting at [facility] by activation of level 2 trauma, brought by EMS following a fall but says he didn't lose consciousness. He says he fell at home. He believes he slipped. He says he did not lose consciousness. He believes he fell when the sun was up but he was unable to get up off the floor. Patient complains of right upper extremity pain. Slightly worse with movement. Still able to move his extremity though. Notable bruising present. He does take blood thinners and is on Eliquis. He was recently seen in the ER and admitted because he was getting shocked by his ICD and has heart failure. Patient reports drinking 1/3 of liquor daily. Per last discharge summary, patient not interested in quitting. He also has chronic tobacco use. Patient denies chest pain, shortness of breath, cough, headache, dizziness, lightheadedness, numbness, paraesthesias, weakness, chills, fevers, abdominal pain, nausea, vomiting, diarrhea, neck pain, or back pain. \"    Restrictions/Precautions:  Restrictions/Precautions: Weight Bearing, Fall Risk  Right Upper Extremity Weight Bearing: Non Weight Bearing  Right Upper Extremity Brace/Splint: sling and swathe  Required Braces or Orthoses  Right Upper Extremity Brace/Splint: Sling  Right Upper Extremity Brace/Splint: sling and swathe     Social/Functional History:  Lives With: Alone  Type of 
[facility]  INPATIENT OCCUPATIONAL THERAPY  [facility]  EVALUATION      Discharge Recommendations: Continue to assess pending progress, Subacute/Skilled Nursing Facility, 24 hour supervision or assist  Equipment Recommendations: Yes Bear-walker      Time In: 1315  Time Out: 1340  Timed Code Treatment Minutes: 17 Minutes  Minutes: 25          Date: 2025  Patient Name: Lawrence Peterson,   Gender: male      MRN: [id number]  : 1955  (69 y.o.)  Referring Practitioner: Jose Brewster MD  Diagnosis: Fall at home, initial encounter  Additional Pertinent Hx: Per EMR, \"He is a 69 y.o. male presenting at [facility] by activation of level 2 trauma, brought by EMS following a fall but says he didn't lose consciousness. He says he fell at home. He believes he slipped. He says he did not lose consciousness. He believes he fell when the sun was up but he was unable to get up off the floor. Patient complains of right upper extremity pain. Slightly worse with movement. Still able to move his extremity though. Notable bruising present. He does take blood thinners and is on Eliquis. He was recently seen in the ER and admitted because he was getting shocked by his ICD and has heart failure. Patient reports drinking 1/3 of liquor daily. Per last discharge summary, patient not interested in quitting. He also has chronic tobacco use. Patient denies chest pain, shortness of breath, cough, headache, dizziness, lightheadedness, numbness, paraesthesias, weakness, chills, fevers, abdominal pain, nausea, vomiting, diarrhea, neck pain, or back pain. \"    Restrictions/Precautions:  Restrictions/Precautions: Weight Bearing, Fall Risk  Right Upper Extremity Weight Bearing: Non Weight Bearing  Right Upper Extremity Brace/Splint: sling and swathe  Required Braces or Orthoses  Right Upper Extremity Brace/Splint: Sling  Right Upper Extremity Brace/Splint: sling and swathe    Subjective  Chart Reviewed: Yes, Orders, 
Spiritual Health History and Assessment/Progress Note  [facility]    Spiritual/Emotional Needs, Emotional distress,  ,      Name: Lawrence Peterson MRN: [id number]    Age: 69 y.o.     Sex: male   Language: English   Buddhist: None   Fall at home, initial encounter     Date: 5/18/2025            Total Time Calculated: 19 min              Spiritual Assessment began in [facility]K        Referral/Consult From: Nurse   Encounter Overview/Reason: Spiritual/Emotional Needs  Service Provided For: Patient    Jessica, Belief, Meaning:   Patient unable to assess at this time  Family/Friends No family/friends present      Importance and Influence:  Patient has no beliefs influential to healthcare decision-making identified during this visit  Family/Friends No family/friends present    Community:  Patient feels well-supported. Support system includes: Other: friends in [facility]  Family/Friends No family/friends present    Assessment and Plan of Care:   PT is a 69 yr old single man, pt is sitting up in bed with his feet and legs drawn up to his chest, pt is open to  visit. Through active listening pt shared he had \" fallen and hurt his shoulder and just got up here in the hospital.\" Pt shares he \"isn't connected to a jessica community or social club or lodge just some good ole boys at the the [facility].\" Advised pt that  was avail when he needs us. Pt thanked  said he didn't need anything.  Patient Interventions include: Facilitated expression of thoughts and feelings  Family/Friends Interventions include: No family/friends present    Patient Plan of Care: Spiritual Care available upon further referral  Family/Friends Plan of Care: No family/friends present    Electronically signed by Chaplain Rasta on 5/18/2025 at 3:23 PM    
This note is to help document compliance.     We last filled Eliquis for him 1/9/25. At the time it was no charge through his previous insurance plan of [employer]. Since then, he has changed to a [employer] plan.    The cost today is the same copay as before of $141.44 (shown below from previous inpatient stay). It is not a deductible issue or anything, this is just his copay through this current plan. I also double checked to be sure he still did not have Medicaid secondary and it comes back saying his coverage is not active. It may be helpful to have him screened for that to see if he still qualifies.          Rosalind Swartz  Patient Assistance     Progress Notes      Signed     Date of Service: 4/4/2025  9:13 AM     Signed         Arzia eval for Eliquis:     Eliquis 5mg #60: $141.44. No deductible.     Lawrence was on this previously paying $0 and it was last filled in January for 3 months, but he has changed insurance plans since then and now unfortunately has a copay.     He has also already used the 1 month free trial and is not eligible for that. He should be eligible (through his insurance) for the Medicare-D Prescription Payment Plan to level his copays over the remainder of the year until he hits his $2k out of pocket costs, but he will have to contact his plan to opt in.     Thanks!  Rosalind Swartz CPhT  Patient  Ext [phone]  Outpatient Pharmacy  4/4/2025,9:13 AM                    
mildly elevated bilirubin - RUQ US at that time with cholelithiasis with borderline thickened GB wall, no significant biliary duct dilatation, suspected hepatic steatosis. Denies abdominal pain. Will defer repeat imaging at this time. Obtain fractionated bili, suspect related to alcohol abuse/steatosis. Likely would benefit from OP GI follow-up for further liver eval if agreeable  #Lactic acidosis: Resolved  # Hypokalemia, hypomagnesemia replacement protocol ordered recheck tomorrow    Chronic Conditions (reviewed and stable unless otherwise stated)   #Hx hyponatremia:  Intermittent hyponatremia noted on lab trend. Likely related to alcohol use/beer potomania. Na normal on admission, will monitor              #Asthma: Per chart review. No home inhalers noted  #Chronic tobacco use: Nicotine patch prn, advise cessation  #HTN: Toprol continued, Entresto held  #HLD: Statin  #Prior CVA?: Old lacunar infarcts noted on bilateral basal ganglia/L cerebellum. ?contribution to recurrent falls. No home antiplatelets noted      LDA: []CVC / []PICC / []Midline / []Jamison / []Drains / []Mediport / [x]None  Antibiotics: None  Steroids: None  Labs (still needed?): [x]Yes / []No  IVF (still needed?): []Yes / [x]No    Level of care: []Step Down / [x]Med-Surg  Bed Status: [x]Inpatient / []Observation  Telemetry: [x]Yes / []No  PT/OT: [x]Yes / []No    DVT Prophylaxis: [] Lovenox / [] Heparin / [] SCDs / [x] Already on Systemic Anticoagulation / [] None     Expected discharge date: Likely tomorrow  Disposition: Pending SNF placement     Code status: Full Code     ===================================================================    Chief Complaint: Fall  Subjective (past 24 hours):   Patient was sitting in the chair looks comfortable  Had a lot of drinks in front of him Pepsi and Coca-Cola and couple cups of water he said he had been feeling thirsty and he is drinking a lot  Appetite is good and tolerating diet  We did discuss that 
of Assist for Transfers: Independent  Active : No  Prior Level of Assist for Ambulation: Independent community ambulator, with or without device  Has the patient had two or more falls in the past year or any fall with injury in the past year?: Yes    Restrictions/Precautions:  Restrictions/Precautions: Weight Bearing, Fall Risk  Right Upper Extremity Weight Bearing: Non Weight Bearing  Right Upper Extremity Brace/Splint: sling and swathe  Required Braces or Orthoses  Right Upper Extremity Brace/Splint: Sling  Right Upper Extremity Brace/Splint: sling and swathe     SUBJECTIVE: pt asking to return to bed on arrival, he needed increased cues for safety during session w/ decreased carryover of cues, noted blood on his sheet in chair upon standing nursing came in to look and pt does have a sore on his buttocks - encouraged and did place him on his side and nursing applied cream at end of session- education pt on frequent positional changes  - did adjust pts sling and swath prior to mobiliey     PAIN: pt voiced minimal pain however noted mod edema in right UE did place ice to UE at end of session     Vitals:  nursing got BP reading during session 138/51    OBJECTIVE:  Bed Mobility:  Sit to Supine: Moderate Assistance, pt impulsively laid down and went straight back vs to his side, I had him sit back up w/ max assist then lay back down, once in bed pt was dependent to boost up in bed    Rolling right and left w/o any use of rail with mod assist- extra time spent for optimal positioning and to apply cream to buttocks   Transfers:  Sit to Stand: Minimal Assistance, to CGA with wide base of support   Stand to Sit:Minimal Assistance, to CGA - decreased control, pt will sit w/o warning     Ambulation:  Contact Guard Assistance  Distance: 15x1  Surface: Level Tile  Device: Bear-Walker  Gait Deviations: slow abdiel with cues for bear walker placement and sequencing he would often bump his foot on the walker  
Seated abduction/adduction.  Exercises were completed for increased independence with functional mobility.    Functional Outcome Measures:  [facility] (6 CLICK) BASIC MOBILITY     AM-PAC Inpatient Mobility without Stair Climbing Raw Score : 15  AM-PAC Inpatient without Stair Climbing T-Scale Score : 43.03     Modified [place]:  Current Functional Status:  Not Applicable    ASSESSMENT:  Assessment: Patient progressing toward established goals.  Activity Tolerance:  Patient tolerance of  treatment:Fair.  Plan: Current Treatment Recommendations: Strengthening, Balance training, Functional mobility training, Transfer training, Gait training, Neuromuscular re-education, Endurance training, Pain management, Home exercise program, Safety education & training, Patient/Caregiver education & training, Therapeutic activities  General Plan:  (5x O)    Education:  Learners: Patient  Patient Education: Transfers, Gait    Goals:  Patient Goals : None stated  Short Term Goals  Time Frame for Short Term Goals: by discharge  Short Term Goal 1: Patient to transfer sit<>supine with (I) with bed flat and without rail.  Short Term Goal 2: Patient to transfer sit <>stand with walker with SBA.  Short Term Goal 3: Patient to ambulate with hemiwalker/cane times 30 feet with SBA.  Short Term Goal 4: Patient to stand for duration of >5 minutes with functional reach activity with SBA to increase stability and activity tolerance.  Short Term Goal 5: Patient score >19/28 on Tinetti to indicate decrease in fall risk.  Long Term Goals  Time Frame for Long Term Goals : n/a due to short length of stay    Following session, patient left in safe position call light within reach and w/ bed alarm          
extremity edema.   Abdomen: Soft, non-tender, non-distended , no palpable masses  Skin: Warm and dry. No rashes or lesions.  Neurologic: Awake alert but disoriented , forgetful ,grossly intact, nonfocal.     MSK : Right shoulder in a sling with limited range of motion due to pain, no overt swelling noted        Labs/Radiology: See chart or assessment above.             Electronically signed by Mustapha aCssidy DO on 5/21/2025 at 8:50 AM    
education & training, Patient/Caregiver education & training, Therapeutic activities  General Plan:  (5x O)    Goals:  Patient Goals : None stated  Short Term Goals  Time Frame for Short Term Goals: by discharge  Short Term Goal 1: Patient to transfer sit<>supine with (I) with bed flat and without rail.  Short Term Goal 2: Patient to transfer sit <>stand with walker with SBA.  Short Term Goal 3: Patient to ambulate with hemiwalker/cane times 30 feet with SBA.  Short Term Goal 4: Patient to stand for duration of >5 minutes with functional reach activity with SBA to increase stability and activity tolerance.  Short Term Goal 5: Patient score >19/28 on Tinetti to indicate decrease in fall risk.  Long Term Goals  Time Frame for Long Term Goals : n/a due to short length of stay    Following session, patient left in safe position with all fall risk precautions in place.

## 2025-05-22 NOTE — CARE COORDINATION
5/22/25, 10:16 AM EDT    Patient goals/plan/ treatment preferences discussed by  and .  Patient goals/plan/ treatment preferences reviewed with patient/ family.  Patient/ family verbalize understanding of discharge plan and are in agreement with goal/plan/treatment preferences.  Understanding was demonstrated using the teach back method.  AVS provided by RN at time of discharge, which includes all necessary medical information pertaining to the patients current course of illness, treatment, post-discharge goals of care, and treatment preferences.     Services At/After Discharge: Skilled Nursing Facility (SNF)    Care [facility]

## 2025-05-22 NOTE — CARE COORDINATION
5/22/25, 7:19 AM EDT    DISCHARGE PLANNING EVALUATION    Care Core precert approved, can accept today.   Transport available at 10:00 this morning if ready for discharge

## 2025-05-22 NOTE — PLAN OF CARE
Problem: Chronic Conditions and Co-morbidities  Goal: Patient's chronic conditions and co-morbidity symptoms are monitored and maintained or improved  5/22/2025 0001 by Tiffany Morris RN  Outcome: Progressing  Flowsheets (Taken 5/21/2025 0807 by Gloria Alvarenga RN)  Care Plan - Patient's Chronic Conditions and Co-Morbidity Symptoms are Monitored and Maintained or Improved:   Monitor and assess patient's chronic conditions and comorbid symptoms for stability, deterioration, or improvement   Collaborate with multidisciplinary team to address chronic and comorbid conditions and prevent exacerbation or deterioration  5/21/2025 1745 by Gloria Alvarenga RN  Outcome: Progressing  Flowsheets (Taken 5/21/2025 0807)  Care Plan - Patient's Chronic Conditions and Co-Morbidity Symptoms are Monitored and Maintained or Improved:   Monitor and assess patient's chronic conditions and comorbid symptoms for stability, deterioration, or improvement   Collaborate with multidisciplinary team to address chronic and comorbid conditions and prevent exacerbation or deterioration     Problem: Discharge Planning  Goal: Discharge to home or other facility with appropriate resources  5/22/2025 0001 by Tiffany Morris RN  Outcome: Progressing  Flowsheets (Taken 5/21/2025 0807 by Gloria Alvarenga RN)  Discharge to home or other facility with appropriate resources:   Identify barriers to discharge with patient and caregiver   Arrange for needed discharge resources and transportation as appropriate   Identify discharge learning needs (meds, wound care, etc)  5/21/2025 1745 by Gloria Alvarenga RN  Outcome: Progressing  Flowsheets (Taken 5/21/2025 0807)  Discharge to home or other facility with appropriate resources:   Identify barriers to discharge with patient and caregiver   Arrange for needed discharge resources and transportation as appropriate   Identify discharge learning needs (meds, wound care, etc)     Problem: Safety - Adult  Goal: Free

## 2025-05-22 NOTE — PLAN OF CARE
Problem: Chronic Conditions and Co-morbidities  Goal: Patient's chronic conditions and co-morbidity symptoms are monitored and maintained or improved  5/22/2025 1002 by Christiano Osei RN  Outcome: Completed     Problem: Discharge Planning  Goal: Discharge to home or other facility with appropriate resources  5/22/2025 1002 by Christiano Osei RN  Outcome: Completed     Problem: Safety - Adult  Goal: Free from fall injury  5/22/2025 1002 by Christiano Osei RN  Outcome: Completed     Problem: ABCDS Injury Assessment  Goal: Absence of physical injury  5/22/2025 1002 by Christiano Osei RN  Outcome: Completed     Problem: Skin/Tissue Integrity  Goal: Skin integrity remains intact  Description: 1.  Monitor for areas of redness and/or skin breakdown2.  Assess vascular access sites hourly3.  Every 4-6 hours minimum:  Change oxygen saturation probe site4.  Every 4-6 hours:  If on nasal continuous positive airway pressure, respiratory therapy assess nares and determine need for appliance change or resting period  5/22/2025 1002 by Christiano Osei RN  Outcome: Completed     Problem: Cardiovascular - Adult  Goal: Absence of cardiac dysrhythmias or at baseline  5/22/2025 1002 by Christiano Osei RN  Outcome: Completed     Problem: Skin/Tissue Integrity - Adult  Goal: Skin integrity remains intact  Description: 1.  Monitor for areas of redness and/or skin breakdown2.  Assess vascular access sites hourly3.  Every 4-6 hours minimum:  Change oxygen saturation probe site4.  Every 4-6 hours:  If on nasal continuous positive airway pressure, respiratory therapy assess nares and determine need for appliance change or resting period  5/22/2025 1002 by Christiano Osei RN  Outcome: Completed     Problem: Skin/Tissue Integrity - Adult  Goal: Incisions, wounds, or drain sites healing without S/S of infection  5/22/2025 1002 by Christiano Osei RN  Outcome: Completed   Care plan reviewed with patient and patient verbalized understanding of the plan

## 2025-05-22 NOTE — DISCHARGE SUMMARY
Greatest spondylosis above without evidence of high-grade lumbar canal    stenosis.      This document has been electronically signed by: Bhupinder Orr DO on    05/18/2025 03:30 AM      All CTs at this facility use dose modulation techniques and iterative    reconstructions, and/or weight-based dosing   when appropriate to reduce radiation to a low as reasonably achievable.      XR CHEST PORTABLE   Final Result   Impression:   No consolidation or CHF.   Impacted, comminuted multipart fracture of the right humeral head and neck.      This document has been electronically signed by: Bud Sharpe MD on    05/18/2025 01:47 AM             Consults:     IP CONSULT TO ADDICTION SERVICES  IP CONSULT TO SOCIAL WORK  IP CONSULT TO ADDICTION SERVICES  IP CONSULT TO SPIRITUAL SERVICES  IP CONSULT TO ORTHOPEDIC SURGERY  IP CONSULT TO ORTHOPEDIC SURGERY  IP CONSULT TO HOSPITALIST    Disposition:    [] Home       [] TCU       [] Rehab       [] Psych       [x] SNF       [] Long Term Care Facility       [] Other-    Condition at Discharge: Stable    Code Status:  Full Code     Patient Instructions:    Discharge lab work: bmp/cbc  Activity: activity as tolerated  Diet: ADULT DIET; Regular; 2000 ml  ADULT ORAL NUTRITION SUPPLEMENT; Breakfast, Lunch, Dinner; Low Calorie/High Protein Oral Supplement      Follow-up visits:   [facility]  [address] [id number] [phone]  Schedule an appointment as soon as possible for a visit in 1 week(s)  [facility] please call to schedule a 1 week hospital follow up appointment.    Michel Valenzuela MD  [facility] [address] [place] [address] [phone]    Schedule an appointment as soon as possible for a visit  Hospital follow up appointment, Appt time    [id number] [place] [id number] [phone]             Discharge Medications:        Medication List        CONTINUE taking these medications      apixaban 5

## 2025-05-27 LAB
BASOPHILS ABSOLUTE: 0.1 /ΜL
BASOPHILS RELATIVE PERCENT: 1.2 %
BUN BLDV-MCNC: 13 MG/DL
CALCIUM SERPL-MCNC: 8.3 MG/DL
CHLORIDE BLD-SCNC: 102 MMOL/L
CHOLESTEROL, TOTAL: 111 MG/DL
CHOLESTEROL/HDL RATIO: ABNORMAL
CO2: 27 MMOL/L
CREAT SERPL-MCNC: 0.7 MG/DL
EGFR: NORMAL
EOSINOPHILS ABSOLUTE: 0.2 /ΜL
EOSINOPHILS RELATIVE PERCENT: 2.6 %
GLUCOSE BLD-MCNC: 123 MG/DL
HCT VFR BLD CALC: 35.5 % (ref 41–53)
HDLC SERPL-MCNC: 32 MG/DL (ref 35–70)
HEMOGLOBIN: 12 G/DL (ref 13.5–17.5)
LDL CHOLESTEROL: 65
LYMPHOCYTES ABSOLUTE: 0.8 /ΜL
LYMPHOCYTES RELATIVE PERCENT: 13.1 %
MCH RBC QN AUTO: 33.6 PG
MCHC RBC AUTO-ENTMCNC: 33.8 G/DL
MCV RBC AUTO: 99.4 FL
MONOCYTES ABSOLUTE: 0.6 /ΜL
MONOCYTES RELATIVE PERCENT: 9.4 %
NEUTROPHILS ABSOLUTE: 4.7 /ΜL
NEUTROPHILS RELATIVE PERCENT: 73.7 %
NONHDLC SERPL-MCNC: 14 MG/DL
PDW BLD-RTO: 14.3 %
PLATELET # BLD: 300 K/ΜL
PMV BLD AUTO: 8.6 FL
POTASSIUM SERPL-SCNC: 4.1 MMOL/L
RBC # BLD: 3.57 10^6/ΜL
SODIUM BLD-SCNC: 139 MMOL/L
TRIGL SERPL-MCNC: 69 MG/DL
VLDLC SERPL CALC-MCNC: ABNORMAL MG/DL
WBC # BLD: 6.4 10^3/ML

## 2025-05-28 PROCEDURE — 93297 REM INTERROG DEV EVAL ICPMS: CPT | Performed by: INTERNAL MEDICINE

## 2025-05-29 ENCOUNTER — OFFICE VISIT (OUTPATIENT)
Dept: CARDIOLOGY CLINIC | Age: 70
End: 2025-05-29
Payer: COMMERCIAL

## 2025-05-29 VITALS
HEIGHT: 62 IN | BODY MASS INDEX: 20.98 KG/M2 | SYSTOLIC BLOOD PRESSURE: 125 MMHG | WEIGHT: 114 LBS | HEART RATE: 94 BPM | DIASTOLIC BLOOD PRESSURE: 81 MMHG

## 2025-05-29 DIAGNOSIS — Z72.0 TOBACCO ABUSE: ICD-10-CM

## 2025-05-29 DIAGNOSIS — I25.10 CORONARY ARTERY DISEASE INVOLVING NATIVE CORONARY ARTERY OF NATIVE HEART WITHOUT ANGINA PECTORIS: Primary | ICD-10-CM

## 2025-05-29 DIAGNOSIS — I25.5 ISCHEMIC CARDIOMYOPATHY: ICD-10-CM

## 2025-05-29 DIAGNOSIS — Z95.810 AICD (AUTOMATIC CARDIOVERTER/DEFIBRILLATOR) PRESENT: ICD-10-CM

## 2025-05-29 DIAGNOSIS — I48.0 PAROXYSMAL ATRIAL FIBRILLATION (HCC): ICD-10-CM

## 2025-05-29 DIAGNOSIS — I10 ESSENTIAL HYPERTENSION: ICD-10-CM

## 2025-05-29 DIAGNOSIS — E78.5 DYSLIPIDEMIA: ICD-10-CM

## 2025-05-29 PROBLEM — I50.22 CHRONIC SYSTOLIC CONGESTIVE HEART FAILURE (HCC): Status: RESOLVED | Noted: 2025-04-01 | Resolved: 2025-05-29

## 2025-05-29 PROBLEM — R07.9 CHEST PAIN: Status: RESOLVED | Noted: 2025-04-05 | Resolved: 2025-05-29

## 2025-05-29 PROBLEM — R94.31 ST SEGMENT DEPRESSION: Status: RESOLVED | Noted: 2025-05-18 | Resolved: 2025-05-29

## 2025-05-29 PROBLEM — I50.32 CHRONIC HEART FAILURE WITH PRESERVED EJECTION FRACTION (HCC): Status: RESOLVED | Noted: 2019-11-25 | Resolved: 2025-05-29

## 2025-05-29 PROCEDURE — 99215 OFFICE O/P EST HI 40 MIN: CPT | Performed by: INTERNAL MEDICINE

## 2025-05-29 PROCEDURE — 1160F RVW MEDS BY RX/DR IN RCRD: CPT | Performed by: INTERNAL MEDICINE

## 2025-05-29 PROCEDURE — 3079F DIAST BP 80-89 MM HG: CPT | Performed by: INTERNAL MEDICINE

## 2025-05-29 PROCEDURE — 1124F ACP DISCUSS-NO DSCNMKR DOCD: CPT | Performed by: INTERNAL MEDICINE

## 2025-05-29 PROCEDURE — 1159F MED LIST DOCD IN RCRD: CPT | Performed by: INTERNAL MEDICINE

## 2025-05-29 PROCEDURE — 3074F SYST BP LT 130 MM HG: CPT | Performed by: INTERNAL MEDICINE

## 2025-05-29 RX ORDER — METOPROLOL SUCCINATE 50 MG/1
50 TABLET, EXTENDED RELEASE ORAL EVERY EVENING
Qty: 90 TABLET | Refills: 2 | Status: SHIPPED | OUTPATIENT
Start: 2025-05-29

## 2025-05-29 NOTE — PROGRESS NOTES
Chief Complaint   Patient presents with    Follow-Up from Hospital    Congestive Heart Failure    Coronary Artery Disease       Originally Seen in hospital for new CHF and new CMP and New onset atr fib  Had cath and sent home on vest and oMT    Last seen sept 2022  Cardiology note 03/2025  Traumatic fall, prox humeral fx  may 2025  ICD shock x7 03/2025  Will have EP evaluate for any device adjustments or antiarrhythmic medications.   Ep note 03/2025    . AICD discharge, appears inappropriate. He was entirely asymptomatic. Intracardiac EGM reviewed, HR around 180s. Near field and far field EGM very similar to presenting/baseline sinus rhythm EGM, suggestive of SVT vs atrial arrhythmia. After shock, morphology changed, which is expected. CL is very consistent, making AF unlikely.  -350ms.  - Keel K >4, and Mg >2. Consider adding aldactone  - Reduce entresto dose to allow for more BP to increase metoprolol   - stop coreg  - Will want to adjust ICD parameters to increase treatment threshold above 200.   - Consider adding atrial lead if he has increasing arrhythmia burden     Came today hospital F/u and re-stablish care after 2.5 yrs    EKG done 5-.    Denied cp,  palpitations, dizziness or edema    Sob on exertion stable    No wt gain    Record reviewed    Patient Seen, Chart, Consults notes, Labs, Radiology studies reviewed.    Has beed on asa/plavix and apixaban now  On apixaban and asa      Feel good today and post D/c    Patient Active Problem List   Diagnosis    Tobacco abuse    Essential hypertension    Paroxysmal atrial fibrillation (HCC)    Abdominal aortic aneurysm (AAA) without rupture    Coronary artery disease involving native coronary artery of native heart without angina pectoris    Dyslipidemia    AICD (automatic cardioverter/defibrillator) present    Anticoagulated on Eliquis    Chronic alcohol abuse    Generalized weakness    Closed nondisplaced fracture of greater trochanter of left femur
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
## 2025-07-03 NOTE — PROGRESS NOTES
Results reviewed:  BNP: No results found for: \"BNP\"  CBC:   Lab Results   Component Value Date/Time    WBC 6.4 05/27/2025 09:15 AM    RBC 3.57 05/27/2025 09:15 AM    HGB 12.0 05/27/2025 09:15 AM    HCT 35.5 05/27/2025 09:15 AM     05/27/2025 09:15 AM     CMP:    Lab Results   Component Value Date/Time     05/27/2025 09:17 AM    K 4.1 05/27/2025 09:17 AM    K 3.4 05/19/2025 06:55 AM     05/27/2025 09:17 AM    CO2 27 05/27/2025 09:17 AM    BUN 13 05/27/2025 09:17 AM    CREATININE 0.7 05/27/2025 09:17 AM    LABGLOM > 90 05/22/2025 06:40 AM    LABGLOM >60 07/18/2023 03:50 PM    GLUCOSE 123 05/27/2025 09:17 AM    GLUCOSE 59 12/31/2019 02:52 PM    CALCIUM 8.3 05/27/2025 09:17 AM     Hepatic Function Panel:    Lab Results   Component Value Date/Time    ALKPHOS 60 05/20/2025 07:33 AM    ALT 28 05/20/2025 07:33 AM    AST 40 05/20/2025 07:33 AM    BILITOT 0.6 05/20/2025 07:33 AM    BILIDIR 0.5 05/19/2025 06:55 AM    IBILI 0.6 05/18/2025 09:40 AM     Magnesium:    Lab Results   Component Value Date/Time    MG 1.8 05/20/2025 07:33 AM     PT/INR:    Lab Results   Component Value Date/Time    INR 1.03 05/18/2025 12:35 AM     Lipids:    Lab Results   Component Value Date/Time    TRIG 69 05/27/2025 09:16 AM    HDL 32 05/27/2025 09:16 AM       ASSESSMENT AND PLAN:      Diagnosis Orders   1. Chronic combined systolic and diastolic CHF (congestive heart failure) (HCC)        2. Ischemic cardiomyopathy        3. AICD (automatic cardioverter/defibrillator) present        4. Essential hypertension            Continue:  GDMT:              ACE/ARB/ARNI - Entresto 49/51 1/2 tablet BID              BB - toprol 50 mg daily              Diuretic - none  AA - no, due to hyperkalemia  SGLT2 -  none  Vasodilator - none  Continue Current medications: Eliquis, lipitor    HFrEF 35-40%, NYHA II  ICM  ICD   CAD s/p PCI (11/2019)  PAF - on Eliquis  HTN -     Stable, appears Euvolemic  Lab reviewed - stable -none recent  BMP, CBC

## 2025-07-08 ENCOUNTER — OFFICE VISIT (OUTPATIENT)
Dept: CARDIOLOGY CLINIC | Age: 70
End: 2025-07-08
Payer: COMMERCIAL

## 2025-07-08 VITALS
HEART RATE: 78 BPM | SYSTOLIC BLOOD PRESSURE: 122 MMHG | BODY MASS INDEX: 20.8 KG/M2 | DIASTOLIC BLOOD PRESSURE: 74 MMHG | OXYGEN SATURATION: 96 % | HEIGHT: 62 IN | WEIGHT: 113 LBS

## 2025-07-08 DIAGNOSIS — Z95.810 AICD (AUTOMATIC CARDIOVERTER/DEFIBRILLATOR) PRESENT: ICD-10-CM

## 2025-07-08 DIAGNOSIS — I25.5 ISCHEMIC CARDIOMYOPATHY: ICD-10-CM

## 2025-07-08 DIAGNOSIS — I10 ESSENTIAL HYPERTENSION: ICD-10-CM

## 2025-07-08 DIAGNOSIS — I50.42 CHRONIC COMBINED SYSTOLIC AND DIASTOLIC CHF (CONGESTIVE HEART FAILURE) (HCC): Primary | ICD-10-CM

## 2025-07-08 PROCEDURE — 1159F MED LIST DOCD IN RCRD: CPT | Performed by: STUDENT IN AN ORGANIZED HEALTH CARE EDUCATION/TRAINING PROGRAM

## 2025-07-08 PROCEDURE — 3078F DIAST BP <80 MM HG: CPT | Performed by: STUDENT IN AN ORGANIZED HEALTH CARE EDUCATION/TRAINING PROGRAM

## 2025-07-08 PROCEDURE — 1124F ACP DISCUSS-NO DSCNMKR DOCD: CPT | Performed by: STUDENT IN AN ORGANIZED HEALTH CARE EDUCATION/TRAINING PROGRAM

## 2025-07-08 PROCEDURE — 99214 OFFICE O/P EST MOD 30 MIN: CPT | Performed by: STUDENT IN AN ORGANIZED HEALTH CARE EDUCATION/TRAINING PROGRAM

## 2025-07-08 PROCEDURE — 3074F SYST BP LT 130 MM HG: CPT | Performed by: STUDENT IN AN ORGANIZED HEALTH CARE EDUCATION/TRAINING PROGRAM

## 2025-07-08 RX ORDER — ACETAMINOPHEN 325 MG/1
650 TABLET ORAL EVERY 4 HOURS PRN
COMMUNITY

## 2025-07-08 NOTE — PATIENT INSTRUCTIONS
You may receive a survey regarding the care you received during your visit.  Your input is valuable to us.  We encourage you to complete and return your survey.  We hope you will choose us in the future for your healthcare needs.    Your nurses today were [facility] and [place].  Office hours:   Mon-Thurs 8-4:30  Friday 8-12  Phone: [phone]    Continue:  Continue current medications  Daily weights and record  Fluid restriction of 2 Liters per day  Limit sodium in diet to around 3952-1700 mg/day  Monitor BP    Call the [facility] for any of the following symptoms:   Weight gain of 3 pounds in 1 day or 5 pounds in 1 week  Increased shortness of breath  Shortness of breath while laying down  Chest pain  Swelling in feet, ankles or legs  Bloating in abdomen  Fatigue

## 2025-08-13 PROCEDURE — 93297 REM INTERROG DEV EVAL ICPMS: CPT | Performed by: INTERNAL MEDICINE

## (undated) DEVICE — KIT ANGIO W/ AT P65 PREM HND CTRL FOR CNTRST DEL ANGIOTOUCH

## (undated) DEVICE — Device

## (undated) DEVICE — GUIDEWIRE VASC L260CM DIA0.035IN RAD 3MM J TIP L7CM PTFE

## (undated) DEVICE — BAND COMPR L24CM REG CLR PLAS HEMSTAT EXT HK AND LOOP RETEN

## (undated) DEVICE — TIBURON NEONATAL DRAPE: Brand: CONVERTORS

## (undated) DEVICE — GLIDESHEATH SLENDER NITINOL HYDROPHILIC COATED INTRODUCER SHEATH: Brand: GLIDESHEATH SLENDER

## (undated) DEVICE — DRAPE KIT RAMAPR RADIATION SHIELD

## (undated) DEVICE — CATHETER DIAG 5FR L100CM LUMN ID0.047IN JR4 CRV 0 SIDE H

## (undated) DEVICE — CATHETER DIAG 5FR L100CM LUMN ID0.047IN JL3.5 CRV 0 SIDE H

## (undated) DEVICE — KIT MFLD ISOLATN NACL CNTRST PRT TBNG SPIK W/ PRSS TRNSDUC